# Patient Record
Sex: FEMALE | Race: WHITE | Employment: UNEMPLOYED | URBAN - NONMETROPOLITAN AREA
[De-identification: names, ages, dates, MRNs, and addresses within clinical notes are randomized per-mention and may not be internally consistent; named-entity substitution may affect disease eponyms.]

---

## 2019-09-18 ENCOUNTER — HOSPITAL ENCOUNTER (OUTPATIENT)
Dept: MAMMOGRAPHY | Age: 54
Discharge: HOME | End: 2019-09-18
Payer: COMMERCIAL

## 2019-09-18 ENCOUNTER — HOSPITAL ENCOUNTER (OUTPATIENT)
Dept: GENERAL RADIOLOGY | Age: 54
Discharge: HOME | End: 2019-09-18
Payer: COMMERCIAL

## 2019-09-18 DIAGNOSIS — R52 PAIN: ICD-10-CM

## 2019-09-18 DIAGNOSIS — Z12.39 SCREENING BREAST EXAMINATION: ICD-10-CM

## 2019-09-18 PROCEDURE — 77063 BREAST TOMOSYNTHESIS BI: CPT

## 2019-09-18 PROCEDURE — 74018 RADEX ABDOMEN 1 VIEW: CPT

## 2019-09-19 ENCOUNTER — HOSPITAL ENCOUNTER (OUTPATIENT)
Age: 54
Setting detail: SPECIMEN
Discharge: HOME | End: 2019-09-19
Payer: COMMERCIAL

## 2019-09-19 LAB
ABSOLUTE EOS #: 0.1 K/UL (ref 0–0.4)
ABSOLUTE IMMATURE GRANULOCYTE: ABNORMAL K/UL (ref 0–0.3)
ABSOLUTE LYMPH #: 3.5 K/UL (ref 1–4.8)
ABSOLUTE MONO #: 0.7 K/UL (ref 0.1–1.2)
ALBUMIN SERPL-MCNC: 4.2 G/DL (ref 3.5–5.2)
ALBUMIN/GLOBULIN RATIO: 1.1 (ref 1–2.5)
ALP BLD-CCNC: 132 U/L (ref 35–104)
ALT SERPL-CCNC: 39 U/L (ref 5–33)
ANION GAP SERPL CALCULATED.3IONS-SCNC: 14 MMOL/L (ref 9–17)
AST SERPL-CCNC: 32 U/L
BASOPHILS # BLD: 2 % (ref 0–1)
BASOPHILS ABSOLUTE: 0.3 K/UL (ref 0–0.2)
BILIRUB SERPL-MCNC: 0.46 MG/DL (ref 0.3–1.2)
BILIRUBIN DIRECT: 0.12 MG/DL
BILIRUBIN, INDIRECT: 0.34 MG/DL (ref 0–1)
BUN BLDV-MCNC: 6 MG/DL (ref 6–20)
BUN/CREAT BLD: 11 (ref 9–20)
CALCIUM SERPL-MCNC: 9.4 MG/DL (ref 8.6–10.4)
CHLORIDE BLD-SCNC: 101 MMOL/L (ref 98–107)
CHOLESTEROL/HDL RATIO: 5.7
CHOLESTEROL: 199 MG/DL
CO2: 24 MMOL/L (ref 20–31)
CREAT SERPL-MCNC: 0.56 MG/DL (ref 0.5–0.9)
DIFFERENTIAL TYPE: ABNORMAL
EOSINOPHILS RELATIVE PERCENT: 1 % (ref 1–7)
ESTIMATED AVERAGE GLUCOSE: 114 MG/DL
GFR AFRICAN AMERICAN: >60 ML/MIN
GFR NON-AFRICAN AMERICAN: >60 ML/MIN
GFR SERPL CREATININE-BSD FRML MDRD: ABNORMAL ML/MIN/{1.73_M2}
GFR SERPL CREATININE-BSD FRML MDRD: ABNORMAL ML/MIN/{1.73_M2}
GLOBULIN: 3.7 G/DL (ref 1.5–3.8)
GLUCOSE BLD-MCNC: 110 MG/DL (ref 70–99)
HBA1C MFR BLD: 5.6 % (ref 4.8–5.9)
HCT VFR BLD CALC: 45.3 % (ref 36–46)
HDLC SERPL-MCNC: 35 MG/DL
HEMOGLOBIN: 15.2 G/DL (ref 12–16)
IMMATURE GRANULOCYTES: ABNORMAL %
LDL CHOLESTEROL: 118 MG/DL (ref 0–130)
LYMPHOCYTES # BLD: 30 % (ref 16–46)
MCH RBC QN AUTO: 31.8 PG (ref 26–34)
MCHC RBC AUTO-ENTMCNC: 33.6 G/DL (ref 31–37)
MCV RBC AUTO: 94.6 FL (ref 80–100)
MONOCYTES # BLD: 6 % (ref 4–11)
NRBC AUTOMATED: ABNORMAL PER 100 WBC
PDW BLD-RTO: 13.3 % (ref 11–14.5)
PLATELET # BLD: 374 K/UL (ref 140–450)
PLATELET ESTIMATE: ABNORMAL
PMV BLD AUTO: 8.5 FL (ref 6–12)
POTASSIUM SERPL-SCNC: 4.2 MMOL/L (ref 3.7–5.3)
RBC # BLD: 4.79 M/UL (ref 4–5.2)
RBC # BLD: ABNORMAL 10*6/UL
SEG NEUTROPHILS: 61 % (ref 43–77)
SEGMENTED NEUTROPHILS ABSOLUTE COUNT: 7.2 K/UL (ref 1.8–7.7)
SODIUM BLD-SCNC: 139 MMOL/L (ref 135–144)
TOTAL PROTEIN: 7.9 G/DL (ref 6.4–8.3)
TRIGL SERPL-MCNC: 232 MG/DL
TSH SERPL DL<=0.05 MIU/L-ACNC: 3.5 MIU/L (ref 0.3–5)
VLDLC SERPL CALC-MCNC: ABNORMAL MG/DL (ref 1–30)
WBC # BLD: 11.8 K/UL (ref 3.5–11)
WBC # BLD: ABNORMAL 10*3/UL

## 2019-09-19 PROCEDURE — 80076 HEPATIC FUNCTION PANEL: CPT

## 2019-09-19 PROCEDURE — 84443 ASSAY THYROID STIM HORMONE: CPT

## 2019-09-19 PROCEDURE — 80061 LIPID PANEL: CPT

## 2019-09-19 PROCEDURE — 80048 BASIC METABOLIC PNL TOTAL CA: CPT

## 2019-09-19 PROCEDURE — 85025 COMPLETE CBC W/AUTO DIFF WBC: CPT

## 2019-09-19 PROCEDURE — 83036 HEMOGLOBIN GLYCOSYLATED A1C: CPT

## 2019-09-25 ENCOUNTER — HOSPITAL ENCOUNTER (OUTPATIENT)
Dept: WOMENS IMAGING | Age: 54
Discharge: HOME | End: 2019-09-25
Payer: COMMERCIAL

## 2019-09-25 DIAGNOSIS — R92.2 BREAST DENSITY: ICD-10-CM

## 2019-09-25 PROCEDURE — 76642 ULTRASOUND BREAST LIMITED: CPT

## 2019-09-25 PROCEDURE — G0279 TOMOSYNTHESIS, MAMMO: HCPCS

## 2019-10-08 ENCOUNTER — HOSPITAL ENCOUNTER (OUTPATIENT)
Age: 54
Discharge: HOME | End: 2019-10-08
Payer: COMMERCIAL

## 2019-10-10 ENCOUNTER — NURSE ONLY (OUTPATIENT)
Dept: LAB | Age: 54
End: 2019-10-10

## 2019-10-11 LAB
FERRITIN: 119 NG/ML (ref 10–291)
HBV SURFACE AB TITR SER: NEGATIVE {TITER}
HEPATITIS B SURFACE ANTIGEN: NEGATIVE
HEPATITIS C ANTIBODY: NEGATIVE
IGA: 249 MG/DL (ref 70–400)
IRON: 62 UG/DL (ref 50–170)
TOTAL IRON BINDING CAPACITY: 300 UG/DL (ref 171–450)

## 2019-10-13 LAB
ALPHA-1 ANTITRYPSIN: 133 MG/DL (ref 90–200)
CERULOPLASMIN: 30 MG/DL (ref 17–54)
GLIADIN PEPTIDE IGG, IGA: NORMAL
HAV AB SERPL IA-ACNC: POSITIVE
HEPATITIS B CORE TOTAL ANTIBODY: NEGATIVE
TISSUE TRANSGLUTAMINASE ANTIBODY: 1 U/ML (ref 0–5)
TISSUE TRANSGLUTAMINASE IGA: 1 U/ML (ref 0–3)

## 2019-10-14 ENCOUNTER — HOSPITAL ENCOUNTER (OUTPATIENT)
Dept: CT IMAGING | Age: 54
Discharge: HOME | End: 2019-10-14
Payer: COMMERCIAL

## 2019-10-14 DIAGNOSIS — R16.0 ENLARGED LIVER: ICD-10-CM

## 2019-10-14 DIAGNOSIS — R22.1 LUMP ON NECK: ICD-10-CM

## 2019-10-14 PROCEDURE — 70491 CT SOFT TISSUE NECK W/DYE: CPT

## 2019-10-14 PROCEDURE — 6360000004 HC RX CONTRAST MEDICATION: Performed by: NURSE PRACTITIONER

## 2019-10-14 PROCEDURE — 74177 CT ABD & PELVIS W/CONTRAST: CPT

## 2019-10-14 RX ADMIN — IOPAMIDOL 100 ML: 755 INJECTION, SOLUTION INTRAVENOUS at 18:41

## 2019-10-14 RX ADMIN — IOHEXOL 50 ML: 240 INJECTION, SOLUTION INTRATHECAL; INTRAVASCULAR; INTRAVENOUS; ORAL at 18:41

## 2019-10-21 ENCOUNTER — NURSE ONLY (OUTPATIENT)
Dept: LAB | Age: 54
End: 2019-10-21

## 2019-10-24 LAB — FECAL FAT, QUALITATIVE: NORMAL

## 2019-10-25 LAB — PANCREATIC ELASTASE, FECAL: 201 UG/G

## 2019-11-06 LAB — MISC. #1 REFERENCE GROUP TEST: NORMAL

## 2019-11-13 ENCOUNTER — HOSPITAL ENCOUNTER (OUTPATIENT)
Age: 54
Discharge: HOME | End: 2019-11-13
Payer: COMMERCIAL

## 2019-11-13 ENCOUNTER — HOSPITAL ENCOUNTER (OUTPATIENT)
Dept: ULTRASOUND IMAGING | Age: 54
Discharge: HOME | End: 2019-11-13
Payer: COMMERCIAL

## 2019-11-13 DIAGNOSIS — R22.1 NECK MASS: ICD-10-CM

## 2019-11-13 PROCEDURE — 76536 US EXAM OF HEAD AND NECK: CPT

## 2019-11-14 ENCOUNTER — HOSPITAL ENCOUNTER (OUTPATIENT)
Age: 54
Setting detail: SPECIMEN
Discharge: HOME | End: 2019-11-14
Payer: COMMERCIAL

## 2019-11-14 LAB
ABSOLUTE EOS #: 0.2 K/UL (ref 0–0.4)
ABSOLUTE IMMATURE GRANULOCYTE: ABNORMAL K/UL (ref 0–0.3)
ABSOLUTE LYMPH #: 4.2 K/UL (ref 1–4.8)
ABSOLUTE MONO #: 0.7 K/UL (ref 0.1–1.2)
ALBUMIN SERPL-MCNC: 4.4 G/DL (ref 3.5–5.2)
ALBUMIN/GLOBULIN RATIO: 1.2 (ref 1–2.5)
ALP BLD-CCNC: 149 U/L (ref 35–104)
ALT SERPL-CCNC: 38 U/L (ref 5–33)
ANION GAP SERPL CALCULATED.3IONS-SCNC: 14 MMOL/L (ref 9–17)
AST SERPL-CCNC: 31 U/L
BASOPHILS # BLD: 3 % (ref 0–1)
BASOPHILS ABSOLUTE: 0.3 K/UL (ref 0–0.2)
BILIRUB SERPL-MCNC: 0.3 MG/DL (ref 0.3–1.2)
BILIRUBIN DIRECT: 0.11 MG/DL
BILIRUBIN, INDIRECT: 0.19 MG/DL (ref 0–1)
BUN BLDV-MCNC: 6 MG/DL (ref 6–20)
BUN/CREAT BLD: 12 (ref 9–20)
C-REACTIVE PROTEIN: 9.3 MG/L (ref 0–5)
CALCIUM SERPL-MCNC: 9.6 MG/DL (ref 8.6–10.4)
CHLORIDE BLD-SCNC: 96 MMOL/L (ref 98–107)
CO2: 26 MMOL/L (ref 20–31)
CREAT SERPL-MCNC: 0.52 MG/DL (ref 0.5–0.9)
DIFFERENTIAL TYPE: ABNORMAL
EOSINOPHILS RELATIVE PERCENT: 2 % (ref 1–7)
GFR AFRICAN AMERICAN: >60 ML/MIN
GFR NON-AFRICAN AMERICAN: >60 ML/MIN
GFR SERPL CREATININE-BSD FRML MDRD: ABNORMAL ML/MIN/{1.73_M2}
GFR SERPL CREATININE-BSD FRML MDRD: ABNORMAL ML/MIN/{1.73_M2}
GLOBULIN: 3.8 G/DL (ref 1.5–3.8)
GLUCOSE BLD-MCNC: 96 MG/DL (ref 70–99)
HCT VFR BLD CALC: 46.1 % (ref 36–46)
HEMOGLOBIN: 15.6 G/DL (ref 12–16)
IMMATURE GRANULOCYTES: ABNORMAL %
LYMPHOCYTES # BLD: 38 % (ref 16–46)
MCH RBC QN AUTO: 31.9 PG (ref 26–34)
MCHC RBC AUTO-ENTMCNC: 33.8 G/DL (ref 31–37)
MCV RBC AUTO: 94.4 FL (ref 80–100)
MONOCYTES # BLD: 7 % (ref 4–11)
NRBC AUTOMATED: ABNORMAL PER 100 WBC
PDW BLD-RTO: 13.3 % (ref 11–14.5)
PLATELET # BLD: 301 K/UL (ref 140–450)
PLATELET ESTIMATE: ABNORMAL
PMV BLD AUTO: 8.3 FL (ref 6–12)
POTASSIUM SERPL-SCNC: 4.3 MMOL/L (ref 3.7–5.3)
RBC # BLD: 4.88 M/UL (ref 4–5.2)
RBC # BLD: ABNORMAL 10*6/UL
RHEUMATOID FACTOR: <10 IU/ML
SEDIMENTATION RATE, ERYTHROCYTE: 13 MM (ref 0–30)
SEG NEUTROPHILS: 50 % (ref 43–77)
SEGMENTED NEUTROPHILS ABSOLUTE COUNT: 5.7 K/UL (ref 1.8–7.7)
SODIUM BLD-SCNC: 136 MMOL/L (ref 135–144)
TOTAL PROTEIN: 8.2 G/DL (ref 6.4–8.3)
TSH SERPL DL<=0.05 MIU/L-ACNC: 4.15 MIU/L (ref 0.3–5)
WBC # BLD: 11.2 K/UL (ref 3.5–11)
WBC # BLD: ABNORMAL 10*3/UL

## 2019-11-14 PROCEDURE — 80076 HEPATIC FUNCTION PANEL: CPT

## 2019-11-14 PROCEDURE — 85651 RBC SED RATE NONAUTOMATED: CPT

## 2019-11-14 PROCEDURE — 86140 C-REACTIVE PROTEIN: CPT

## 2019-11-14 PROCEDURE — 85025 COMPLETE CBC W/AUTO DIFF WBC: CPT

## 2019-11-14 PROCEDURE — 86431 RHEUMATOID FACTOR QUANT: CPT

## 2019-11-14 PROCEDURE — 86038 ANTINUCLEAR ANTIBODIES: CPT

## 2019-11-14 PROCEDURE — 84443 ASSAY THYROID STIM HORMONE: CPT

## 2019-11-14 PROCEDURE — 80048 BASIC METABOLIC PNL TOTAL CA: CPT

## 2019-11-15 LAB — ANTI-NUCLEAR ANTIBODY (ANA): NEGATIVE

## 2019-12-05 ENCOUNTER — HOSPITAL ENCOUNTER (OUTPATIENT)
Dept: ULTRASOUND IMAGING | Age: 54
Discharge: HOME | End: 2019-12-05
Payer: COMMERCIAL

## 2019-12-05 DIAGNOSIS — R10.9 ABDOMINAL PAIN, UNSPECIFIED ABDOMINAL LOCATION: ICD-10-CM

## 2019-12-05 DIAGNOSIS — R10.2 PELVIC PAIN: ICD-10-CM

## 2019-12-05 PROCEDURE — 76856 US EXAM PELVIC COMPLETE: CPT

## 2019-12-05 PROCEDURE — 76705 ECHO EXAM OF ABDOMEN: CPT

## 2020-01-29 ENCOUNTER — OFFICE VISIT (OUTPATIENT)
Dept: CARDIOLOGY CLINIC | Age: 55
End: 2020-01-29
Payer: COMMERCIAL

## 2020-01-29 VITALS
HEART RATE: 100 BPM | WEIGHT: 189 LBS | HEIGHT: 62 IN | DIASTOLIC BLOOD PRESSURE: 110 MMHG | SYSTOLIC BLOOD PRESSURE: 200 MMHG | BODY MASS INDEX: 34.78 KG/M2

## 2020-01-29 PROCEDURE — 93000 ELECTROCARDIOGRAM COMPLETE: CPT | Performed by: INTERNAL MEDICINE

## 2020-01-29 PROCEDURE — 99204 OFFICE O/P NEW MOD 45 MIN: CPT | Performed by: INTERNAL MEDICINE

## 2020-01-29 RX ORDER — METOPROLOL SUCCINATE 200 MG/1
TABLET, EXTENDED RELEASE ORAL
COMMUNITY
Start: 2020-01-08 | End: 2020-01-29 | Stop reason: ALTCHOICE

## 2020-01-29 RX ORDER — ALPRAZOLAM 0.25 MG/1
TABLET ORAL 3 TIMES DAILY PRN
Refills: 5 | COMMUNITY
Start: 2019-11-22 | End: 2021-06-14 | Stop reason: SDUPTHER

## 2020-01-29 RX ORDER — AMLODIPINE BESYLATE 10 MG/1
10 TABLET ORAL DAILY
Qty: 30 TABLET | Refills: 5 | Status: SHIPPED | OUTPATIENT
Start: 2020-01-29 | End: 2020-04-27

## 2020-01-29 RX ORDER — PANTOPRAZOLE SODIUM 40 MG/1
40 TABLET, DELAYED RELEASE ORAL 2 TIMES DAILY
Refills: 6 | COMMUNITY
Start: 2019-11-22 | End: 2021-06-04

## 2020-01-29 RX ORDER — METOPROLOL SUCCINATE 100 MG/1
TABLET, EXTENDED RELEASE ORAL
Refills: 5 | COMMUNITY
Start: 2019-11-22 | End: 2020-01-29

## 2020-01-29 RX ORDER — AMLODIPINE BESYLATE 5 MG/1
TABLET ORAL
Refills: 11 | COMMUNITY
Start: 2019-10-28 | End: 2020-01-29

## 2020-01-29 RX ORDER — CARVEDILOL 12.5 MG/1
12.5 TABLET ORAL DAILY
Qty: 30 TABLET | Refills: 5 | Status: SHIPPED | OUTPATIENT
Start: 2020-01-29 | End: 2020-02-06 | Stop reason: SDUPTHER

## 2020-01-29 RX ORDER — SUCRALFATE 1 G/1
1 TABLET ORAL PRN
COMMUNITY
Start: 2019-12-17 | End: 2021-03-24

## 2020-01-29 RX ORDER — CLONIDINE HYDROCHLORIDE 0.1 MG/1
0.1 TABLET ORAL 2 TIMES DAILY
Qty: 60 TABLET | Refills: 3 | Status: SHIPPED | OUTPATIENT
Start: 2020-01-29 | End: 2020-07-01

## 2020-01-29 RX ORDER — FUROSEMIDE 20 MG/1
20 TABLET ORAL DAILY
Qty: 90 TABLET | Refills: 1 | Status: SHIPPED
Start: 2020-01-29 | End: 2020-02-19 | Stop reason: ALTCHOICE

## 2020-01-29 RX ORDER — CLONIDINE HYDROCHLORIDE 0.2 MG/1
0.2 TABLET ORAL 2 TIMES DAILY
COMMUNITY
Start: 2019-12-27 | End: 2020-01-29 | Stop reason: DRUGHIGH

## 2020-01-29 RX ORDER — CLONIDINE HYDROCHLORIDE 0.1 MG/1
TABLET ORAL
Refills: 5 | COMMUNITY
Start: 2019-11-01 | End: 2020-01-29

## 2020-01-29 NOTE — PROGRESS NOTES
[address] [place] [address] [facility], [place], operated by [facility] [id number]  Dept: [phone]  Dept Fax: [phone]  Loc: [phone]    Visit Date: 1/29/2020    Ms. Rajani Jones is a 53 y.o. female  who presented for:  Chief Complaint   Patient presents with    Establish Cardiologist       HPI:   54 yo F c hx of HTN, Anxiety, Smoker, GERD, and alcohol abuse is here to establish cardiology. Patient reports worsening shortness of breath over the course of last 1 paul. Her BP has not been well controlled. She has been drinking alcohol every day, smokes 1 ppd. Her BP has been uncontrolled. No prior workup        Current Outpatient Medications:     pantoprazole (PROTONIX) 40 MG tablet, 40 mg 2 times daily , Disp: , Rfl: 6    ALPRAZolam (XANAX) 0.25 MG tablet, 3 times daily as needed. , Disp: , Rfl: 5    Lactobacillus (PROBIOTIC ACIDOPHILUS PO), Take by mouth daily, Disp: , Rfl:     carvedilol (COREG) 12.5 MG tablet, Take 1 tablet by mouth daily, Disp: 30 tablet, Rfl: 5    amLODIPine (NORVASC) 10 MG tablet, Take 1 tablet by mouth daily, Disp: 30 tablet, Rfl: 5    furosemide (LASIX) 20 MG tablet, Take 1 tablet by mouth daily, Disp: 90 tablet, Rfl: 1    sucralfate (CARAFATE) 1 GM tablet, , Disp: , Rfl:     Past Medical History  Riki Verma  has a past medical history of Anxiety, Arthritis, Enlargement, spleen, Fatty liver, GERD (gastroesophageal reflux disease), Hepatitis A, Hypertension, and Ovarian cyst.    Social History  Riki Verma  reports that she has been smoking. She has been smoking about 1.00 pack per day. She has never used smokeless tobacco. She reports current alcohol use. She reports that she does not use drugs. Family History  Riki Verma family history includes Heart Attack in her maternal grandfather and paternal grandfather; Heart Disease in her mother; Heart Surgery in her mother; Other in her brother and sister; Stroke in her maternal grandmother.     Past Surgical History 11/14/2019    MCV 94.4 11/14/2019    MCH 31.9 11/14/2019    MCHC 33.8 11/14/2019    RDW 13.3 11/14/2019     11/14/2019    MPV 8.3 11/14/2019       Lab Results   Component Value Date     11/14/2019    K 4.3 11/14/2019    CL 96 11/14/2019    CO2 26 11/14/2019    BUN 6 11/14/2019    LABALBU 4.4 11/14/2019    CREATININE 0.52 11/14/2019    CALCIUM 9.6 11/14/2019    GFRAA >60 11/14/2019    LABGLOM >60 11/14/2019    LABGLOM >90 01/04/2017    GLUCOSE 96 11/14/2019       Lab Results   Component Value Date    ALKPHOS 149 11/14/2019    ALT 38 11/14/2019    AST 31 11/14/2019    PROT 8.2 11/14/2019    BILITOT 0.30 11/14/2019    BILIDIR 0.11 11/14/2019    IBILI 0.19 11/14/2019    LABALBU 4.4 11/14/2019       No results found for: MG    No results found for: INR, PROTIME      Lab Results   Component Value Date    LABA1C 5.6 09/19/2019       Lab Results   Component Value Date    TRIG 232 09/19/2019    HDL 35 09/19/2019    LDLCALC 133 01/04/2017       Lab Results   Component Value Date    TSH 4.15 11/14/2019         Testing Reviewed:      I haveindividually reviewed the below cardiac tests    EKG:    ECHO: No results found for this or any previous visit. STRESS:    CATH:    Assessment/Plan       Diagnosis Orders   1. Other chest pain  EKG 12 Lead    Stress test, lexiscan    ECHO Complete 2D W Doppler W Color    Nkechi Guo MD, Pulmonology, Jamar Ochoa MD, Pulmonology, [facility]   2. Dyspnea on exertion  EKG 12 Lead    Stress test, lexiscan    ECHO Complete 2D W Doppler W Color    Nkechi Guo MD, Pulmonology, Jamar Ochoa MD, Pulmonology, [facility]   3. Other emphysema [facility])  Nkechi Guo MD, Pulmonology, Jamar Ochoa MD, Pulmonology, [facility]     Dyspnea  Smoker  Weight gain  COPD?, Pul HTN?  CHF?       Plan:  2D Echo, LVSF, Valves  Lexiscan  Pulm referral for PFTs, Sleep study  Will change toprol

## 2020-01-29 NOTE — PROGRESS NOTES
New patient     C/O increased weight gain, 2+ pitting edema (per daughter in law). C/O edema in her abdomen and around her neck. C/O mid sternal chest pain (7/10) radiating to her back and neck. C/O arms feeling heavy. C/O SOB. Worse with activity. C/O dizziness at times.      EKG DONE

## 2020-02-06 ENCOUNTER — TELEPHONE (OUTPATIENT)
Dept: CARDIOLOGY CLINIC | Age: 55
End: 2020-02-06

## 2020-02-07 ENCOUNTER — HOSPITAL ENCOUNTER (OUTPATIENT)
Dept: NON INVASIVE DIAGNOSTICS | Age: 55
Discharge: HOME | End: 2020-02-07
Payer: COMMERCIAL

## 2020-02-07 VITALS — BODY MASS INDEX: 32.28 KG/M2 | WEIGHT: 176.5 LBS

## 2020-02-07 LAB
LV EF: 58 %
LVEF MODALITY: NORMAL

## 2020-02-07 PROCEDURE — A9500 TC99M SESTAMIBI: HCPCS | Performed by: INTERNAL MEDICINE

## 2020-02-07 PROCEDURE — 2709999900 HC NON-CHARGEABLE SUPPLY

## 2020-02-07 PROCEDURE — 93017 CV STRESS TEST TRACING ONLY: CPT | Performed by: INTERNAL MEDICINE

## 2020-02-07 PROCEDURE — 6360000002 HC RX W HCPCS

## 2020-02-07 PROCEDURE — 78452 HT MUSCLE IMAGE SPECT MULT: CPT

## 2020-02-07 PROCEDURE — 93306 TTE W/DOPPLER COMPLETE: CPT

## 2020-02-07 PROCEDURE — 3430000000 HC RX DIAGNOSTIC RADIOPHARMACEUTICAL: Performed by: INTERNAL MEDICINE

## 2020-02-07 RX ORDER — CARVEDILOL 25 MG/1
25 TABLET ORAL DAILY
Qty: 60 TABLET | Refills: 3 | Status: SHIPPED
Start: 2020-02-07 | End: 2020-02-19 | Stop reason: DRUGHIGH

## 2020-02-07 RX ADMIN — Medication 31.1 MILLICURIE: at 15:53

## 2020-02-07 RX ADMIN — Medication 9.5 MILLICURIE: at 14:58
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
## 2020-02-19 ENCOUNTER — OFFICE VISIT (OUTPATIENT)
Dept: CARDIOLOGY CLINIC | Age: 55
End: 2020-02-19
Payer: COMMERCIAL

## 2020-02-19 VITALS
HEART RATE: 100 BPM | WEIGHT: 184 LBS | SYSTOLIC BLOOD PRESSURE: 152 MMHG | HEIGHT: 62 IN | BODY MASS INDEX: 33.86 KG/M2 | DIASTOLIC BLOOD PRESSURE: 82 MMHG

## 2020-02-19 PROCEDURE — 99214 OFFICE O/P EST MOD 30 MIN: CPT | Performed by: INTERNAL MEDICINE

## 2020-02-19 RX ORDER — BUMETANIDE 1 MG/1
0.5 TABLET ORAL DAILY
Qty: 30 TABLET | Refills: 3 | Status: SHIPPED | OUTPATIENT
Start: 2020-02-19 | End: 2020-03-05

## 2020-02-19 RX ORDER — CARVEDILOL 25 MG/1
25 TABLET ORAL 2 TIMES DAILY WITH MEALS
COMMUNITY
End: 2020-04-13 | Stop reason: SDUPTHER

## 2020-02-19 NOTE — PROGRESS NOTES
Psychiatric: Normal mood and affect.        No results found for: CKTOTAL, CKMB, CKMBINDEX    Lab Results   Component Value Date    WBC 11.2 11/14/2019    RBC 4.88 11/14/2019    HGB 15.6 11/14/2019    HCT 46.1 11/14/2019    MCV 94.4 11/14/2019    MCH 31.9 11/14/2019    MCHC 33.8 11/14/2019    RDW 13.3 11/14/2019     11/14/2019    MPV 8.3 11/14/2019       Lab Results   Component Value Date     11/14/2019    K 4.3 11/14/2019    CL 96 11/14/2019    CO2 26 11/14/2019    BUN 6 11/14/2019    LABALBU 4.4 11/14/2019    CREATININE 0.52 11/14/2019    CALCIUM 9.6 11/14/2019    GFRAA >60 11/14/2019    LABGLOM >60 11/14/2019    LABGLOM >90 01/04/2017    GLUCOSE 96 11/14/2019       Lab Results   Component Value Date    ALKPHOS 149 11/14/2019    ALT 38 11/14/2019    AST 31 11/14/2019    PROT 8.2 11/14/2019    BILITOT 0.30 11/14/2019    BILIDIR 0.11 11/14/2019    IBILI 0.19 11/14/2019    LABALBU 4.4 11/14/2019       No results found for: MG    No results found for: INR, PROTIME      Lab Results   Component Value Date    LABA1C 5.6 09/19/2019       Lab Results   Component Value Date    TRIG 232 09/19/2019    HDL 35 09/19/2019    LDLCALC 133 01/04/2017       Lab Results   Component Value Date    TSH 4.15 11/14/2019         Testing Reviewed:      I haveindividually reviewed the below cardiac tests    EKG:    ECHO:   Results for orders placed during the hospital encounter of 02/07/20   ECHO Complete 2D W Doppler W Color    Narrative Transthoracic Echocardiography Report (TTE)     Demographics      Patient Name    Lenda Apley Gender               Female      MR #            [id number]       Race                                                       Ethnicity      Account #       [de-identified]       Room Number      Accession       [id number]       Date of Study        02/07/2020   Number      Date of Birth   1965      Referring Physician  Angel Serna MD Moreno Corona MD      Age             53 year(s)      [facility]                                      Interpreting         Moreno Corona MD                                   Physician     Procedure    Type of Study      TTE procedure:ECHOCARDIOGRAM COMPLETE 2D W DOPPLER W COLOR. Procedure Date  Date: 02/07/2020 Start: 01:50 PM    Study Location: Echo Lab  Technical Quality: Adequate visualization    Indications:Shortness of breath. Additional Medical History:Smoker, Hypertension, GERD, Alcohol Abuse    Patient Status: Routine    Height: 62 inches Weight: 189 pounds BSA: 1.87 m^2 BMI: 34.57 kg/m^2    BP: 158/79 mmHg     Conclusions      Summary   Left ventricular size and systolic function is normal. Ejection fraction   was estimated at 55-60%. LV wall thickness is within normal limits and   there are no obvious wall motion abnormalities. The right ventricular size appears normal with normal systolic function   and wall thickness. Signature      ----------------------------------------------------------------   Electronically signed by Moreno Corona MD (Interpreting   physician) on 02/10/2020 at 10:37 AM   ----------------------------------------------------------------      Findings      Mitral Valve   The mitral valve structure is normal with normal leaflet separation. DOPPLER: The transmitral velocity was within the normal range with no   evidence for mitral stenosis. There was no evidence of mitral   regurgitation. Aortic Valve   The aortic valve leaflets were not well visualized. DOPPLER: Transaortic velocity was within the normal range with no evidence   of aortic stenosis. There was no evidence of aortic regurgitation. Tricuspid Valve   The tricuspid valve structure is normal with normal leaflet separation. DOPPLER: There is no evidence of tricuspid stenosis. There was no evidence   of tricuspid regurgitation.       Pulmonic Valve   The pulmonic mmHg      MV Deceleration Time: 299                         TV Peak E-Wave: 60.4   msec                                              cm/s                                                     TV Peak A-Wave: 57.8                              IVRT: 74 msec          cm/s   MV E' Septal Velocity: 6.2   cm/s                                              TV Peak Gradient: 1.46   MV A' Septal Velocity: 7.3 AV DVI (Vmax):0.82     mmHg   cm/s   MV E' Lateral Velocity:                           PV Peak Velocity: 67.7   7.3 cm/s                                          cm/s   MV A' Lateral Velocity:                           PV Peak Gradient: 1.83   9.4 cm/s                                          mmHg   E/E' septal: 12.23   E/E' lateral: 10.38     [email or web address] [id number]       STRESS:    CATH:    Assessment/Plan       Diagnosis Orders   1. Dyspnea, unspecified type       Dyspnea  Smoker  Weight gain  COPD?, Pul HTN?  CHF?        Plan:  Reviewed 2D Echo, LVSF, Valves  Lexiscan  Pulm referral for PFTs, Sleep study  changed toprol to Coreg  Added amlodipine  Continue clonidine for now, will try to taper off  Smoking cessation advised  Alcohol cessation advised  Advised to call in with BP logs in 5 days  The patient is asked to make an attempt to improve diet and exercise patterns to aid in medical management of this problem. Advised more plant based nutrition/meditarrean diet   Advised patient to call office or seek immediate medical attention if there is any new onset of  any chest pain, sob, palpitations, lightheadedness, dizziness, orthopnea, PND or pedal edema. All medication side effects were discussed in details.     Thank youfor allowing me to participate in the care of this patient. Please do not hesitate to contact me for any further questions.      Return in about 3 months (around 5/19/2020), or if

## 2020-03-05 ENCOUNTER — TELEPHONE (OUTPATIENT)
Dept: CARDIOLOGY CLINIC | Age: 55
End: 2020-03-05

## 2020-03-05 RX ORDER — BUMETANIDE 1 MG/1
1 TABLET ORAL DAILY
COMMUNITY
End: 2020-03-30 | Stop reason: SDUPTHER

## 2020-03-05 NOTE — TELEPHONE ENCOUNTER
Verbal order Dr Sam Rubio:  Take Bumex 1 mg qd and get BMP in one week. Pt notified. BMP ordered. Med ist updated. Please agree with verbal order.

## 2020-03-05 NOTE — TELEPHONE ENCOUNTER
Pt called with BP log (in Media) and states that she is still having swelling in her left foot and both hands. Asking if she can increase Bumex. She take half of 1 mg QD.

## 2020-03-10 ENCOUNTER — OFFICE VISIT (OUTPATIENT)
Dept: PULMONOLOGY | Age: 55
End: 2020-03-10
Payer: COMMERCIAL

## 2020-03-10 ENCOUNTER — TELEPHONE (OUTPATIENT)
Dept: PULMONOLOGY | Age: 55
End: 2020-03-10

## 2020-03-10 VITALS
DIASTOLIC BLOOD PRESSURE: 86 MMHG | HEART RATE: 90 BPM | OXYGEN SATURATION: 98 % | HEIGHT: 62 IN | SYSTOLIC BLOOD PRESSURE: 136 MMHG | WEIGHT: 183.2 LBS | BODY MASS INDEX: 33.71 KG/M2

## 2020-03-10 PROCEDURE — 99205 OFFICE O/P NEW HI 60 MIN: CPT | Performed by: INTERNAL MEDICINE

## 2020-03-10 RX ORDER — FLUTICASONE PROPIONATE 50 MCG
2 SPRAY, SUSPENSION (ML) NASAL DAILY
Qty: 1 BOTTLE | Refills: 11 | Status: SHIPPED | OUTPATIENT
Start: 2020-03-10 | End: 2021-04-02

## 2020-03-10 RX ORDER — ZOLPIDEM TARTRATE 5 MG/1
5 TABLET ORAL NIGHTLY PRN
Qty: 1 TABLET | Refills: 0 | Status: SHIPPED | OUTPATIENT
Start: 2020-03-10 | End: 2020-03-11

## 2020-03-10 RX ORDER — ACETAMINOPHEN 500 MG
500 TABLET ORAL EVERY 6 HOURS PRN
COMMUNITY
End: 2022-02-14

## 2020-03-10 NOTE — TELEPHONE ENCOUNTER
Pt was seen in Office today. She is requesting something for sleep for the night of her sleep study. Please advise.

## 2020-03-10 NOTE — PROGRESS NOTES
Neck Circumference -   16.5  Mallampati - 2    Lung Nodule Screening     [] Qualifies    [] Does not qualify   [] Declined    [] Completed

## 2020-03-18 ENCOUNTER — HOSPITAL ENCOUNTER (OUTPATIENT)
Age: 55
Setting detail: SPECIMEN
Discharge: HOME | End: 2020-03-18
Payer: COMMERCIAL

## 2020-03-18 LAB
ANION GAP SERPL CALCULATED.3IONS-SCNC: 14 MMOL/L (ref 9–17)
BUN BLDV-MCNC: 8 MG/DL (ref 6–20)
BUN/CREAT BLD: 12 (ref 9–20)
CALCIUM SERPL-MCNC: 9.7 MG/DL (ref 8.6–10.4)
CHLORIDE BLD-SCNC: 96 MMOL/L (ref 98–107)
CO2: 27 MMOL/L (ref 20–31)
CREAT SERPL-MCNC: 0.65 MG/DL (ref 0.5–0.9)
GFR AFRICAN AMERICAN: >60 ML/MIN
GFR NON-AFRICAN AMERICAN: >60 ML/MIN
GFR SERPL CREATININE-BSD FRML MDRD: ABNORMAL ML/MIN/{1.73_M2}
GFR SERPL CREATININE-BSD FRML MDRD: ABNORMAL ML/MIN/{1.73_M2}
GLUCOSE BLD-MCNC: 97 MG/DL (ref 70–99)
POTASSIUM SERPL-SCNC: 3.9 MMOL/L (ref 3.7–5.3)
SODIUM BLD-SCNC: 137 MMOL/L (ref 135–144)

## 2020-03-18 PROCEDURE — 80048 BASIC METABOLIC PNL TOTAL CA: CPT

## 2020-03-26 ENCOUNTER — TELEPHONE (OUTPATIENT)
Dept: PULMONOLOGY | Age: 55
End: 2020-03-26

## 2020-03-30 RX ORDER — BUMETANIDE 1 MG/1
1 TABLET ORAL DAILY
Qty: 90 TABLET | Refills: 3 | Status: SHIPPED | OUTPATIENT
Start: 2020-03-30 | End: 2020-05-20 | Stop reason: SDUPTHER

## 2020-04-13 RX ORDER — CARVEDILOL 25 MG/1
25 TABLET ORAL 2 TIMES DAILY WITH MEALS
Qty: 180 TABLET | Refills: 1 | Status: SHIPPED | OUTPATIENT
Start: 2020-04-13 | End: 2020-07-10

## 2020-04-28 RX ORDER — AMLODIPINE BESYLATE 10 MG/1
10 TABLET ORAL DAILY
Qty: 30 TABLET | Refills: 0 | Status: SHIPPED
Start: 2020-04-28 | End: 2020-08-11 | Stop reason: SINTOL

## 2020-05-19 ENCOUNTER — TELEPHONE (OUTPATIENT)
Dept: SLEEP CENTER | Age: 55
End: 2020-05-19

## 2020-05-21 RX ORDER — BUMETANIDE 1 MG/1
1 TABLET ORAL DAILY
Qty: 90 TABLET | Refills: 3 | OUTPATIENT
Start: 2020-05-21 | End: 2021-03-02

## 2020-05-26 ENCOUNTER — TELEPHONE (OUTPATIENT)
Dept: SLEEP CENTER | Age: 55
End: 2020-05-26

## 2020-05-26 NOTE — TELEPHONE ENCOUNTER
Numerous phone calls have been made, leaving messages to schedule HST. After calls on 5/20, 5/21, and 5/26 a letter has been sent to Jarrod Arevalo, inviting her to call the sleep center if she wishes to schedule a HST.

## 2020-05-27 ENCOUNTER — OFFICE VISIT (OUTPATIENT)
Dept: CARDIOLOGY CLINIC | Age: 55
End: 2020-05-27
Payer: COMMERCIAL

## 2020-05-27 VITALS
HEIGHT: 62 IN | SYSTOLIC BLOOD PRESSURE: 158 MMHG | BODY MASS INDEX: 34.23 KG/M2 | HEART RATE: 88 BPM | WEIGHT: 186 LBS | DIASTOLIC BLOOD PRESSURE: 80 MMHG

## 2020-05-27 PROCEDURE — 99213 OFFICE O/P EST LOW 20 MIN: CPT | Performed by: INTERNAL MEDICINE

## 2020-05-27 NOTE — PROGRESS NOTES
[id number]       Room Number      Accession       [id number]       Date of Study        02/07/2020   Number      Date of Birth   1965      Referring Physician  Paul Blankenship MD      Age             53 year(s)      Betty Soldrupa, [facility]                                      Interpreting         Pancho Blankenship MD                                   Physician     Procedure    Type of Study      TTE procedure:ECHOCARDIOGRAM COMPLETE 2D W DOPPLER W COLOR. Procedure Date  Date: 02/07/2020 Start: 01:50 PM    Study Location: Echo Lab  Technical Quality: Adequate visualization    Indications:Shortness of breath. Additional Medical History:Smoker, Hypertension, GERD, Alcohol Abuse    Patient Status: Routine    Height: 62 inches Weight: 189 pounds BSA: 1.87 m^2 BMI: 34.57 kg/m^2    BP: 158/79 mmHg     Conclusions      Summary   Left ventricular size and systolic function is normal. Ejection fraction   was estimated at 55-60%. LV wall thickness is within normal limits and   there are no obvious wall motion abnormalities. The right ventricular size appears normal with normal systolic function   and wall thickness. Signature      ----------------------------------------------------------------   Electronically signed by Pancho Blankenship MD (Interpreting   physician) on 02/10/2020 at 10:37 AM   ----------------------------------------------------------------      Findings      Mitral Valve   The mitral valve structure is normal with normal leaflet separation. DOPPLER: The transmitral velocity was within the normal range with no   evidence for mitral stenosis. There was no evidence of mitral   regurgitation. Aortic Valve   The aortic valve leaflets were not well visualized. DOPPLER: Transaortic velocity was within the normal range with no evidence   of aortic stenosis. There was no evidence of aortic regurgitation. Tricuspid Valve   The tricuspid valve structure is normal with normal leaflet separation. DOPPLER: There is no evidence of tricuspid stenosis. There was no evidence   of tricuspid regurgitation. Pulmonic Valve   The pulmonic valve leaflets appear normal thickness, and normal cuspal   separation. DOPPLER: The transpulmonic velocity was within the normal   range. No evidence for regurgitation. Left Atrium   Left atrial size is normal.      Left Ventricle   Left ventricular size and systolic function is normal. Ejection fraction   was estimated at 55-60%. LV wall thickness is within normal limits and   there are no obvious wall motion abnormalities. Right Atrium   Right atrial size was normal.      Right Ventricle   The right ventricular size appears normal with normal systolic function   and wall thickness. Pericardial Effusion   The pericardium appears normal with no evidence of a pericardial effusion. Pleural Effusion   No evidence of pleural effusion. Aorta / Great Vessels   -Aortic root dimension within normal limits. -IVC size is within normal limits with normal respiratory phasic changes.      M-Mode/2D Measurements & Calculations      LV Diastolic   LV Systolic Dimension: 3  AV Cusp Separation: 1.9 cmLA   Dimension: 4.6 cm                        Dimension: 4.1 cmAO Root   cm             LV Volume Diastolic: 51.0 Dimension: 2.5 cmLA Area: 16.5   LV FS:34.8 %   ml                        cm^2   LV PW          LV Volume Systolic: 35 ml   Diastolic: 1.1 LV EDV/LV EDV Index: 97.3   cm             ml/52 m^2LV ESV/LV ESV   Septum         Index: 35 ml/19 m^2       RV Diastolic Dimension: 3.3 cm   Diastolic: 1.1 EF Calculated: 64 %   cm                                       LA/Aorta: 1.64                                            Ascending Aorta: 3.1 cm                                            LA volume/Index: 49.9 ml /27m^2     Doppler Measurements & Calculations      MV Peak E-Wave: 75.8 cm/s  AV Peak Velocity: 111  LVOT Peak Velocity: 91.3   MV Peak A-Wave: 71.6 cm/s  cm/s                   cm/s   MV E/A Ratio: 1.06         AV Peak Gradient: 4.93 LVOT Peak Gradient: 3   MV Peak Gradient: 2.3 mmHg mmHg                   mmHg      MV Deceleration Time: 299                         TV Peak E-Wave: 60.4   msec                                              cm/s                                                     TV Peak A-Wave: 57.8                              IVRT: 74 msec          cm/s   MV E' Septal Velocity: 6.2   cm/s                                              TV Peak Gradient: 1.46   MV A' Septal Velocity: 7.3 AV DVI (Vmax):0.82     mmHg   cm/s   MV E' Lateral Velocity:                           PV Peak Velocity: 67.7   7.3 cm/s                                          cm/s   MV A' Lateral Velocity:                           PV Peak Gradient: 1.83   9.4 cm/s                                          mmHg   E/E' septal: 12.23   E/E' lateral: 10.38     [email or web address] [id number]%3d       STRESS:    CATH:    Assessment/Plan       Diagnosis Orders   1. Leg edema       Dyspnea  Smoker  Weight gain  COPD?, Pul HTN?  CHF?        Plan:  Reviewed 2D Echo, LVSF, Valves  Lexiscan  Pulm referral for PFTs, Sleep study  States she is continuing to smoke and drinks 5-6 drinks almost daily  changed toprol to Coreg  Added amlodipine  Continue clonidine for now, will try to taper off  Smoking cessation advised  Alcohol cessation advised  The patient is asked to make an attempt to improve diet and exercise patterns to aid in medical management of this problem.   Advised more plant based nutrition/meditarrean diet   Advised patient to call office or seek immediate medical attention if there is any new onset of  any chest pain, sob, palpitations, lightheadedness, dizziness, orthopnea, PND or pedal edema. All medication side effects were discussed in details.     Thank youfor allowing me to participate in the care of this patient. Please do not hesitate to contact me for any further questions. Return in about 8 months (around 1/27/2021) for Regular follow up, Review testing.        Electronically signed by Brandon Jesus MD [facility]  5/27/2020 at 2:17 PM

## 2020-06-18 ENCOUNTER — HOSPITAL ENCOUNTER (OUTPATIENT)
Age: 55
Discharge: HOME | End: 2020-06-18
Payer: COMMERCIAL

## 2020-06-18 ENCOUNTER — HOSPITAL ENCOUNTER (OUTPATIENT)
Dept: GENERAL RADIOLOGY | Age: 55
Discharge: HOME | End: 2020-06-18
Payer: COMMERCIAL

## 2020-06-18 PROCEDURE — 71046 X-RAY EXAM CHEST 2 VIEWS: CPT

## 2020-06-23 ENCOUNTER — HOSPITAL ENCOUNTER (OUTPATIENT)
Age: 55
Discharge: HOME | End: 2020-06-23
Payer: COMMERCIAL

## 2020-06-23 PROCEDURE — U0002 COVID-19 LAB TEST NON-CDC: HCPCS

## 2020-06-24 LAB
PERFORMING LAB: NORMAL
REPORT: NORMAL
SARS-COV-2: NOT DETECTED

## 2020-06-29 ENCOUNTER — HOSPITAL ENCOUNTER (OUTPATIENT)
Dept: PULMONOLOGY | Age: 55
Discharge: HOME | End: 2020-06-29
Payer: COMMERCIAL

## 2020-06-29 PROCEDURE — 94729 DIFFUSING CAPACITY: CPT

## 2020-06-29 PROCEDURE — 94726 PLETHYSMOGRAPHY LUNG VOLUMES: CPT

## 2020-06-29 PROCEDURE — 94060 EVALUATION OF WHEEZING: CPT

## 2020-07-01 RX ORDER — CLONIDINE HYDROCHLORIDE 0.1 MG/1
0.1 TABLET ORAL DAILY
Qty: 90 TABLET | Refills: 1 | Status: SHIPPED | OUTPATIENT
Start: 2020-07-01 | End: 2021-03-02

## 2020-07-07 ENCOUNTER — VIRTUAL VISIT (OUTPATIENT)
Dept: PULMONOLOGY | Age: 55
End: 2020-07-07
Payer: COMMERCIAL

## 2020-07-07 PROCEDURE — 99215 OFFICE O/P EST HI 40 MIN: CPT | Performed by: NURSE PRACTITIONER

## 2020-07-07 PROCEDURE — 99406 BEHAV CHNG SMOKING 3-10 MIN: CPT | Performed by: NURSE PRACTITIONER

## 2020-07-07 RX ORDER — UMECLIDINIUM BROMIDE AND VILANTEROL TRIFENATATE 62.5; 25 UG/1; UG/1
1 POWDER RESPIRATORY (INHALATION) DAILY
Qty: 30 PUFF | Refills: 5 | Status: SHIPPED | OUTPATIENT
Start: 2020-07-07 | End: 2020-12-02

## 2020-07-07 RX ORDER — ALBUTEROL SULFATE 90 UG/1
2 AEROSOL, METERED RESPIRATORY (INHALATION) EVERY 6 HOURS PRN
Qty: 1 INHALER | Refills: 5 | Status: SHIPPED | OUTPATIENT
Start: 2020-07-07 | End: 2021-05-04 | Stop reason: SDUPTHER

## 2020-07-07 NOTE — PATIENT INSTRUCTIONS
Patient Education        umeclidinium and vilanterol  Pronunciation:  jennifer ME kli CYDNEY ee um and vye MICAH ter ol  Brand:  Anoro Ellipta  What is the most important information I should know about umeclidinium and vilanterol? You should not use this medicine if you are allergic to umeclidinium or vilanterol, or if you have a severe allergy to milk proteins. Umeclidinium and vilanterol is not a rescue medicine. It will not work fast enough to treat an bronchospasm attack. Seek medical attention if your breathing problems do not improve, or if your symptoms get worse quickly. What is umeclidinium and vilanterol? Umeclidinium and vilanterol is a combination medicine used to prevent airflow obstruction and reduce flare-ups in adults with COPD (chronic obstructive pulmonary disease), including bronchitis and emphysema. This medicine is for use only in people with COPD and should not be used to treat asthma. Umeclidinium and vilanterol may also be used for purposes not listed in this medication guide. What should I discuss with my healthcare provider before using umeclidinium and vilanterol? You should not use this medicine if you are allergic to umeclidinium or vilanterol, or if you have a severe allergy to milk proteins. Tell your doctor if you have ever had:  · heart disease, high blood pressure;  · a seizure;  · liver disease;  · glaucoma;  · diabetes;  · a thyroid disorder; or  · an enlarged prostate or urination problems. Tell your doctor if you are pregnant or breastfeeding. Umeclidinium and vilanterol is not approved for use by anyone younger than 25years old. How should I use umeclidinium and vilanterol? Follow all directions on your prescription label and read all medication guides. Use the medicine exactly as directed. Umeclidinium and vilanterol is not a rescue medicine for bronchospasm attacks. Use only fast-acting inhalation medicine for an attack.  Seek medical attention if your breathing problems get worse quickly, or if you think your medications are not working as well. Read and carefully follow any Instructions for Use provided with your medicine. Ask your doctor or pharmacist if you do not understand these instructions. Use the medicine at the same time each day, and not more than once in a 24-hour period. Store at room temperature away from moisture, heat, and light. Keep the inhaler device in the sealed foil tray until ready to start using it. Throw the inhaler away 6 weeks after opening, or when the dose indicator shows a zero (whichever comes first). What happens if I miss a dose? Use the medicine as soon as you can, but skip the missed dose if it is almost time for your next dose. Do not use two doses at one time. Do not use more than once in a 24-hour period. What happens if I overdose? Seek emergency medical attention or call the Poison Help line at 1-693.242.2712. Overdose symptoms may include include headache, chest pain, fast heart rate, tremor, and feeling shaky or nervous. What should I avoid while using umeclidinium and vilanterol? Do not use a second inhaled bronchodilator that contains formoterol, arformoterol, indacaterol, olodaterol, salmeterol, or vilanterol. What are the possible side effects of umeclidinium and vilanterol? Get emergency medical help if you have signs of an allergic reaction: hives; difficult breathing; swelling of your face, lips, tongue, or throat.   Call your doctor at once if you have:  · wheezing, choking, or other breathing problems after using this medicine;  · painful or difficult urination, or urinating more often;  · blurred vision, tunnel vision, eye pain or redness, or seeing halos around lights;  · high blood sugar --increased thirst or urination, hunger, dry mouth, fruity breath odor; or  · low potassium level --leg cramps, constipation, irregular heartbeats, fluttering in your chest, numbness or tingling, muscle weakness or limp appropriate, unless specifically indicated otherwise. [facility]Domainindex.coms drug information does not endorse drugs, diagnose patients or recommend therapy. [facility] drug information is an informational resource designed to assist licensed healthcare practitioners in caring for their patients and/or to serve consumers viewing this service as a supplement to, and not a substitute for, the expertise, skill, knowledge and judgment of healthcare practitioners. The absence of a warning for a given drug or drug combination in no way should be construed to indicate that the drug or drug combination is safe, effective or appropriate for any given patient. [facility] does not assume any responsibility for any aspect of healthcare administered with the aid of information [facility] provides. The information contained herein is not intended to cover all possible uses, directions, precautions, warnings, drug interactions, allergic reactions, or adverse effects. If you have questions about the drugs you are taking, check with your doctor, nurse or pharmacist.  Copyright [id number] [address] [place] [address] Dr RIVERA. Version: 4.01. Revision date: 7/25/2019. Care instructions adapted under license by [facility] ([facility]). If you have questions about a medical condition or this instruction, always ask your healthcare professional. [place] [address] any warranty or liability for your use of this information. Patient Education        Chronic Obstructive Pulmonary Disease (COPD): Care Instructions  Your Care Instructions     Chronic obstructive pulmonary disease (COPD) is a general term for a group of lung diseases, including emphysema and chronic bronchitis. People with COPD have decreased airflow in and out of the lungs, which makes it hard to breathe. The airways also can get clogged with thick mucus. Cigarette smoking is a major cause of COPD. Although there is no cure for COPD, you can slow its progress.  Following your treatment plan and taking care of yourself can help you feel better and live longer. Follow-up care is a key part of your treatment and safety. Be sure to make and go to all appointments, and call your doctor if you are having problems. It's also a good idea to know your test results and keep a list of the medicines you take. How can you care for yourself at home? Staying healthy  · Do not smoke. This is the most important step you can take to prevent more damage to your lungs. If you need help quitting, talk to your doctor about stop-smoking programs and medicines. These can increase your chances of quitting for good. · Avoid colds and flu. Get a pneumococcal vaccine shot. If you have had one before, ask your doctor whether you need a second dose. Get the flu vaccine every fall. If you must be around people with colds or the flu, wash your hands often. · Avoid secondhand smoke, air pollution, and high altitudes. Also avoid cold, dry air and hot, humid air. Stay at home with your windows closed when air pollution is bad. Medicines and oxygen therapy  · Take your medicines exactly as prescribed. Call your doctor if you think you are having a problem with your medicine. You may be taking medicines such as:  ? Bronchodilators. These help open your airways and make breathing easier. They are either short-acting (work for 6 to 9 hours) or long-acting (work for 24 hours). You inhale most bronchodilators, so they start to act quickly. Always carry your quick-relief inhaler with you in case you need it while you are away from home. ? Corticosteroids (prednisone, budesonide). These reduce airway inflammation. They come in pill or inhaled form. You must take these medicines every day for them to work well. · Ask your doctor or pharmacist if a spacer is right for you. A spacer may help you get more inhaled medicine to your lungs. If you use one, ask how to use it properly.   · Do not take any vitamins, over-the-counter medicine, or herbal products have new or worse trouble breathing. · You cough up blood. · You have a fever. Watch closely for changes in your health, and be sure to contact your doctor if:  · You cough more deeply or more often, especially if you notice more mucus or a change in the color of your mucus. · You have new or worse swelling in your legs or belly. · You are not getting better as expected. Where can you learn more? Go to https://Danlanpealiyaheweb.Bidstalk. org and sign in to your Gear6 account. Enter W303 in the [employer] box to learn more about \"Chronic Obstructive Pulmonary Disease (COPD): Care Instructions. \"     If you do not have an account, please click on the \"Sign Up Now\" link. Current as of: February 24, 2020               Content Version: 12.5  © 2006-2020 Healthwise, Incorporated. Care instructions adapted under license by [facility] ([facility]). If you have questions about a medical condition or this instruction, always ask your healthcare professional. [place] [address] any warranty or liability for your use of this information. Patient Education        Deciding About Using Medicines To Quit Smoking  How can you decide about using medicines to quit smoking? What are the medicines you can use? Your doctor may prescribe varenicline (Chantix) or bupropion (Zyban). These medicines can help you cope with cravings for tobacco. They are pills that don't contain nicotine. You also can use nicotine replacement products. These do contain nicotine. There are many types. · Gum and lozenges slowly release nicotine into your mouth. · Patches stick to your skin. They slowly release nicotine into your bloodstream.  · An inhaler has a govea that contains nicotine. You breathe in a puff of nicotine vapor through your mouth and throat. · Nasal spray releases a mist that contains nicotine. What are key points about this decision? · Using medicines can double your chances of quitting smoking. They can ease cravings and withdrawal symptoms. · Getting counseling along with using medicine can raise your chances of quitting even more. · If you smoke fewer than 5 cigarettes a day, you may not need medicines to help you quit smoking. · These medicines have less nicotine than cigarettes. And by itself, nicotine is not nearly as harmful as smoking. The tars, carbon monoxide, and other toxic chemicals in tobacco cause the harmful effects. · The side effects of nicotine replacement products depend on the type of product. For example, a patch can make your skin red and itchy. Medicines in pill form can make you sick to your stomach. They can also cause dry mouth and trouble sleeping. For most people, the side effects are not bad enough to make them stop using the products. Why might you choose to use medicines to quit smoking? · You have tried on your own to stop smoking, but you were not able to stop. · You smoke more than 5 cigarettes a day. · You want to increase your chances of quitting smoking. · You want to reduce your cravings and withdrawal symptoms. · You feel the benefits of medicine outweigh the side effects. Why might you choose not to use medicine? · You want to try quitting on your own by stopping all at once (\"cold turkey\"). · You want to cut back slowly on the number of cigarettes you smoke. · You smoke fewer than 5 cigarettes a day. · You do not like using medicine. · You feel the side effects of medicines outweigh the benefits. · You are worried about the cost of medicines. Your decision  Thinking about the facts and your feelings can help you make a decision that is right for you. Be sure you understand the benefits and risks of your options, and think about what else you need to do before you make the decision. Where can you learn more? Go to https://daryl.healthBioLight [employer] org and sign in to your Reimage account.  Enter H503 in the Toldo box to learn more about \"Deciding About Using Medicines To Quit Smoking. \"     If you do not have an account, please click on the \"Sign Up Now\" link. Current as of: March 12, 2020               Content Version: 12.5  © 2006-2020 Healthwise, Incorporated. Care instructions adapted under license by [facility] ([facility]). If you have questions about a medical condition or this instruction, always ask your healthcare professional. [address] any warranty or liability for your use of this information.

## 2020-07-07 NOTE — PROGRESS NOTES
[facility]     Patient: Wilder Badillo, 63 y.o.   : 1965   Pt of Dr. Sugar Nuñez   Patient presents with    Results     CXR, PFT      TELEHEALTH EVALUATION -- Audio/Visual (During MNGLE-56 public health emergency)    HPI:  Nando Wharton (: 1965) has requested an audio/video evaluation for the following concern(s): SOB. Hx HTN, GERD, Hep A, Anxiety. HPI  Vandana Sheffield is here for follow up for SOB with CXR and PFT. Hx of PNA right pleural effusion 2017.  46 pack year smoking history still smoking 1 PPD. Saw Cardiology for evaluation of SOB, normal echo and stress test.  Reports increased fatigue, edema. Also found to have mild liver/spleen enlargement with possible degree of PAH. Status unchanged since last seen. CXR shows no acute findings, resolved effusion, mild emphysema. PFT correlates with moderate COPD, FEV1 55, FEV/FVC 68 with no BD response. Scheduled to have sleep study later this month. Started on Flonase for allergic rhinitis. MMRC Score: 1    Progress History:   Since last visit any new medical issues? No  New ER or hospitlal visits? No  Any new or changes in medicines? No  Using inhalers? NA  Are they helpful? NA    Past Medical hx   PMH:  Past Medical History:   Diagnosis Date    Anxiety     Arthritis     COPD (chronic obstructive pulmonary disease) (HCC)     Enlargement, spleen     Fatty liver     GERD (gastroesophageal reflux disease)     Hepatitis A     Hypertension     Ovarian cyst     Pneumonia      SURGICAL HISTORY:History reviewed. No pertinent surgical history.      SOCIAL HISTORY:  Social History     Tobacco Use    Smoking status: Current Every Day Smoker     Packs/day: 1.00     Years: 46.00     Pack years: 46.00     Types: Cigarettes    Smokeless tobacco: Never Used   Substance Use Topics    Alcohol use: Yes     Comment: social    Drug use: Never ALLERGIES:  Allergies   Allergen Reactions    Pcn [Penicillins] Hives     FAMILY HISTORY:  Family History   Problem Relation Age of Onset    Heart Surgery Mother         dies at 29    Heart Disease Mother     Other Sister         Cardiomyopathy    Other Brother         cardiomyopathy    Stroke Maternal Grandmother     Heart Attack Maternal Grandfather     Heart Attack Paternal Grandfather      CURRENT MEDICATIONS:  Current Outpatient Medications   Medication Sig Dispense Refill    umeclidinium-vilanterol (ANORO ELLIPTA) 62.5-25 MCG/INH AEPB inhaler Inhale 1 puff into the lungs daily 30 puff 5    albuterol sulfate HFA (VENTOLIN HFA) 108 (90 Base) MCG/ACT inhaler Inhale 2 puffs into the lungs every 6 hours as needed for Wheezing 1 Inhaler 5    cloNIDine (CATAPRES) 0.1 MG tablet Take 1 tablet by mouth daily 90 tablet 1    bumetanide (BUMEX) 1 MG tablet Take 1 tablet by mouth daily 90 tablet 3    amLODIPine (NORVASC) 10 MG tablet TAKE 1 TABLET BY MOUTH DAILY 30 tablet 0    carvedilol (COREG) 25 MG tablet Take 1 tablet by mouth 2 times daily (with meals) 180 tablet 1    acetaminophen (TYLENOL) 500 MG tablet Take 500 mg by mouth every 6 hours as needed for Pain      fluticasone (FLONASE) 50 MCG/ACT nasal spray 2 sprays by Nasal route daily 1 Bottle 11    sucralfate (CARAFATE) 1 GM tablet Take 1 g by mouth as needed       pantoprazole (PROTONIX) 40 MG tablet 40 mg 2 times daily   6    ALPRAZolam (XANAX) 0.25 MG tablet 3 times daily as needed. 5    Lactobacillus (PROBIOTIC ACIDOPHILUS PO) Take by mouth daily       No current facility-administered medications for this visit. Gallo WEEMS   General/Constitutional: No recent loss of weight or appetite changes. No fever or chills. HENT: Negative. Eyes: Negative. Upper respiratory tract: No nasal stuffiness or post nasal drip. Lower respiratory tract/ lungs: No cough or sputum production. No hemoptysis.   Cardiovascular: No palpitations or chest pain.  Gastrointestinal: No nausea or vomiting. Neurological: No focal neurologiacal weakness. Extremities: No increased edema. Musculoskeletal: No complaints. Genitourinary: No complaints. Hematological: Negative. Psychiatric/Behavioral: Negative. Skin: No itching. Physical exam   There were no vitals taken for this visit. Vital Signs: (As obtained by patient/caregiver or practitioner observation)     Constitutional: [x] Appears well-developed and well-nourished [x] No apparent distress        Mental status  [x] Alert and awake  [x] Oriented to person/place/time [x]Able to follow commands      Eyes:  EOM    [x]  Normal    Sclera  [x]  Normal           Discharge [x]  None visible      HENT:   [x] Normocephalic, atraumatic. [x] Mouth/Throat: Mucous membranes are moist.     External Ears [x] Normal    of ataxia         [x] Normal range of motion of neck    Neck: [x] No visualized mass     Pulmonary/Chest: [x] Respiratory effort normal.  [x] No visualized signs of difficulty breathing or respiratory distress       Musculoskeletal:   [x] Normal gait with no signs     Neurological:        [x] No Facial Asymmetry (Cranial nerve 7 motor function) (limited exam to video visit)          [x] No gaze palsy        Skin:        [x] No significant exanthematous lesions or discoloration noted on facial skin            Psychiatric:       [x] Normal Affect [x] No Hallucinations     Test results   Lung Nodule Screening     [x] Qualifies    []Does not qualify   [] Declined    [] Completed    6/18/20    COMPARISON: 1/4/2017.       TECHNIQUE: PA and lateral views the chest.       FINDINGS:   There is hyperaeration as evidence for COPD. The lungs appear clear. Effusion on prior exam has resolved.  The cardiac-based also what is normal. Visualized osseous structures appear intact.               Impression   No acute abnormality identified.                   Echo 2/7/20   Summary   Left ventricular size and systolic risks involved and cessation treatment options. We discussed the risks and benefits of various tobacco cessation strategies, including \"cold turkey,\" nicotine replacement (nicotine patch, gum, etc.), Wellbutrin and Chantix and combination therapy if needed. We discussed some of the risks of Chantix including nausea, insomnia, vivid dreaming and depression. She defers at this time. 40 minutes was spent on this visit with > 50% being face to face obtaining HPI, reviewing test results, educating about disease process, discussing smoking cessation  and coordinating a treatment plan. Will see Kurtis Pink back in: 6 months. Kurtis Pink is a 63 y.o. female being evaluated by a Virtual/Doxy. me Visit (video visit) encounter to address concerns as mentioned above. A caregiver was present when appropriate. Due to this being a TeleHealth encounter (During UBN-72 public health emergency), evaluation of the following organ systems was limited: Vitals/Constitutional/EENT/Resp/CV/GI//MS/Neuro/Skin/Heme-Lymph-Imm. Pursuant to the emergency declaration under the [facility], [address] [place] [address] authority and the [employer] Act, this Virtual Visit was conducted with patient's (and/or legal guardian's) consent, to reduce the patient's risk of exposure to COVID-19 and provide necessary medical care. The patient (and/or legal guardian) has also been advised to contact this office for worsening conditions or problems, and seek emergency medical treatment and/or call 911 if deemed necessary. Patient identification was verified at the start of the visit: Yes    Total time spent on this encounter: 40 minutes    Services were provided through a video synchronous discussion virtually to substitute for in-person clinic visit. Patient and provider were located at their individual homes.     --Cathleen Beatty, JASON - KASIA on 7/7/2020 at 3:38 PM    An electronic signature was used to authenticate this note.

## 2020-07-13 ENCOUNTER — HOSPITAL ENCOUNTER (OUTPATIENT)
Dept: SLEEP CENTER | Age: 55
Discharge: HOME | End: 2020-07-15
Payer: COMMERCIAL

## 2020-07-13 PROCEDURE — 95806 SLEEP STUDY UNATT&RESP EFFT: CPT

## 2020-07-13 RX ORDER — CARVEDILOL 25 MG/1
25 TABLET ORAL 2 TIMES DAILY WITH MEALS
Qty: 180 TABLET | Refills: 1 | Status: SHIPPED | OUTPATIENT
Start: 2020-07-13 | End: 2021-03-24 | Stop reason: SDUPTHER

## 2020-07-13 NOTE — PROGRESS NOTES
Bernard Roblero presents today for a HST instruction and demonstration on unit # 0946. Questions were asked and answers given. She was able to return demonstration and verbalized understanding. The sleep center control room phone number was provided incase questions arise during her study. Informed patient to call 911 in case of an emergency. She states she will return the unit tomorrow.

## 2020-07-15 LAB — STATUS: NORMAL

## 2020-07-16 NOTE — PROGRESS NOTES
[address] [place] [address]                               SLEEP STUDY REPORT    PATIENT NAME: Deidra Smith                    :        1965  MED REC NO:   [id number]                           ROOM:  ACCOUNT NO:   [de-identified]                           ADMIT DATE: 2020  PROVIDER:     Luz Germain. MD Jose    DATE OF STUDY:  2020    PORTABLE/HOME SLEEP STUDY REPORT    REFERRING PROVIDER:  Pushpa Elkins MD    The patient's height is 62 inches, weight is 183 pounds with a BMI of  33.5. HISTORY:  The patient is a 79-year-old female, who was recently  evaluated by me on 03/10/2020. The patient is currently suffering with  hypersomnia with [place] Sleepiness Score of 10. The patient's neck  circumference was 16.5 inches and class II Mallampati airway. Due to  her hypersomnia, the patient is scheduled for a sleep study to evaluate  for sleep apnea. The patient had associated comorbidities including  hypertension, gastroesophageal reflux disease, and hypersomnia. The  patient is scheduled for portable/home sleep study to evaluate for sleep  apnea. METHODS:  The patient underwent Type III Portable Monitoring Sleep Study  including the simultaneous recording of oral-nasal airflow, rib and  abdominal respiratory effort, pulse rate, oxygen saturation, left and  right leg movement, and body position. Scoring criteria is consistent  with the current published AASM standards for scoring of apneas and  hypopneas 4A. Sleep staging and scoring followed the standard put forth  by the American Academy of Sleep Medicine and utilized the 4A  obstructive hypopnea event desaturation of 4 percent or greater. INTERPRETATION:  This is a home/portable sleep study. The study was  performed on 2020.   The study was started at 10:00 p.m. and was  terminated at 07:30 a.m. with a total recording time was 569.6 minutes. RESPIRATORY EVENT ANALYSIS:  Revealed the patient had a total of 42  apneas. Out of 42, 41 were obstructive and 1 was central in nature. The patient also had a total of 171 hypopneas, all of them were  obstructive in nature. The total number of apneas and hypopneas  recorded during the study were 213 with an apnea-hypopnea index of 22.4. OXYGEN SATURATION MONITORING:  Revealed the patient had a maximum oxygen  desaturation to 82% with a mean oxygen saturation of 92.6%. The patient  spent a total of 13.6 minutes below oxygen saturation less than 88%. POSITION ANALYSIS:  Revealed the patient spent 4.7 minutes in supine  position, 565 minutes in non-supine position. EKG MONITORING:  Revealed normal sinus rhythm. IMPRESSION:  1. Moderately severe obstructive sleep apnea. 2.  Nocturnal hypoxia. The patient spent a total of 13.6 minutes below  oxygen saturation less than 88%. 3.  Anxiety disorder. 4.  Gastroesophageal reflux disease. 5.  Hypertension. 6.  Deviated nasal septum to the left side with a history of injury to  the nose during childhood. RECOMMENDATIONS:  1. For the patient's sleep disordered breathing, the patient needs  treatment. 2.  The patient should be scheduled for followup with my clinic as soon  as possible to discuss about the sleep study findings for further  management. Thanks to Jesika Lehman MD for giving me this opportunity to  participate in the care of this pleasant lady.         Scott Mccord MD    D: 07/15/2020 17:09:42       T: 07/16/2020 0:00:10     [email or web address]  Job#: [id number]     Doc#: [id number]    CC:

## 2020-07-21 ENCOUNTER — VIRTUAL VISIT (OUTPATIENT)
Dept: PULMONOLOGY | Age: 55
End: 2020-07-21
Payer: COMMERCIAL

## 2020-07-21 PROCEDURE — 99214 OFFICE O/P EST MOD 30 MIN: CPT | Performed by: NURSE PRACTITIONER

## 2020-07-21 RX ORDER — FEXOFENADINE HCL 180 MG/1
180 TABLET ORAL DAILY
Qty: 30 TABLET | Refills: 0 | COMMUNITY
Start: 2020-07-21 | End: 2020-08-20

## 2020-07-21 NOTE — PROGRESS NOTES
[place] [facility], [facility] [address], 63 y.o.  [id number]     Chief Complaint   Patient presents with    Results     HST results     TELEHEALTH EVALUATION -- Audio/Visual (During MXMQZ-76 public health emergency)    HPI:  Danyel Nelson (: 1965) has requested an audio/video evaluation for the following concern(s): HST results. Study Results  Initial Study Date -  20  AHI -  22.4   Total Events - 213  (Apneas  41  Hypopneas 171  Central  1)  LM w/Arousals - NA  Sleep Efficiency - NA  (Total Recorded Time - 569.6 min)  Time with Sats below 88% - 13.6 min  Weight 183 lbs  Neck 16.5 inches  Mallampati II  ESS 10    Interval History       Danyel Nelson is a 63 y.o. old female who presents to review the results of her recent home sleep study, to answer questions and to explore options for treatment. She was referred to Dr. Augie South for evaluation of SOB, reproted symptoms of hypersomnia and fatigue. Was started on Flonase for allergic rhinitis. Has continued allergy symptoms. SOB improved with adding Anoro. PMHx  Past Medical History:   Diagnosis Date    Anxiety     Arthritis     COPD (chronic obstructive pulmonary disease) (HCC)     Enlargement, spleen     Fatty liver     GERD (gastroesophageal reflux disease)     Hepatitis A     Hypertension     Ovarian cyst     Pneumonia      No past surgical history on file.   Social History     Tobacco Use    Smoking status: Current Every Day Smoker     Packs/day: 1.00     Years: 46.00     Pack years: 46.00     Types: Cigarettes    Smokeless tobacco: Never Used   Substance Use Topics    Alcohol use: Yes     Comment: social    Drug use: Never     Family History   Problem Relation Age of Onset    Heart Surgery Mother         dies at 29    Heart Disease Mother     Other Sister         Cardiomyopathy    Other Brother         cardiomyopathy    Stroke Maternal Grandmother     Heart Attack Maternal Grandfather Psychiatric/Behavioral: Negative. Skin: No itching. Exam  There were no vitals taken for this visit. There is no height or weight on file to calculate BMI. Oxygen level - Room Air     Vital Signs: (As obtained by patient/caregiver or practitioner observation)    Constitutional: [x] Appears well-developed and well-nourished [x] No apparent distress        Mental status  [x] Alert and awake  [x] Oriented to person/place/time [x]Able to follow commands      Eyes:  EOM    [x]  Normal    Sclera  [x]  Normal           Discharge [x]  None visible      HENT:   [x] Normocephalic, atraumatic. [x] Mouth/Throat: Mucous membranes are moist.     External Ears [x] Normal     Neck: [x] No visualized mass     Pulmonary/Chest: [x] Respiratory effort normal.  [x] No visualized signs of difficulty breathing or respiratory distress. Musculoskeletal:   [x] Normal gait with no signs of ataxia         [x] Normal range of motion of neck    Neurological:        [x] No Facial Asymmetry (Cranial nerve 7 motor function) (limited exam to video visit)          [x] No gaze palsy         Skin:        [x] No significant exanthematous lesions or discoloration noted on facial skin             Psychiatric:       [x] Normal Affect [x] No Hallucinations     Assessment   Diagnosis Orders   1. LYNN (obstructive sleep apnea)  DME Order for CPAP as OP   2. Stage 2 moderate COPD by GOLD classification ([address]     3. Environmental and seasonal allergies          Recommendations  I reviewed the home sleep study results in detail with Clydia Epley. We discussed various treatment options including positional therapy, OAT and PAP therapies along with the benefits and limitations of each. We also discussed the health risks with untreated LYNN. She agrees to proceed with APAP set up with mask fitting.    -Continue Flonase.  -Start Allegra 180 mg daily PRN. -Continue Anoro and LEVY PRN. -Keep Pulmonary follow up as previously scheduled.         Follow up 8 weeks after PAP set up with download. Vivian Perales is a 63 y.o. female being evaluated by a Virtual/Doxy. me Visit (video visit) encounter to address concerns as mentioned above. A caregiver was present when appropriate. Due to this being a TeleHealth encounter (During CPIMX-77 public health emergency), evaluation of the following organ systems was limited: Vitals/Constitutional/EENT/Resp/CV/GI//MS/Neuro/Skin/Heme-Lymph-Imm. Pursuant to the emergency declaration under the [facility], [address] [place] [address] authority and the [employer] Act, this Virtual Visit was conducted with patient's (and/or legal guardian's) consent, to reduce the patient's risk of exposure to COVID-19 and provide necessary medical care. The patient (and/or legal guardian) has also been advised to contact this office for worsening conditions or problems, and seek emergency medical treatment and/or call 911 if deemed necessary. Patient identification was verified at the start of the visit: Yes    Total time spent on this encounter: Not billed by time    Services were provided through a video synchronous discussion virtually to substitute for in-person clinic visit. Patient and provider were located at their individual homes. --JASON Gonzalez CNP on 7/21/2020 at 3:25 PM    An electronic signature was used to authenticate this note.

## 2020-07-21 NOTE — PATIENT INSTRUCTIONS
allergies. · Try a continuous positive airway pressure (CPAP) breathing machine if your doctor recommends it. The machine keeps your airway open when you sleep. · If CPAP does not work for you, ask your doctor if you can try other breathing machines. A bilevel positive airway pressure machine uses one type of air pressure for breathing in and another type for breathing out. Another device raises or lowers air pressure as needed while you breathe. · Talk to your doctor if:  ? Your nose feels dry or bleeds when you use one of these machines. You may need to increase moisture in the air. A humidifier may help. ? Your nose is runny or stuffy from using a breathing machine. Decongestants or a corticosteroid nasal spray may help. ? You are sleepy during the day and it gets in the way of the normal things you do. Do not drive when you are drowsy. When should you call for help? Watch closely for changes in your health, and be sure to contact your doctor if:  · You still have sleep apnea even though you have made lifestyle changes. · You are thinking of trying a device such as CPAP. · You are having problems using a CPAP or similar machine. Where can you learn more? Go to https://[employer]te[employer]ream. org and sign in to your [employer] account. Enter Q644 in the [employer] box to learn more about \"Sleep Apnea: Care Instructions. \"     If you do not have an account, please click on the \"Sign Up Now\" link. Current as of: February 24, 2020               Content Version: 12.5  © 2006-2020 Healthwise, Incorporated. Care instructions adapted under license by [facility] [employer] [facility] ([facility]). If you have questions about a medical condition or this instruction, always ask your healthcare professional. [place] [address] any warranty or liability for your use of this information.

## 2020-08-08 ENCOUNTER — HOSPITAL ENCOUNTER (EMERGENCY)
Age: 55
Discharge: HOME | End: 2020-08-08
Attending: EMERGENCY MEDICINE
Payer: COMMERCIAL

## 2020-08-08 ENCOUNTER — APPOINTMENT (OUTPATIENT)
Dept: GENERAL RADIOLOGY | Age: 55
End: 2020-08-08
Payer: COMMERCIAL

## 2020-08-08 VITALS
HEIGHT: 62 IN | DIASTOLIC BLOOD PRESSURE: 80 MMHG | HEART RATE: 88 BPM | WEIGHT: 186 LBS | SYSTOLIC BLOOD PRESSURE: 138 MMHG | OXYGEN SATURATION: 95 % | RESPIRATION RATE: 16 BRPM | BODY MASS INDEX: 34.23 KG/M2 | TEMPERATURE: 98.7 F

## 2020-08-08 LAB
ALBUMIN SERPL-MCNC: 4 G/DL (ref 3.5–5.1)
ALP BLD-CCNC: 153 U/L (ref 38–126)
ALT SERPL-CCNC: 47 U/L (ref 11–66)
ANION GAP SERPL CALCULATED.3IONS-SCNC: 13 MEQ/L (ref 8–16)
AST SERPL-CCNC: 37 U/L (ref 5–40)
ATYPICAL LYMPHOCYTES: ABNORMAL %
BACTERIA: ABNORMAL /HPF
BASE EXCESS MIXED: 1.2 MMOL/L (ref -2–3)
BASOPHILS # BLD: 0.6 %
BASOPHILS ABSOLUTE: 0.1 THOU/MM3 (ref 0–0.1)
BILIRUB SERPL-MCNC: 0.2 MG/DL (ref 0.3–1.2)
BILIRUBIN DIRECT: < 0.2 MG/DL (ref 0–0.3)
BILIRUBIN URINE: NEGATIVE
BLOOD, URINE: NEGATIVE
BUN BLDV-MCNC: 6 MG/DL (ref 7–22)
CALCIUM SERPL-MCNC: 9 MG/DL (ref 8.5–10.5)
CASTS 2: ABNORMAL /LPF
CASTS UA: ABNORMAL /LPF
CHARACTER, URINE: ABNORMAL
CHLORIDE BLD-SCNC: 96 MEQ/L (ref 98–111)
CO2: 24 MEQ/L (ref 23–33)
COLLECTED BY:: ABNORMAL
COLOR: YELLOW
CREAT SERPL-MCNC: 0.5 MG/DL (ref 0.4–1.2)
CRYSTALS, UA: ABNORMAL
DEVICE: ABNORMAL
DIFFERENTIAL TYPE: ABNORMAL
EKG ATRIAL RATE: 89 BPM
EKG P AXIS: 8 DEGREES
EKG P-R INTERVAL: 128 MS
EKG Q-T INTERVAL: 366 MS
EKG QRS DURATION: 80 MS
EKG QTC CALCULATION (BAZETT): 445 MS
EKG R AXIS: 74 DEGREES
EKG T AXIS: 59 DEGREES
EKG VENTRICULAR RATE: 89 BPM
EOSINOPHIL # BLD: 0.8 %
EOSINOPHILS ABSOLUTE: 0.1 THOU/MM3 (ref 0–0.4)
EPITHELIAL CELLS, UA: ABNORMAL /HPF
ERYTHROCYTE [DISTWIDTH] IN BLOOD BY AUTOMATED COUNT: 14.1 % (ref 11.5–14.5)
ERYTHROCYTE [DISTWIDTH] IN BLOOD BY AUTOMATED COUNT: 49.1 FL (ref 35–45)
GFR SERPL CREATININE-BSD FRML MDRD: > 90 ML/MIN/1.73M2
GLUCOSE BLD-MCNC: 120 MG/DL (ref 70–108)
GLUCOSE URINE: NEGATIVE MG/DL
HCO3, MIXED: 26 MMOL/L (ref 23–28)
HCT VFR BLD CALC: 43.5 % (ref 37–47)
HEMOGLOBIN: 15 GM/DL (ref 12–16)
IMMATURE GRANS (ABS): 0.05 THOU/MM3 (ref 0–0.07)
IMMATURE GRANULOCYTES: 0.4 %
KETONES, URINE: NEGATIVE
LEUKOCYTE ESTERASE, URINE: NEGATIVE
LYMPHOCYTES # BLD: 37.8 %
LYMPHOCYTES ABSOLUTE: 5 THOU/MM3 (ref 1–4.8)
MCH RBC QN AUTO: 33 PG (ref 26–33)
MCHC RBC AUTO-ENTMCNC: 34.5 GM/DL (ref 32.2–35.5)
MCV RBC AUTO: 95.6 FL (ref 81–99)
MISCELLANEOUS 2: ABNORMAL
MONOCYTES # BLD: 7 %
MONOCYTES ABSOLUTE: 0.9 THOU/MM3 (ref 0.4–1.3)
NITRITE, URINE: NEGATIVE
NUCLEATED RED BLOOD CELLS: 0 /100 WBC
O2 SAT, MIXED: 97 %
OSMOLALITY CALCULATION: 265.2 MOSMOL/KG (ref 275–300)
PATHOLOGIST REVIEW: ABNORMAL
PCO2, MIXED VENOUS: 39 MMHG (ref 41–51)
PH UA: 6.5 (ref 5–9)
PH, MIXED: 7.42 (ref 7.31–7.41)
PLATELET # BLD: 286 THOU/MM3 (ref 130–400)
PLATELET ESTIMATE: ADEQUATE
PMV BLD AUTO: 9.4 FL (ref 9.4–12.4)
PO2 MIXED: 90 MMHG (ref 25–40)
POTASSIUM SERPL-SCNC: 3.5 MEQ/L (ref 3.5–5.2)
PRO-BNP: 75.9 PG/ML (ref 0–900)
PROTEIN UA: NEGATIVE
RBC # BLD: 4.55 MILL/MM3 (ref 4.2–5.4)
RBC URINE: ABNORMAL /HPF
RENAL EPITHELIAL, UA: ABNORMAL
SCAN OF BLOOD SMEAR: NORMAL
SEG NEUTROPHILS: 53.4 %
SEGMENTED NEUTROPHILS ABSOLUTE COUNT: 7 THOU/MM3 (ref 1.8–7.7)
SITE: ABNORMAL
SODIUM BLD-SCNC: 133 MEQ/L (ref 135–145)
SPECIFIC GRAVITY, URINE: < 1.005 (ref 1–1.03)
TOTAL PROTEIN: 7.4 G/DL (ref 6.1–8)
TROPONIN T: < 0.01 NG/ML
UROBILINOGEN, URINE: 0.2 EU/DL (ref 0–1)
WBC # BLD: 13.2 THOU/MM3 (ref 4.8–10.8)
WBC UA: ABNORMAL /HPF
YEAST: ABNORMAL

## 2020-08-08 PROCEDURE — 81001 URINALYSIS AUTO W/SCOPE: CPT

## 2020-08-08 PROCEDURE — 99283 EMERGENCY DEPT VISIT LOW MDM: CPT

## 2020-08-08 PROCEDURE — 71045 X-RAY EXAM CHEST 1 VIEW: CPT

## 2020-08-08 PROCEDURE — 84484 ASSAY OF TROPONIN QUANT: CPT

## 2020-08-08 PROCEDURE — 82803 BLOOD GASES ANY COMBINATION: CPT

## 2020-08-08 PROCEDURE — 80053 COMPREHEN METABOLIC PANEL: CPT

## 2020-08-08 PROCEDURE — 94761 N-INVAS EAR/PLS OXIMETRY MLT: CPT

## 2020-08-08 PROCEDURE — 83880 ASSAY OF NATRIURETIC PEPTIDE: CPT

## 2020-08-08 PROCEDURE — 85025 COMPLETE CBC W/AUTO DIFF WBC: CPT

## 2020-08-08 PROCEDURE — 93005 ELECTROCARDIOGRAM TRACING: CPT | Performed by: EMERGENCY MEDICINE

## 2020-08-08 PROCEDURE — 82248 BILIRUBIN DIRECT: CPT

## 2020-08-08 PROCEDURE — 36415 COLL VENOUS BLD VENIPUNCTURE: CPT

## 2020-08-08 PROCEDURE — 36600 WITHDRAWAL OF ARTERIAL BLOOD: CPT

## 2020-08-08 ASSESSMENT — ENCOUNTER SYMPTOMS
PHOTOPHOBIA: 0
RHINORRHEA: 0
NAUSEA: 0
EYE REDNESS: 0
VOMITING: 0
STRIDOR: 0
ABDOMINAL DISTENTION: 0
COUGH: 0
EYE ITCHING: 0
SORE THROAT: 0
ABDOMINAL PAIN: 0
SHORTNESS OF BREATH: 1
EYE DISCHARGE: 0
BACK PAIN: 0
DIARRHEA: 0
CONSTIPATION: 0
WHEEZING: 0
CHEST TIGHTNESS: 0
EYE PAIN: 0

## 2020-08-08 NOTE — ED NOTES
Respiratory at bedside- pt sitting up on cart drinking water. Appears in no acute distress.       Gema Corona RN  08/08/20 [id number]

## 2020-08-08 NOTE — ED TRIAGE NOTES
Pt presents to the ED via ED lobby with c/o shortness of breath and leg swelling. Pt states she has been increasingly short of breath the past few days. Pt also reports that she has had difficulty walking due to dyspnea with exertion and leg swelling. Pt also reports that her abdomen has become increasingly distended. Pt denies pain. PT sees Dr Francisco Javier Hansen for cardiology. VSS, respirations even and unlabored. EKG complete.

## 2020-08-08 NOTE — ED PROVIDER NOTES
[address] ENCOUNTER      PATIENT NAME: Loren You  MRN: [id number]  : 1965  ROOT: 2020  PROVIDER: Jeronimo Sharpe MD      [address] [place] [address]       Chief Complaint   Patient presents with    Shortness of Breath    Leg Swelling       Nurses Notes reviewed and I agreeexcept as noted in the HPI. HISTORY OF PRESENT ILLNESS    Loren You is a 63 y.o. female who presents to Emergency Department with Shortness of Breath and Leg Swelling      67-year-old female with PMH of COPD and central obesity presents to ED for SOB and bilateral LE edema in last 4.5 months. Onset: Gradual  Location: At home  Severity: Moderate  Timing: Last of 4.5 months  Modifying factors: Dyspnea worse on exertion  Quality: Shortness of breath and peripheral edema from bilateral lower extremities  Radiation: No pain  Duration: Persistent  Context: No history of CHF. No liver cirrhosis. No renal failure  Prior episodes: None  Therapy today: None  Associated Symptoms: None. Additional history: Hx of ETOH abuse (usually drinks 6 beers daily). This HPI was provided by the patient. REVIEW OF SYSTEMS     Review of Systems   Constitutional: Negative for activity change, appetite change, chills, fatigue, fever and unexpected weight change. HENT: Negative for congestion, ear discharge, ear pain, hearing loss, nosebleeds, rhinorrhea and sore throat. Eyes: Negative for photophobia, pain, discharge, redness and itching. Respiratory: Positive for shortness of breath. Negative for cough, chest tightness, wheezing and stridor. Cardiovascular: Negative for chest pain, palpitations and leg swelling. Gastrointestinal: Negative for abdominal distention, abdominal pain, constipation, diarrhea, nausea and vomiting. Endocrine: Negative for cold intolerance, heat intolerance, polydipsia and polyphagia. Genitourinary: Negative for dysuria, flank pain, frequency and hematuria. Musculoskeletal: Negative for arthralgias, back pain, gait problem, myalgias, neck pain and neck stiffness. Skin: Negative for pallor, rash and wound. Allergic/Immunologic: Negative for environmental allergies and food allergies. Neurological: Negative for dizziness, tremors, syncope, weakness and headaches. Psychiatric/Behavioral: Negative for agitation, behavioral problems, confusion, self-injury, sleep disturbance and suicidal ideas. PAST MEDICAL HISTORY    has a past medical history of Anxiety, Arthritis, COPD (chronic obstructive pulmonary disease) ([facility] [address] Enlargement, spleen, Fatty liver, GERD (gastroesophageal reflux disease), Hepatitis A, Hypertension, Ovarian cyst, and Pneumonia. SURGICAL HISTORY      has no past surgical history on file. CURRENT MEDICATIONS       Previous Medications    ACETAMINOPHEN (TYLENOL) 500 MG TABLET    Take 500 mg by mouth every 6 hours as needed for Pain    ALBUTEROL SULFATE HFA (VENTOLIN HFA) 108 (90 BASE) MCG/ACT INHALER    Inhale 2 puffs into the lungs every 6 hours as needed for Wheezing    ALPRAZOLAM (XANAX) 0.25 MG TABLET    3 times daily as needed.      AMLODIPINE (NORVASC) 10 MG TABLET    TAKE 1 TABLET BY MOUTH DAILY    BUMETANIDE (BUMEX) 1 MG TABLET    Take 1 tablet by mouth daily    CARVEDILOL (COREG) 25 MG TABLET    TAKE 1 TABLET BY MOUTH 2 TIMES DAILY (WITH MEALS)    CLONIDINE (CATAPRES) 0.1 MG TABLET    Take 1 tablet by mouth daily    FEXOFENADINE (ALLEGRA) 180 MG TABLET    Take 1 tablet by mouth daily    FLUTICASONE (FLONASE) 50 MCG/ACT NASAL SPRAY    2 sprays by Nasal route daily    LACTOBACILLUS (PROBIOTIC ACIDOPHILUS PO)    Take by mouth daily    PANTOPRAZOLE (PROTONIX) 40 MG TABLET    40 mg 2 times daily     SUCRALFATE (CARAFATE) 1 GM TABLET    Take 1 g by mouth as needed     UMECLIDINIUM-VILANTEROL (ANORO ELLIPTA) 62.5-25 MCG/INH AEPB INHALER    Inhale 1 puff into the lungs daily       ALLERGIES     is allergic to pcn [penicillins]. FAMILY HISTORY     She indicated that her mother is . She indicated that her father is . She indicated that her sister is alive. She indicated that her brother is alive. She indicated that her maternal grandmother is . She indicated that her maternal grandfather is . She indicated that her paternal grandfather is . family history includes Heart Attack in her maternal grandfather and paternal grandfather; Heart Disease in her mother; Heart Surgery in her mother; Other in her brother and sister; Stroke in her maternal grandmother. SOCIAL HISTORY      reports that she has been smoking cigarettes. She has a 46.00 pack-year smoking history. She has never used smokeless tobacco. She reports current alcohol use. She reports that she does not use drugs. PHYSICAL EXAM     INITIAL VITALS:  height is 5' 2\" (1.575 m) and weight is 186 lb (84.4 kg). Her oral temperature is 98.7 °F (37.1 °C). Her blood pressure is 131/78 and her pulse is 85. Her respiration is 14 and oxygen saturation is 96%. Physical Exam  Vitals signs and nursing note reviewed. Constitutional:       Appearance: She is well-developed. She is not diaphoretic. HENT:      Head: Normocephalic and atraumatic. Nose: Nose normal.   Eyes:      General: No scleral icterus. Right eye: No discharge. Left eye: No discharge. Conjunctiva/sclera: Conjunctivae normal.      Pupils: Pupils are equal, round, and reactive to light. Neck:      Musculoskeletal: Normal range of motion and neck supple. Vascular: No JVD. Trachea: No tracheal deviation. Cardiovascular:      Rate and Rhythm: Normal rate and regular rhythm. Heart sounds: Normal heart sounds. No murmur. No friction rub. No gallop. Pulmonary:      Effort: Pulmonary effort is normal. No respiratory distress. Breath sounds: Normal breath sounds. No stridor. No wheezing, rhonchi or rales.    Chest: Chest wall: No tenderness. Abdominal:      General: Bowel sounds are normal. There is no distension. Palpations: Abdomen is soft. There is no mass. Tenderness: There is no abdominal tenderness. There is no guarding or rebound. Hernia: No hernia is present. Musculoskeletal:         General: No tenderness or deformity. Right lower leg: Edema present. Left lower leg: Edema present. Comments: Bilateral lower extremity 2+ moderate pitting edema   Lymphadenopathy:      Cervical: No cervical adenopathy. Skin:     General: Skin is warm and dry. Capillary Refill: Capillary refill takes less than 2 seconds. Coloration: Skin is not pale. Findings: No erythema or rash. Neurological:      Mental Status: She is alert and oriented to person, place, and time. Cranial Nerves: No cranial nerve deficit. Sensory: No sensory deficit. Motor: No abnormal muscle tone. Coordination: Coordination normal.      Deep Tendon Reflexes: Reflexes normal.   Psychiatric:         Behavior: Behavior normal.         Thought Content: Thought content normal.         Judgment: Judgment normal.           DIFFERENTIAL DIAGNOSIS:   Medication side effect, CHF, renal insufficiency, hypoalbuminemia, fatty liver, dependent edema, idiopathic edema    DIAGNOSTIC RESULTS     EKG: All EKG's are interpreted by the Emergency Department Physician who either signs or Co-signsthis chart in the absence of a cardiologist.  Interpreted by me  Normal sinus rhythm  Ventricular rate 89 bpm  CO interval 128 ms  QRS duration 80 ms   ms  Q waves from septal leads, age undetermined  No ST elevation or acute T wave    RADIOLOGY: non-plain film images(s) such as CT, Ultrasound and MRI are read by the radiologist.    XR CHEST PORTABLE   Final Result      No acute cardiopulmonary disease. **This report has been created using voice recognition software.  It may contain minor errors which are inherent in voice recognition technology. **      Final report electronically signed by Dr. Jackie Weiss on 8/8/2020 3:49 AM          []Visualized and interpreted by me   [] Radiologist's Wet Read Report Reviewed   [] Discussed with RadiologistPiyush Fam:   Results for orders placed or performed during the hospital encounter of 08/08/20   CBC Auto Differential   Result Value Ref Range    WBC 13.2 (H) 4.8 - 10.8 thou/mm3    RBC 4.55 4.20 - 5.40 mill/mm3    Hemoglobin 15.0 12.0 - 16.0 gm/dl    Hematocrit 43.5 37.0 - 47.0 %    MCV 95.6 81.0 - 99.0 fL    MCH 33.0 26.0 - 33.0 pg    MCHC 34.5 32.2 - 35.5 gm/dl    RDW-CV 14.1 11.5 - 14.5 %    RDW-SD 49.1 (H) 35.0 - 45.0 fL    Platelets 821 897 - 795 thou/mm3    MPV 9.4 9.4 - 12.4 fL    Differential Type see below     Platelet Estimate ADEQUATE Adequate   Hepatic function panel   Result Value Ref Range    Alb 4.0 3.5 - 5.1 g/dL    Total Bilirubin 0.2 (L) 0.3 - 1.2 mg/dL    Bilirubin, Direct <0.2 0.0 - 0.3 mg/dL    Alkaline Phosphatase 153 (H) 38 - 126 U/L    AST 37 5 - 40 U/L    ALT 47 11 - 66 U/L    Total Protein 7.4 6.1 - 8.0 g/dL   Basic Metabolic Panel   Result Value Ref Range    Sodium 133 (L) 135 - 145 meq/L    Potassium 3.5 3.5 - 5.2 meq/L    Chloride 96 (L) 98 - 111 meq/L    CO2 24 23 - 33 meq/L    Glucose 120 (H) 70 - 108 mg/dL    BUN 6 (L) 7 - 22 mg/dL    CREATININE 0.5 0.4 - 1.2 mg/dL    Calcium 9.0 8.5 - 10.5 mg/dL   Brain Natriuretic Peptide   Result Value Ref Range    Pro-BNP 75.9 0.0 - 900.0 pg/mL   Troponin   Result Value Ref Range    Troponin T < 0.010 ng/ml   Urine with Reflexed Micro   Result Value Ref Range    Glucose, Ur NEGATIVE NEGATIVE mg/dl    Bilirubin Urine NEGATIVE NEGATIVE    Ketones, Urine NEGATIVE NEGATIVE    Specific Gravity, Urine < 1.005 1.002 - 1.03    Blood, Urine NEGATIVE NEGATIVE    pH, UA 6.5 5.0 - 9.0    Protein, UA NEGATIVE NEGATIVE    Urobilinogen, Urine 0.2 0.0 - 1.0 eu/dl    Nitrite, Urine NEGATIVE NEGATIVE    Leukocyte Esterase, Urine patient's leg swelling is secondary to Norvasc. I suggest she should discuss with his cardiologist to discontinue Norvasc. Regarding her shortness of breath, it seems this is multifactorial including obesity hypoventilation and COPD. She certainly needs to quit smoking and loose weight and she understands. Patient was discharged with cardiology follow-up in 3 days. CRITICAL CARE:   None    CONSULTS:  None    PROCEDURES:  None    FINAL IMPRESSION      1. Peripheral edema    2. Medication side effect    3.  Dyspnea and respiratory abnormalities          DISPOSITION/PLAN   Home    PATIENT REFERRED TO:  Arnoldo Frey MD  [facility] [place] [place] [address]  [phone]    In 3 days  To discuss stopping Norvasc      DISCHARGE MEDICATIONS:  New Prescriptions    No medications on file       (Please note that portions of this note were completed with a voice recognition program.  Efforts were made to edit the dictations but occasionally words aremis-transcribed.)    MD Kiya Suárez MD  08/08/20 [id number]

## 2020-08-08 NOTE — ED NOTES
Assumed care at this time. Bedside report received from [facility].  Will continue to monitor        Pavel Conway RN  08/08/20 [id number]

## 2020-08-09 PROCEDURE — 93010 ELECTROCARDIOGRAM REPORT: CPT | Performed by: INTERNAL MEDICINE

## 2020-08-10 ENCOUNTER — CARE COORDINATION (OUTPATIENT)
Dept: CARE COORDINATION | Age: 55
End: 2020-08-10

## 2020-08-10 NOTE — CARE COORDINATION
Patient contacted regarding recent visit for viral symptoms. This Bong Cadence contacted the patient by telephone to perform post discharge call. Verified name and  with patient as identifiers. Provided introduction to self, and reason for call due to viral symptoms of infection and/or exposure to COVID-19. Patient presented to emergency department/flu clinic with complaints of viral symptoms/exposure to COVID. Patient reports symptoms are improving. Due to no new or worsening symptoms the RN CTN/ACM was not notified for escalation. Discussed exposure protocols and quarantine with CDC Guidelines What To Do If You Are Sick    Patient was given an opportunity for questions and concerns. Stay home except to get medical care    Separate yourself from other people and animals in your home    Call ahead before visiting your doctor    Wear a facemask    Cover your coughs and sneezes    Clean your hands often    Avoid sharing personal household items    Clean all high-touch surfaces everyday    Monitor your symptoms  Seek prompt medical attention if your illness is worsening (e.g., difficulty breathing). Before seeking care, call your healthcare provider and tell them that you have, or are being evaluated for, COVID-19. Put on a facemask before you enter the facility. These steps will help the healthcare provider's office to keep other people in the office or waiting room from getting infected or exposed. Ask your healthcare provider to call the local or [facility] health department. Persons who are placed under If you have a medical emergency and need to call 911, notify the dispatch personnel that you have, or are being evaluated for COVID-19. If possible, put on a facemask before emergency medical services arrive. The patient agrees to contact the [facility] exposure line [phone], local health department [place] [facility]: ([phone]) and PCP office for questions related to their healthcare. Author provided contact information for future reference. Patient/family/caregiver given information for [employer] and agrees to enroll no    Patient was seen in the ER on 8/8/20 for SOB and leg swelling. Patient is following up with her Cardiologist. Patient educated on covid precautions and given the covid hotline number. Patient declined [employer] Well [employer].

## 2020-08-10 NOTE — CARE COORDINATION
Attempted to reach patient for a follow up in regards to COVID-19 education/ monitoring. Patient was unavailable at the time of my call, and a generic voicemail message was left asking patient to return my call at [phone].     Julissa Ball [address]   Medication Assistance  [facility] [employer]    (W)[phone] (I)[phone]

## 2020-08-11 RX ORDER — LISINOPRIL 10 MG/1
10 TABLET ORAL DAILY
Qty: 30 TABLET | Refills: 2 | Status: SHIPPED | OUTPATIENT
Start: 2020-08-11 | End: 2020-10-02

## 2020-08-11 RX ORDER — LISINOPRIL 10 MG/1
10 TABLET ORAL DAILY
COMMUNITY
End: 2020-08-11 | Stop reason: SDUPTHER

## 2020-08-27 ENCOUNTER — CARE COORDINATION (OUTPATIENT)
Dept: CARE COORDINATION | Age: 55
End: 2020-08-27

## 2020-08-27 NOTE — CARE COORDINATION
Attempted to reach patient for a 2 week ED follow up in regards to COVID-19 education/ monitoring. Patient was unavailable at the time of my call, and a generic voicemail message was left asking patient to return my call at [phone].     Yecenia Puckett [facility]  [address] [facility]   Medication Assistance  [facility] [employer]    X)[phone] (O)[phone]

## 2020-10-05 RX ORDER — LISINOPRIL 10 MG/1
10 TABLET ORAL DAILY
Qty: 30 TABLET | Refills: 4 | Status: SHIPPED | OUTPATIENT
Start: 2020-10-05 | End: 2021-03-02

## 2020-10-06 ENCOUNTER — VIRTUAL VISIT (OUTPATIENT)
Dept: PULMONOLOGY | Age: 55
End: 2020-10-06
Payer: COMMERCIAL

## 2020-10-06 PROCEDURE — 99214 OFFICE O/P EST MOD 30 MIN: CPT | Performed by: NURSE PRACTITIONER

## 2020-10-06 NOTE — PROGRESS NOTES
[facility]         [id number]  10/6/2020   Chief Complaint   Patient presents with    Sleep Apnea     8 week follow up with download      TELEHEALTH EVALUATION -- Audio/Visual (During DVNFJ-68 public health emergency)    HPI:  Jovanny Jansen (: 1965) has requested an audio/video evaluation for the following concern(s): LYNN. Pt of Dr. Ashley Weston    PAP Download:   Original or initial AHI: 22.4   Date of initial study: 20  Weight of initial study: 183 lbs  [] Compliant  27%   [x] Noncompliant 64%     PAP Type Airsense 10 APAP Level  10-15   Avg Hrs/Day 3 hours and 3 minutes  AHI: 0.8   Recorded compliance dates, 20 to 20  Machine/Mfg: hoohbe  Interface: FFM    Provider:  [x]LOWELL  []Rashad []Mikie  [][facility]         [][facility] []Other:     Neck Size: 16.5 inches  Mallampati Mallampati 2  ESS:  10    Here is a scan of the most recent download:                                                                              Presentation:   Jada Flaherty presents for sleep medicine follow up for obstructive sleep apnea. Since the last visit, Jada Flaherty has started APAP and is not tolerating treatment well. She struggles with pressure, mask fit/moisture, sinus drainage and nausea to the point she vomits. We have made multiple adjustments for APAP 5-20 to CPAP 16 to APAP 10-15 as well as mask fittings. She is still not tolerating, sleeping and feeling worse so stopped using it this past Tuesday. She is inquiring about [employer]. Equipment issues: The pressure is not acceptable, the mask is unacceptable and she is using the humidity. Sleep issues:  Do you feel better? No  More rested? No   Better concentration? NA    Progress History:   Since last visit any new medical issues? No  New ER or hospitlal visits? No  Any new or changes in medicines? No  Any new sleep medicines?  No      Past Medical History:   Diagnosis Date    Anxiety     Arthritis  COPD (chronic obstructive pulmonary disease) (HCC)     Enlargement, spleen     Fatty liver     GERD (gastroesophageal reflux disease)     Hepatitis A     Hypertension     LYNN on CPAP     Ovarian cyst     Pneumonia        History reviewed. No pertinent surgical history. Social History     Tobacco Use    Smoking status: Current Every Day Smoker     Packs/day: 1.00     Years: 46.00     Pack years: 46.00     Types: Cigarettes    Smokeless tobacco: Never Used   Substance Use Topics    Alcohol use: Yes     Comment: 6 beers per day    Drug use: Never       Allergies   Allergen Reactions    Pcn [Penicillins] Hives       Current Outpatient Medications   Medication Sig Dispense Refill    lisinopril (PRINIVIL;ZESTRIL) 10 MG tablet TAKE 1 TABLET BY MOUTH DAILY 30 tablet 4    CPAP Machine MISC by Does not apply route Please change CPAP to APAP pressure of 10-15 cm H20. 1 each 0    carvedilol (COREG) 25 MG tablet TAKE 1 TABLET BY MOUTH 2 TIMES DAILY (WITH MEALS) 180 tablet 1    umeclidinium-vilanterol (ANORO ELLIPTA) 62.5-25 MCG/INH AEPB inhaler Inhale 1 puff into the lungs daily 30 puff 5    albuterol sulfate HFA (VENTOLIN HFA) 108 (90 Base) MCG/ACT inhaler Inhale 2 puffs into the lungs every 6 hours as needed for Wheezing 1 Inhaler 5    cloNIDine (CATAPRES) 0.1 MG tablet Take 1 tablet by mouth daily 90 tablet 1    bumetanide (BUMEX) 1 MG tablet Take 1 tablet by mouth daily 90 tablet 3    acetaminophen (TYLENOL) 500 MG tablet Take 500 mg by mouth every 6 hours as needed for Pain      fluticasone (FLONASE) 50 MCG/ACT nasal spray 2 sprays by Nasal route daily 1 Bottle 11    sucralfate (CARAFATE) 1 GM tablet Take 1 g by mouth as needed       pantoprazole (PROTONIX) 40 MG tablet 40 mg 2 times daily   6    ALPRAZolam (XANAX) 0.25 MG tablet 3 times daily as needed. 5    Lactobacillus (PROBIOTIC ACIDOPHILUS PO) Take by mouth daily       No current facility-administered medications for this visit. [x] Normal range of motion of neck          Neurological:        [x] No Facial Asymmetry (Cranial nerve 7 motor function) (limited exam to video visit)          [x] No gaze palsy          Skin:        [x] No significant exanthematous lesions or discoloration noted on facial skin           Psychiatric:       [x] Normal Affect [x] No Hallucinations     ASSESSMENT/DIAGNOSIS     Diagnosis Orders   1. LYNN on CPAP  Sleep Study with PAP Titration    Intolerant to treatment   2. Chronic sinus complaints     3. Environmental and seasonal allergies     4. Stage 2 moderate COPD by GOLD classification ([address]            Plan   Do you need any equipment today? No.    -Despite pressure changes, mask refit, she is not tolerant to treatment.  -She is not a candidate for positional treatment nor oral appliance. -She was HST with APAP set up: will schedule in lab CPAP titration. May need BiPAP. -Contact DME about adjusting comfort settings and temperature/humidification.   -Try using CPAP when awake to aid with adapting.  -Take Flonase at HS.  -Continue Zyrtec.  -May need to evaluate sinuses.  -We did discuss surgical options and need for true failure to CPAP then referral to ENT that specializes in sleep medicine. - She was advised to continue current positive airway pressure therapy with above described pressure. - She was advised to keep good compliance with current recommended pressure to get optimal results and clinical improvement.  - Recommend 7-9 hours of sleep with PAP treatment. - She was advised to call [employer] company regarding supplies if needed.   -She is to call my office for earlier appointment if needed for worsening of sleep symptoms.   - She was instructed on weight loss. - Lanell Goldmann was educated about my impression and plan and verbalizes understanding. We will see Fabián Villalba back in: 8 weeks with download. Fabián Villalba is a 63 y.o. female being evaluated by a Virtual/Doxy. me Visit (video visit) encounter to address concerns as mentioned above. A caregiver was present when appropriate. Due to this being a TeleHealth encounter (During BDGTJ-33 public health emergency), evaluation of the following organ systems was limited: Vitals/Constitutional/EENT/Resp/CV/GI//MS/Neuro/Skin/Heme-Lymph-Imm. Pursuant to the emergency declaration under the [address] [place] [address] and the [employer] Act, this Virtual Visit was conducted with patient's (and/or legal guardian's) consent, to reduce the patient's risk of exposure to COVID-19 and provide necessary medical care. The patient (and/or legal guardian) has also been advised to contact this office for worsening conditions or problems, and seek emergency medical treatment and/or call 911 if deemed necessary. Patient identification was verified at the start of the visit: Yes    Total time spent on this encounter: 25 minutes. Services were provided through a video synchronous discussion virtually to substitute for in-person clinic visit. Patient and provider were located at their individual homes. --JASON Gibbs CNP on 10/6/2020 at 3:08 PM    An electronic signature was used to authenticate this note.

## 2020-10-08 ENCOUNTER — TELEPHONE (OUTPATIENT)
Dept: PULMONOLOGY | Age: 55
End: 2020-10-08

## 2020-10-08 NOTE — TELEPHONE ENCOUNTER
Patient wants pressure turned down on machine, states she can't wear with it being that high. Please advise.

## 2020-10-17 ENCOUNTER — APPOINTMENT (OUTPATIENT)
Dept: GENERAL RADIOLOGY | Age: 55
End: 2020-10-17
Payer: COMMERCIAL

## 2020-10-17 ENCOUNTER — HOSPITAL ENCOUNTER (EMERGENCY)
Age: 55
Discharge: HOME | End: 2020-10-17
Attending: FAMILY MEDICINE
Payer: COMMERCIAL

## 2020-10-17 VITALS
DIASTOLIC BLOOD PRESSURE: 98 MMHG | RESPIRATION RATE: 20 BRPM | BODY MASS INDEX: 34.78 KG/M2 | SYSTOLIC BLOOD PRESSURE: 192 MMHG | OXYGEN SATURATION: 94 % | HEART RATE: 86 BPM | WEIGHT: 189 LBS | TEMPERATURE: 98.7 F | HEIGHT: 62 IN

## 2020-10-17 LAB
ALBUMIN SERPL-MCNC: 3.6 GM/DL (ref 3.4–5)
ALP BLD-CCNC: 154 U/L (ref 46–116)
ALT SERPL-CCNC: 65 U/L (ref 14–63)
ANION GAP: 8 MEQ/L (ref 8–16)
AST SERPL-CCNC: 57 U/L (ref 15–37)
BASOPHILS # BLD: 0.8 % (ref 0–3)
BILIRUB SERPL-MCNC: 0.6 MG/DL (ref 0.2–1)
BILIRUBIN URINE: ABNORMAL
BLOOD, URINE: NEGATIVE
BUN BLDV-MCNC: 12 MG/DL (ref 7–18)
CHARACTER, URINE: CLEAR
CHLORIDE BLD-SCNC: 95 MEQ/L (ref 98–107)
CO2: 30 MEQ/L (ref 21–32)
COLOR: YELLOW
CREAT SERPL-MCNC: 0.9 MG/DL (ref 0.6–1.3)
EKG ATRIAL RATE: 88 BPM
EKG P-R INTERVAL: 118 MS
EKG Q-T INTERVAL: 350 MS
EKG QRS DURATION: 74 MS
EKG QTC CALCULATION (BAZETT): 423 MS
EKG R AXIS: 74 DEGREES
EKG T AXIS: 57 DEGREES
EKG VENTRICULAR RATE: 88 BPM
EOSINOPHILS RELATIVE PERCENT: 1.4 % (ref 0–4)
GFR, ESTIMATED: 69 ML/MIN/1.73M2
GLUCOSE BLD-MCNC: 158 MG/DL (ref 74–106)
GLUCOSE, URINE: NEGATIVE MG/DL
HCT VFR BLD CALC: 46.5 % (ref 37–47)
HEMOGLOBIN: 15.6 GM/DL (ref 12–16)
ICTOTEST: NEGATIVE
KETONES, URINE: NEGATIVE
LEUKOCYTE ESTERASE, URINE: NEGATIVE
LYMPHOCYTES # BLD: 24.5 % (ref 15–47)
MCH RBC QN AUTO: 33.6 PG (ref 27–31)
MCHC RBC AUTO-ENTMCNC: 33.6 GM/DL (ref 33–37)
MCV RBC AUTO: 100 FL (ref 81–99)
MONOCYTES: 7.4 % (ref 0–12)
NITRITE, URINE: NEGATIVE
NT PRO BNP: 73 PG/ML (ref 0–900)
PDW BLD-RTO: 12.1 % (ref 11.5–14.5)
PH UA: 6 (ref 5–9)
PLATELET # BLD: 264 THOU/MM3 (ref 130–400)
PMV BLD AUTO: 7.4 FL (ref 7.4–10.4)
POC CALCIUM: 9 MG/DL (ref 8.5–10.1)
POTASSIUM SERPL-SCNC: 3.7 MEQ/L (ref 3.5–5.1)
PROTEIN UA: NEGATIVE MG/DL
RBC # BLD: 4.65 MILL/MM3 (ref 4.2–5.4)
REFLEX TO URINE C & S: ABNORMAL
SEGS: 65.9 % (ref 43–75)
SODIUM BLD-SCNC: 133 MEQ/L (ref 136–145)
SPECIFIC GRAVITY UA: 1.01 (ref 1–1.03)
TOTAL PROTEIN: 8 GM/DL (ref 6.4–8.2)
TROPONIN I: < 0.017 NG/ML (ref 0.02–0.06)
TROPONIN I: < 0.017 NG/ML (ref 0.02–0.06)
UROBILINOGEN, URINE: 0.2 EU/DL (ref 0–1)
WBC # BLD: 11.5 THOU/MM3 (ref 4.8–10.8)

## 2020-10-17 PROCEDURE — 36415 COLL VENOUS BLD VENIPUNCTURE: CPT

## 2020-10-17 PROCEDURE — 93005 ELECTROCARDIOGRAM TRACING: CPT | Performed by: FAMILY MEDICINE

## 2020-10-17 PROCEDURE — 71046 X-RAY EXAM CHEST 2 VIEWS: CPT

## 2020-10-17 PROCEDURE — 85025 COMPLETE CBC W/AUTO DIFF WBC: CPT

## 2020-10-17 PROCEDURE — 99283 EMERGENCY DEPT VISIT LOW MDM: CPT

## 2020-10-17 PROCEDURE — 81001 URINALYSIS AUTO W/SCOPE: CPT

## 2020-10-17 PROCEDURE — 2709999900 HC NON-CHARGEABLE SUPPLY

## 2020-10-17 PROCEDURE — 80053 COMPREHEN METABOLIC PANEL: CPT

## 2020-10-17 PROCEDURE — 84484 ASSAY OF TROPONIN QUANT: CPT

## 2020-10-17 PROCEDURE — 99282 EMERGENCY DEPT VISIT SF MDM: CPT

## 2020-10-17 PROCEDURE — 83880 ASSAY OF NATRIURETIC PEPTIDE: CPT

## 2020-10-17 RX ORDER — AZITHROMYCIN 250 MG/1
TABLET, FILM COATED ORAL
Qty: 1 PACKET | Refills: 0 | Status: SHIPPED | OUTPATIENT
Start: 2020-10-17 | End: 2020-10-21

## 2020-10-17 RX ORDER — PREDNISONE 10 MG/1
TABLET ORAL
Qty: 20 TABLET | Refills: 0 | Status: SHIPPED | OUTPATIENT
Start: 2020-10-17 | End: 2020-10-27

## 2020-10-17 ASSESSMENT — ENCOUNTER SYMPTOMS
NAUSEA: 0
COUGH: 0
SHORTNESS OF BREATH: 1
SORE THROAT: 0
VOMITING: 0

## 2020-10-17 ASSESSMENT — HEART SCORE: ECG: 0

## 2020-10-18 NOTE — ED PROVIDER NOTES
[facility]  eMERGENCY dEPARTMENT eNCOUnter          CHIEF COMPLAINT       Chief Complaint   Patient presents with    Shortness of Breath       Nurses Notes reviewed and I agree except as noted in the HPI. HISTORY OF PRESENT ILLNESS    Kenya Rivera is a 63 y.o. female who presents shortness of breath and short period of chest pain that started around 2 pm today. No current chest pain. Notes shortness of breath is not unusual secondary to COPD. The patient notes SOB worse in recent days. Denies fever,chills. Denies other symptoms. REVIEW OF SYSTEMS     Review of Systems   Constitutional: Negative for chills and fever. HENT: Negative for congestion and sore throat. Respiratory: Positive for shortness of breath. Negative for cough. Cardiovascular: Negative for chest pain and palpitations. Gastrointestinal: Negative for nausea and vomiting. Genitourinary: Negative for difficulty urinating, dysuria and frequency. Musculoskeletal: Negative for arthralgias and myalgias. Psychiatric/Behavioral: Negative for agitation and behavioral problems. All other systems reviewed and are negative. PAST MEDICAL HISTORY    has a past medical history of Anxiety, Arthritis, COPD (chronic obstructive pulmonary disease) ([address] Enlargement, spleen, Fatty liver, GERD (gastroesophageal reflux disease), Hepatitis A, Hypertension, LYNN on CPAP, Ovarian cyst, and Pneumonia. SURGICAL HISTORY      has no past surgical history on file. CURRENT MEDICATIONS       Previous Medications    ACETAMINOPHEN (TYLENOL) 500 MG TABLET    Take 500 mg by mouth every 6 hours as needed for Pain    ALBUTEROL SULFATE HFA (VENTOLIN HFA) 108 (90 BASE) MCG/ACT INHALER    Inhale 2 puffs into the lungs every 6 hours as needed for Wheezing    ALPRAZOLAM (XANAX) 0.25 MG TABLET    3 times daily as needed.      BUMETANIDE (BUMEX) 1 MG TABLET    Take 1 tablet by mouth daily    CARVEDILOL (COREG) 25 MG TABLET    TAKE 1 TABLET BY MOUTH 2 TIMES DAILY (WITH MEALS)    CLONIDINE (CATAPRES) 0.1 MG TABLET    Take 1 tablet by mouth daily    CPAP MACHINE MISC    by Does not apply route Please change APAP pressure to 6-14 cm H20.    FLUTICASONE (FLONASE) 50 MCG/ACT NASAL SPRAY    2 sprays by Nasal route daily    LACTOBACILLUS (PROBIOTIC ACIDOPHILUS PO)    Take by mouth daily    LISINOPRIL (PRINIVIL;ZESTRIL) 10 MG TABLET    TAKE 1 TABLET BY MOUTH DAILY    PANTOPRAZOLE (PROTONIX) 40 MG TABLET    40 mg 2 times daily     SUCRALFATE (CARAFATE) 1 GM TABLET    Take 1 g by mouth as needed     UMECLIDINIUM-VILANTEROL (ANORO ELLIPTA) 62.5-25 MCG/INH AEPB INHALER    Inhale 1 puff into the lungs daily       ALLERGIES     is allergic to pcn [penicillins]. FAMILY HISTORY     She indicated that her mother is . She indicated that her father is . She indicated that her sister is alive. She indicated that her brother is alive. She indicated that her maternal grandmother is . She indicated that her maternal grandfather is . She indicated that her paternal grandfather is . family history includes Heart Attack in her maternal grandfather and paternal grandfather; Heart Disease in her mother; Heart Surgery in her mother; Other in her brother and sister; Stroke in her maternal grandmother. SOCIAL HISTORY      reports that she has been smoking cigarettes. She has a 46.00 pack-year smoking history. She has never used smokeless tobacco. She reports current alcohol use. She reports that she does not use drugs. PHYSICAL EXAM     INITIAL VITALS:  height is 5' 2\" (1.575 m) and weight is 189 lb (85.7 kg). Her oral temperature is 98.7 °F (37.1 °C). Her blood pressure is 192/98 (abnormal) and her pulse is 90. Her respiration is 29 and oxygen saturation is 93%. Physical Exam  Vitals signs and nursing note reviewed. Constitutional:       General: She is not in acute distress. Appearance: She is not ill-appearing. Pulmonary:      Effort: Pulmonary effort is normal.      Breath sounds: Normal breath sounds. No decreased breath sounds, wheezing or rhonchi. Chest:      Chest wall: No tenderness. Musculoskeletal:      Right lower leg: No edema. Left lower leg: No edema. Skin:     General: Skin is dry. Capillary Refill: Capillary refill takes less than 2 seconds. Findings: No erythema. Neurological:      General: No focal deficit present. Mental Status: She is alert. Psychiatric:         Mood and Affect: Mood normal.         Behavior: Behavior normal.         DIFFERENTIAL DIAGNOSIS:   Copd exacerbation,acs,chf     DIAGNOSTIC RESULTS     EKG: All EKG's are interpreted by the Emergency Department Physician who either signs or Co-signs this chart in the absence of a cardiologist.  Normal sinus rhythm   Septal infarct (cited on or before 08-AUG-2020)   Abnormal ECG   When compared with ECG of 08-AUG-2020 02:07,   No significant change was found     RADIOLOGY: non-plain film images(s) such as CT, Ultrasound and MRI are read by the radiologist.       XR CHEST (2 VW) (Final result)   Result time 10/17/20 21:11:05   Final result by Joceline Santiago MD (10/17/20 21:11:05)                 Impression:     Pulmonary hyperinflation. This document has been electronically signed by: Joceline Santiago MD on   10/17/2020 09:11 PM             Narrative:     2 view chest x-ray     Comparison:  SR GABINO  - XR CHEST PORTABLE  - 08/08/2020 03:30 AM EDT     Findings:   No consolidation or effusion. Normal size heart.    No acute fracture.                        LABS:   Labs Reviewed   CBC WITH AUTO DIFFERENTIAL - Abnormal; Notable for the following components:       Result Value    WBC 11.5 (*)     .0 (*)     MCH 33.6 (*)     All other components within normal limits   COMPREHENSIVE METABOLIC PANEL - Abnormal; Notable for the following components:    Glucose 158 (*)     Sodium 133 (*)     Chloride 95 (*)     AST 57 (*)     Alkaline Phosphatase 154 (*)     ALT 65 (*)     All other components within normal limits   URINE RT REFLEX TO CULTURE - Abnormal; Notable for the following components:    Bilirubin Urine SMALL (*)     All other components within normal limits   GLOMERULAR FILTRATION RATE, ESTIMATED - Abnormal; Notable for the following components:    GFR, Estimated 69 (*)     All other components within normal limits   TROPONIN   BRAIN NATRIURETIC PEPTIDE   ANION GAP   ICTOTEST, URINE   TROPONIN       EMERGENCY DEPARTMENT COURSE:   Vitals:    Vitals:    10/17/20 2022   BP: (!) 192/98   Pulse: 90   Resp: 29   Temp: 98.7 °F (37.1 °C)   TempSrc: Oral   SpO2: 93%   Weight: 189 lb (85.7 kg)   Height: 5' 2\" (1.575 m)   Heart Score for chest pain patients  History: Slightly Suspicious  ECG: Normal  Patient Age: > 39 and < 65 years  *Risk factors for Atherosclerotic disease: Cigarette smoking, Hypertension  Risk Factors: 1 or 2 risk factors  Troponin: < 1X normal limit  Heart Score Total: 2  Heart Score for chest pain patients  History: Slightly Suspicious  ECG: Normal  Patient Age: > 39 and < 65 years  *Risk factors for Atherosclerotic disease: Cigarette smoking, Hypertension  Risk Factors: 1 or 2 risk factors  Troponin: < 1X normal limit  Heart Score Total: 2        Arrival patient is accompanied by her daughter-in-law and . Patient states that she is short of breath but she is normally short of breath. She states what made her come to the ED with some right-sided chest pain lasting minutes around 2 PM today she currently denies any chest pain. EKG showed a sinus rhythm with when compared to a prior EKG of 8 August 2020 no significant change was found. She does have a longstanding history of smoking. She states that the shortness of breath is usual for her secondary COPD. Lungs are relatively unremarkable on auscultation. Labs troponin was negative BNP was normal at 72. Chest x-ray showed some hyperinflation. There is no significant metabolic derangement that I noted except for some elevated liver enzymes and elevated alk phos which has been noted in the past.  Repeat troponin was also negative. At this time I did offer her treatment for her COPD. I recommended further follow-up with her primary care provider. CRITICAL CARE:       CONSULTS:      PROCEDURES:  None    FINAL IMPRESSION      1. COPD exacerbation ([address]          DISPOSITION/PLAN   Home. Care instructions provided. Follow up with PCP or ED if symptoms should worsen. PATIENT REFERRED TO:  Jacqui Small MD  [facility] [address] [phone]    Call in 3 days  As needed      DISCHARGE MEDICATIONS:  New Prescriptions    AZITHROMYCIN (ZITHROMAX Z-JERE) 250 MG TABLET    Take 2 tablets (500 mg) on Day 1, and then take 1 tablet (250 mg) on days 2 through 5.     PREDNISONE (DELTASONE) 10 MG TABLET    Take 4 tablets by mouth once daily for 5 days       (Please note that portions of this note were completed with a voice recognition program.  Efforts were made to edit the dictations but occasionally words are mis-transcribed.)    MD Andra Jacobs MD  10/17/20 [id number]

## 2020-10-18 NOTE — ED NOTES
Patient presents today complaining of shortness of breath for the past 24 hours. States her blood pressure has been up, she had a short episode of chest pain this morning. Also states she has dizziness that comes and goes today. Abdomen is distended, states \"I have all kinds of stomach issues they can't figure out. \"  Patient feels bloated.        Madhu Diaz RN  10/17/20 2036

## 2020-12-02 RX ORDER — UMECLIDINIUM BROMIDE AND VILANTEROL TRIFENATATE 62.5; 25 UG/1; UG/1
1 POWDER RESPIRATORY (INHALATION) DAILY
Qty: 1 EACH | Refills: 5 | Status: SHIPPED | OUTPATIENT
Start: 2020-12-02 | End: 2020-12-29 | Stop reason: SDUPTHER

## 2020-12-29 ENCOUNTER — TELEPHONE (OUTPATIENT)
Dept: PULMONOLOGY | Age: 55
End: 2020-12-29

## 2020-12-29 RX ORDER — UMECLIDINIUM BROMIDE AND VILANTEROL TRIFENATATE 62.5; 25 UG/1; UG/1
1 POWDER RESPIRATORY (INHALATION) DAILY
Qty: 1 EACH | Refills: 5 | Status: SHIPPED | OUTPATIENT
Start: 2020-12-29 | End: 2021-06-14 | Stop reason: SDUPTHER

## 2021-03-02 RX ORDER — LISINOPRIL 10 MG/1
10 TABLET ORAL DAILY
Qty: 90 TABLET | Refills: 0 | Status: SHIPPED | OUTPATIENT
Start: 2021-03-02 | End: 2021-03-24 | Stop reason: SDUPTHER

## 2021-03-02 RX ORDER — BUMETANIDE 1 MG/1
1 TABLET ORAL DAILY
Qty: 90 TABLET | Refills: 0 | Status: SHIPPED | OUTPATIENT
Start: 2021-03-02 | End: 2021-03-24 | Stop reason: SDUPTHER

## 2021-03-02 RX ORDER — CLONIDINE HYDROCHLORIDE 0.1 MG/1
0.1 TABLET ORAL DAILY
Qty: 90 TABLET | Refills: 0 | Status: SHIPPED | OUTPATIENT
Start: 2021-03-02 | End: 2021-03-24 | Stop reason: SDUPTHER

## 2021-03-24 ENCOUNTER — OFFICE VISIT (OUTPATIENT)
Dept: CARDIOLOGY CLINIC | Age: 56
End: 2021-03-24
Payer: COMMERCIAL

## 2021-03-24 VITALS
HEART RATE: 97 BPM | DIASTOLIC BLOOD PRESSURE: 80 MMHG | BODY MASS INDEX: 37.38 KG/M2 | WEIGHT: 203.13 LBS | HEIGHT: 62 IN | SYSTOLIC BLOOD PRESSURE: 150 MMHG

## 2021-03-24 DIAGNOSIS — R06.00 DYSPNEA, UNSPECIFIED TYPE: Primary | ICD-10-CM

## 2021-03-24 PROCEDURE — 99213 OFFICE O/P EST LOW 20 MIN: CPT | Performed by: INTERNAL MEDICINE

## 2021-03-24 RX ORDER — CARVEDILOL 25 MG/1
25 TABLET ORAL 2 TIMES DAILY WITH MEALS
Qty: 180 TABLET | Refills: 3 | Status: SHIPPED | OUTPATIENT
Start: 2021-03-24 | End: 2021-06-14 | Stop reason: SDUPTHER

## 2021-03-24 RX ORDER — BUMETANIDE 1 MG/1
1 TABLET ORAL DAILY
Qty: 90 TABLET | Refills: 3 | Status: SHIPPED | OUTPATIENT
Start: 2021-03-24 | End: 2021-06-14 | Stop reason: SDUPTHER

## 2021-03-24 RX ORDER — CLONIDINE HYDROCHLORIDE 0.1 MG/1
0.1 TABLET ORAL DAILY
Qty: 90 TABLET | Refills: 3 | Status: SHIPPED | OUTPATIENT
Start: 2021-03-24 | End: 2021-06-14 | Stop reason: SDUPTHER

## 2021-03-24 RX ORDER — LISINOPRIL 10 MG/1
10 TABLET ORAL DAILY
Qty: 90 TABLET | Refills: 3 | Status: SHIPPED | OUTPATIENT
Start: 2021-03-24 | End: 2021-11-23

## 2021-03-24 NOTE — PROGRESS NOTES
[address] [place] [address] [facility] [address]  Dept: [phone]  Dept Fax: [phone]  Loc: [phone]    Visit Date: 3/24/2021    Ms. Anshu Dao is a 63 y.o. female  who presented for:  Chief Complaint   Patient presents with    Check-Up    Edema       HPI:   62 yo F c hx of HTN, COPD, LYNN not on CPAP, Anxiety, Smoker, GERD, and alcohol abuse is here follow up. Reports She has been drinking alcohol every day, smokes 1 ppd. Her BP has been uncontrolled. Current Outpatient Medications:     lisinopril (PRINIVIL;ZESTRIL) 10 MG tablet, TAKE 1 TABLET BY MOUTH DAILY, Disp: 90 tablet, Rfl: 0    cloNIDine (CATAPRES) 0.1 MG tablet, TAKE 1 TABLET BY MOUTH DAILY, Disp: 90 tablet, Rfl: 0    bumetanide (BUMEX) 1 MG tablet, TAKE 1 TABLET BY MOUTH DAILY, Disp: 90 tablet, Rfl: 0    umeclidinium-vilanterol (ANORO ELLIPTA) 62.5-25 MCG/INH AEPB inhaler, Inhale 1 puff into the lungs daily, Disp: 1 each, Rfl: 5    CPAP Machine MISC, by Does not apply route Please change APAP pressure to 6-14 cm H20., Disp: 1 each, Rfl: 0    carvedilol (COREG) 25 MG tablet, TAKE 1 TABLET BY MOUTH 2 TIMES DAILY (WITH MEALS), Disp: 180 tablet, Rfl: 1    acetaminophen (TYLENOL) 500 MG tablet, Take 500 mg by mouth every 6 hours as needed for Pain, Disp: , Rfl:     fluticasone (FLONASE) 50 MCG/ACT nasal spray, 2 sprays by Nasal route daily, Disp: 1 Bottle, Rfl: 11    pantoprazole (PROTONIX) 40 MG tablet, 40 mg 2 times daily , Disp: , Rfl: 6    ALPRAZolam (XANAX) 0.25 MG tablet, 3 times daily as needed.  , Disp: , Rfl: 5    Lactobacillus (PROBIOTIC ACIDOPHILUS PO), Take by mouth daily, Disp: , Rfl:     albuterol sulfate HFA (VENTOLIN HFA) 108 (90 Base) MCG/ACT inhaler, Inhale 2 puffs into the lungs every 6 hours as needed for Wheezing, Disp: 1 Inhaler, Rfl: 5    Past Medical History  Satinder Flor  has a past medical history of Anxiety, Arthritis, COPD (chronic obstructive pulmonary disease) ([facility] [address] Enlargement, spleen, Fatty liver, GERD (gastroesophageal reflux disease), Hepatitis A, Hypertension, LYNN on CPAP, Ovarian cyst, Pneumonia, and Sleep apnea. Social History  Maribel Núñez  reports that she has been smoking cigarettes. She has a 46.00 pack-year smoking history. She has never used smokeless tobacco. She reports current alcohol use. She reports that she does not use drugs. Family History  Maribel Núñez family history includes Heart Attack in her maternal grandfather and paternal grandfather; Heart Disease in her mother; Heart Surgery in her mother; Other in her brother and sister; Stroke in her maternal grandmother. Past Surgical History   History reviewed. No pertinent surgical history. Subjective:     REVIEW OF SYSTEMS  Constitutional: denies sweats, chills and fever  HENT: denies  congestion, sinus pressure, sneezing and sore throat. Eyes: denies  pain, discharge, redness and itching. Respiratory: denies apnea, cough  Gastrointestinal: denies blood in stool, constipation, diarrhea   Endocrine: denies cold intolerance, heat intolerance, polydipsia. Genitourinary: denies dysuria, enuresis, flank pain and hematuria. Musculoskeletal: denies arthralgias, joint swelling and neck pain. Neurological: denies numbness and headaches. Psychiatric/Behavioral: denies agitation, confusion, decreased concentration and dysphoric mood    All others reviewed and are negative. Objective:     BP (!) 149/71   Pulse 97   Ht 5' 2\" (1.575 m)   Wt 203 lb 2 oz (92.1 kg)   BMI 37.15 kg/m²     Wt Readings from Last 3 Encounters:   03/24/21 203 lb 2 oz (92.1 kg)   10/17/20 189 lb (85.7 kg)   08/08/20 186 lb (84.4 kg)     BP Readings from Last 3 Encounters:   03/24/21 (!) 149/71   10/17/20 (!) 192/98   08/08/20 138/80       PHYSICAL EXAM  Constitutional: Oriented to person, place, and time. Appears well-developed and well-nourished. HENT:   Head: Normocephalic and atraumatic.    Eyes: EOM are normal. Pupils are equal, round, and reactive to light. Neck: Normal range of motion. Neck supple. No JVD present. Cardiovascular: Normal rate , normal heart sounds and intact distal pulses. Pulmonary/Chest: Effort normal and breath sounds normal. No respiratory distress. No wheezes. No rales. Abdominal: Soft. Bowel sounds are normal. No distension. There is no tenderness. Musculoskeletal: Normal range of motion. No edema. Neurological: Alert and oriented to person, place, and time. No cranial nerve deficit. Coordination normal.   Skin: Skin is warm and dry. Psychiatric: Normal mood and affect.        No results found for: CKTOTAL, CKMB, CKMBINDEX    Lab Results   Component Value Date    WBC 11.5 10/17/2020    RBC 4.65 10/17/2020    HGB 15.6 10/17/2020    HCT 46.5 10/17/2020    .0 10/17/2020    MCH 33.6 10/17/2020    MCHC 33.6 10/17/2020    RDW 12.1 10/17/2020     10/17/2020    MPV 7.4 10/17/2020       Lab Results   Component Value Date     10/17/2020    K 3.7 10/17/2020    CL 95 10/17/2020    CO2 30 10/17/2020    BUN 12 10/17/2020    LABALBU 3.6 10/17/2020    CREATININE 0.9 10/17/2020    CALCIUM 9.0 08/08/2020    GFRAA >60 03/18/2020    LABGLOM >90 08/08/2020    GLUCOSE 158 10/17/2020       Lab Results   Component Value Date    ALKPHOS 154 10/17/2020    ALT 65 10/17/2020    AST 57 10/17/2020    PROT 8.0 10/17/2020    BILITOT 0.6 10/17/2020    BILIDIR <0.2 08/08/2020    IBILI 0.19 11/14/2019    LABALBU 3.6 10/17/2020       No results found for: MG    No results found for: INR, PROTIME      Lab Results   Component Value Date    LABA1C 5.6 09/19/2019       Lab Results   Component Value Date    TRIG 232 09/19/2019    HDL 35 09/19/2019    LDLCALC 133 01/04/2017       Lab Results   Component Value Date    TSH 4.15 11/14/2019         Testing Reviewed:      I haveindividually reviewed the below cardiac tests    EKG:    ECHO:   Results for orders placed during the hospital encounter of 02/07/20   ECHO Complete 2D W Doppler W Color    Narrative Transthoracic Echocardiography Report (TTE)     Demographics      Patient Name    Carlotta Eastman Gender               Female      MR #            [id number]       Race                                                       Ethnicity      Account #       [de-identified]       Room Number      Accession       [id number]       Date of Study        02/07/2020   Number      Date of Birth   1965      Referring Physician  Desiderio Apley MD Marrian Showers MD      Age             53 year(s)      Keny Estes [facility]                                      Interpreting         Nathaniel Alvarez MD                                   Physician     Procedure    Type of Study      TTE procedure:ECHOCARDIOGRAM COMPLETE 2D W DOPPLER W COLOR. Procedure Date  Date: 02/07/2020 Start: 01:50 PM    Study Location: Echo Lab  Technical Quality: Adequate visualization    Indications:Shortness of breath. Additional Medical History:Smoker, Hypertension, GERD, Alcohol Abuse    Patient Status: Routine    Height: 62 inches Weight: 189 pounds BSA: 1.87 m^2 BMI: 34.57 kg/m^2    BP: 158/79 mmHg     Conclusions      Summary   Left ventricular size and systolic function is normal. Ejection fraction   was estimated at 55-60%. LV wall thickness is within normal limits and   there are no obvious wall motion abnormalities. The right ventricular size appears normal with normal systolic function   and wall thickness. Signature      ----------------------------------------------------------------   Electronically signed by Nathaniel Alvarez MD (Interpreting   physician) on 02/10/2020 at 10:37 AM   ----------------------------------------------------------------      Findings      Mitral Valve   The mitral valve structure is normal with normal leaflet separation.    DOPPLER: The transmitral velocity was within the Diastolic Dimension: 3.3 cm   Diastolic: 1.1 EF Calculated: 64 %   cm                                       LA/Aorta: 1.64                                            Ascending Aorta: 3.1 cm                                            LA volume/Index: 49.9 ml /27m^2     Doppler Measurements & Calculations      MV Peak E-Wave: 75.8 cm/s  AV Peak Velocity: 111  LVOT Peak Velocity: 91.3   MV Peak A-Wave: 71.6 cm/s  cm/s                   cm/s   MV E/A Ratio: 1.06         AV Peak Gradient: 4.93 LVOT Peak Gradient: 3   MV Peak Gradient: 2.3 mmHg mmHg                   mmHg      MV Deceleration Time: 299                         TV Peak E-Wave: 60.4   msec                                              cm/s                                                     TV Peak A-Wave: 57.8                              IVRT: 74 msec          cm/s   MV E' Septal Velocity: 6.2   cm/s                                              TV Peak Gradient: 1.46   MV A' Septal Velocity: 7.3 AV DVI (Vmax):0.82     mmHg   cm/s   MV E' Lateral Velocity:                           PV Peak Velocity: 67.7   7.3 cm/s                                          cm/s   MV A' Lateral Velocity:                           PV Peak Gradient: 1.83   9.4 cm/s                                          mmHg   E/E' septal: 12.23   E/E' lateral: 10.38     [email or web address] [id number]       STRESS:    CATH:    Assessment/Plan       Diagnosis Orders   1.  Dyspnea, unspecified type       Dyspnea  COPD  Smoker  Weight gain          Plan:  Reviewed 2D Echo and Joan Escalona up with pulmonary  States she is continuing to smoke and drinks 5-6 drinks almost daily  Coreg 25mg BID  Continue clonidine for now, needs to be tappered off  Smoking cessation advised   Alcohol cessation advised  The patient is asked to make an attempt to improve diet and exercise patterns to aid in medical management of this problem. Advised more plant based nutrition/meditarrean diet   Advised patient to call office or seek immediate medical attention if there is any new onset of  any chest pain, sob, palpitations, lightheadedness, dizziness, orthopnea, PND or pedal edema. All medication side effects were discussed in details.     Thank youfor allowing me to participate in the care of this patient. Please do not hesitate to contact me for any further questions. Return in about 1 year (around 3/24/2022), or if symptoms worsen or fail to improve, for Regular follow up, Review testing.        Electronically signed by Maco Diop MD [facility]  3/24/2021 at 2:17 PM

## 2021-03-24 NOTE — PROGRESS NOTES
Pt here for 10 mo check up     Pt denies swelling in legs and feet ,     Pt continues with leg aches and not able to stand for long periods , dizziness at times.

## 2021-05-04 RX ORDER — ALBUTEROL SULFATE 90 UG/1
2 AEROSOL, METERED RESPIRATORY (INHALATION) EVERY 6 HOURS PRN
Qty: 1 INHALER | Refills: 1 | Status: SHIPPED | OUTPATIENT
Start: 2021-05-04 | End: 2021-06-14 | Stop reason: SDUPTHER

## 2021-06-14 PROBLEM — Z87.891 HISTORY OF TOBACCO ABUSE: Status: ACTIVE | Noted: 2021-06-14

## 2021-06-14 PROBLEM — K21.9 GASTROESOPHAGEAL REFLUX DISEASE WITHOUT ESOPHAGITIS: Status: ACTIVE | Noted: 2021-06-14

## 2021-06-14 PROBLEM — J98.01 BRONCHOSPASM: Status: ACTIVE | Noted: 2021-06-14

## 2021-06-14 PROBLEM — J30.2 SEASONAL ALLERGIES: Status: ACTIVE | Noted: 2021-06-14

## 2021-08-11 ENCOUNTER — HOSPITAL ENCOUNTER (OUTPATIENT)
Dept: CT IMAGING | Age: 56
Discharge: HOME | End: 2021-08-11
Payer: COMMERCIAL

## 2021-08-11 DIAGNOSIS — Z87.891 HISTORY OF TOBACCO ABUSE: ICD-10-CM

## 2021-08-11 PROCEDURE — 71271 CT THORAX LUNG CANCER SCR C-: CPT

## 2021-11-16 ENCOUNTER — TELEPHONE (OUTPATIENT)
Dept: PULMONOLOGY | Age: 56
End: 2021-11-16

## 2021-11-16 NOTE — PROGRESS NOTES
[facility]    Patient: Marly Roberson, 61 y.o.   : 2021    Patient of Dr. Kellee Maharaj   Patient presents with    Follow-up     16 month f/u, no testing         COPD  She complains of cough, shortness of breath, sputum production and wheezing. There is no chest tightness or hemoptysis. This is a chronic problem. The current episode started more than 1 year ago. The problem occurs daily. The problem has been gradually worsening. The cough is productive of sputum (clear, frothy ). Associated symptoms include dyspnea on exertion, headaches (reports intermittent), nasal congestion and postnasal drip. Pertinent negatives include no appetite change, chest pain, fever, rhinorrhea, sneezing, sore throat or weight loss. Her symptoms are aggravated by strenuous activity, exercise, any activity, climbing stairs, change in weather, minimal activity and exposure to fumes. Her symptoms are alleviated by beta-agonist. She reports significant improvement on treatment. Risk factors for lung disease include smoking/tobacco exposure. Her past medical history is significant for COPD. Flaco Puri is here for follow up for Moderate COPD. Patient was last seen as a virtual visit by JASON Flores CNP on 2020. At that time, she was started on Anoro, was smoking 1 PPD, and deferred CT lung screening. She was to follow up in 6 months, however patient did not follow up. Patient had a CT lung screen on 2021 with a 7 mm DAWIT nodule with recommendation for 6 month follow up. Patient has not followed with the sleep clinic. Patient reports that she cannot wear the CPAP as she reports that she has panic attacks. Overall patient reports respiratory symptoms have been worse since last appointment. Patient reports good compliance with inhaled medications (Anoro). Patient using albuterol 4+ times per day on average.  Patient reports physical limitation due to respiratory symptoms. Her past medical history is significant for anxiety, arthritis, COPD, enlarged spleen, fatty liver, GERD, Hep A, hypertension, LYNN, and ovarian cyst.     Patient reports to me that she is in the middle of transitioning to a new primary care provider. She reports that she has an appointment with JASON Regalado CNP tomorrow (11/23). MMRC Dyspnea Scale:   0: Dyspneic on strenuous exercise  1: Dyspneic on walking a slight hill  2: Dyspneic on walking level ground; must stop occasionally due to breathlessness  3: Must stop for breathlessness after walking 100 yards or after a few minutes  4: Cannot leave house; breathless on dressing/undressing    MMRC dyspnea score: 3    Progress History:   Since last visit any new medical issues? No  New ER or hospital visits? No  Any new or changes in medicines? Yes, probiotics  Using inhalers? Yes Anoro and as needed albuterol   Are they helpful? Yes   Any recent exacerbations? No  Last PFT: 6/29/2020 - moderate obstruction  Last 6 MWT: none in epic  Last A1A: Serum level of 133 on 10/10/2019    Smoking History:  Current smoker of 0.5 PPD with 52 PY history    Flu vaccine: has not received and does not plan to   Pneumonia vaccine: [id number] 6/14/2021 - up to date   COVID-19 vaccine: has not received and reports that she is scared   Past Medical hx   PMH:  Past Medical History:   Diagnosis Date    Anxiety     Arthritis     COPD (chronic obstructive pulmonary disease) ([address]     Enlargement, spleen     Fatty liver     GERD (gastroesophageal reflux disease)     Hepatitis A     Hypertension     LYNN on CPAP     Ovarian cyst     Pneumonia     Sleep apnea      SURGICAL HISTORY:History reviewed. No pertinent surgical history.   SOCIAL HISTORY:  Social History     Tobacco Use    Smoking status: Current Every Day Smoker     Packs/day: 1.00     Years: 46.00     Pack years: 46.00     Types: Cigarettes    Smokeless tobacco: Never Used    Tobacco comment: smokes . 5 pack a day 11/22/21   Vaping Use    Vaping Use: Never used   Substance Use Topics    Alcohol use: Yes     Comment: 6 beers per day    Drug use: Never     ALLERGIES:  Allergies   Allergen Reactions    Pcn [Penicillins] Hives     FAMILY HISTORY:  Family History   Problem Relation Age of Onset    Heart Surgery Mother         dies at 29    Heart Disease Mother     Other Sister         Cardiomyopathy    Other Brother         cardiomyopathy    Stroke Maternal Grandmother     Heart Attack Maternal Grandfather     Heart Attack Paternal Grandfather      CURRENT MEDICATIONS:  Current Outpatient Medications   Medication Sig Dispense Refill    fluticasone-umeclidin-vilant (TRELEGY ELLIPTA) 100-62.5-25 MCG/INH AEPB Inhale 1 puff into the lungs daily 30 each 11    albuterol sulfate HFA (VENTOLIN HFA) 108 (90 Base) MCG/ACT inhaler Inhale 2 puffs into the lungs every 6 hours as needed for Wheezing or Shortness of Breath 18 g 11    albuterol (PROVENTIL) (2.5 MG/3ML) 0.083% nebulizer solution Take 3 mLs by nebulization every 6 hours as needed for Wheezing or Shortness of Breath 120 each 11    bumetanide (BUMEX) 1 MG tablet Take 1 tablet by mouth daily 90 tablet 3    carvedilol (COREG) 25 MG tablet Take 1 tablet by mouth 2 times daily (with meals) 180 tablet 3    cloNIDine (CATAPRES) 0.1 MG tablet Take 1 tablet by mouth daily 90 tablet 3    sucralfate (CARAFATE) 1 GM tablet Take 1 tablet by mouth 4 times daily 120 tablet 5    ondansetron (ZOFRAN) 8 MG tablet Take 1 tablet by mouth every 8 hours as needed for Nausea or Vomiting 30 tablet 5    fluticasone (FLONASE) 50 MCG/ACT nasal spray 2 SPRAYS BY NASAL ROUTE DAILY 1 Bottle 11    lisinopril (PRINIVIL;ZESTRIL) 10 MG tablet Take 1 tablet by mouth daily 90 tablet 3    CPAP Machine MISC by Does not apply route Please change APAP pressure to 6-14 cm H20. 1 each 0    acetaminophen (TYLENOL) 500 MG tablet Take 500 mg by mouth every 6 hours as needed for Pain      Lactobacillus (PROBIOTIC ACIDOPHILUS PO) Take by mouth daily      pantoprazole (PROTONIX) 40 MG tablet TAKE 1 TABLET BY MOUTH 2 TIMES DAILY NO REFILLS DUE FOR CHECK UP 60 tablet 10     No current facility-administered medications for this visit. Pinky Angles ROS   Review of Systems   Constitutional: Negative for appetite change, chills, fever and weight loss. HENT: Positive for postnasal drip and sinus pressure. Negative for congestion, rhinorrhea, sinus pain, sneezing and sore throat. Eyes: Negative for itching. Respiratory: Positive for cough, sputum production, shortness of breath and wheezing. Negative for hemoptysis and chest tightness. Cardiovascular: Positive for dyspnea on exertion and palpitations. Negative for chest pain and leg swelling. Gastrointestinal: Positive for abdominal pain (reports is chronic). History of GERD, reports GERD symptoms   Genitourinary: Negative for difficulty urinating. Allergic/Immunologic: Positive for environmental allergies. On Flonase   Neurological: Positive for headaches (reports intermittent). Negative for light-headedness. Psychiatric/Behavioral: The patient is nervous/anxious. Physical exam   BP (!) 162/108 Comment: recheck 182/110  Pulse 97   Ht 5' (1.524 m)   Wt 204 lb (92.5 kg)   SpO2 96% Comment: r/a  BMI 39.84 kg/m²    Wt Readings from Last 3 Encounters:   11/22/21 204 lb (92.5 kg)   06/14/21 205 lb (93 kg)   03/24/21 203 lb 2 oz (92.1 kg)       Physical Exam  Constitutional:       Appearance: She is well-developed. Comments: BMI 40   HENT:      Head: Normocephalic. Right Ear: External ear normal.      Left Ear: External ear normal.      Mouth/Throat:      Mouth: Mucous membranes are moist.      Pharynx: Oropharynx is clear. No oropharyngeal exudate. Eyes:      General:         Right eye: No discharge. Left eye: No discharge.    Cardiovascular:      Rate and Rhythm: Normal rate and regular rhythm. Pulmonary:      Effort: Pulmonary effort is normal.      Breath sounds: Wheezing (clears with cough) present. No rhonchi or rales. Chest:      Chest wall: No tenderness. Abdominal:      General: There is no distension. Tenderness: There is abdominal tenderness. Musculoskeletal:      Cervical back: Neck supple. Right lower leg: No edema. Left lower leg: No edema. Skin:     General: Skin is warm and dry. Neurological:      General: No focal deficit present. Mental Status: She is alert. Psychiatric:         Attention and Perception: Attention normal.         Mood and Affect: Mood is anxious. Speech: Speech is rapid and pressured. Behavior: Behavior is cooperative. Results   Lung Nodule Screening     [x] Qualifies    [] Does not qualify   [] Declined    [] Completed  52 PY history   The USPSTF recommends annual screening for lung cancer with low-dose computed tomography (LDCT) in adults aged 48 to 80 years who have a 20 pack-year smoking history and currently smoke or have quit within the past 15 years. Screening should be discontinued once a person has not smoked for 15 years or develops a health problem that substantially limits life expectancy or the ability or willingness to have curative lung surgery. CT Lung Screening 8/11/2021 (Reviewed) 7mm DAWIT nodule   Narrative   NONCONTRAST SCREENING CT CHEST:       HISTORY: History of smoking. 40 pack year history of smoking.       TECHNIQUE:    1 mm axial imaging of the chest and upper abdomen without IV contrast.        All CT scans at this facility use dose modulation, iterative reconstruction, and/or weight based dosing when appropriate to reduce the radiation dose to as low as reasonably achievable.       COMPARISON: No prior examinations.           FINDINGS:   LUNGS NODULES:   1. 7 mm pleural-based nodule left upper lobe laterally image 45.       LYMPHADENOPATHY:   1.  There are no pathologically enlarged lymph nodes.       OTHER (LUNGS/MEDIASTINUM/MUSCULOSKELETAL/ABDOMEN):   1. Unremarkable.             Impression   1. 7 mm left upper lobe nodule   2. There are no pathologically enlarged lymph nodes. 3. LUNGRADS ASSESSMENT VALUE: 3,            The LUNG RADS RECOMMENDATIONS for monitoring lung nodules listed below (ACR- Lung-RADS Version 1.0 Assessment Categories Release date\" April 28, 2014)       LUNG RADS RECOMMENDATIONS;   1.  Normal, continue annual screening   2.  Benign appearance or behavior, continue annual screening   3.  6 month CT recommended   4A.  3 month CT recommended; may consider PET/CT   4B.  Additional diagnostics and/or tissue sampling recommended   4X.  Additional diagnostics and/or tissue sampling recommended         **This report has been created using voice recognition software.  It may contain minor errors which are inherent in voice recognition technology. **       Final report electronically signed by Dr. Filipe Lopez on 8/11/2021 4:49 PM           Echo 2/7/2020 (Reviewed) EF 55-60% with normal right ventricle   Conclusions      Summary   Left ventricular size and systolic function is normal. Ejection fraction   was estimated at 55-60%. LV wall thickness is within normal limits and   there are no obvious wall motion abnormalities. The right ventricular size appears normal with normal systolic function   and wall thickness. Signature      ----------------------------------------------------------------   Electronically signed by Fabiano Mark MD (Interpreting   physician) on 02/10/2020 at 10:37 AM   ----------------------------------------------------------------  Assessment      Diagnosis Orders   1.  Moderate COPD (chronic obstructive pulmonary disease) (HCC)  fluticasone-umeclidin-vilant (TRELEGY ELLIPTA) 100-62.5-25 MCG/INH AEPB    albuterol sulfate HFA (VENTOLIN HFA) 108 (90 Base) MCG/ACT inhaler    DME Order for Nebulizer as OP    albuterol (PROVENTIL) (2.5 MG/3ML) 0.083% nebulizer solution   2. Pulmonary emphysema, unspecified emphysema type (HCC)  fluticasone-umeclidin-vilant (TRELEGY ELLIPTA) 100-62.5-25 MCG/INH AEPB    albuterol sulfate HFA (VENTOLIN HFA) 108 (90 Base) MCG/ACT inhaler    DME Order for Nebulizer as OP    albuterol (PROVENTIL) (2.5 MG/3ML) 0.083% nebulizer solution   3. Allergic rhinitis, unspecified seasonality, unspecified trigger     4. Lung nodule, solitary  CT CHEST WO CONTRAST   5. Anxiety     6. Hypertension, uncontrolled           Plan   1. Moderate COPD (chronic obstructive pulmonary disease) (HCC)  - Symptoms poorly controlled on Anoro, will stop Anoro  - Start Trelegy 100. Instructed patient on use. Advised to rinse and spit after use  - 2 samples of trelegy given to patient today  - Sample spacer given to patient  - Order for nebulizer and albuterol nebulizer solution  - Discussed albuterol inhaler and nebulizer use with the patient. Reviewed signs and symptoms indicating need for use including shortness of breath and wheezing. Discussed with the patient the importance of using the inhaler or nebulizer within the prescribed time frames. Patient verbalized understanding to use one or the other not both within prescribed time frame. Patient also verbalized understanding that if there is no relief of symptoms after using albuterol and resting for 15 minutes they need to go to nearest ER or call 911.  - Need to complete A1A at next visit  - Unable to obtain 6 MWT due to very elevated BP. Will plan to obtain test at next visit. - fluticasone-umeclidin-vilant (Elton Rail) 100-62.5-25 MCG/INH AEPB; Inhale 1 puff into the lungs daily  Dispense: 30 each; Refill: 11  - albuterol sulfate HFA (VENTOLIN HFA) 108 (90 Base) MCG/ACT inhaler;  Inhale 2 puffs into the lungs every 6 hours as needed for Wheezing or Shortness of Breath  Dispense: 18 g; Refill: 11  - DME Order for Nebulizer as OP  - albuterol (PROVENTIL) (2.5 MG/3ML) 0.083% nebulizer other poor outcome. Patient verbalized that she understood the risk and she would still like to proceed with being evaluated at [facility] ambulatory care    7. LYNN not on treatment  - Patient has history of LYNN and is not on treatment due to claustrophobia   - Declines sleep follow up      Advised patient to call office with any changes, questions, or concerns regarding respiratory status or issues with prescribed medications. Will forward my note to patient's new primary care provider, JASON Diggs CNP as she is scheduled with him tomorrow. Return in about 3 months (around 2/22/2022) for COPD with CT prior.        Electronically signed by JASON Colon CNP on 11/22/2021 at 3:02 PM

## 2021-11-16 NOTE — TELEPHONE ENCOUNTER
Patient called back and wanted to be rescheduled back in our office.   Appt was made with Reyna per pt's request.

## 2021-11-16 NOTE — TELEPHONE ENCOUNTER
Received a faxed referral from Dr. Josafat Owen to reschedule patient for pulmonary. Went to schedule patient and see where she is scheduled with another pulmonologist in [place]. Just need to know if patient wants to see us [place] or another doctor in [place].

## 2021-11-22 ENCOUNTER — OFFICE VISIT (OUTPATIENT)
Dept: PULMONOLOGY | Age: 56
End: 2021-11-22
Payer: COMMERCIAL

## 2021-11-22 ENCOUNTER — HOSPITAL ENCOUNTER (EMERGENCY)
Age: 56
Discharge: HOME | End: 2021-11-22
Attending: EMERGENCY MEDICINE
Payer: COMMERCIAL

## 2021-11-22 VITALS
HEART RATE: 87 BPM | DIASTOLIC BLOOD PRESSURE: 89 MMHG | OXYGEN SATURATION: 97 % | RESPIRATION RATE: 15 BRPM | SYSTOLIC BLOOD PRESSURE: 199 MMHG | TEMPERATURE: 97.4 F

## 2021-11-22 VITALS
WEIGHT: 204 LBS | HEIGHT: 60 IN | BODY MASS INDEX: 40.05 KG/M2 | OXYGEN SATURATION: 96 % | DIASTOLIC BLOOD PRESSURE: 108 MMHG | SYSTOLIC BLOOD PRESSURE: 162 MMHG | HEART RATE: 97 BPM

## 2021-11-22 DIAGNOSIS — J30.9 ALLERGIC RHINITIS, UNSPECIFIED SEASONALITY, UNSPECIFIED TRIGGER: ICD-10-CM

## 2021-11-22 DIAGNOSIS — G47.33 OSA (OBSTRUCTIVE SLEEP APNEA): ICD-10-CM

## 2021-11-22 DIAGNOSIS — J44.9 MODERATE COPD (CHRONIC OBSTRUCTIVE PULMONARY DISEASE) (HCC): Primary | ICD-10-CM

## 2021-11-22 DIAGNOSIS — I10 HYPERTENSION, UNCONTROLLED: ICD-10-CM

## 2021-11-22 DIAGNOSIS — F41.9 ANXIETY: ICD-10-CM

## 2021-11-22 DIAGNOSIS — R91.1 LUNG NODULE, SOLITARY: ICD-10-CM

## 2021-11-22 DIAGNOSIS — I10 UNCONTROLLED HYPERTENSION: Primary | ICD-10-CM

## 2021-11-22 DIAGNOSIS — J43.9 PULMONARY EMPHYSEMA, UNSPECIFIED EMPHYSEMA TYPE (HCC): ICD-10-CM

## 2021-11-22 PROCEDURE — 99214 OFFICE O/P EST MOD 30 MIN: CPT

## 2021-11-22 PROCEDURE — 6370000000 HC RX 637 (ALT 250 FOR IP): Performed by: EMERGENCY MEDICINE

## 2021-11-22 PROCEDURE — 99283 EMERGENCY DEPT VISIT LOW MDM: CPT

## 2021-11-22 RX ORDER — FLUTICASONE FUROATE, UMECLIDINIUM BROMIDE AND VILANTEROL TRIFENATATE 100; 62.5; 25 UG/1; UG/1; UG/1
1 POWDER RESPIRATORY (INHALATION) DAILY
Qty: 30 EACH | Refills: 11 | Status: SHIPPED | OUTPATIENT
Start: 2021-11-22 | End: 2022-02-17

## 2021-11-22 RX ORDER — ALBUTEROL SULFATE 90 UG/1
2 AEROSOL, METERED RESPIRATORY (INHALATION) EVERY 6 HOURS PRN
Qty: 18 G | Refills: 11 | Status: SHIPPED | OUTPATIENT
Start: 2021-11-22 | End: 2022-11-22

## 2021-11-22 RX ORDER — CLONIDINE HYDROCHLORIDE 0.1 MG/1
0.1 TABLET ORAL ONCE
Status: COMPLETED | OUTPATIENT
Start: 2021-11-22 | End: 2021-11-22

## 2021-11-22 RX ORDER — ALBUTEROL SULFATE 2.5 MG/3ML
2.5 SOLUTION RESPIRATORY (INHALATION) EVERY 6 HOURS PRN
Qty: 120 EACH | Refills: 11 | Status: SHIPPED | OUTPATIENT
Start: 2021-11-22 | End: 2022-11-22

## 2021-11-22 RX ADMIN — CLONIDINE HYDROCHLORIDE 0.1 MG: 0.1 TABLET ORAL at 16:36

## 2021-11-22 ASSESSMENT — ENCOUNTER SYMPTOMS
COUGH: 1
SORE THROAT: 0
SPUTUM PRODUCTION: 1
SINUS PAIN: 0
WHEEZING: 1
RHINORRHEA: 0
SHORTNESS OF BREATH: 1
SINUS PRESSURE: 1
HEMOPTYSIS: 0
CHEST TIGHTNESS: 0
ABDOMINAL PAIN: 1
EYE ITCHING: 0

## 2021-11-22 ASSESSMENT — COPD QUESTIONNAIRES: COPD: 1

## 2021-11-22 NOTE — PATIENT INSTRUCTIONS
Stop your Anoro inhaler. Start Trelegy. Inhale 1 puff daily. Rinse and spit after use. Continue your albuterol inhaler and nebulizer. You may use one or the other every 6 hours as needed for shortness of breath or wheezing. Do NOT use both at the same time as they continue the same medication. I will see you back in 3 months. Chronic Obstructive Pulmonary Disease (COPD): Care Instructions  Your Care Instructions     Chronic obstructive pulmonary disease (COPD) is a general term for a group of lung diseases, including emphysema and chronic bronchitis. People with COPD have decreased airflow in and out of the lungs, which makes it hard to breathe. The airways also can get clogged with thick mucus. Cigarette smoking is a major cause of COPD. Although there is no cure for COPD, you can slow its progress. Following your treatment plan and taking care of yourself can help you feel better and live longer. Follow-up care is a key part of your treatment and safety. Be sure to make and go to all appointments, and call your doctor if you are having problems. It's also a good idea to know your test results and keep a list of the medicines you take. How can you care for yourself at home? Staying healthy    · Do not smoke. This is the most important step you can take to prevent more damage to your lungs. If you need help quitting, talk to your doctor about stop-smoking programs and medicines. These can increase your chances of quitting for good. · Avoid colds and flu. Get a pneumococcal vaccine shot. If you have had one before, ask your doctor whether you need a second dose. Get the flu vaccine every fall. If you must be around people with colds or the flu, wash your hands often. · Avoid secondhand smoke, air pollution, and high altitudes. Also avoid cold, dry air and hot, humid air. Stay at home with your windows closed when air pollution is bad. Medicines and oxygen therapy    1.  Take your medicines exactly as prescribed. Call your doctor if you think you are having a problem with your medicine. You may be taking medicines such as:  ? Bronchodilators. These help open your airways and make breathing easier. They are either short-acting (work for 6 to 9 hours) or long-acting (work for 24 hours). You inhale most bronchodilators, so they start to act quickly. Always carry your quick-relief inhaler with you in case you need it while you are away from home. ? Corticosteroids (prednisone, budesonide). These reduce airway inflammation. They come in pill or inhaled form. You must take these medicines every day for them to work well. · Ask your doctor or pharmacist if a spacer is right for you. A spacer may help you get more inhaled medicine to your lungs. If you use one, ask how to use it properly. · Do not take any vitamins, over-the-counter medicine, or herbal products without talking to your doctor first.     · If your doctor prescribed antibiotics, take them as directed. Do not stop taking them just because you feel better. You need to take the full course of antibiotics. · If you use oxygen therapy, use the flow rate your doctor has recommended. Don't change it without talking to your doctor first. Oxygen therapy boosts the amount of oxygen in your blood and helps you breathe easier. Activity    · Get regular exercise. Walking is an easy way to get exercise. Start out slowly, and walk a little more each day. · Pay attention to your breathing. You are exercising too hard if you can't talk while you exercise. · Take short rest breaks when doing household chores and other activities. · Learn breathing methods--such as breathing through pursed lips--to help you become less short of breath. · If your doctor has not set you up with a pulmonary rehabilitation program, ask if rehab is right for you.  Rehab includes exercise programs, education about your disease and how to manage it, help with diet and other changes, and emotional support. Diet    · Eat regular, healthy meals. Use bronchodilators about 1 hour before you eat to make it easier to eat. Eat several small meals instead of three large ones. Drink beverages at the end of the meal. Avoid foods that are hard to chew. · Eat foods that contain protein so you don't lose muscle mass. · Talk with your doctor if you gain too much weight or if you lose weight without trying. Mental health    · Talk to your family, friends, or a therapist about your feelings. Some people feel frightened, angry, hopeless, helpless, and even guilty. Talking openly about bad feelings can help you cope. If these feelings last, talk to your doctor. When should you call for help? Call 911 anytime you think you may need emergency care. For example, call if:    · You have severe trouble breathing. Call your doctor now or seek immediate medical care if:    · You have new or worse trouble breathing. · You cough up blood. · You have a fever. Watch closely for changes in your health, and be sure to contact your doctor if:    · You cough more deeply or more often, especially if you notice more mucus or a change in the color of your mucus. · You have new or worse swelling in your legs or belly. · You are not getting better as expected. Where can you learn more? Go to https://Pigeonlynehemiah.Nibu. org and sign in to your [employer] account. Enter I455 in the [facility] box to learn more about \"Chronic Obstructive Pulmonary Disease (COPD): Care Instructions. \"     If you do not have an account, please click on the \"Sign Up Now\" link. Current as of: October 26, 2020               Content Version: 12.9  © 0926-2521 [employer]. Care instructions adapted under license by [facility] ([facility]).  If you have questions about a medical condition or this instruction, always ask your healthcare professional. Luciana Phipps disclaims any warranty or liability for your use of this information.

## 2021-11-22 NOTE — LETTER
Adrian Pickett,   I saw a patient today that reported to me that she is establishing care with you as a primary care provider tomorrow. I wanted to let you know that her blood pressure was very elevated at my appointment (162/108 with recheck 182/110). I had advised her to go straight to the ER, she refused and elected to have her friend drive her to [facility]. I educated her that in not immediately seeking treatment, she is at risk for stroke, heart attack, or other poor outcomes. She was okay with these risks. She was very anxious at her appointment. Her voice was trembling and her hands were shaking. She reported that she takes as needed xanax for her anxiety and I had advised her that she likely will need something more for her anxiety, such as a daily medication. I advised her to discuss this with you at her appointment. I did escalate her COPD treatment to Trelegy, but I do feel that her anxiety may also play a factor in her symptoms. She uses albuterol frequently. I just wanted to let you know since you will be seeing her as a new patient tomorrow.     Have a great day,  Evelin Giron, JASON - CNP

## 2021-11-22 NOTE — ED NOTES
Pt given an additional dose of clonidine per order. Pt given d/c instructions and instructed to increase her clonidine to twice daily and to also call her PCP in the am and discuss the change.  Pt stated she understood and had no further questions     Elodia Leyden, RN  11/22/21 [id number]

## 2021-11-22 NOTE — ED NOTES
Pt states that she was at her pulmonologist apt today and her blood pressure was elevated so they recommended that she f/u at an ED. Denies any CP, pain or any complaints. She stated that she took all her meds today and does so as ordered.      Afsaneh Friedman RN  11/22/21 [id number]

## 2021-11-23 ENCOUNTER — OFFICE VISIT (OUTPATIENT)
Dept: FAMILY MEDICINE CLINIC | Age: 56
End: 2021-11-23
Payer: COMMERCIAL

## 2021-11-23 VITALS
HEIGHT: 60 IN | WEIGHT: 204 LBS | RESPIRATION RATE: 18 BRPM | OXYGEN SATURATION: 95 % | DIASTOLIC BLOOD PRESSURE: 92 MMHG | SYSTOLIC BLOOD PRESSURE: 170 MMHG | TEMPERATURE: 97.1 F | HEART RATE: 86 BPM | BODY MASS INDEX: 40.05 KG/M2

## 2021-11-23 DIAGNOSIS — F41.9 ANXIETY: ICD-10-CM

## 2021-11-23 DIAGNOSIS — K21.9 GASTROESOPHAGEAL REFLUX DISEASE WITHOUT ESOPHAGITIS: ICD-10-CM

## 2021-11-23 DIAGNOSIS — I10 ESSENTIAL HYPERTENSION: Primary | ICD-10-CM

## 2021-11-23 DIAGNOSIS — J44.9 CHRONIC OBSTRUCTIVE PULMONARY DISEASE, UNSPECIFIED COPD TYPE (HCC): ICD-10-CM

## 2021-11-23 DIAGNOSIS — Z12.11 SCREEN FOR COLON CANCER: ICD-10-CM

## 2021-11-23 PROCEDURE — 99214 OFFICE O/P EST MOD 30 MIN: CPT | Performed by: NURSE PRACTITIONER

## 2021-11-23 RX ORDER — ALPRAZOLAM 0.25 MG/1
0.25 TABLET ORAL NIGHTLY PRN
COMMUNITY
End: 2022-01-04 | Stop reason: SDUPTHER

## 2021-11-23 RX ORDER — LISINOPRIL 30 MG/1
30 TABLET ORAL DAILY
Qty: 90 TABLET | Refills: 3 | Status: SHIPPED | OUTPATIENT
Start: 2021-11-23 | End: 2022-02-24 | Stop reason: SDUPTHER

## 2021-11-23 ASSESSMENT — ENCOUNTER SYMPTOMS
GASTROINTESTINAL NEGATIVE: 1
COUGH: 1
EYES NEGATIVE: 1
RESPIRATORY NEGATIVE: 1
SHORTNESS OF BREATH: 1
ABDOMINAL PAIN: 0
WHEEZING: 0
NAUSEA: 0

## 2021-11-23 NOTE — ED PROVIDER NOTES
[facility]  eMERGENCY dEPARTMENT eNCOUnter             SharonPiyush Noam 81    CHIEF COMPLAINT    Chief Complaint   Patient presents with    Hypertension       Nurses Notes reviewed and I agree except as noted in the HPI. HPI    Margoth Jamison is a 61 y.o. female who presents when she went to a new pulmonologist today, they told her that her blood pressure was too high. They told her to go to an emergency department. She has taken all of her blood pressure medicine today, except her evening dose of carvedilol. She feels that she is just stressed out by the visit to the doctor today. She was put on some new medications for her COPD, and hopes that they will help her. She does have a frequent cough and some wheezing. She uses a as needed Ventolin inhaler right now. She has not been on steroids for several months. REVIEW OF SYSTEMS      Review of Systems   Constitutional: Negative for fever and malaise/fatigue. HENT: Negative for congestion. Respiratory: Positive for cough and shortness of breath. Negative for wheezing. Cardiovascular: Negative for chest pain and palpitations. Gastrointestinal: Negative for abdominal pain and nausea. Genitourinary: Negative for dysuria. Neurological: Negative for dizziness, focal weakness and headaches. PAST MEDICAL HISTORY     has a past medical history of Anxiety, Arthritis, COPD (chronic obstructive pulmonary disease) (HCC), Enlargement, spleen, Fatty liver, GERD (gastroesophageal reflux disease), Hepatitis A, Hypertension, LYNN on CPAP, Ovarian cyst, Pneumonia, and Sleep apnea. SURGICAL HISTORY     has no past surgical history on file.     CURRENT MEDICATIONS    Discharge Medication List as of 11/22/2021  5:10 PM      CONTINUE these medications which have NOT CHANGED    Details   fluticasone-umeclidin-vilant (TRELEGY ELLIPTA) 100-62.5-25 MCG/INH AEPB Inhale 1 puff into the lungs daily, Disp-30 each, R-11Normal albuterol sulfate HFA (VENTOLIN HFA) 108 (90 Base) MCG/ACT inhaler Inhale 2 puffs into the lungs every 6 hours as needed for Wheezing or Shortness of Breath, Disp-18 g, R-11Normal      albuterol (PROVENTIL) (2.5 MG/3ML) 0.083% nebulizer solution Take 3 mLs by nebulization every 6 hours as needed for Wheezing or Shortness of Breath, Disp-120 each, R-11Normal      pantoprazole (PROTONIX) 40 MG tablet TAKE 1 TABLET BY MOUTH 2 TIMES DAILY NO REFILLS DUE FOR CHECK UP, Disp-60 tablet, R-10Normal      bumetanide (BUMEX) 1 MG tablet Take 1 tablet by mouth daily, Disp-90 tablet, R-3Normal      carvedilol (COREG) 25 MG tablet Take 1 tablet by mouth 2 times daily (with meals), Disp-180 tablet, R-3Normal      cloNIDine (CATAPRES) 0.1 MG tablet Take 1 tablet by mouth daily, Disp-90 tablet, R-3Normal      sucralfate (CARAFATE) 1 GM tablet Take 1 tablet by mouth 4 times daily, Disp-120 tablet, R-5Normal      ondansetron (ZOFRAN) 8 MG tablet Take 1 tablet by mouth every 8 hours as needed for Nausea or Vomiting, Disp-30 tablet, R-5Normal      fluticasone (FLONASE) 50 MCG/ACT nasal spray 2 SPRAYS BY NASAL ROUTE DAILY, Disp-1 Bottle, R-11Normal      lisinopril (PRINIVIL;ZESTRIL) 10 MG tablet Take 1 tablet by mouth daily, Disp-90 tablet, R-3Normal      CPAP Machine MISC Starting Thu 10/8/2020, Disp-1 each,R-0, PrintPlease change APAP pressure to 6-14 cm H20.      acetaminophen (TYLENOL) 500 MG tablet Take 500 mg by mouth every 6 hours as needed for PainHistorical Med      Lactobacillus (PROBIOTIC ACIDOPHILUS PO) Take by mouth dailyHistorical Med             ALLERGIES    is allergic to pcn [penicillins]. FAMILY HISTORY    She indicated that her mother is . She indicated that her father is . She indicated that her sister is alive. She indicated that her brother is alive. She indicated that her maternal grandmother is . She indicated that her maternal grandfather is .  She indicated that her paternal grandfather is . family history includes Heart Attack in her maternal grandfather and paternal grandfather; Heart Disease in her mother; Heart Surgery in her mother; Other in her brother and sister; Stroke in her maternal grandmother. SOCIAL HISTORY     reports that she has been smoking cigarettes. She has a 23.00 pack-year smoking history. She has never used smokeless tobacco. She reports current alcohol use. She reports previous drug use. PHYSICAL EXAM       INITIAL VITALS: BP (!) 199/89   Pulse 87   Temp 97.4 °F (36.3 °C) (Oral)   Resp 15   SpO2 97%      Physical Exam  Vitals and nursing note reviewed. Exam conducted with a chaperone present. Constitutional:       General: She is not in acute distress. Appearance: She is not toxic-appearing. HENT:      Right Ear: Tympanic membrane and ear canal normal.      Left Ear: Tympanic membrane and ear canal normal.      Nose: No congestion. Mouth/Throat:      Mouth: Mucous membranes are moist.      Pharynx: No oropharyngeal exudate or posterior oropharyngeal erythema. Eyes:      Pupils: Pupils are equal, round, and reactive to light. Cardiovascular:      Rate and Rhythm: Normal rate and regular rhythm. Heart sounds: No murmur heard. Pulmonary:      Comments: Decreased breath sounds, prolonged expiratory phase, clear. Frequent harsh cough. Abdominal:      General: Bowel sounds are normal.      Palpations: Abdomen is soft. There is no mass. Tenderness: There is no abdominal tenderness. Musculoskeletal:         General: No swelling or tenderness. Cervical back: Neck supple. Lymphadenopathy:      Cervical: No cervical adenopathy. Skin:     General: Skin is warm and dry. Findings: No rash. Neurological:      General: No focal deficit present. Mental Status: She is alert and oriented to person, place, and time.    Psychiatric:         Behavior: Behavior normal.           Labs Reviewed - No data to

## 2021-11-23 NOTE — PROGRESS NOTES
Duke Pascual is a 61 y.o. female whopresents today for :  Chief Complaint   Patient presents with    New Patient     In ER yesterday and pulmonologist., bump on head,     Hypertension       HPI:     HPI  Pt here as a new patient. Was seeing pulmonologist yesterday and bp was very high. Went to ER  They increased clonidine times 1 day. Reports history of severe htn. Has seen cardiology   notes are in chart. Has history of severe copd. Anxiety   Did review colon cancer screening. Patient Active Problem List   Diagnosis    Hypertension, uncontrolled    Anxiety    Gastroesophageal reflux disease without esophagitis    Seasonal allergies    Bronchospasm    History of tobacco abuse    Moderate COPD (chronic obstructive pulmonary disease) (HCC)    Allergic rhinitis    Lung nodule, solitary    Pulmonary emphysema (HCC)    LYNN (obstructive sleep apnea)        Past Medical History:   Diagnosis Date    Anxiety     Arthritis     COPD (chronic obstructive pulmonary disease) (HCC)     Enlargement, spleen     Fatty liver     GERD (gastroesophageal reflux disease)     Hepatitis A     Hypertension     LYNN on CPAP     Ovarian cyst     Pneumonia     Sleep apnea       No past surgical history on file. Family History   Problem Relation Age of Onset    Heart Surgery Mother         dies at 29    Heart Disease Mother     Other Sister         Cardiomyopathy    Other Brother         cardiomyopathy    Stroke Maternal Grandmother     Heart Attack Maternal Grandfather     Heart Attack Paternal Grandfather      Social History     Tobacco Use    Smoking status: Current Every Day Smoker     Packs/day: 0.50     Years: 46.00     Pack years: 23.00     Types: Cigarettes    Smokeless tobacco: Never Used    Tobacco comment: smokes . 5 pack a day 11/22/21   Substance Use Topics    Alcohol use: Yes     Comment: several times/week      Current Outpatient Medications   Medication Sig Dispense Refill    ALPRAZolam (XANAX) 0.25 MG tablet Take 0.25 mg by mouth nightly as needed for Sleep.  lisinopril (PRINIVIL;ZESTRIL) 30 MG tablet Take 1 tablet by mouth daily 90 tablet 3    fluticasone-umeclidin-vilant (TRELEGY ELLIPTA) 100-62.5-25 MCG/INH AEPB Inhale 1 puff into the lungs daily 30 each 11    albuterol sulfate HFA (VENTOLIN HFA) 108 (90 Base) MCG/ACT inhaler Inhale 2 puffs into the lungs every 6 hours as needed for Wheezing or Shortness of Breath 18 g 11    albuterol (PROVENTIL) (2.5 MG/3ML) 0.083% nebulizer solution Take 3 mLs by nebulization every 6 hours as needed for Wheezing or Shortness of Breath 120 each 11    pantoprazole (PROTONIX) 40 MG tablet TAKE 1 TABLET BY MOUTH 2 TIMES DAILY NO REFILLS DUE FOR CHECK UP 60 tablet 10    bumetanide (BUMEX) 1 MG tablet Take 1 tablet by mouth daily 90 tablet 3    carvedilol (COREG) 25 MG tablet Take 1 tablet by mouth 2 times daily (with meals) 180 tablet 3    cloNIDine (CATAPRES) 0.1 MG tablet Take 1 tablet by mouth daily 90 tablet 3    sucralfate (CARAFATE) 1 GM tablet Take 1 tablet by mouth 4 times daily 120 tablet 5    ondansetron (ZOFRAN) 8 MG tablet Take 1 tablet by mouth every 8 hours as needed for Nausea or Vomiting 30 tablet 5    fluticasone (FLONASE) 50 MCG/ACT nasal spray 2 SPRAYS BY NASAL ROUTE DAILY 1 Bottle 11    acetaminophen (TYLENOL) 500 MG tablet Take 500 mg by mouth every 6 hours as needed for Pain      Lactobacillus (PROBIOTIC ACIDOPHILUS PO) Take by mouth daily      CPAP Machine MISC by Does not apply route Please change APAP pressure to 6-14 cm H20. (Patient not taking: Reported on 11/23/2021) 1 each 0     No current facility-administered medications for this visit.      Allergies   Allergen Reactions    Pcn [Penicillins] Hives     Health Maintenance   Topic Date Due    Cervical cancer screen  Never done    Colon cancer screen colonoscopy  Never done    Shingles Vaccine (1 of 2) Never done    Flu vaccine (1) Never done    Breast cancer screen  09/25/2021    Potassium monitoring  10/17/2021    Creatinine monitoring  10/17/2021    COVID-19 Vaccine (1) 06/14/2022 (Originally 5/24/1977)    Low dose CT lung screening  08/11/2022    Diabetes screen  09/19/2022    Lipid screen  09/19/2024    Pneumococcal 0-64 years Vaccine (2 of 2 - PPSV23) 05/24/2029    DTaP/Tdap/Td vaccine (2 - Td or Tdap) 06/14/2031    Hepatitis C screen  Completed    Hepatitis A vaccine  Aged Out    Hepatitis B vaccine  Aged Out    Hib vaccine  Aged Out    Meningococcal (ACWY) vaccine  Aged Out    HIV screen  Discontinued       Subjective:     Review of Systems   Constitutional: Negative. HENT: Negative. Eyes: Negative. Respiratory: Negative. Cardiovascular: Negative. Gastrointestinal: Negative. Musculoskeletal: Negative. Skin: Negative. Neurological: Negative. Objective:     Vitals:    11/23/21 1425 11/23/21 1458   BP: (!) 204/98 (!) 170/92   Site: Left Upper Arm    Position: Sitting    Cuff Size: Large Adult    Pulse: 86    Resp: 18    Temp: 97.1 °F (36.2 °C)    TempSrc: Temporal    SpO2: 95%    Weight: 204 lb (92.5 kg)    Height: 5' (1.524 m)        Physical Exam  Constitutional:       Appearance: She is well-developed. HENT:      Head: Normocephalic. Right Ear: Tympanic membrane and external ear normal.      Left Ear: Tympanic membrane and external ear normal.      Nose: Nose normal.   Cardiovascular:      Rate and Rhythm: Normal rate and regular rhythm. Heart sounds: Normal heart sounds. No murmur heard. No friction rub. No gallop. Pulmonary:      Effort: Pulmonary effort is normal.      Breath sounds: Decreased air movement present. Decreased breath sounds and wheezing present. No rales. Abdominal:      General: Bowel sounds are normal.      Palpations: Abdomen is soft. Tenderness: There is no abdominal tenderness. There is no guarding. Musculoskeletal:         General: Normal range of motion. Cervical back: Normal range of motion and neck supple. Lymphadenopathy:      Cervical: No cervical adenopathy. Skin:     General: Skin is warm. Neurological:      Mental Status: She is alert and oriented to person, place, and time. Deep Tendon Reflexes: Reflexes are normal and symmetric. Assessment:      Diagnosis Orders   1. Essential hypertension  lisinopril (PRINIVIL;ZESTRIL) 30 MG tablet   2. Screen for colon cancer  Cologuard (For External Results Only)   3. Chronic obstructive pulmonary disease, unspecified COPD type ([facility] [address]     4. Gastroesophageal reflux disease without esophagitis     5. Anxiety         Plan:      Return in about 4 weeks (around 12/21/2021). Orders Placed This Encounter   Procedures    Cologuard (For External Results Only)     This test is performed by an external laboratory and is used for result attachment only. It is required that this order requisition be faxed to: [facility] [facility] [employer] [facility]) @ [phone]. See [email or web address] for further information. Standing Status:   Future     Standing Expiration Date:   11/23/2022     Orders Placed This Encounter   Medications    lisinopril (PRINIVIL;ZESTRIL) 30 MG tablet     Sig: Take 1 tablet by mouth daily     Dispense:  90 tablet     Refill:  3      Will increase prinivil to 30mg  Cont other meds  Fu in 3months     Patient given educational materials - seepatient instructions. Discussed use, benefit, and side effects of prescribed medications. All patient questions answered. Pt voiced understanding. Patient agreed withtreatment plan. Follow up as directed.      Electronically signed by JASON Smith CNP on 11/23/2021 at 5:45 PM

## 2021-12-25 ENCOUNTER — HOSPITAL ENCOUNTER (INPATIENT)
Age: 56
LOS: 2 days | Discharge: HOME | DRG: 139 | End: 2021-12-27
Attending: EMERGENCY MEDICINE | Admitting: PEDIATRICS
Payer: COMMERCIAL

## 2021-12-25 ENCOUNTER — APPOINTMENT (OUTPATIENT)
Dept: GENERAL RADIOLOGY | Age: 56
DRG: 139 | End: 2021-12-25
Payer: COMMERCIAL

## 2021-12-25 DIAGNOSIS — I10 UNCONTROLLED HYPERTENSION: ICD-10-CM

## 2021-12-25 DIAGNOSIS — J18.9 PNEUMONIA DUE TO INFECTIOUS ORGANISM, UNSPECIFIED LATERALITY, UNSPECIFIED PART OF LUNG: ICD-10-CM

## 2021-12-25 DIAGNOSIS — J44.1 COPD EXACERBATION (HCC): Primary | ICD-10-CM

## 2021-12-25 DIAGNOSIS — R06.02 SHORTNESS OF BREATH: ICD-10-CM

## 2021-12-25 DIAGNOSIS — J30.9 ALLERGIC RHINITIS, UNSPECIFIED SEASONALITY, UNSPECIFIED TRIGGER: ICD-10-CM

## 2021-12-25 DIAGNOSIS — J81.0 ACUTE PULMONARY EDEMA (HCC): ICD-10-CM

## 2021-12-25 LAB
ALBUMIN SERPL-MCNC: 3.9 GM/DL (ref 3.4–5)
ALP BLD-CCNC: 137 U/L (ref 46–116)
ALT SERPL-CCNC: 27 U/L (ref 14–63)
ANION GAP: 10 MEQ/L (ref 8–16)
AST SERPL-CCNC: 23 U/L (ref 15–37)
BASOPHILS # BLD: 1 % (ref 0–3)
BILIRUB SERPL-MCNC: 0.7 MG/DL (ref 0.2–1)
BUN BLDV-MCNC: 6 MG/DL (ref 7–18)
CHLORIDE BLD-SCNC: 93 MEQ/L (ref 98–107)
CO2: 31 MEQ/L (ref 21–32)
CREAT SERPL-MCNC: 0.8 MG/DL (ref 0.6–1.3)
DIFFERENTIAL, MANUAL: NORMAL
EKG ATRIAL RATE: 96 BPM
EKG P AXIS: 63 DEGREES
EKG P-R INTERVAL: 140 MS
EKG Q-T INTERVAL: 344 MS
EKG QRS DURATION: 68 MS
EKG QTC CALCULATION (BAZETT): 434 MS
EKG R AXIS: 69 DEGREES
EKG T AXIS: 20 DEGREES
EKG VENTRICULAR RATE: 96 BPM
FLU A ANTIGEN: NEGATIVE
FLU B ANTIGEN: NEGATIVE
GFR, ESTIMATED: 78 ML/MIN/1.73M2
GLUCOSE BLD-MCNC: 116 MG/DL (ref 74–106)
HCT VFR BLD CALC: 45.4 % (ref 37–47)
HEMOGLOBIN: 14.7 GM/DL (ref 12–16)
LYMPHOCYTES # BLD: 20 % (ref 15–47)
MACROCYTES: ABNORMAL
MCH RBC QN AUTO: 33 PG (ref 27–31)
MCHC RBC AUTO-ENTMCNC: 32.5 GM/DL (ref 33–37)
MCV RBC AUTO: 101.6 FL (ref 81–99)
MONOCYTES: 7 % (ref 0–12)
NT PRO BNP: 195 PG/ML (ref 0–900)
PDW BLD-RTO: 12.1 % (ref 11.5–14.5)
PLATELET # BLD: 297 THOU/MM3 (ref 130–400)
PLATELET ESTIMATE: ADEQUATE
PMV BLD AUTO: 7.4 FL (ref 7.4–10.4)
POC CALCIUM: 8.8 MG/DL (ref 8.5–10.1)
POTASSIUM SERPL-SCNC: 4.2 MEQ/L (ref 3.5–5.1)
RBC # BLD: 4.46 MILL/MM3 (ref 4.2–5.4)
SARS-COV-2, NAAT: NOT  DETECTED
SEGS: 72 % (ref 43–75)
SODIUM BLD-SCNC: 134 MEQ/L (ref 136–145)
TOTAL PROTEIN: 8.4 GM/DL (ref 6.4–8.2)
TROPONIN I: < 0.017 NG/ML (ref 0.02–0.06)
WBC # BLD: 12.6 THOU/MM3 (ref 4.8–10.8)

## 2021-12-25 PROCEDURE — 6360000002 HC RX W HCPCS: Performed by: EMERGENCY MEDICINE

## 2021-12-25 PROCEDURE — 96375 TX/PRO/DX INJ NEW DRUG ADDON: CPT

## 2021-12-25 PROCEDURE — 93010 ELECTROCARDIOGRAM REPORT: CPT | Performed by: INTERNAL MEDICINE

## 2021-12-25 PROCEDURE — 6370000000 HC RX 637 (ALT 250 FOR IP): Performed by: EMERGENCY MEDICINE

## 2021-12-25 PROCEDURE — 71045 X-RAY EXAM CHEST 1 VIEW: CPT

## 2021-12-25 PROCEDURE — 2140000000 HC CCU INTERMEDIATE R&B

## 2021-12-25 PROCEDURE — 87804 INFLUENZA ASSAY W/OPTIC: CPT

## 2021-12-25 PROCEDURE — 87541 LEGION PNEUMO DNA AMP PROB: CPT

## 2021-12-25 PROCEDURE — 87486 CHLMYD PNEUM DNA AMP PROBE: CPT

## 2021-12-25 PROCEDURE — 87581 M.PNEUMON DNA AMP PROBE: CPT

## 2021-12-25 PROCEDURE — 87449 NOS EACH ORGANISM AG IA: CPT

## 2021-12-25 PROCEDURE — 84145 PROCALCITONIN (PCT): CPT

## 2021-12-25 PROCEDURE — 87635 SARS-COV-2 COVID-19 AMP PRB: CPT

## 2021-12-25 PROCEDURE — 87040 BLOOD CULTURE FOR BACTERIA: CPT

## 2021-12-25 PROCEDURE — 85025 COMPLETE CBC W/AUTO DIFF WBC: CPT

## 2021-12-25 PROCEDURE — 84484 ASSAY OF TROPONIN QUANT: CPT

## 2021-12-25 PROCEDURE — 99284 EMERGENCY DEPT VISIT MOD MDM: CPT

## 2021-12-25 PROCEDURE — 80053 COMPREHEN METABOLIC PANEL: CPT

## 2021-12-25 PROCEDURE — 81003 URINALYSIS AUTO W/O SCOPE: CPT

## 2021-12-25 PROCEDURE — 83880 ASSAY OF NATRIURETIC PEPTIDE: CPT

## 2021-12-25 PROCEDURE — 93005 ELECTROCARDIOGRAM TRACING: CPT | Performed by: EMERGENCY MEDICINE

## 2021-12-25 PROCEDURE — 87899 AGENT NOS ASSAY W/OPTIC: CPT

## 2021-12-25 PROCEDURE — 87798 DETECT AGENT NOS DNA AMP: CPT

## 2021-12-25 PROCEDURE — 36415 COLL VENOUS BLD VENIPUNCTURE: CPT

## 2021-12-25 PROCEDURE — 96374 THER/PROPH/DIAG INJ IV PUSH: CPT

## 2021-12-25 PROCEDURE — 87631 RESP VIRUS 3-5 TARGETS: CPT

## 2021-12-25 PROCEDURE — 2500000003 HC RX 250 WO HCPCS: Performed by: EMERGENCY MEDICINE

## 2021-12-25 RX ORDER — FUROSEMIDE 10 MG/ML
60 INJECTION INTRAMUSCULAR; INTRAVENOUS ONCE
Status: COMPLETED | OUTPATIENT
Start: 2021-12-25 | End: 2021-12-25

## 2021-12-25 RX ORDER — ONDANSETRON 4 MG/1
4 TABLET, ORALLY DISINTEGRATING ORAL EVERY 8 HOURS PRN
Status: DISCONTINUED | OUTPATIENT
Start: 2021-12-25 | End: 2021-12-27 | Stop reason: HOSPADM

## 2021-12-25 RX ORDER — ONDANSETRON 2 MG/ML
4 INJECTION INTRAMUSCULAR; INTRAVENOUS EVERY 6 HOURS PRN
Status: DISCONTINUED | OUTPATIENT
Start: 2021-12-25 | End: 2021-12-27 | Stop reason: HOSPADM

## 2021-12-25 RX ORDER — SODIUM CHLORIDE 0.9 % (FLUSH) 0.9 %
5-40 SYRINGE (ML) INJECTION PRN
Status: DISCONTINUED | OUTPATIENT
Start: 2021-12-25 | End: 2021-12-27 | Stop reason: HOSPADM

## 2021-12-25 RX ORDER — METHYLPREDNISOLONE SODIUM SUCCINATE 125 MG/2ML
125 INJECTION, POWDER, LYOPHILIZED, FOR SOLUTION INTRAMUSCULAR; INTRAVENOUS ONCE
Status: COMPLETED | OUTPATIENT
Start: 2021-12-25 | End: 2021-12-25

## 2021-12-25 RX ORDER — LEVOFLOXACIN 5 MG/ML
750 INJECTION, SOLUTION INTRAVENOUS EVERY 24 HOURS
Status: DISCONTINUED | OUTPATIENT
Start: 2021-12-26 | End: 2021-12-27 | Stop reason: HOSPADM

## 2021-12-25 RX ORDER — SODIUM CHLORIDE 0.9 % (FLUSH) 0.9 %
5-40 SYRINGE (ML) INJECTION EVERY 12 HOURS SCHEDULED
Status: DISCONTINUED | OUTPATIENT
Start: 2021-12-25 | End: 2021-12-27 | Stop reason: HOSPADM

## 2021-12-25 RX ORDER — ONDANSETRON 2 MG/ML
4 INJECTION INTRAMUSCULAR; INTRAVENOUS ONCE
Status: COMPLETED | OUTPATIENT
Start: 2021-12-25 | End: 2021-12-25

## 2021-12-25 RX ORDER — MAGNESIUM SULFATE HEPTAHYDRATE 40 MG/ML
2000 INJECTION, SOLUTION INTRAVENOUS ONCE
Status: COMPLETED | OUTPATIENT
Start: 2021-12-25 | End: 2021-12-25

## 2021-12-25 RX ORDER — ACETAMINOPHEN 325 MG/1
650 TABLET ORAL EVERY 6 HOURS PRN
Status: DISCONTINUED | OUTPATIENT
Start: 2021-12-25 | End: 2021-12-27 | Stop reason: HOSPADM

## 2021-12-25 RX ORDER — IPRATROPIUM BROMIDE AND ALBUTEROL SULFATE 2.5; .5 MG/3ML; MG/3ML
1 SOLUTION RESPIRATORY (INHALATION) ONCE
Status: COMPLETED | OUTPATIENT
Start: 2021-12-25 | End: 2021-12-25

## 2021-12-25 RX ORDER — ALBUTEROL SULFATE 2.5 MG/3ML
2.5 SOLUTION RESPIRATORY (INHALATION) ONCE
Status: COMPLETED | OUTPATIENT
Start: 2021-12-25 | End: 2021-12-25

## 2021-12-25 RX ORDER — ACETAMINOPHEN 650 MG/1
650 SUPPOSITORY RECTAL EVERY 6 HOURS PRN
Status: DISCONTINUED | OUTPATIENT
Start: 2021-12-25 | End: 2021-12-27 | Stop reason: HOSPADM

## 2021-12-25 RX ORDER — CLONIDINE HYDROCHLORIDE 0.1 MG/1
0.1 TABLET ORAL DAILY
Status: DISCONTINUED | OUTPATIENT
Start: 2021-12-26 | End: 2021-12-27 | Stop reason: HOSPADM

## 2021-12-25 RX ORDER — POLYETHYLENE GLYCOL 3350 17 G/17G
17 POWDER, FOR SOLUTION ORAL DAILY PRN
Status: DISCONTINUED | OUTPATIENT
Start: 2021-12-25 | End: 2021-12-27 | Stop reason: HOSPADM

## 2021-12-25 RX ORDER — LEVOFLOXACIN 5 MG/ML
500 INJECTION, SOLUTION INTRAVENOUS ONCE
Status: COMPLETED | OUTPATIENT
Start: 2021-12-25 | End: 2021-12-25

## 2021-12-25 RX ORDER — IPRATROPIUM BROMIDE AND ALBUTEROL SULFATE 2.5; .5 MG/3ML; MG/3ML
1 SOLUTION RESPIRATORY (INHALATION) EVERY 4 HOURS
Status: DISCONTINUED | OUTPATIENT
Start: 2021-12-25 | End: 2021-12-27 | Stop reason: HOSPADM

## 2021-12-25 RX ORDER — ONDANSETRON 2 MG/ML
INJECTION INTRAMUSCULAR; INTRAVENOUS
Status: DISPENSED
Start: 2021-12-25 | End: 2021-12-26

## 2021-12-25 RX ORDER — NITROGLYCERIN 0.4 MG/1
0.4 TABLET SUBLINGUAL EVERY 5 MIN PRN
Status: DISCONTINUED | OUTPATIENT
Start: 2021-12-25 | End: 2021-12-27 | Stop reason: HOSPADM

## 2021-12-25 RX ORDER — ALBUTEROL SULFATE 2.5 MG/3ML
2.5 SOLUTION RESPIRATORY (INHALATION) ONCE
Status: DISCONTINUED | OUTPATIENT
Start: 2021-12-25 | End: 2021-12-27 | Stop reason: HOSPADM

## 2021-12-25 RX ORDER — PANTOPRAZOLE SODIUM 40 MG/1
40 TABLET, DELAYED RELEASE ORAL 2 TIMES DAILY
Status: DISCONTINUED | OUTPATIENT
Start: 2021-12-26 | End: 2021-12-27 | Stop reason: HOSPADM

## 2021-12-25 RX ORDER — SODIUM CHLORIDE 9 MG/ML
25 INJECTION, SOLUTION INTRAVENOUS PRN
Status: DISCONTINUED | OUTPATIENT
Start: 2021-12-25 | End: 2021-12-27 | Stop reason: HOSPADM

## 2021-12-25 RX ORDER — CARVEDILOL 25 MG/1
25 TABLET ORAL 2 TIMES DAILY WITH MEALS
Status: DISCONTINUED | OUTPATIENT
Start: 2021-12-26 | End: 2021-12-27 | Stop reason: HOSPADM

## 2021-12-25 RX ADMIN — NITROGLYCERIN 0.4 MG: 0.4 TABLET, ORALLY DISINTEGRATING SUBLINGUAL at 19:05

## 2021-12-25 RX ADMIN — ALBUTEROL SULFATE 2.5 MG: 2.5 SOLUTION RESPIRATORY (INHALATION) at 19:04

## 2021-12-25 RX ADMIN — METHYLPREDNISOLONE SODIUM SUCCINATE 125 MG: 125 INJECTION, POWDER, FOR SOLUTION INTRAMUSCULAR; INTRAVENOUS at 18:27

## 2021-12-25 RX ADMIN — LEVOFLOXACIN 500 MG: 5 INJECTION, SOLUTION INTRAVENOUS at 20:18

## 2021-12-25 RX ADMIN — MAGNESIUM SULFATE HEPTAHYDRATE 2000 MG: 40 INJECTION, SOLUTION INTRAVENOUS at 19:49

## 2021-12-25 RX ADMIN — FUROSEMIDE 60 MG: 10 INJECTION, SOLUTION INTRAMUSCULAR; INTRAVENOUS at 19:03

## 2021-12-25 RX ADMIN — NITROGLYCERIN 0.4 MG: 0.4 TABLET, ORALLY DISINTEGRATING SUBLINGUAL at 19:22

## 2021-12-25 RX ADMIN — ONDANSETRON HYDROCHLORIDE 4 MG: 4 INJECTION, SOLUTION INTRAMUSCULAR; INTRAVENOUS at 20:44

## 2021-12-25 RX ADMIN — IPRATROPIUM BROMIDE AND ALBUTEROL SULFATE 1 AMPULE: .5; 3 SOLUTION RESPIRATORY (INHALATION) at 18:26

## 2021-12-25 ASSESSMENT — ENCOUNTER SYMPTOMS
WHEEZING: 0
DIARRHEA: 0
BLOOD IN STOOL: 0
EYE DISCHARGE: 0
COUGH: 1
ABDOMINAL PAIN: 0
EYE PAIN: 0
SORE THROAT: 0
SHORTNESS OF BREATH: 1

## 2021-12-25 ASSESSMENT — PAIN SCALES - GENERAL: PAINLEVEL_OUTOF10: 4

## 2021-12-25 ASSESSMENT — PAIN DESCRIPTION - LOCATION
LOCATION: RIB CAGE
LOCATION: RIB CAGE

## 2021-12-25 ASSESSMENT — PAIN DESCRIPTION - ORIENTATION
ORIENTATION: RIGHT
ORIENTATION: RIGHT

## 2021-12-25 NOTE — ED PROVIDER NOTES
[address]  [address] [place] [address]  Phone: [phone]    eMERGENCY dEPARTMENT eNCOUnter           [address] [facility]       Chief Complaint   Patient presents with    Shortness of Breath    Cough       Nurses Notes reviewed and I agree except as noted in the HPI. HISTORY OF PRESENT ILLNESS    Monik Markham is a 61 y.o. female who presented private vehicle with the above-mentioned complaints. She has chronic COPD. She said that her breathing has been worse over the last 2 days and especially today. She is complaining of severe shortness of breath. She has mild nonproductive cough. She denies fever or chills. She also is complaining of right-sided pleuritic chest pain which was only with coughing. Patient is not on home O2. Her O2 sat was 77% on room air upon arrival.     REVIEW OF SYSTEMS     Review of Systems   Constitutional: Positive for fatigue. Negative for chills and fever. HENT: Negative for sore throat. Eyes: Negative for pain and discharge. Respiratory: Positive for cough and shortness of breath. Negative for wheezing. Cardiovascular: Negative for chest pain and palpitations. Gastrointestinal: Negative for abdominal pain, blood in stool and diarrhea. Genitourinary: Negative for dysuria and hematuria. Musculoskeletal: Negative for neck pain and neck stiffness. Neurological: Negative for seizures, syncope and headaches. Psychiatric/Behavioral: Negative for confusion. PAST MEDICAL HISTORY    has a past medical history of Anxiety, Arthritis, COPD (chronic obstructive pulmonary disease) (HCC), Enlargement, spleen, Fatty liver, GERD (gastroesophageal reflux disease), Hepatitis A, Hypertension, LYNN on CPAP, Ovarian cyst, Pneumonia, and Sleep apnea. SURGICAL HISTORY      has no past surgical history on file.     CURRENT MEDICATIONS       Previous Medications    ACETAMINOPHEN (TYLENOL) 500 MG TABLET    Take 500 mg by mouth every 6 hours as needed for Pain    ALBUTEROL (PROVENTIL) (2.5 MG/3ML) 0.083% NEBULIZER SOLUTION    Take 3 mLs by nebulization every 6 hours as needed for Wheezing or Shortness of Breath    ALBUTEROL SULFATE HFA (VENTOLIN HFA) 108 (90 BASE) MCG/ACT INHALER    Inhale 2 puffs into the lungs every 6 hours as needed for Wheezing or Shortness of Breath    ALPRAZOLAM (XANAX) 0.25 MG TABLET    Take 0.25 mg by mouth nightly as needed for Sleep. BUMETANIDE (BUMEX) 1 MG TABLET    Take 1 tablet by mouth daily    CARVEDILOL (COREG) 25 MG TABLET    Take 1 tablet by mouth 2 times daily (with meals)    CLONIDINE (CATAPRES) 0.1 MG TABLET    Take 1 tablet by mouth daily    CPAP MACHINE MISC    by Does not apply route Please change APAP pressure to 6-14 cm H20.    FLUTICASONE (FLONASE) 50 MCG/ACT NASAL SPRAY    2 SPRAYS BY NASAL ROUTE DAILY    FLUTICASONE-UMECLIDIN-VILANT (TRELEGY ELLIPTA) 100-62.5-25 MCG/INH AEPB    Inhale 1 puff into the lungs daily    LACTOBACILLUS (PROBIOTIC ACIDOPHILUS PO)    Take by mouth daily    LISINOPRIL (PRINIVIL;ZESTRIL) 30 MG TABLET    Take 1 tablet by mouth daily    ONDANSETRON (ZOFRAN) 8 MG TABLET    Take 1 tablet by mouth every 8 hours as needed for Nausea or Vomiting    PANTOPRAZOLE (PROTONIX) 40 MG TABLET    TAKE 1 TABLET BY MOUTH 2 TIMES DAILY NO REFILLS DUE FOR CHECK UP    SUCRALFATE (CARAFATE) 1 GM TABLET    Take 1 tablet by mouth 4 times daily       ALLERGIES     is allergic to pcn [penicillins]. FAMILY HISTORY     She indicated that her mother is . She indicated that her father is . She indicated that her sister is alive. She indicated that her brother is alive. She indicated that her maternal grandmother is . She indicated that her maternal grandfather is . She indicated that her paternal grandfather is .    family history includes Heart Attack in her maternal grandfather and paternal grandfather; Heart Disease in her mother; Heart Surgery in her mother; Other in her brother and sister; Stroke in her maternal grandmother. SOCIAL HISTORY      reports that she has been smoking cigarettes. She has a 23.00 pack-year smoking history. She has never used smokeless tobacco. She reports current alcohol use. She reports previous drug use. PHYSICAL EXAM     INITIAL VITALS:  temperature is 97 °F (36.1 °C). Her blood pressure is 151/89 (abnormal) and her pulse is 100. Her respiration is 22 and oxygen saturation is 93%. Physical Exam  Vitals and nursing note reviewed. Constitutional:       General: She is in acute distress. Appearance: She is well-developed. Comments: She is awake and alert, in severe respiratory distress. Cannot finish a sentence. HENT:      Head: Atraumatic. Eyes:      Conjunctiva/sclera: Conjunctivae normal.      Pupils: Pupils are equal, round, and reactive to light. Neck:      Thyroid: No thyromegaly. Vascular: No JVD. Trachea: No tracheal deviation. Cardiovascular:      Rate and Rhythm: Normal rate and regular rhythm. Heart sounds: No murmur heard. No friction rub. No gallop. Pulmonary:      Effort: Respiratory distress present. Comments: Her work of breathing is very labored, she is using accessory muscles. Breath sounds are severely diminished bilaterally. There is faint expiratory wheezing  Abdominal:      General: Bowel sounds are normal.      Palpations: Abdomen is soft. Tenderness: There is no abdominal tenderness. Musculoskeletal:         General: No tenderness. Cervical back: Neck supple. Neurological:      General: No focal deficit present. Mental Status: She is alert. Cranial Nerves: No cranial nerve deficit.              DIAGNOSTIC RESULTS     EKG:   EKG Interpretation    Interpreted by me    Rhythm: normal sinus   Rate: normal  Axis: normal  Ectopy: none  Conduction: normal  ST Segments: no acute change  T Waves: no acute change  Q Waves: none    Clinical Impression: no acute changes and normal EKG    RADIOLOGY:    PROCEDURE: XR CHEST PORTABLE     CLINICAL INFORMATION: Shortness of breath     TECHNIQUE: Mobile AP chest radiograph.     COMPARISON: PA and lateral chest radiographs 10/17/2020     FINDINGS: There is mild enlargement of the cardiac silhouette. Pulmonary vessels are congested. Pulmonary interstitium is prominent. The right costophrenic angle is blunted. Reticular opacities are seen at the bilateral lung bases. Degenerative changes   in the thoracic spine are poorly visualized.     IMPRESSION:  1. Mild cardiomegaly with pulmonary vascular congestion suspicious for congestive heart failure. 2. Probable small right-sided pleural effusion and bibasilar atelectasis/infiltrate. 3. Prominent pulmonary interstitium suspicious for pulmonary edema.     LABS:   Labs Reviewed   CBC WITH AUTO DIFFERENTIAL - Abnormal; Notable for the following components:       Result Value    WBC 12.6 (*)     .6 (*)     MCH 33.0 (*)     MCHC 32.5 (*)     All other components within normal limits   COMPREHENSIVE METABOLIC PANEL - Abnormal; Notable for the following components:    Glucose 116 (*)     BUN 6 (*)     Sodium 134 (*)     Chloride 93 (*)     Alkaline Phosphatase 137 (*)     Total Protein 8.4 (*)     All other components within normal limits   GLOMERULAR FILTRATION RATE, ESTIMATED - Abnormal; Notable for the following components:    GFR, Estimated 78 (*)     All other components within normal limits   COVID-19, RAPID   RAPID INFLUENZA A/B ANTIGENS   CULTURE, BLOOD 1   CULTURE, BLOOD 2   TROPONIN   BRAIN NATRIURETIC PEPTIDE   ANION GAP       EMERGENCY DEPARTMENT COURSE:   Vitals:    Vitals:    12/25/21 2001 12/25/21 2015 12/25/21 2030 12/25/21 2031   BP: (!) 155/103 (!) 153/85  (!) 151/89   Pulse: 105 98 100 100   Resp: 26 27 22    Temp:       SpO2: 93% 94% 90% 93%   Patient was placed on cardiac monitor. She was given 4 L of O2 by nasal cannula.   She received 1 DuoNeb and 2 albuterol treatments, Solu-Medrol IV, Levaquin IV, magnesium IV and Lasix IV. She also received nitroglycerin 0.4 mg sublingual.    Reevaluation at 8:20 PM:  She is awake alert, seems to be breathing more comfortably, only minimal obvious distress. Lungs examination showed diminishing of wheezing, breath sounds are still diminished bilaterally. Her blood pressure improved to 153/85. CONSULTS:  I spoke with Alexandr Stacy RN home accept the patient for the hospitalist service      FINAL IMPRESSION      1. COPD exacerbation ([address]    2. Shortness of breath    3. Uncontrolled hypertension    4. Pneumonia due to infectious organism, unspecified laterality, unspecified part of lung    5.  Acute pulmonary edema (HCC)          DISPOSITION/PLAN       (Please note that portions of this note were completed with a voice recognition program.  Efforts were made to edit the dictations but occasionally words are mis-transcribed.)    MD Franchesca Kerr MD  12/25/21 4840

## 2021-12-25 NOTE — ED TRIAGE NOTES
Presents via wheelchair with complaints of increased shortness of breath that started today. Labored breathing noted on assessment.

## 2021-12-26 LAB
ACINETOBACTER CALCOACETICUS-BAUMANNII BY PCR: NOT DETECTED
ADENOVIRUS BY PCR: NOT DETECTED
ALBUMIN SERPL-MCNC: 4.3 G/DL (ref 3.5–5.1)
ALLEN TEST: POSITIVE
ALP BLD-CCNC: 125 U/L (ref 38–126)
ALT SERPL-CCNC: 17 U/L (ref 11–66)
ANION GAP SERPL CALCULATED.3IONS-SCNC: 15 MEQ/L (ref 8–16)
AST SERPL-CCNC: 19 U/L (ref 5–40)
BASE EXCESS (CALCULATED): 4.2 MMOL/L (ref -2.5–2.5)
BILIRUB SERPL-MCNC: 0.5 MG/DL (ref 0.3–1.2)
BILIRUBIN URINE: NEGATIVE
BLOOD, URINE: NEGATIVE
BUN BLDV-MCNC: 8 MG/DL (ref 7–22)
CALCIUM SERPL-MCNC: 9.9 MG/DL (ref 8.5–10.5)
CHARACTER, URINE: CLEAR
CHLAMYDIA PNEUMONIAE BY PCR: NOT DETECTED
CHLORIDE BLD-SCNC: 89 MEQ/L (ref 98–111)
CO2: 27 MEQ/L (ref 23–33)
COLLECTED BY:: ABNORMAL
COLOR: YELLOW
CREAT SERPL-MCNC: 0.6 MG/DL (ref 0.4–1.2)
DEVICE: ABNORMAL
ENTEROBACTER CLOACAE COMPLEX BY PCR: NOT DETECTED
ERYTHROCYTE [DISTWIDTH] IN BLOOD BY AUTOMATED COUNT: 14 % (ref 11.5–14.5)
ERYTHROCYTE [DISTWIDTH] IN BLOOD BY AUTOMATED COUNT: 49 FL (ref 35–45)
ESCHERICHIA COLI BY PCR: NOT DETECTED
ETHYL ALCOHOL, SERUM: < 0.01 %
GFR SERPL CREATININE-BSD FRML MDRD: > 90 ML/MIN/1.73M2
GLUCOSE BLD-MCNC: 168 MG/DL (ref 70–108)
GLUCOSE URINE: NEGATIVE MG/DL
HAEMOPHILUS INFLUENZAE BY PCR: NOT DETECTED
HCO3: 30 MMOL/L (ref 23–28)
HCT VFR BLD CALC: 45.4 % (ref 37–47)
HEMOGLOBIN: 15 GM/DL (ref 12–16)
IFIO2: 36
INFLUENZA A BY PCR: NOT DETECTED
INFLUENZA B BY PCR: NOT DETECTED
KETONES, URINE: NEGATIVE
KLEBSIELLA AEROGENES BY PCR: NOT DETECTED
KLEBSIELLA OXYTOCA BY PCR: NOT DETECTED
KLEBSIELLA PNEUMONIAE GROUP BY PCR: NOT DETECTED
LACTIC ACID, SEPSIS: 1.3 MMOL/L (ref 0.5–1.9)
LACTIC ACID, SEPSIS: 1.9 MMOL/L (ref 0.5–1.9)
LEGIONELLA PNEUMOPHILIA AG, URINE: NEGATIVE
LEGIONELLA PNEUMOPHILIA BY PCR: NOT DETECTED
LEUKOCYTE ESTERASE, URINE: NEGATIVE
LV EF: 60 %
LVEF MODALITY: NORMAL
MCH RBC QN AUTO: 31.4 PG (ref 26–33)
MCHC RBC AUTO-ENTMCNC: 33 GM/DL (ref 32.2–35.5)
MCV RBC AUTO: 95.2 FL (ref 81–99)
METAPNEUMOVIRUS BY PCR: NOT DETECTED
MORAXELLA CATARRHALIS BY PCR: NOT DETECTED
MRSA SCREEN RT-PCR: NEGATIVE
MYCOPLASMA PNEUMONIAE BY PCR: NOT DETECTED
NITRITE, URINE: NEGATIVE
NON-SARS CORONAVIRUS: NOT DETECTED
O2 SATURATION: 92 %
PARAINFLUENZA VIRUS BY PCR: NOT DETECTED
PCO2: 45 MMHG (ref 35–45)
PH BLOOD GAS: 7.42 (ref 7.35–7.45)
PH UA: 5 (ref 5–9)
PLATELET # BLD: 280 THOU/MM3 (ref 130–400)
PMV BLD AUTO: 9.8 FL (ref 9.4–12.4)
PO2: 62 MMHG (ref 71–104)
POTASSIUM REFLEX MAGNESIUM: 4.4 MEQ/L (ref 3.5–5.2)
PROCALCITONIN: 0.03 NG/ML (ref 0.01–0.09)
PROTEIN UA: NEGATIVE
PROTEUS SPECIES BY PCR: NOT DETECTED
PSEUDOMONAS AERUGINOSA BY PCR: NOT DETECTED
RBC # BLD: 4.77 MILL/MM3 (ref 4.2–5.4)
RESISTANT GENE CTX-M BY PCR: NORMAL
RESISTANT GENE IMP BY PCR: NORMAL
RESISTANT GENE KPC BY PCR: NORMAL
RESISTANT GENE MECA/C & MREJ BY PCR: NORMAL
RESISTANT GENE NDM BY PCR: NORMAL
RESISTANT GENE OXA-48-LIKE BY PCR: NORMAL
RESISTANT GENE VIM BY PCR: NORMAL
RESPIRATORY SYNCYTIAL VIRUS BY PCR: NOT DETECTED
RHINOVIRUS ENTEROVIRUS PCR: NOT DETECTED
SERRATIA MARCESCENS BY PCR: NOT DETECTED
SODIUM BLD-SCNC: 131 MEQ/L (ref 135–145)
SOURCE, BLOOD GAS: ABNORMAL
SOURCE: NORMAL
SPECIFIC GRAVITY, URINE: 1.01 (ref 1–1.03)
SPECIMEN ACCEPTABILITY: NORMAL
STAPH AUREUS BY PCR: NOT DETECTED
STREP AGALACTIAE BY PCR: NOT DETECTED
STREP PNEUMO AG, UR: NEGATIVE
STREP PNEUMONIAE BY PCR: NOT DETECTED
STREP PYOGENES BY PCR: NOT DETECTED
TOTAL PROTEIN: 7.8 G/DL (ref 6.1–8)
UROBILINOGEN, URINE: 0.2 EU/DL (ref 0–1)
VANCOMYCIN RESISTANT ENTEROCOCCUS: NEGATIVE
WBC # BLD: 9.7 THOU/MM3 (ref 4.8–10.8)

## 2021-12-26 PROCEDURE — 36600 WITHDRAWAL OF ARTERIAL BLOOD: CPT

## 2021-12-26 PROCEDURE — 82077 ASSAY SPEC XCP UR&BREATH IA: CPT

## 2021-12-26 PROCEDURE — 93005 ELECTROCARDIOGRAM TRACING: CPT | Performed by: STUDENT IN AN ORGANIZED HEALTH CARE EDUCATION/TRAINING PROGRAM

## 2021-12-26 PROCEDURE — 2580000003 HC RX 258: Performed by: STUDENT IN AN ORGANIZED HEALTH CARE EDUCATION/TRAINING PROGRAM

## 2021-12-26 PROCEDURE — 2500000003 HC RX 250 WO HCPCS: Performed by: STUDENT IN AN ORGANIZED HEALTH CARE EDUCATION/TRAINING PROGRAM

## 2021-12-26 PROCEDURE — 85027 COMPLETE CBC AUTOMATED: CPT

## 2021-12-26 PROCEDURE — 87641 MR-STAPH DNA AMP PROBE: CPT

## 2021-12-26 PROCEDURE — 6360000002 HC RX W HCPCS: Performed by: STUDENT IN AN ORGANIZED HEALTH CARE EDUCATION/TRAINING PROGRAM

## 2021-12-26 PROCEDURE — 87205 SMEAR GRAM STAIN: CPT

## 2021-12-26 PROCEDURE — 87081 CULTURE SCREEN ONLY: CPT

## 2021-12-26 PROCEDURE — 99233 SBSQ HOSP IP/OBS HIGH 50: CPT | Performed by: HOSPITALIST

## 2021-12-26 PROCEDURE — 94761 N-INVAS EAR/PLS OXIMETRY MLT: CPT

## 2021-12-26 PROCEDURE — 82803 BLOOD GASES ANY COMBINATION: CPT

## 2021-12-26 PROCEDURE — 80053 COMPREHEN METABOLIC PANEL: CPT

## 2021-12-26 PROCEDURE — 2700000000 HC OXYGEN THERAPY PER DAY

## 2021-12-26 PROCEDURE — 6370000000 HC RX 637 (ALT 250 FOR IP): Performed by: STUDENT IN AN ORGANIZED HEALTH CARE EDUCATION/TRAINING PROGRAM

## 2021-12-26 PROCEDURE — 2140000000 HC CCU INTERMEDIATE R&B

## 2021-12-26 PROCEDURE — 36415 COLL VENOUS BLD VENIPUNCTURE: CPT

## 2021-12-26 PROCEDURE — 87500 VANOMYCIN DNA AMP PROBE: CPT

## 2021-12-26 PROCEDURE — 87070 CULTURE OTHR SPECIMN AEROBIC: CPT

## 2021-12-26 PROCEDURE — 93306 TTE W/DOPPLER COMPLETE: CPT

## 2021-12-26 PROCEDURE — 83605 ASSAY OF LACTIC ACID: CPT

## 2021-12-26 RX ORDER — HYDRALAZINE HYDROCHLORIDE 20 MG/ML
5 INJECTION INTRAMUSCULAR; INTRAVENOUS EVERY 6 HOURS PRN
Status: DISCONTINUED | OUTPATIENT
Start: 2021-12-26 | End: 2021-12-27 | Stop reason: HOSPADM

## 2021-12-26 RX ORDER — BUMETANIDE 1 MG/1
1 TABLET ORAL DAILY
Status: DISCONTINUED | OUTPATIENT
Start: 2021-12-26 | End: 2021-12-27 | Stop reason: HOSPADM

## 2021-12-26 RX ORDER — LORAZEPAM 2 MG/ML
4 INJECTION INTRAMUSCULAR
Status: DISCONTINUED | OUTPATIENT
Start: 2021-12-26 | End: 2021-12-26

## 2021-12-26 RX ORDER — SODIUM CHLORIDE 0.9 % (FLUSH) 0.9 %
5-40 SYRINGE (ML) INJECTION PRN
Status: DISCONTINUED | OUTPATIENT
Start: 2021-12-26 | End: 2021-12-27 | Stop reason: HOSPADM

## 2021-12-26 RX ORDER — NICOTINE 21 MG/24HR
1 PATCH, TRANSDERMAL 24 HOURS TRANSDERMAL DAILY
Status: DISCONTINUED | OUTPATIENT
Start: 2021-12-26 | End: 2021-12-26

## 2021-12-26 RX ORDER — MULTIVITAMIN WITH IRON
1 TABLET ORAL DAILY
Status: DISCONTINUED | OUTPATIENT
Start: 2021-12-26 | End: 2021-12-27 | Stop reason: HOSPADM

## 2021-12-26 RX ORDER — LORAZEPAM 2 MG/1
2 TABLET ORAL
Status: DISCONTINUED | OUTPATIENT
Start: 2021-12-26 | End: 2021-12-26

## 2021-12-26 RX ORDER — LORAZEPAM 1 MG/1
1 TABLET ORAL
Status: DISCONTINUED | OUTPATIENT
Start: 2021-12-26 | End: 2021-12-26

## 2021-12-26 RX ORDER — FOLIC ACID 1 MG/1
1 TABLET ORAL DAILY
Status: DISCONTINUED | OUTPATIENT
Start: 2021-12-26 | End: 2021-12-27 | Stop reason: HOSPADM

## 2021-12-26 RX ORDER — LIDOCAINE 4 G/G
1 PATCH TOPICAL DAILY
Status: DISCONTINUED | OUTPATIENT
Start: 2021-12-26 | End: 2021-12-27 | Stop reason: HOSPADM

## 2021-12-26 RX ORDER — LORAZEPAM 2 MG/ML
3 INJECTION INTRAMUSCULAR
Status: DISCONTINUED | OUTPATIENT
Start: 2021-12-26 | End: 2021-12-26

## 2021-12-26 RX ORDER — LORAZEPAM 2 MG/1
4 TABLET ORAL
Status: DISCONTINUED | OUTPATIENT
Start: 2021-12-26 | End: 2021-12-26

## 2021-12-26 RX ORDER — LANOLIN ALCOHOL/MO/W.PET/CERES
100 CREAM (GRAM) TOPICAL DAILY
Status: DISCONTINUED | OUTPATIENT
Start: 2021-12-26 | End: 2021-12-27 | Stop reason: HOSPADM

## 2021-12-26 RX ORDER — SODIUM CHLORIDE 9 MG/ML
25 INJECTION, SOLUTION INTRAVENOUS PRN
Status: DISCONTINUED | OUTPATIENT
Start: 2021-12-26 | End: 2021-12-27 | Stop reason: HOSPADM

## 2021-12-26 RX ORDER — LORAZEPAM 2 MG/ML
2 INJECTION INTRAMUSCULAR
Status: DISCONTINUED | OUTPATIENT
Start: 2021-12-26 | End: 2021-12-26

## 2021-12-26 RX ORDER — LORAZEPAM 2 MG/ML
1 INJECTION INTRAMUSCULAR
Status: DISCONTINUED | OUTPATIENT
Start: 2021-12-26 | End: 2021-12-26

## 2021-12-26 RX ORDER — NICOTINE 21 MG/24HR
1 PATCH, TRANSDERMAL 24 HOURS TRANSDERMAL DAILY PRN
Status: DISCONTINUED | OUTPATIENT
Start: 2021-12-26 | End: 2021-12-27 | Stop reason: HOSPADM

## 2021-12-26 RX ORDER — PHENOBARBITAL SODIUM 65 MG/ML
260 INJECTION INTRAMUSCULAR
Status: DISCONTINUED | OUTPATIENT
Start: 2021-12-26 | End: 2021-12-27 | Stop reason: HOSPADM

## 2021-12-26 RX ORDER — PREDNISONE 20 MG/1
40 TABLET ORAL DAILY
Status: DISCONTINUED | OUTPATIENT
Start: 2021-12-26 | End: 2021-12-27 | Stop reason: HOSPADM

## 2021-12-26 RX ORDER — PHENOBARBITAL SODIUM 65 MG/ML
130 INJECTION INTRAMUSCULAR
Status: DISCONTINUED | OUTPATIENT
Start: 2021-12-26 | End: 2021-12-27 | Stop reason: HOSPADM

## 2021-12-26 RX ORDER — BUMETANIDE 0.25 MG/ML
1 INJECTION, SOLUTION INTRAMUSCULAR; INTRAVENOUS 2 TIMES DAILY
Status: DISCONTINUED | OUTPATIENT
Start: 2021-12-26 | End: 2021-12-27 | Stop reason: HOSPADM

## 2021-12-26 RX ORDER — SODIUM CHLORIDE 0.9 % (FLUSH) 0.9 %
5-40 SYRINGE (ML) INJECTION EVERY 12 HOURS SCHEDULED
Status: DISCONTINUED | OUTPATIENT
Start: 2021-12-26 | End: 2021-12-27 | Stop reason: HOSPADM

## 2021-12-26 RX ADMIN — CARVEDILOL 25 MG: 25 TABLET, FILM COATED ORAL at 17:39

## 2021-12-26 RX ADMIN — PHENOBARBITAL SODIUM 130 MG: 65 INJECTION INTRAMUSCULAR; INTRAVENOUS at 13:14

## 2021-12-26 RX ADMIN — FOLIC ACID 1 MG: 1 TABLET ORAL at 07:53

## 2021-12-26 RX ADMIN — BUMETANIDE 1 MG: 0.25 INJECTION INTRAMUSCULAR; INTRAVENOUS at 20:21

## 2021-12-26 RX ADMIN — HYDRALAZINE HYDROCHLORIDE 5 MG: 20 INJECTION, SOLUTION INTRAMUSCULAR; INTRAVENOUS at 01:39

## 2021-12-26 RX ADMIN — Medication 100 MG: at 07:53

## 2021-12-26 RX ADMIN — LEVOFLOXACIN 750 MG: 5 INJECTION, SOLUTION INTRAVENOUS at 20:53

## 2021-12-26 RX ADMIN — CARVEDILOL 25 MG: 25 TABLET, FILM COATED ORAL at 07:53

## 2021-12-26 RX ADMIN — PREDNISONE 40 MG: 20 TABLET ORAL at 07:54

## 2021-12-26 RX ADMIN — ACETAMINOPHEN 650 MG: 325 TABLET ORAL at 09:35

## 2021-12-26 RX ADMIN — SODIUM CHLORIDE, PRESERVATIVE FREE 10 ML: 5 INJECTION INTRAVENOUS at 01:41

## 2021-12-26 RX ADMIN — PANTOPRAZOLE SODIUM 40 MG: 40 TABLET, DELAYED RELEASE ORAL at 15:55

## 2021-12-26 RX ADMIN — BUMETANIDE 1 MG: 0.25 INJECTION INTRAMUSCULAR; INTRAVENOUS at 07:55

## 2021-12-26 RX ADMIN — PHENOBARBITAL SODIUM 130 MG: 65 INJECTION INTRAMUSCULAR; INTRAVENOUS at 15:55

## 2021-12-26 RX ADMIN — PHENOBARBITAL SODIUM 130 MG: 65 INJECTION INTRAMUSCULAR; INTRAVENOUS at 20:21

## 2021-12-26 RX ADMIN — SODIUM CHLORIDE, PRESERVATIVE FREE 10 ML: 5 INJECTION INTRAVENOUS at 07:56

## 2021-12-26 RX ADMIN — PHENOBARBITAL SODIUM 130 MG: 65 INJECTION INTRAMUSCULAR; INTRAVENOUS at 09:49

## 2021-12-26 RX ADMIN — SODIUM CHLORIDE, PRESERVATIVE FREE 10 ML: 5 INJECTION INTRAVENOUS at 20:43

## 2021-12-26 RX ADMIN — Medication 1 TABLET: at 07:53

## 2021-12-26 RX ADMIN — IPRATROPIUM BROMIDE AND ALBUTEROL SULFATE 1 AMPULE: .5; 3 SOLUTION RESPIRATORY (INHALATION) at 13:40

## 2021-12-26 RX ADMIN — SODIUM CHLORIDE, PRESERVATIVE FREE 10 ML: 5 INJECTION INTRAVENOUS at 07:57

## 2021-12-26 RX ADMIN — LISINOPRIL 30 MG: 20 TABLET ORAL at 07:55

## 2021-12-26 RX ADMIN — CLONIDINE HYDROCHLORIDE 0.1 MG: 0.1 TABLET ORAL at 07:53

## 2021-12-26 RX ADMIN — ENOXAPARIN SODIUM 40 MG: 100 INJECTION SUBCUTANEOUS at 07:52

## 2021-12-26 RX ADMIN — ACETAMINOPHEN 650 MG: 325 TABLET ORAL at 15:55

## 2021-12-26 RX ADMIN — ACETAMINOPHEN 650 MG: 325 TABLET ORAL at 01:38

## 2021-12-26 RX ADMIN — PANTOPRAZOLE SODIUM 40 MG: 40 TABLET, DELAYED RELEASE ORAL at 07:53

## 2021-12-26 ASSESSMENT — PAIN DESCRIPTION - ONSET: ONSET: ON-GOING

## 2021-12-26 ASSESSMENT — LIFESTYLE VARIABLES
HOW OFTEN DO YOU HAVE A DRINK CONTAINING ALCOHOL: 4 OR MORE TIMES A WEEK
HOW MANY STANDARD DRINKS CONTAINING ALCOHOL DO YOU HAVE ON A TYPICAL DAY: 5 OR 6

## 2021-12-26 ASSESSMENT — PAIN SCALES - GENERAL
PAINLEVEL_OUTOF10: 5
PAINLEVEL_OUTOF10: 5
PAINLEVEL_OUTOF10: 4
PAINLEVEL_OUTOF10: 6

## 2021-12-26 ASSESSMENT — PAIN DESCRIPTION - LOCATION
LOCATION: HEAD
LOCATION: HEAD

## 2021-12-26 ASSESSMENT — PAIN DESCRIPTION - PAIN TYPE
TYPE: ACUTE PAIN
TYPE: ACUTE PAIN

## 2021-12-26 ASSESSMENT — PATIENT HEALTH QUESTIONNAIRE - PHQ9: SUM OF ALL RESPONSES TO PHQ QUESTIONS 1-9: 13

## 2021-12-26 ASSESSMENT — PAIN DESCRIPTION - ORIENTATION: ORIENTATION: ANTERIOR;POSTERIOR

## 2021-12-26 ASSESSMENT — PAIN DESCRIPTION - DESCRIPTORS: DESCRIPTORS: POUNDING

## 2021-12-26 ASSESSMENT — PAIN DESCRIPTION - FREQUENCY: FREQUENCY: INTERMITTENT

## 2021-12-26 NOTE — PROGRESS NOTES
Utilize [facility] alcohol withdrawal scale (Based on [place] Modified Alcohol Withdrawal Scale). Tabulate score based on classifications including Tremor, Sweating, Hallucination, Orientation, and Agitation. Tremor: 1  Sweatin  Hallucinations: 0  Orientation: 0  Agitation: 0  Total Score: 2  Action perform as described below     Tremor:  No tremor is 0 points. Tremor on movement is 1 point. Tremor at rest is 2 points. Sweating: No Sweat 0 points. Moist is 1 point. Drenching sweats is 2 points. Hallucinations: No present 0 points. Dissuadable is 1 point. Not dissuadable is 2 points. Orientation: Oriented 0 points. Vague/detached 1 point. Disoriented/no contact 2 points. Agitation: Calm 0 points. Anxious 1 point. Panicky 2 points. Check scale every 2 hours. Discontinue scoring with 4 consecutive scorings of 0. Scale 0: No phenobarbital given. Re-assess every 60 minutes as needed. Scale 1-3: Phenobarbital 130 mg IV over 3 minutes. Re-assess every 60 minutes as needed. May administer every 60 minutes to a maximum dose of phenobarbital 1040 mg in 24 hours! Scale 4-8: Phenobarbital 260 mg IV over 5 minutes. Re-assess every 60 minutes as needed. May administer every 60 minutes to a maximum dose of phenobarbital 1040mg in 24 hours! Scale 9-10: Transfer to ICU (if not already in ICU). Administer 10mg/kg phenobarbital IV over 60 minutes. Maximum dose phenobarbital is 1040mg in 24 hours!

## 2021-12-26 NOTE — PROGRESS NOTES
Utilize [facility] alcohol withdrawal scale (Based on [place] Modified Alcohol Withdrawal Scale). Tabulate score based on classifications including Tremor, Sweating, Hallucination, Orientation, and Agitation. Tremor: 1  Sweatin  Hallucinations: 0  Orientation: 0  Agitation: 0  Total Score: 2  Action perform as described below     Tremor:  No tremor is 0 points. Tremor on movement is 1 point. Tremor at rest is 2 points. Sweating: No Sweat 0 points. Moist is 1 point. Drenching sweats is 2 points. Hallucinations: No present 0 points. Dissuadable is 1 point. Not dissuadable is 2 points. Orientation: Oriented 0 points. Vague/detached 1 point. Disoriented/no contact 2 points. Agitation: Calm 0 points. Anxious 1 point. Panicky 2 points. Check scale every 2 hours. Discontinue scoring with 4 consecutive scorings of 0. Scale 0: No phenobarbital given. Re-assess every 60 minutes as needed. Scale 1-3: Phenobarbital 130 mg IV over 3 minutes. Re-assess every 60 minutes as needed. May administer every 60 minutes to a maximum dose of phenobarbital 1040 mg in 24 hours! Scale 4-8: Phenobarbital 260 mg IV over 5 minutes. Re-assess every 60 minutes as needed. May administer every 60 minutes to a maximum dose of phenobarbital 1040mg in 24 hours! Scale 9-10: Transfer to ICU (if not already in ICU). Administer 10mg/kg phenobarbital IV over 60 minutes. Maximum dose phenobarbital is 1040mg in 24 hours!

## 2021-12-26 NOTE — PROGRESS NOTES
[facility] alcohol withdrawal scale (Based on [place] Modified Alcohol Withdrawal Scale). Tabulate score based on classifications including Tremor, Sweating, Hallucination, Orientation, and Agitation.       Tremor: 0  Sweatin  Hallucinations:  0  Orientation: 0  Agitation: 0    Total Score: 0    Action perform as described below      Tremor:  No tremor is 0 points. Tremor on movement is 1 point. Tremor at rest is 2 points. Sweating: No Sweat 0 points. Moist is 1 point. Drenching sweats is 2 points. Hallucinations: No present 0 points. Dissuadable is 1 point. Not dissuadable is 2 points. Orientation: Oriented 0 points. Vague/detached 1 point. Disoriented/no contact 2 points. Agitation: Calm 0 points. Anxious 1 point.  Panicky 2 points.

## 2021-12-26 NOTE — PROGRESS NOTES
[facility] alcohol withdrawal scale (Based on [place] Modified Alcohol Withdrawal Scale). Tabulate score based on classifications including Tremor, Sweating, Hallucination, Orientation, and Agitation.            Tremor: 0  Sweatin  Hallucinations:  0  Orientation: 0  Agitation: 0     Total Score: 0     Action perform as described below      Tremor:  No tremor is 0 points.  Tremor on movement is 1 point.  Tremor at rest is 2 points. Sweating: No Sweat 0 points. Moist is 1 point.  Drenching sweats is 2 points. Hallucinations: No present 0 points. Dissuadable is 1 point. Not dissuadable is 2 points. Orientation: Oriented 0 points. Vague/detached 1 point. Disoriented/no contact 2 points. Agitation: Calm 0 points.  Anxious 1 point.  Panicky 2 points.

## 2021-12-26 NOTE — ED NOTES
Reported off care to [facility] crew.  Pt stable and loaded into EMS and transported to [facility] 3B-36     Yonatan Baker RN  12/25/21 2052

## 2021-12-26 NOTE — PROGRESS NOTES
Hospitalist Progress Note    Patient:  Desire Dennis      Unit/Bed:3B-36/036-A    YOB: 1965    MRN: [id number]       Acct: [de-identified]     PCP: JASON Maldonado CNP    Date of Admission: 12/25/2021    Assessment/Plan:    1. Hypertensive urgency: Resolved, patient's blood pressures are elevated but much improved. Continue with carvedilol, lisinopril. As needed hydralazine to maintain systolic blood pressures less than 160.  2. Acute hypoxemic respiratory failure: Continue to wean O2, wean down to 4 L nasal cannula. 3. Acute pulmonary edema: Likely secondary to acute diastolic dysfunction. 2D echo is pending. Continue to diurese with Bumex 1 mg IV every 12 hours. Fluid restriction at 2 L discussed with patient as she had found drinks from the cafeteria it is vital that she monitor her fluid intake. Daily weights. Repeat chest x-ray in a.m.  4. Acute exacerbation of COPD: Continue with prednisone, bronchodilators. 5. Obstructive sleep apnea: Patient is noncompliant with CPAP. 6. Alcohol use: CIWA protocol with phenobarbital.  7. VTE prophylaxis: Lovenox      Subjective (past 24 hours):   Patient seen and examined at bedside, reports she has been voiding well. No reports of chest pain or shortness of breath. Patient does complain of a headache.       Medications:  Reviewed    Infusion Medications    sodium chloride      sodium chloride       Scheduled Medications    lidocaine  1 patch TransDERmal Daily    multivitamin  1 tablet Oral Daily    thiamine  100 mg Oral Daily    sodium chloride flush  5-40 mL IntraVENous 2 times per day    folic acid  1 mg Oral Daily    predniSONE  40 mg Oral Daily    [Held by provider] bumetanide  1 mg Oral Daily    bumetanide  1 mg IntraVENous BID    albuterol  2.5 mg Nebulization Once    carvedilol  25 mg Oral BID     cloNIDine  0.1 mg Oral Daily    lisinopril  30 mg Oral Daily    pantoprazole  40 mg Oral BID    sodium chloride flush 5-40 mL IntraVENous 2 times per day    enoxaparin  40 mg SubCUTAneous Daily    ipratropium-albuterol  1 ampule Inhalation Q4H    levofloxacin  750 mg IntraVENous Q24H     PRN Meds: hydrALAZINE, sodium chloride flush, sodium chloride, nicotine, PHENobarbital, PHENobarbital, PHENobarbital IVPB, nitroGLYCERIN, sodium chloride flush, sodium chloride, ondansetron **OR** ondansetron, polyethylene glycol, acetaminophen **OR** acetaminophen    No intake or output data in the 24 hours ending 12/26/21 0950    Diet:  ADULT DIET; Regular; Low Sodium (2 gm); 2000 ml    Exam:  BP (!) 158/62   Pulse 97   Temp 97.8 °F (36.6 °C) (Oral)   Resp 20   Ht 5' 2\" (1.575 m)   Wt 203 lb 6.4 oz (92.3 kg)   SpO2 98%   BMI 37.20 kg/m²     General appearance: No apparent distress, appears stated age and cooperative. Respiratory:  Normal respiratory effort. Clear to auscultation, bilaterally without Rales/Wheezes/Rhonchi. Cardiovascular: Regular rate and rhythm with normal S1/S2 without murmurs, rubs or gallops. Abdomen: Soft, non-tender, non-distended with normal bowel sounds. Extremities: no pedal edema  Skin:  no rashes    Labs:   Recent Labs     12/25/21 1830 12/26/21 0451   WBC 12.6* 9.7   HGB 14.7 15.0   HCT 45.4 45.4    280     Recent Labs     12/25/21 1830 12/26/21 0451   * 131*   K 4.2 4.4   CL 93* 89*   CO2 31 27   BUN 6* 8   CREATININE 0.8 0.6   CALCIUM  --  9.9     Recent Labs     12/25/21 1830 12/26/21  0451   AST 23 19   ALT 27 17   BILITOT 0.7 0.5   ALKPHOS 137* 125     No results for input(s): INR in the last 72 hours.   Recent Labs     12/25/21 1830   TROPONINI < 0.017     Recent Labs     12/25/21  2328   PROCAL 0.03       Microbiology:      Urinalysis:      Lab Results   Component Value Date    NITRU NEGATIVE 12/25/2021    WBCUA 0-2 08/08/2020    BACTERIA FEW 08/08/2020    RBCUA NONE 08/08/2020    BLOODU NEGATIVE 12/25/2021    SPECGRAV 1.010 06/14/2021    SPECGRAV 1.015 10/17/2020    GLUCOSEU NEGATIVE 12/25/2021       Radiology:  XR CHEST PORTABLE    Result Date: 12/25/2021  PROCEDURE: XR CHEST PORTABLE CLINICAL INFORMATION: Shortness of breath TECHNIQUE: Mobile AP chest radiograph. COMPARISON: PA and lateral chest radiographs 10/17/2020 FINDINGS: There is mild enlargement of the cardiac silhouette. Pulmonary vessels are congested. Pulmonary interstitium is prominent. The right costophrenic angle is blunted. Reticular opacities are seen at the bilateral lung bases. Degenerative changes in the thoracic spine are poorly visualized. 1. Mild cardiomegaly with pulmonary vascular congestion suspicious for congestive heart failure. 2. Probable small right-sided pleural effusion and bibasilar atelectasis/infiltrate. 3. Prominent pulmonary interstitium suspicious for pulmonary edema. **This report has been created using voice recognition software. It may contain minor errors which are inherent in voice recognition technology. ** Final report electronically signed by Dr. Ольга Dobbs on 12/25/2021 6:49 PM      DVT prophylaxis: [x] Lovenox                                 [] SCDs                                 [] SQ Heparin                                 [] Encourage ambulation           [] Already on Anticoagulation     Code Status: Full Code    Tele:   [x] yes             [] no    Active Hospital Problems    Diagnosis Date Noted    COPD exacerbation ([facility] [address] [J44.1] 12/25/2021       Electronically signed by Dina Kasper MD on 12/26/2021 at 9:50 AM

## 2021-12-26 NOTE — PROGRESS NOTES
Pt arrived to 3B room 36. Oriented to room and call light. Dr. Clarice Nichole paged regarding patient's arrival. Assessment complete.

## 2021-12-26 NOTE — H&P
Hospitalist - History & Physical      Patient: Danielle Ayala    Unit/Bed:3B-36/036-A  YOB: 1965  MRN: [id number]   Acct: [de-identified]   PCP: JASON Gonzalez - CNP    Date of Service: Pt seen/examined on 12/26/21  and Admitted to Inpatient with expected LOS greater than two midnights due to medical therapy. Chief Complaint:  Shortness of breath with fatigue    Assessment and Plan:  Hypertensive Emergency with associated pulmonary edema: SBP >180/DBP>110 with evidence of pulmonary edema on CXR with acute hypoxia. Last /86 with noted ROOT per family members. Repeat BP on evaluation SBP 170s/DBP 80s. Has known history of uncontrolled HTN per family and chart review.  -Will hold off on starting on gtt. Resume home BP meds. Hydralazine 5 mg IV PRN for SBP >180/DBP>110. Suspect chronic uncontrolled BP. Will be cautious with aggressive acute correction. Will need modification in medications for better BP control at baseline. Monitor vitals. -Bumex 1 mg IV BID for diuresis. Strict I/Os. -Check TTE (last TTE 02/10/2020 with EF 55-60% with abnormalities)    Acute hypoxic respiratory failure: pulmonary edema due to HTN emergency vs progression of underlying COPD in the setting of LYNN (noncompliance to CPAP and reported pulmonary HTN) vs acute COPD decompensation vs pulmonary HTN from noncompliance to CPAP. Started on Trelegy (changed from NeurOp) recently. On Trelegy, ventolin HFA inhaler, proventil nebulizar at home. Not on oxygen at baseline. Last PFT 06/29/2020 Moderate COPD with FEV 1 55, FEV/FVC ratio 55. Given her extensive history and recently progression of COPD, cough with some sputum production, will treat for acute COPD exacerbation with possible pneumonia. -Duonebs q4h, Prednisone 40 mg daily (may need a taper), Levaquin 750 mg IV daily (pt allergic to PCN) empirically. Acapella, IS. Add metaneb as patient reported difficulty with nebulizer at home recently.   -Check lactate level  -Check ABG  -Lidocaine patch for back pain related to deep breathing/cough. -Currently on 4L NC. Wean off of oxygen as tolerated with SpO2 goal>92%. BiPAP if patient can tolerate to help with pulmonary edema.  -Will need home oxygen evaluation on discharge. SIRS: 3/4 with tachypnea, tachycardia, and leukocytosis. Pulmonary source likely given presentation as above. Afebrile and hemodynamically stable. -UA with reflex culture, BCx x2, MRSA/VRE screening, Legionella/Strep Ag pending. On Abx. Fluids not given due to evidence of volume overload and acute hypoxia. Hx Primary HTN: uncontrolled. Endorses compliance to medications. On Lisinopril 30 mg daily, Bumex 1 mg daily, Clonidine 00.1 mg daily, Coreg 25 mg BID. Management as above. EtOH use disorder: Reports 4-6 beers daily for 2 years. Last drink last night. Check Ethanol level. Phenobarbital protocol. B1/Folic/multivit daily. Fall/Seizure precautions. Addiction/SW consulted. Monitor for withdrawal. Encourage cessation. LYNN on CPAP: noncompliance to CPAP due to claustrophobia per chart review. Declined sleep follow up. Pulmonary nodule: CT lung screen on 8/11/2021 with a 7 mm DAWIT nodule with recommendation for 6 month follow up outpatient. Medical deconditioning: in the setting of worsening COPD. PTOT. Fall precautions. Hx Fatty liver with splenomegaly: noted. Anxiety: On Xanax at home. Hold for now. Monitor. Tobacco use disorder: Smokes 1 PPD. 40+ PYH. Nicotine patch prn. Encourage cessation. Patient currently unwilling to quit. Code Status:FULL    Tele:   [x] yes             [] no    Diet: ADULT DIET; Regular;  Low Sodium (2 gm); 2000 ml    DVT prophylaxis: [x] Lovenox                                  [] SCDs                                 [] SQ Heparin                                 [] Encourage ambulation                                 [] Already on Anticoagulation      Disposition:      [x] TBD [] Home                             [] TCU                             [] Rehab                             [] Psych                             [] SNF                             [] [facility]                             [] Other-      History Of Present Illness:    62 yo F with history of COPD, current smoker, LYNN on CPAP, HTN, Anxiety trasnferred from [facility] for evaluation of shortness of breath that acutely worsened x 2 days and fatigue/cough. [facility] ED: Noted to be hypoxic 77% on RA. Afebrile, tachycardic 105, tachypneic 24, Hypertensive ranging 151-207/. Labs showed WBC 12.6. , total protein 8.4. . COVID rapid/Flu A/B negative. CXR with mild cardiomegaly, pulmonary vascular congestion with prominent pulmonary interstitium, probable small right sided pleural effusion and bibasilar atelectasis/infiltrate. Given Duoneb x1 dose, Lasix 60 mg IVx1 dose, SoluMedrol 125 mg IV x1 dose, and Levaquin 500 mg IV x1 dose. Mag sulfate 2g IV also given. Transferred to [facility] as direct admit for acute hypoxic respiratory failure. On evaluation, patient states she has been having progessively worsening dyspnea predominantly on exertion with fatigue for the past 1 month that has been worsened acutely over the last 2 days. Has been physically limited due to dyspnea. Has baseline cough but reports some increased sputum production. Reports no subjective fever, chills, abdominal pain. Some nausea present. Has chronic diarrhea. No loss of taste/smell. No known sick contact. Reports associated chest pain lower axillary area that radiates to back but only present with cough or deep breathing. Attributes to \"dilocation of rib from coughing too hard\". Has extensive smoking history of ~1 ppd since age 14. Reports drinking 4-6 beers nightly over the last 2 years, drank less prior to that. No other drug use.  Follows with BELKIS Denise CNP at Dr. Orlando Mike office (last office visit in 11/2021). Reports she was recently changed from Anoro to Trelegy. Per chart review: Patient had a CT lung screen on 8/11/2021 with a 7 mm DAWIT nodule with recommendation for 6 month follow up. Patient has not followed with the sleep clinic. Patient reported noncompliance to CPAP due to panic attacks. Past Medical History:        Diagnosis Date    Anxiety     Arthritis     COPD (chronic obstructive pulmonary disease) (HCC)     Enlargement, spleen     Fatty liver     GERD (gastroesophageal reflux disease)     Hepatitis A     Hypertension     LYNN on CPAP     Ovarian cyst     Pneumonia     Sleep apnea        Past Surgical History:  History reviewed. No pertinent surgical history. Home Medications:   No current facility-administered medications on file prior to encounter. Current Outpatient Medications on File Prior to Encounter   Medication Sig Dispense Refill    ALPRAZolam (XANAX) 0.25 MG tablet Take 0.25 mg by mouth nightly as needed for Sleep or Anxiety.        lisinopril (PRINIVIL;ZESTRIL) 30 MG tablet Take 1 tablet by mouth daily 90 tablet 3    fluticasone-umeclidin-vilant (TRELEGY ELLIPTA) 100-62.5-25 MCG/INH AEPB Inhale 1 puff into the lungs daily 30 each 11    albuterol sulfate HFA (VENTOLIN HFA) 108 (90 Base) MCG/ACT inhaler Inhale 2 puffs into the lungs every 6 hours as needed for Wheezing or Shortness of Breath 18 g 11    albuterol (PROVENTIL) (2.5 MG/3ML) 0.083% nebulizer solution Take 3 mLs by nebulization every 6 hours as needed for Wheezing or Shortness of Breath (Patient taking differently: Take 2.5 mg by nebulization every 4 hours as needed for Wheezing or Shortness of Breath ) 120 each 11    pantoprazole (PROTONIX) 40 MG tablet TAKE 1 TABLET BY MOUTH 2 TIMES DAILY NO REFILLS DUE FOR CHECK UP 60 tablet 10    bumetanide (BUMEX) 1 MG tablet Take 1 tablet by mouth daily 90 tablet 3    carvedilol (COREG) 25 MG tablet Take 1 tablet by mouth 2 times daily (with meals) 180 tablet 3    cloNIDine (CATAPRES) 0.1 MG tablet Take 1 tablet by mouth daily 90 tablet 3    sucralfate (CARAFATE) 1 GM tablet Take 1 tablet by mouth 4 times daily 120 tablet 5    ondansetron (ZOFRAN) 8 MG tablet Take 1 tablet by mouth every 8 hours as needed for Nausea or Vomiting 30 tablet 5    fluticasone (FLONASE) 50 MCG/ACT nasal spray 2 SPRAYS BY NASAL ROUTE DAILY 1 Bottle 11    acetaminophen (TYLENOL) 500 MG tablet Take 500 mg by mouth every 6 hours as needed for Pain      Lactobacillus (PROBIOTIC ACIDOPHILUS PO) Take by mouth daily      CPAP Machine MISC by Does not apply route Please change APAP pressure to 6-14 cm H20. (Patient not taking: Reported on 11/23/2021) 1 each 0       Allergies:    Pcn [penicillins]    Social History:    reports that she has been smoking cigarettes. She has a 23.00 pack-year smoking history. She has never used smokeless tobacco. She reports current alcohol use. She reports previous drug use. Family History:       Problem Relation Age of Onset    Heart Surgery Mother         dies at 29    Heart Disease Mother     Other Sister         Cardiomyopathy    Other Brother         cardiomyopathy    Stroke Maternal Grandmother     Heart Attack Maternal Grandfather     Heart Attack Paternal Grandfather        Review of systems:  Pertinent positives as noted in the HPI. All other systems reviewed and negative. EKG: NSR    PHYSICAL EXAM:  BP (!) 173/89   Pulse 98   Temp 97.6 °F (36.4 °C) (Oral)   Resp 26   Ht 5' 2\" (1.575 m)   Wt 203 lb 6.4 oz (92.3 kg)   SpO2 94%   BMI 37.20 kg/m²   General appearance: No apparent distress, appears stated age and cooperative. Chronically ill appearing. HEENT: Normal cephalic, atraumatic without obvious deformity. Extra ocular muscles intact. Conjunctivae/corneas clear. Neck: Supple, with full range of motion. No jugular venous distention. Respiratory:  Normal respiratory effort.  Diminished lung sounds with bibasilar crackles, bilaterally without Wheezes/Rhonchi. Cardiovascular: Regular rate and rhythm with normal S1/S2 without murmurs, rubs or gallops. Abdomen: Soft, non-tender, non-distended with normal bowel sounds. Musculoskeletal:  No clubbing, cyanosis or edema bilaterally. Skin: Skin color, texture, turgor normal.  No rashes or lesions. Neurologic:  Neurovascularly intact without any focal sensory/motor deficits. Cranial nerves: II-XII intact, grossly non-focal.  Psychiatric: Alert and oriented, thought content appropriate, normal insight  Capillary Refill: Brisk,< 3 seconds   Peripheral Pulses: +2 palpable, equal bilaterally     Labs:   Recent Labs     12/25/21 1830   WBC 12.6*   HGB 14.7   HCT 45.4        Recent Labs     12/25/21 1830   *   K 4.2   CL 93*   CO2 31   BUN 6*   CREATININE 0.8     Recent Labs     12/25/21 1830   AST 23   ALT 27   BILITOT 0.7   ALKPHOS 137*     No results for input(s): INR in the last 72 hours. Recent Labs     12/25/21 1830   TROPONINI < 0.017       Urinalysis:    Lab Results   Component Value Date    NITRU NEGATIVE 12/25/2021    WBCUA 0-2 08/08/2020    BACTERIA FEW 08/08/2020    RBCUA NONE 08/08/2020    BLOODU NEGATIVE 12/25/2021    SPECGRAV 1.010 06/14/2021    SPECGRAV 1.015 10/17/2020    GLUCOSEU NEGATIVE 12/25/2021       Radiology:   XR CHEST PORTABLE   Final Result   1. Mild cardiomegaly with pulmonary vascular congestion suspicious for congestive heart failure. 2. Probable small right-sided pleural effusion and bibasilar atelectasis/infiltrate. 3. Prominent pulmonary interstitium suspicious for pulmonary edema. **This report has been created using voice recognition software. It may contain minor errors which are inherent in voice recognition technology. **      Final report electronically signed by Dr. Kike Kevin on 12/25/2021 6:49 PM        XR CHEST PORTABLE    Result Date: 12/25/2021  PROCEDURE: XR CHEST PORTABLE CLINICAL INFORMATION: Shortness of breath TECHNIQUE: Mobile AP chest radiograph. COMPARISON: PA and lateral chest radiographs 10/17/2020 FINDINGS: There is mild enlargement of the cardiac silhouette. Pulmonary vessels are congested. Pulmonary interstitium is prominent. The right costophrenic angle is blunted. Reticular opacities are seen at the bilateral lung bases. Degenerative changes in the thoracic spine are poorly visualized. 1. Mild cardiomegaly with pulmonary vascular congestion suspicious for congestive heart failure. 2. Probable small right-sided pleural effusion and bibasilar atelectasis/infiltrate. 3. Prominent pulmonary interstitium suspicious for pulmonary edema. **This report has been created using voice recognition software. It may contain minor errors which are inherent in voice recognition technology. ** Final report electronically signed by Dr. Adrien Nieto on 12/25/2021 6:49 PM          Electronically signed by   Rachana Herrera MD   PGY2, Internal Medicine  12/26/2021

## 2021-12-26 NOTE — PROGRESS NOTES
This RCP spoke with Garcia Horner RN about BiPAP order for pt. Patient does not wear CPAP at home to due anxiety and RN believes pt will not tolerate BiPAP.  Patient currently wearing 6LNC with unlabored respirations, RR 22/min

## 2021-12-26 NOTE — CONSULTS
Brief Intervention and Referral to Treatment Summary    Patient was provided PHQ-9, AUDIT and DAST Screening:      PHQ-9 Score:  13  AUDIT Score:  10  DAST Score:  1    Patients substance use is considered     Low Risk/Healthy  Moderate Risk  X  Harmful  Dependent    Patients depression is considered:     Minimal  Mild   Moderate  X  Moderately Severe  Severe    Brief Education Was Provided    Patient was receptive      Brief Intervention Is Provided (Only for AUDIT or DAST)       Patient denies readiness to decrease and/or stop use and a plan was not discussed      Recommendations/Referrals for Brief and/or Specialized Treatment Provided to Patient     Pt drinks alcohol almost daily, 4-5 beers, occasional rum. Pt tried medical gummies and took a puff of a joint within the last month to assist with pain however did not like either. Pt denies issues with alcohol use, reportedly drink for fun. Pt report moderate depression which began in childhood, increased with the onset of medical issues five years ago, SI/HI denied, hallucinations denied, no delusions noted. Pt receptive to receiving resources.

## 2021-12-26 NOTE — PROGRESS NOTES
Utilize [facility] alcohol withdrawal scale (Based on [place] Modified Alcohol Withdrawal Scale). Tabulate score based on classifications including Tremor, Sweating, Hallucination, Orientation, and Agitation. Tremor:  Sweating:  Hallucinations:   Orientation:   Agitation:   Total Score: Action perform as described below     Tremor:  No tremor is 0 points. Tremor on movement is 1 point. Tremor at rest is 2 points. Sweating: No Sweat 0 points. Moist is 1 point. Drenching sweats is 2 points. Hallucinations: No present 0 points. Dissuadable is 1 point. Not dissuadable is 2 points. Orientation: Oriented 0 points. Vague/detached 1 point. Disoriented/no contact 2 points. Agitation: Calm 0 points. Anxious 1 point. Panicky 2 points. Check scale every 2 hours. Discontinue scoring with 4 consecutive scorings of 0. Scale 0: No phenobarbital given. Re-assess every 60 minutes as needed. Scale 1-3: Phenobarbital 130 mg IV over 3 minutes. Re-assess every 60 minutes as needed. May administer every 60 minutes to a maximum dose of phenobarbital 1040 mg in 24 hours! Scale 4-8: Phenobarbital 260 mg IV over 5 minutes. Re-assess every 60 minutes as needed. May administer every 60 minutes to a maximum dose of phenobarbital 1040mg in 24 hours! Scale 9-10: Transfer to ICU (if not already in ICU). Administer 10mg/kg phenobarbital IV over 60 minutes. Maximum dose phenobarbital is 1040mg in 24 hours!

## 2021-12-26 NOTE — PROGRESS NOTES
Utilize [facility] alcohol withdrawal scale (Based on [place] Modified Alcohol Withdrawal Scale). Tabulate score based on classifications including Tremor, Sweating, Hallucination, Orientation, and Agitation. Tremor: 0  Sweatin  Hallucinations: 0  Orientation: 0  Agitation: 0  Total Score: 1  Action perform as described below     Tremor:  No tremor is 0 points. Tremor on movement is 1 point. Tremor at rest is 2 points. Sweating: No Sweat 0 points. Moist is 1 point. Drenching sweats is 2 points. Hallucinations: No present 0 points. Dissuadable is 1 point. Not dissuadable is 2 points. Orientation: Oriented 0 points. Vague/detached 1 point. Disoriented/no contact 2 points. Agitation: Calm 0 points. Anxious 1 point. Panicky 2 points. Check scale every 2 hours. Discontinue scoring with 4 consecutive scorings of 0. Scale 0: No phenobarbital given. Re-assess every 60 minutes as needed. Scale 1-3: Phenobarbital 130 mg IV over 3 minutes. Re-assess every 60 minutes as needed. May administer every 60 minutes to a maximum dose of phenobarbital 1040 mg in 24 hours! Scale 4-8: Phenobarbital 260 mg IV over 5 minutes. Re-assess every 60 minutes as needed. May administer every 60 minutes to a maximum dose of phenobarbital 1040mg in 24 hours! Scale 9-10: Transfer to ICU (if not already in ICU). Administer 10mg/kg phenobarbital IV over 60 minutes. Maximum dose phenobarbital is 1040mg in 24 hours!

## 2021-12-27 ENCOUNTER — TELEPHONE (OUTPATIENT)
Dept: FAMILY MEDICINE CLINIC | Age: 56
End: 2021-12-27

## 2021-12-27 ENCOUNTER — APPOINTMENT (OUTPATIENT)
Dept: GENERAL RADIOLOGY | Age: 56
DRG: 139 | End: 2021-12-27
Payer: COMMERCIAL

## 2021-12-27 VITALS
HEIGHT: 62 IN | WEIGHT: 203 LBS | BODY MASS INDEX: 37.36 KG/M2 | RESPIRATION RATE: 20 BRPM | DIASTOLIC BLOOD PRESSURE: 73 MMHG | TEMPERATURE: 97.7 F | HEART RATE: 96 BPM | OXYGEN SATURATION: 94 % | SYSTOLIC BLOOD PRESSURE: 142 MMHG

## 2021-12-27 LAB
ALBUMIN SERPL-MCNC: 4.3 G/DL (ref 3.5–5.1)
ALP BLD-CCNC: 110 U/L (ref 38–126)
ALT SERPL-CCNC: 15 U/L (ref 11–66)
ANION GAP SERPL CALCULATED.3IONS-SCNC: 10 MEQ/L (ref 8–16)
AST SERPL-CCNC: 21 U/L (ref 5–40)
BASOPHILS # BLD: 0.2 %
BASOPHILS ABSOLUTE: 0 THOU/MM3 (ref 0–0.1)
BILIRUB SERPL-MCNC: 0.5 MG/DL (ref 0.3–1.2)
BILIRUBIN DIRECT: < 0.2 MG/DL (ref 0–0.3)
BUN BLDV-MCNC: 17 MG/DL (ref 7–22)
CALCIUM SERPL-MCNC: 9.9 MG/DL (ref 8.5–10.5)
CHLORIDE BLD-SCNC: 90 MEQ/L (ref 98–111)
CO2: 33 MEQ/L (ref 23–33)
CREAT SERPL-MCNC: 0.8 MG/DL (ref 0.4–1.2)
EKG ATRIAL RATE: 90 BPM
EKG P AXIS: 32 DEGREES
EKG P-R INTERVAL: 146 MS
EKG Q-T INTERVAL: 358 MS
EKG QRS DURATION: 74 MS
EKG QTC CALCULATION (BAZETT): 437 MS
EKG R AXIS: 75 DEGREES
EKG T AXIS: 40 DEGREES
EKG VENTRICULAR RATE: 90 BPM
EOSINOPHIL # BLD: 0.2 %
EOSINOPHILS ABSOLUTE: 0 THOU/MM3 (ref 0–0.4)
ERYTHROCYTE [DISTWIDTH] IN BLOOD BY AUTOMATED COUNT: 14.5 % (ref 11.5–14.5)
ERYTHROCYTE [DISTWIDTH] IN BLOOD BY AUTOMATED COUNT: 51.1 FL (ref 35–45)
GFR SERPL CREATININE-BSD FRML MDRD: 74 ML/MIN/1.73M2
GLUCOSE BLD-MCNC: 94 MG/DL (ref 70–108)
HCT VFR BLD CALC: 44.9 % (ref 37–47)
HEMOGLOBIN: 14.7 GM/DL (ref 12–16)
IMMATURE GRANS (ABS): 0.05 THOU/MM3 (ref 0–0.07)
IMMATURE GRANULOCYTES: 0.4 %
LYMPHOCYTES # BLD: 26.3 %
LYMPHOCYTES ABSOLUTE: 3.4 THOU/MM3 (ref 1–4.8)
MCH RBC QN AUTO: 31.7 PG (ref 26–33)
MCHC RBC AUTO-ENTMCNC: 32.7 GM/DL (ref 32.2–35.5)
MCV RBC AUTO: 96.8 FL (ref 81–99)
MONOCYTES # BLD: 8.2 %
MONOCYTES ABSOLUTE: 1 THOU/MM3 (ref 0.4–1.3)
NUCLEATED RED BLOOD CELLS: 0 /100 WBC
PLATELET # BLD: 287 THOU/MM3 (ref 130–400)
PMV BLD AUTO: 9.8 FL (ref 9.4–12.4)
POTASSIUM SERPL-SCNC: 4.6 MEQ/L (ref 3.5–5.2)
RBC # BLD: 4.64 MILL/MM3 (ref 4.2–5.4)
SEG NEUTROPHILS: 64.7 %
SEGMENTED NEUTROPHILS ABSOLUTE COUNT: 8.3 THOU/MM3 (ref 1.8–7.7)
SODIUM BLD-SCNC: 133 MEQ/L (ref 135–145)
TOTAL PROTEIN: 7.8 G/DL (ref 6.1–8)
WBC # BLD: 12.8 THOU/MM3 (ref 4.8–10.8)

## 2021-12-27 PROCEDURE — 93010 ELECTROCARDIOGRAM REPORT: CPT | Performed by: INTERNAL MEDICINE

## 2021-12-27 PROCEDURE — 36415 COLL VENOUS BLD VENIPUNCTURE: CPT

## 2021-12-27 PROCEDURE — 99239 HOSP IP/OBS DSCHRG MGMT >30: CPT | Performed by: HOSPITALIST

## 2021-12-27 PROCEDURE — 85025 COMPLETE CBC W/AUTO DIFF WBC: CPT

## 2021-12-27 PROCEDURE — 71045 X-RAY EXAM CHEST 1 VIEW: CPT

## 2021-12-27 PROCEDURE — 80053 COMPREHEN METABOLIC PANEL: CPT

## 2021-12-27 PROCEDURE — 82248 BILIRUBIN DIRECT: CPT

## 2021-12-27 PROCEDURE — 6360000002 HC RX W HCPCS: Performed by: STUDENT IN AN ORGANIZED HEALTH CARE EDUCATION/TRAINING PROGRAM

## 2021-12-27 PROCEDURE — 6370000000 HC RX 637 (ALT 250 FOR IP): Performed by: STUDENT IN AN ORGANIZED HEALTH CARE EDUCATION/TRAINING PROGRAM

## 2021-12-27 PROCEDURE — 2700000000 HC OXYGEN THERAPY PER DAY

## 2021-12-27 PROCEDURE — 2500000003 HC RX 250 WO HCPCS: Performed by: STUDENT IN AN ORGANIZED HEALTH CARE EDUCATION/TRAINING PROGRAM

## 2021-12-27 PROCEDURE — 94640 AIRWAY INHALATION TREATMENT: CPT

## 2021-12-27 PROCEDURE — 2580000003 HC RX 258: Performed by: STUDENT IN AN ORGANIZED HEALTH CARE EDUCATION/TRAINING PROGRAM

## 2021-12-27 RX ORDER — LEVOFLOXACIN 750 MG/1
750 TABLET ORAL DAILY
Qty: 3 TABLET | Refills: 0 | Status: SHIPPED | OUTPATIENT
Start: 2021-12-27 | End: 2021-12-30

## 2021-12-27 RX ORDER — PREDNISONE 20 MG/1
40 TABLET ORAL DAILY
Qty: 6 TABLET | Refills: 0 | Status: SHIPPED | OUTPATIENT
Start: 2021-12-28 | End: 2022-02-09 | Stop reason: SDUPTHER

## 2021-12-27 RX ADMIN — LISINOPRIL 30 MG: 20 TABLET ORAL at 08:56

## 2021-12-27 RX ADMIN — BUMETANIDE 1 MG: 0.25 INJECTION INTRAMUSCULAR; INTRAVENOUS at 08:56

## 2021-12-27 RX ADMIN — SODIUM CHLORIDE, PRESERVATIVE FREE 10 ML: 5 INJECTION INTRAVENOUS at 09:00

## 2021-12-27 RX ADMIN — Medication 100 MG: at 08:58

## 2021-12-27 RX ADMIN — ENOXAPARIN SODIUM 40 MG: 100 INJECTION SUBCUTANEOUS at 08:59

## 2021-12-27 RX ADMIN — PANTOPRAZOLE SODIUM 40 MG: 40 TABLET, DELAYED RELEASE ORAL at 08:59

## 2021-12-27 RX ADMIN — IPRATROPIUM BROMIDE AND ALBUTEROL SULFATE 1 AMPULE: .5; 3 SOLUTION RESPIRATORY (INHALATION) at 07:51

## 2021-12-27 RX ADMIN — IPRATROPIUM BROMIDE AND ALBUTEROL SULFATE 1 AMPULE: .5; 3 SOLUTION RESPIRATORY (INHALATION) at 02:31

## 2021-12-27 RX ADMIN — ACETAMINOPHEN 650 MG: 325 TABLET ORAL at 03:10

## 2021-12-27 RX ADMIN — CLONIDINE HYDROCHLORIDE 0.1 MG: 0.1 TABLET ORAL at 08:58

## 2021-12-27 RX ADMIN — Medication 1 TABLET: at 08:56

## 2021-12-27 RX ADMIN — FOLIC ACID 1 MG: 1 TABLET ORAL at 08:57

## 2021-12-27 RX ADMIN — PREDNISONE 40 MG: 20 TABLET ORAL at 08:56

## 2021-12-27 RX ADMIN — CARVEDILOL 25 MG: 25 TABLET, FILM COATED ORAL at 08:56

## 2021-12-27 RX ADMIN — IPRATROPIUM BROMIDE AND ALBUTEROL SULFATE 1 AMPULE: .5; 3 SOLUTION RESPIRATORY (INHALATION) at 11:51

## 2021-12-27 RX ADMIN — ACETAMINOPHEN 650 MG: 325 TABLET ORAL at 13:44

## 2021-12-27 ASSESSMENT — PAIN SCALES - GENERAL
PAINLEVEL_OUTOF10: 4
PAINLEVEL_OUTOF10: 4
PAINLEVEL_OUTOF10: 2

## 2021-12-27 ASSESSMENT — PAIN DESCRIPTION - PAIN TYPE: TYPE: ACUTE PAIN

## 2021-12-27 ASSESSMENT — PAIN DESCRIPTION - LOCATION
LOCATION: RIB CAGE;BACK
LOCATION: RIB CAGE;BACK

## 2021-12-27 ASSESSMENT — PAIN DESCRIPTION - DESCRIPTORS: DESCRIPTORS: ACHING

## 2021-12-27 ASSESSMENT — PAIN DESCRIPTION - ORIENTATION: ORIENTATION: RIGHT

## 2021-12-27 NOTE — PROGRESS NOTES
Utilize [facility] alcohol withdrawal scale (Based on [place] Modified Alcohol Withdrawal Scale). Tabulate score based on classifications including Tremor, Sweating, Hallucination, Orientation, and Agitation.     :     Tremor: 0  Sweatin  Hallucinations: 0  Orientation: 0  Agitation: 0  Total Score: 0     No interventions required per protocol.          (Daily total Phenobarbital given: 520 mg as of )        Tremor:  No tremor is 0 points.  Tremor on movement is 1 point.  Tremor at rest is 2 points. Sweating: No Sweat 0 points. Moist is 1 point.  Drenching sweats is 2 points. Hallucinations: No present 0 points. Dissuadable is 1 point. Not dissuadable is 2 points. Orientation: Oriented 0 points. Vague/detached 1 point. Disoriented/no contact 2 points. Agitation: Calm 0 points.  Anxious 1 point. Panicky 2 points.     Check scale every 2 hours.  Discontinue scoring with 4 consecutive scorings of 0. Scale 0: No phenobarbital given.  Re-assess every 60 minutes as needed. Scale 1-3: Phenobarbital 130 mg IV over 3 minutes. Re-assess every 60 minutes as needed.  May administer every 60 minutes to a maximum dose of phenobarbital 1040 mg in 24 hours! Scale 4-8: Phenobarbital 260 mg IV over 5 minutes.  Re-assess every 60 minutes as needed. May administer every 60 minutes to a maximum dose of phenobarbital 1040mg in 24 hours! Scale 9-10: Transfer to ICU (if not already in ICU).  Administer 10mg/kg phenobarbital IV over 60 minutes.  Maximum dose phenobarbital is 1040mg in 24 hours!

## 2021-12-27 NOTE — DISCHARGE SUMMARY
Discharge Summary    Patient:  Reny Reyes  YOB: 1965    MRN: [id number]   Acct: [de-identified]    Primary Care Physician: JASON Winkler CNP    Admit date:  12/25/2021    Discharge date:   12/27/2021       Discharge Diagnoses:   <principal problem not specified>  Active Problems:    COPD exacerbation ([address]  Resolved Problems:    * No resolved hospital problems. *      Admitted for: (HPI)  60 yo F with history of COPD, current smoker, LYNN on CPAP, HTN, Anxiety trasnferred from [facility] for evaluation of shortness of breath that acutely worsened x 2 days and fatigue/cough.   [facility] ED: Noted to be hypoxic 77% on RA. Afebrile, tachycardic 105, tachypneic 24, Hypertensive ranging 151-207/. Labs showed WBC 12.6. , total protein 8.4. . COVID rapid/Flu A/B negative. CXR with mild cardiomegaly, pulmonary vascular congestion with prominent pulmonary interstitium, probable small right sided pleural effusion and bibasilar atelectasis/infiltrate. Given Duoneb x1 dose, Lasix 60 mg IVx1 dose, SoluMedrol 125 mg IV x1 dose, and Levaquin 500 mg IV x1 dose. Mag sulfate 2g IV also given. Transferred to [facility] as direct admit for acute hypoxic respiratory failure.     On evaluation, patient states she has been having progessively worsening dyspnea predominantly on exertion with fatigue for the past 1 month that has been worsened acutely over the last 2 days. Has been physically limited due to dyspnea. Has baseline cough but reports some increased sputum production. Reports no subjective fever, chills, abdominal pain. Some nausea present. Has chronic diarrhea. No loss of taste/smell. No known sick contact. Reports associated chest pain lower axillary area that radiates to back but only present with cough or deep breathing. Attributes to \"dilocation of rib from coughing too hard\".      Has extensive smoking history of ~1 ppd since age 14.  Reports drinking 4-6 beers nightly over the last 2 years, drank less prior to that. No other drug use. Follows with BELKIS Myers CNP at [facility] office (last office visit in 11/2021). Reports she was recently changed from Anoro to Trelegy. Per chart review: Patient had a CT lung screen on 8/11/2021 with a 7 mm DAWIT nodule with recommendation for 6 month follow up. Patient has not followed with the sleep clinic. Patient reported noncompliance to CPAP due to panic attacks. Hospital Course:  65-year-old female who had been admitted with uncontrolled hypertension and acute hypoxemic respiratory failure secondary to acute pulmonary edema. Patient was restarted on blood pressure medications and her pressures normalized. She was given IV Bumex to help with the pulmonary edema and was weaned off of oxygen completely. Patient was  advised on the importance of compliance with her blood pressure medications. She also has a history of obstructive sleep apnea and is noncompliant with her CPAP machine. She was treated for COPD exacerbation and is being discharged home with prednisone and Rx for Levaquin. Follow-up with PCP and pulmonology as outpatient. Patient was up and ambulating the hallways with no assistance on room air prior to discharge.     Consultants:  Patient Care Team:  JASON Gongora CNP as PCP - General (Nurse Practitioner)  JASON Gongora CNP as PCP - REHABILITATION [facility] Provider    Discharge Medications:       Medication List      START taking these medications    levoFLOXacin 750 MG tablet  Commonly known as: Levaquin  Take 1 tablet by mouth daily for 3 days     predniSONE 20 MG tablet  Commonly known as: DELTASONE  Take 2 tablets by mouth daily for 3 days  Start taking on: December 28, 2021        CHANGE how you take these medications    * albuterol sulfate  (90 Base) MCG/ACT inhaler  Commonly known as: Ventolin HFA  Inhale 2 puffs into the lungs every 6 hours as needed for Wheezing or Shortness of Breath  What changed: Another medication with the same name was changed. Make sure you understand how and when to take each. * albuterol (2.5 MG/3ML) 0.083% nebulizer solution  Commonly known as: PROVENTIL  Take 3 mLs by nebulization every 6 hours as needed for Wheezing or Shortness of Breath  What changed: when to take this         * This list has 2 medication(s) that are the same as other medications prescribed for you. Read the directions carefully, and ask your doctor or other care provider to review them with you.             CONTINUE taking these medications    acetaminophen 500 MG tablet  Commonly known as: TYLENOL     ALPRAZolam 0.25 MG tablet  Commonly known as: XANAX     bumetanide 1 MG tablet  Commonly known as: BUMEX  Take 1 tablet by mouth daily     carvedilol 25 MG tablet  Commonly known as: COREG  Take 1 tablet by mouth 2 times daily (with meals)     cloNIDine 0.1 MG tablet  Commonly known as: CATAPRES  Take 1 tablet by mouth daily     CPAP Machine Misc  by Does not apply route Please change APAP pressure to 6-14 cm H20.     fluticasone 50 MCG/ACT nasal spray  Commonly known as: FLONASE  2 SPRAYS BY NASAL ROUTE DAILY     lisinopril 30 MG tablet  Commonly known as: PRINIVIL;ZESTRIL  Take 1 tablet by mouth daily     ondansetron 8 MG tablet  Commonly known as: Zofran  Take 1 tablet by mouth every 8 hours as needed for Nausea or Vomiting     pantoprazole 40 MG tablet  Commonly known as: PROTONIX  TAKE 1 TABLET BY MOUTH 2 TIMES DAILY NO REFILLS DUE FOR CHECK UP     PROBIOTIC ACIDOPHILUS PO     sucralfate 1 GM tablet  Commonly known as: Carafate  Take 1 tablet by mouth 4 times daily     Trelegy Ellipta 100-62.5-25 MCG/INH Aepb  Generic drug: fluticasone-umeclidin-vilant  Inhale 1 puff into the lungs daily           Where to Get Your Medications      These medications were sent to [address] [place] [address] [phone]  [address] [address]    Phone: [phone]   · levoFLOXacin 750 MG tablet  · predniSONE 20 MG tablet           Physical Exam:    Vitals:  Vitals:    12/27/21 0328 12/27/21 0853 12/27/21 0913 12/27/21 1130   BP: (!) 150/70 134/64     Pulse: 78 96     Resp: 18 18     Temp: 97.7 °F (36.5 °C) 97.7 °F (36.5 °C)     TempSrc: Oral Oral     SpO2: 98% 96% 91% 92%   Weight: 203 lb (92.1 kg)      Height:         Weight: Weight: 203 lb (92.1 kg)     24 hour intake/output:    Intake/Output Summary (Last 24 hours) at 12/27/2021 1143  Last data filed at 12/26/2021 2310  Gross per 24 hour   Intake 522.39 ml   Output 0 ml   Net 522.39 ml       General appearance - alert awake appears to be in no acute distress  Chest - Bilateral air entry, no wheeze  Heart - S1S2 RRR, no murmurs or gallops  Abdomen - soft, non tender, normoactive bowel sounds  Neurological - non focal  Extremities - no edema  Skin - no rashes or lesions    Procedures:  na    Diagnostic Test:  na    Radiology reports as per the Radiologist  Radiology:  ECHO Complete 2D W Doppler W Color    Result Date: 12/26/2021  Transthoracic Echocardiography Report (TTE)  Demographics   Patient Name    Giancarlo Salazar Gender               Female   MR #            [id number]       Race                                                    Ethnicity   Account #       [de-identified]       Room Number          0036   Accession       [id number]      Date of Study        12/26/2021  Number   Date of Birth   1965      Referring Physician  KASIA CLEMENT MD   Age             61 year(s)      Sonographer          Elsi Flores RDCS                                   Interpreting         Tonda Spurling MD                                  Physician  Procedure Type of Study   TTE procedure:ECHOCARDIOGRAM COMPLETE 2D W DOPPLER W COLOR.   Procedure Date Date: 12/26/2021 Start: 08:02 AM Study Location: Echo Lab Technical Quality: Limited visualization due to poor acoustical window. Indications:Hypoxia. Additional Medical History:COPD, hypertension, anxiety, GERD, sleep apnea, smoker Patient Status: Routine Height: 62.2 inches Weight: 202.83 pounds BSA: 1.93 m^2 BMI: 36.85 kg/m^2 BP: 157/76 mmHg  Conclusions   Summary  Normal left ventricle size and systolic function. Ejection fraction was  estimated at 60 %. There were no regional left ventricular wall motion  abnormalities and wall thickness was within normal limits. Signature   ----------------------------------------------------------------  Electronically signed by Tamera Moreno MD (Interpreting  physician) on 12/26/2021 at 08:25 PM  ----------------------------------------------------------------   Findings   Mitral Valve  The mitral valve structure was normal with normal leaflet separation. DOPPLER: The transmitral velocity was within the normal range with no  evidence for mitral stenosis. Trace mitral regurgitation is present. Aortic Valve  The aortic valve was trileaflet with normal thickness and cuspal  separation. DOPPLER: Transaortic velocity was within the normal range with  no evidence of aortic stenosis. There was no evidence of aortic  regurgitation. Tricuspid Valve  The tricuspid valve structure was normal with normal leaflet separation. DOPPLER: There was no evidence of tricuspid stenosis. Trivial tricuspid  regurgitation visualized. Pulmonic Valve  The pulmonic valve leaflets exhibited normal thickness, no calcification,  and normal cuspal separation. DOPPLER: The transpulmonic velocity was  within the normal range with no evidence for regurgitation. Left Atrium  Left atrial size was normal.   Left Ventricle  Normal left ventricle size and systolic function. Ejection fraction was  estimated at 60 %. There were no regional left ventricular wall motion  abnormalities and wall thickness was within normal limits.    Right Atrium  Right atrial size was normal. Right Ventricle  The right ventricular size was normal with normal systolic function and  wall thickness. Pericardial Effusion  The pericardium was normal in appearance with no evidence of a pericardial  effusion. Pleural Effusion  No evidence of pleural effusion. Aorta / Great Vessels  -Aortic root dimension within normal limits.  -The Pulmonary artery is within normal limits. -IVC size is within normal limits with normal respiratory phasic changes.   M-Mode/2D Measurements & Calculations   LV Diastolic   LV Systolic Dimension:    AV Cusp Separation: 2.1 cmLA  Dimension: 4.3 3.1 cm                    Dimension: 3.7 cmAO Root  cm             LV Volume Diastolic: 83.0 Dimension: 3.8 cmLA Area: 11.5  LV FS:27.9 %   ml                        cm^2  LV PW          LV Volume Systolic: 60.2  Diastolic: 0.8 ml  cm             LV EDV/LV EDV Index: 83.1  Septum         ml/43 m^2LV ESV/LV ESV    RV Diastolic Dimension: 2.9 cm  Diastolic: 0.9 Index: 45.9 ml/20 m^2  cm             EF Calculated: 54.4 %     LA/Aorta: 0.97                                            LA volume/Index: 26.7 ml /14m^2  Doppler Measurements & Calculations   MV Peak E-Wave: 67.7 cm/s AV Peak Velocity: 115 LVOT Peak Velocity: 98.1  MV Peak A-Wave: 75.8 cm/s cm/s                  cm/s  MV E/A Ratio: 0.89        AV Peak Gradient:     LVOT Peak Gradient: 4 mmHg  MV Peak Gradient: 1.83    5.29 mmHg  mmHg                                            TV Peak E-Wave: 65.1 cm/s                                                  TV Peak A-Wave: 74.1 cm/s  MV Deceleration Time: 225  msec                                            TV Peak Gradient: 1.7 mmHg  MV P1/2t: 66 msec                               TR Velocity:261 cm/s  MVA by PHT:3.33 cm^2                            TR Gradient:27.25 mmHg                                                  PV Peak Velocity: 53.1  MV E' Septal Velocity:    AV DVI (Vmax):0.85    cm/s  3.8 cm/s PV Peak Gradient: 1.13  MV A' Septal Velocity:                          mmHg  7.5 cm/s  MV E' Lateral Velocity: 5  cm/s  MV A' Lateral Velocity:  7.8 cm/s  E/E' septal: 17.82  E/E' lateral: 13.54  [email or web address] [id number]    XR CHEST PORTABLE    Result Date: 12/27/2021  1 view chest x-ray Comparison: October 17, 2020 Findings: Interstitial densities greater involving the right than left lung predominately within the right upper and right lower lobe. Finding may indicate changes of edema or atypical infection. Heart size is normal. Without retrocardiac densities. No acute fracture. 1. Interstitial densities greater involving the right than left lung predominately within the right upper and right lower lobe. Finding may indicate changes of edema or atypical infection. This document has been electronically signed by: Iglesia Ortega DO on 12/27/2021 03:10 AM    XR CHEST PORTABLE    Result Date: 12/25/2021  PROCEDURE: XR CHEST PORTABLE CLINICAL INFORMATION: Shortness of breath TECHNIQUE: Mobile AP chest radiograph. COMPARISON: PA and lateral chest radiographs 10/17/2020 FINDINGS: There is mild enlargement of the cardiac silhouette. Pulmonary vessels are congested. Pulmonary interstitium is prominent. The right costophrenic angle is blunted. Reticular opacities are seen at the bilateral lung bases. Degenerative changes in the thoracic spine are poorly visualized. 1. Mild cardiomegaly with pulmonary vascular congestion suspicious for congestive heart failure. 2. Probable small right-sided pleural effusion and bibasilar atelectasis/infiltrate. 3. Prominent pulmonary interstitium suspicious for pulmonary edema. **This report has been created using voice recognition software. It may contain minor errors which are inherent in voice recognition technology. ** Final report electronically signed by  Ival Lips on 12/25/2021 6:49 PM      Results for orders placed or performed during the hospital encounter of 12/25/21   COVID-19, Rapid    Specimen: Nasopharyngeal Swab   Result Value Ref Range    SARS-CoV-2, NAAT NOT  DETECTED NOT DETECTED   Rapid influenza A/B antigens    Specimen: Nasopharyngeal   Result Value Ref Range    Flu A Antigen Negative NEGATIVE    Flu B Antigen Negative NEGATIVE   Culture, Blood 1    Specimen: Blood   Result Value Ref Range    Blood Culture, Routine No growth-preliminary     Culture, Blood 2    Specimen: Blood   Result Value Ref Range    Blood Culture, Routine No growth-preliminary     Pneumonia Panel, Molecular    Specimen: Sputum   Result Value Ref Range    Source see below     Specimen Acceptability see below     Acinetobacter calcoaceticus-baumannii by PCR Not Detected Not Detected    Enterobacter cloacae complex by PCR Not Detected Not Detected    Escherichia coli by PCR Not Detected Not Detected    Haemophilus Influenzae by PCR Not Detected Not Detected    Klebsiella aerogenes by PCR Not Detected Not Detected    Klebsiella oxytoca by PCR Not Detected Not Detected    Klebsiella pneumoniae group by PCR Not Detected Not Detected    Moraxella catarrhalis by PCR Not Detected Not Detected    Proteus species by PCR Not Detected Not Detected    Pseudomonas aeruginosa by PCR Not Detected Not Detected    Serratia marcescens by PCR Not Detected Not Detected    Staph aureus by PCR Not Detected Not Detected    Strep agalactiae by PCR Not Detected Not Detected    Strep pneumoniae by PCR Not Detected Not Detected    Strep pyogenes by PCR Not Detected Not Detected    Resistant gene ctx-m by PCR N/A Not Detected    Resistant gene imp by PCR N/A Not Detected    Resistant gene kpc by PCR N/A Not Detected    Resistant gene meca/c & mrej by PCR N/A Not Detected    Resistant gene ndm by PCR N/A Not Detected    Resistant gene oxa-48-like by pcr N/A Not Detected    Resistant gene vim by PCR N/A Not Detected    Chlamydia pneumoniae By PCR Not Detected Not Detected    Legionella Pneumophilia By PCR Not Detected Not Detected    Mycoplasma pneumoniae by PCR Not Detected Not Detected    Adenovirus by PCR Not Detected Not Detected    Non-SARS Coronavirus Not Detected Not Detected    Metapneumovirus by PCR Not Detected Not Detected    Rhinovirus Enterovirus PCR Not Detected Not Detected    Influenza A by PCR Not Detected Not Detected    Influenza B by PCR Not Detected Not Detected    Parainfluenza virus by PCR Not Detected Not Detected    Respiratory Syncytial Virus by PCR Not Detected Not Detected   VRE Screen by PCR    Specimen: Rectal Swab   Result Value Ref Range    Vancomycin Resistant Enterococcus NEGATIVE    Legionella Antigen, Urine    Specimen: Urine, clean catch   Result Value Ref Range    Legionella Pneumophilia Ag, Urine NEGATIVE NEG   Strep Pneumoniae Antigen    Specimen: Urine, clean catch   Result Value Ref Range    Strep pneumo Ag, Ur NEGATIVE    Culture, Respiratory    Specimen: Sputum Expectorated   Result Value Ref Range    Respiratory Culture Normal jaime- preliminary      Gram Stain Result       Quality of sputum specimen: Specimen acceptable. Few segmented neutrophils observed. Rare epithelial cells observed. Few gram positive cocci in pairs and chains. Few gram positive bacilli. Rare gram negative bacilli.     Troponin   Result Value Ref Range    Troponin I < 0.017 0.017 - 0.056 ng/ml   Brain Natriuretic Peptide   Result Value Ref Range    NT Pro-.0 0.0 - 900.0 pg/mL   CBC auto differential   Result Value Ref Range    WBC 12.6 (H) 4.8 - 10.8 thou/mm3    RBC 4.46 4.20 - 5.40 mill/mm3    Hemoglobin 14.7 12.0 - 16.0 gm/dl    Hematocrit 45.4 37.0 - 47.0 %    .6 (H) 81.0 - 99.0 fL    MCH 33.0 (H) 27.0 - 31.0 pg    MCHC 32.5 (L) 33.0 - 37.0 gm/dl    RDW 12.1 11.5 - 14.5 %    Platelets 524 269 - 955 thou/mm3    MPV 7.4 7.4 - 10.4 fL    SEGS 72.0 43.0 - 75.0 %    Lymphocytes 20.0 15.0 - 47.0 %    Monocytes 7.0 0.0 - 12.0 %    Basophils 1.0 0.0 - 3.0 %    Platelet Estimate ADEQUATE Adequate    Macrocytes 1+ Absent   Comprehensive Metabolic Panel   Result Value Ref Range    Glucose 116 (H) 74 - 106 mg/dl    CREATININE 0.8 0.6 - 1.3 mg/dl    BUN 6 (L) 7 - 18 mg/dl    Sodium 134 (L) 136 - 145 meq/l    Potassium 4.2 3.5 - 5.1 meq/l    Chloride 93 (L) 98 - 107 meq/l    CO2 31 21 - 32 meq/l    POC CALCIUM 8.8 8.5 - 10.1 mg/dl    AST 23 15 - 37 U/L    Alkaline Phosphatase 137 (H) 46 - 116 U/L    Total Protein 8.4 (H) 6.4 - 8.2 gm/dl    Albumin 3.9 3.4 - 5.0 gm/dl    Total Bilirubin 0.7 0.2 - 1.0 mg/dl    ALT 27 14 - 63 U/L   Glomerular Filtration Rate, Estimated   Result Value Ref Range    GFR, Estimated 78 (A) ml/min/1.73m2   ANION GAP   Result Value Ref Range    Anion Gap 10.0 8.0 - 16.0 meq/l   Manual Differential   Result Value Ref Range    Differential, manual see below    Comprehensive Metabolic Panel w/ Reflex to MG   Result Value Ref Range    Glucose 168 (H) 70 - 108 mg/dL    CREATININE 0.6 0.4 - 1.2 mg/dL    BUN 8 7 - 22 mg/dL    Sodium 131 (L) 135 - 145 meq/L    Potassium reflex Magnesium 4.4 3.5 - 5.2 meq/L    Chloride 89 (L) 98 - 111 meq/L    CO2 27 23 - 33 meq/L    Calcium 9.9 8.5 - 10.5 mg/dL    AST 19 5 - 40 U/L    Alkaline Phosphatase 125 38 - 126 U/L    Total Protein 7.8 6.1 - 8.0 g/dL    Albumin 4.3 3.5 - 5.1 g/dL    Total Bilirubin 0.5 0.3 - 1.2 mg/dL    ALT 17 11 - 66 U/L   CBC   Result Value Ref Range    WBC 9.7 4.8 - 10.8 thou/mm3    RBC 4.77 4.20 - 5.40 mill/mm3    Hemoglobin 15.0 12.0 - 16.0 gm/dl    Hematocrit 45.4 37.0 - 47.0 %    MCV 95.2 81.0 - 99.0 fL    MCH 31.4 26.0 - 33.0 pg    MCHC 33.0 32.2 - 35.5 gm/dl    RDW-CV 14.0 11.5 - 14.5 %    RDW-SD 49.0 (H) 35.0 - 45.0 fL    Platelets 463 379 - 859 thou/mm3    MPV 9.8 9.4 - 12.4 fL   Blood gas, arterial   Result Value Ref Range    pH, Blood Gas 7.42 7.35 - 7.45    PCO2 45 35 - 45 mmhg    PO2 62 (L) 71 - 104 mmhg    HCO3 30 (H) 23 - 28 mmol/l    Base Excess (Calculated) 4.2 (H) -2.5 - 2.5 mmol/l    O2 Sat 92 %    IFIO2 36     DEVICE Cannula     [place] Test Positive     Source: L Radial     COLLECTED BY: [id number]    MRSA by PCR   Result Value Ref Range    MRSA SCREEN RT-PCR NEGATIVE    Procalcitonin   Result Value Ref Range    Procalcitonin 0.03 0.01 - 0.09 ng/mL   Urine rt reflex to culture    Specimen: Urine, clean catch   Result Value Ref Range    Glucose, Ur NEGATIVE NEGATIVE mg/dl    Bilirubin Urine NEGATIVE NEGATIVE    Ketones, Urine NEGATIVE NEGATIVE    Specific Gravity, Urine 1.007 1.002 - 1.030    Blood, Urine NEGATIVE NEGATIVE    pH, UA 5.0 5.0 - 9.0    Protein, UA NEGATIVE NEGATIVE    Urobilinogen, Urine 0.2 0.0 - 1.0 eu/dl    Nitrite, Urine NEGATIVE NEGATIVE    Leukocyte Esterase, Urine NEGATIVE NEGATIVE    Color, UA YELLOW STRAW-YELLOW    Character, Urine CLEAR CLEAR-SL CLOUD   Ethanol   Result Value Ref Range    ETHYL ALCOHOL, SERUM < 0.01 0.00 %   Lactate, Sepsis   Result Value Ref Range    Lactic Acid, Sepsis 1.3 0.5 - 1.9 mmol/L   Lactate, Sepsis   Result Value Ref Range    Lactic Acid, Sepsis 1.9 0.5 - 1.9 mmol/L   Anion Gap   Result Value Ref Range    Anion Gap 15.0 8.0 - 16.0 meq/L   Glomerular Filtration Rate, Estimated   Result Value Ref Range    Est, Glom Filt Rate >90 JG/QPF/4.08V3   Basic Metabolic Panel   Result Value Ref Range    Sodium 133 (L) 135 - 145 meq/L    Potassium 4.6 3.5 - 5.2 meq/L    Chloride 90 (L) 98 - 111 meq/L    CO2 33 23 - 33 meq/L    Glucose 94 70 - 108 mg/dL    BUN 17 7 - 22 mg/dL    CREATININE 0.8 0.4 - 1.2 mg/dL    Calcium 9.9 8.5 - 10.5 mg/dL   CBC auto differential   Result Value Ref Range    WBC 12.8 (H) 4.8 - 10.8 thou/mm3    RBC 4.64 4.20 - 5.40 mill/mm3    Hemoglobin 14.7 12.0 - 16.0 gm/dl    Hematocrit 44.9 37.0 - 47.0 %    MCV 96.8 81.0 - 99.0 fL    MCH 31.7 26.0 - 33.0 pg    MCHC 32.7 32.2 - 35.5 gm/dl    RDW-CV 14.5 11.5 - 14.5 %    RDW-SD 51.1 (H) 35.0 - 45.0 fL    Platelets 722 466 - 400 thou/mm3    MPV 9.8 9.4 - 12.4 fL    Seg Neutrophils 64.7 %    Lymphocytes 26.3 %    Monocytes 8.2 %    Eosinophils 0.2 %    Basophils 0.2 %    Immature Granulocytes 0.4 %    Segs Absolute 8.3 (H) 1.8 - 7.7 thou/mm3    Lymphocytes Absolute 3.4 1.0 - 4.8 thou/mm3    Monocytes Absolute 1.0 0.4 - 1.3 thou/mm3    Eosinophils Absolute 0.0 0.0 - 0.4 thou/mm3    Basophils Absolute 0.0 0.0 - 0.1 thou/mm3    Immature Grans (Abs) 0.05 0.00 - 0.07 thou/mm3    nRBC 0 /100 wbc   Hepatic Function Panel   Result Value Ref Range    Albumin 4.3 3.5 - 5.1 g/dL    Total Bilirubin 0.5 0.3 - 1.2 mg/dL    Bilirubin, Direct <0.2 0.0 - 0.3 mg/dL    Alkaline Phosphatase 110 38 - 126 U/L    AST 21 5 - 40 U/L    ALT 15 11 - 66 U/L    Total Protein 7.8 6.1 - 8.0 g/dL   Anion Gap   Result Value Ref Range    Anion Gap 10.0 8.0 - 16.0 meq/L   Glomerular Filtration Rate, Estimated   Result Value Ref Range    Est, Glom Filt Rate 74 (A) ml/min/1.73m2   EKG Dizziness   Result Value Ref Range    Ventricular Rate 96 BPM    Atrial Rate 96 BPM    P-R Interval 140 ms    QRS Duration 68 ms    Q-T Interval 344 ms    QTc Calculation (Bazett) 434 ms    P Axis 63 degrees    R Axis 69 degrees    T Axis 20 degrees   EKG 12 Lead   Result Value Ref Range    Ventricular Rate 90 BPM    Atrial Rate 90 BPM    P-R Interval 146 ms    QRS Duration 74 ms    Q-T Interval 358 ms    QTc Calculation (Bazett) 437 ms    P Axis 32 degrees    R Axis 75 degrees    T Axis 40 degrees       Diet:  ADULT DIET; Regular;  Low Sodium (2 gm); 2000 ml    Activity:  Activity as tolerated (Patient may move about with assist as indicated or with supervision.)    Follow-up:  in the next few days with Waldemar Rodriguez APRN - [facility]    Disposition: home    Condition: Stable      Time Spent: 35 minutes    Electronically signed by Dalia Padilla MD on 12/27/2021 at 11:43 AM    Discharging Hospitalist

## 2021-12-27 NOTE — TELEPHONE ENCOUNTER
[facility] Transitions Initial Follow Up Call    Outreach made within 2 business days of discharge: Yes    Patient: Elle Ingram Patient : 1965   MRN: [id number]  Reason for Admission: There are no discharge diagnoses documented for the most recent discharge. Discharge Date: 21       Spoke with: Patient, Yasmany Mojica    Discharge department/facility: [facility]    TCM Interactive Patient Contact:  Was patient able to fill all prescriptions: Yes  Was patient instructed to bring all medications to the follow-up visit: Yes  Is patient taking all medications as directed in the discharge summary?  Yes  Does patient understand their discharge instructions: Yes  Does patient have questions or concerns that need addressed prior to 7-14 day follow up office visit: no    Scheduled appointment with PCP within 7-14 days    Follow Up  Future Appointments   Date Time Provider Milton Novak   2022  9:30 AM MD KERRIE LeungULM [facility]   2022  2:00 PM STR [facility]AGING RM1 STR[facility] PUT OP STR [place] [facility]   2022  2:00 PM JASON Bullock - KASIA Schwartz [facility] [place], [place]

## 2021-12-27 NOTE — PROGRESS NOTES
Utilize [facility] alcohol withdrawal scale (Based on [place] Modified Alcohol Withdrawal Scale). Tabulate score based on classifications including Tremor, Sweating, Hallucination, Orientation, and Agitation.     :    Tremor: 1  Sweatin  Hallucinations: 0  Orientation: 0  Agitation: 0  Total Score: 1    2021 - 130 mg Phenobarbital administered IVP per protocol listed. (Daily total Phenobarbital given: 520 mg as of )        Tremor:  No tremor is 0 points. Tremor on movement is 1 point. Tremor at rest is 2 points. Sweating: No Sweat 0 points. Moist is 1 point. Drenching sweats is 2 points. Hallucinations: No present 0 points. Dissuadable is 1 point. Not dissuadable is 2 points. Orientation: Oriented 0 points. Vague/detached 1 point. Disoriented/no contact 2 points. Agitation: Calm 0 points. Anxious 1 point. Panicky 2 points.     Check scale every 2 hours. Discontinue scoring with 4 consecutive scorings of 0. Scale 0: No phenobarbital given. Re-assess every 60 minutes as needed. Scale 1-3: Phenobarbital 130 mg IV over 3 minutes. Re-assess every 60 minutes as needed. May administer every 60 minutes to a maximum dose of phenobarbital 1040 mg in 24 hours! Scale 4-8: Phenobarbital 260 mg IV over 5 minutes. Re-assess every 60 minutes as needed. May administer every 60 minutes to a maximum dose of phenobarbital 1040mg in 24 hours! Scale 9-10: Transfer to ICU (if not already in ICU). Administer 10mg/kg phenobarbital IV over 60 minutes.   Maximum dose phenobarbital is 1040mg in 24 hours!

## 2021-12-27 NOTE — PROGRESS NOTES
Utilize [facility] alcohol withdrawal scale (Based on [place] Modified Alcohol Withdrawal Scale). Tabulate score based on classifications including Tremor, Sweating, Hallucination, Orientation, and Agitation.     :     Tremor: 0  Sweatin  Hallucinations: 0  Orientation: 0  Agitation: 0  Total Score: 0     No interventions required per protocol.          (Daily total Phenobarbital given: 520 mg as of )        Tremor:  No tremor is 0 points.  Tremor on movement is 1 point.  Tremor at rest is 2 points. Sweating: No Sweat 0 points. Moist is 1 point.  Drenching sweats is 2 points. Hallucinations: No present 0 points. Dissuadable is 1 point. Not dissuadable is 2 points. Orientation: Oriented 0 points. Vague/detached 1 point. Disoriented/no contact 2 points. Agitation: Calm 0 points.  Anxious 1 point. Panicky 2 points.     Check scale every 2 hours.  Discontinue scoring with 4 consecutive scorings of 0. Scale 0: No phenobarbital given.  Re-assess every 60 minutes as needed. Scale 1-3: Phenobarbital 130 mg IV over 3 minutes. Re-assess every 60 minutes as needed.  May administer every 60 minutes to a maximum dose of phenobarbital 1040 mg in 24 hours! Scale 4-8: Phenobarbital 260 mg IV over 5 minutes.  Re-assess every 60 minutes as needed. May administer every 60 minutes to a maximum dose of phenobarbital 1040mg in 24 hours! Scale 9-10: Transfer to ICU (if not already in ICU).  Administer 10mg/kg phenobarbital IV over 60 minutes.  Maximum dose phenobarbital is 1040mg in 24 hours!

## 2021-12-27 NOTE — PROGRESS NOTES
CLINICAL PHARMACY: DISCHARGE MED RECONCILIATION/REVIEW    [facility]) Select Patient?: No  Total # of Interventions Recommended: 0   -   Total # Interventions Accepted: 0  Intervention Severity:   - Level 1 Intervention Present?: No   - Level 2 #: 0   - Level 3 #: 0   Time Spent (min): 15    Additional Documentation:

## 2021-12-27 NOTE — CARE COORDINATION
12/27/21, 2:10 PM EST    DISCHARGE PLANNING EVALUATION    SW consult deferred. She is being discharged today and denied needs to CM.

## 2021-12-27 NOTE — PROGRESS NOTES
Utilize [facility] alcohol withdrawal scale (Based on [place] Modified Alcohol Withdrawal Scale). Tabulate score based on classifications including Tremor, Sweating, Hallucination, Orientation, and Agitation.     :     Tremor: 0  Sweatin  Hallucinations: 0  Orientation: 0  Agitation: 0  Total Score: 0    No interventions required per protocol.          (Daily total Phenobarbital given: 520 mg as of )        Tremor:  No tremor is 0 points.  Tremor on movement is 1 point.  Tremor at rest is 2 points. Sweating: No Sweat 0 points. Moist is 1 point.  Drenching sweats is 2 points. Hallucinations: No present 0 points. Dissuadable is 1 point. Not dissuadable is 2 points. Orientation: Oriented 0 points. Vague/detached 1 point. Disoriented/no contact 2 points. Agitation: Calm 0 points.  Anxious 1 point. Panicky 2 points.     Check scale every 2 hours.  Discontinue scoring with 4 consecutive scorings of 0. Scale 0: No phenobarbital given.  Re-assess every 60 minutes as needed. Scale 1-3: Phenobarbital 130 mg IV over 3 minutes. Re-assess every 60 minutes as needed.  May administer every 60 minutes to a maximum dose of phenobarbital 1040 mg in 24 hours! Scale 4-8: Phenobarbital 260 mg IV over 5 minutes.  Re-assess every 60 minutes as needed. May administer every 60 minutes to a maximum dose of phenobarbital 1040mg in 24 hours! Scale 9-10: Transfer to ICU (if not already in ICU).  Administer 10mg/kg phenobarbital IV over 60 minutes.  Maximum dose phenobarbital is 1040mg in 24 hours!

## 2021-12-27 NOTE — PROGRESS NOTES
Utilize [facility] alcohol withdrawal scale (Based on [place] Modified Alcohol Withdrawal Scale). Tabulate score based on classifications including Tremor, Sweating, Hallucination, Orientation, and Agitation.     :     Tremor: 0  Sweatin  Hallucinations: 0  Orientation: 0  Agitation: 0  Total Score: 0     No interventions required per protocol.          (Daily total Phenobarbital given: 520 mg as of )        Tremor:  No tremor is 0 points.  Tremor on movement is 1 point.  Tremor at rest is 2 points. Sweating: No Sweat 0 points. Moist is 1 point.  Drenching sweats is 2 points. Hallucinations: No present 0 points. Dissuadable is 1 point. Not dissuadable is 2 points. Orientation: Oriented 0 points. Vague/detached 1 point. Disoriented/no contact 2 points. Agitation: Calm 0 points.  Anxious 1 point. Panicky 2 points.     Check scale every 2 hours.  Discontinue scoring with 4 consecutive scorings of 0. Scale 0: No phenobarbital given.  Re-assess every 60 minutes as needed. Scale 1-3: Phenobarbital 130 mg IV over 3 minutes. Re-assess every 60 minutes as needed.  May administer every 60 minutes to a maximum dose of phenobarbital 1040 mg in 24 hours! Scale 4-8: Phenobarbital 260 mg IV over 5 minutes.  Re-assess every 60 minutes as needed. May administer every 60 minutes to a maximum dose of phenobarbital 1040mg in 24 hours! Scale 9-10: Transfer to ICU (if not already in ICU).  Administer 10mg/kg phenobarbital IV over 60 minutes.  Maximum dose phenobarbital is 1040mg in 24 hours!

## 2021-12-27 NOTE — DISCHARGE SUMMARY
AVS printed, explained and given to pt. All questions answered. Pt verbalizes understanding when to seek medical attention. IV removed with tip intact. Telemonitor discontinued. Pt stable at discharge.

## 2021-12-28 ENCOUNTER — TELEPHONE (OUTPATIENT)
Dept: FAMILY MEDICINE CLINIC | Age: 56
End: 2021-12-28

## 2021-12-28 LAB
GRAM STAIN RESULT: NORMAL
MRSA SCREEN: NORMAL
RESPIRATORY CULTURE: NORMAL

## 2021-12-28 RX ORDER — LIDOCAINE 4 G/G
PATCH TOPICAL
Qty: 30 PATCH | Refills: 0 | Status: SHIPPED | OUTPATIENT
Start: 2021-12-28 | End: 2022-02-17

## 2021-12-28 NOTE — TELEPHONE ENCOUNTER
patient states she is having pain from the fluid around her lungs. The tylenol is not helping, asking if the pain patches she used in the hospital could be called in. Also, patient asking for something to help her stop smoking. She states she was told about something to help her stop smoking that is a pop or a snap. Please advise.

## 2021-12-28 NOTE — TELEPHONE ENCOUNTER
Lidocaine patches called in. I am not sure what she is referring to as a stop smoking aid. Perhaps she is thinking of nicorette gum/lozenge.   That is available otc

## 2022-01-01 LAB
BLOOD CULTURE, ROUTINE: NORMAL
BLOOD CULTURE, ROUTINE: NORMAL

## 2022-01-04 ENCOUNTER — OFFICE VISIT (OUTPATIENT)
Dept: FAMILY MEDICINE CLINIC | Age: 57
End: 2022-01-04
Payer: COMMERCIAL

## 2022-01-04 ENCOUNTER — TELEPHONE (OUTPATIENT)
Dept: FAMILY MEDICINE CLINIC | Age: 57
End: 2022-01-04

## 2022-01-04 DIAGNOSIS — J43.9 PULMONARY EMPHYSEMA, UNSPECIFIED EMPHYSEMA TYPE (HCC): ICD-10-CM

## 2022-01-04 DIAGNOSIS — Z23 NEED FOR INFLUENZA VACCINATION: ICD-10-CM

## 2022-01-04 DIAGNOSIS — R32 URINARY INCONTINENCE, UNSPECIFIED TYPE: ICD-10-CM

## 2022-01-04 DIAGNOSIS — J44.9 MODERATE COPD (CHRONIC OBSTRUCTIVE PULMONARY DISEASE) (HCC): Primary | ICD-10-CM

## 2022-01-04 DIAGNOSIS — I50.9 ACUTE CONGESTIVE HEART FAILURE, UNSPECIFIED HEART FAILURE TYPE (HCC): ICD-10-CM

## 2022-01-04 PROCEDURE — 1111F DSCHRG MED/CURRENT MED MERGE: CPT | Performed by: NURSE PRACTITIONER

## 2022-01-04 PROCEDURE — 90674 CCIIV4 VAC NO PRSV 0.5 ML IM: CPT | Performed by: NURSE PRACTITIONER

## 2022-01-04 PROCEDURE — 99215 OFFICE O/P EST HI 40 MIN: CPT | Performed by: NURSE PRACTITIONER

## 2022-01-04 RX ORDER — M-VIT,TX,IRON,MINS/CALC/FOLIC 27MG-0.4MG
1 TABLET ORAL DAILY
Qty: 30 TABLET | Refills: 11 | Status: SHIPPED | OUTPATIENT
Start: 2022-01-04 | End: 2023-01-04

## 2022-01-04 RX ORDER — ACETAMINOPHEN 500 MG
500-1000 TABLET ORAL 4 TIMES DAILY PRN
Qty: 120 TABLET | Refills: 5 | Status: SHIPPED | OUTPATIENT
Start: 2022-01-04

## 2022-01-04 RX ORDER — ALPRAZOLAM 0.25 MG/1
0.25 TABLET ORAL NIGHTLY PRN
Qty: 30 TABLET | Refills: 2 | Status: SHIPPED | OUTPATIENT
Start: 2022-01-04 | End: 2022-02-03

## 2022-01-04 RX ORDER — ETOH/EUC OIL/MENTH/PEP/WINTERG
SPRAY, NON-AEROSOL (ML) MUCOUS MEMBRANE
Qty: 120 EACH | Refills: 3 | Status: SHIPPED | OUTPATIENT
Start: 2022-01-04 | End: 2022-01-09 | Stop reason: SDUPTHER

## 2022-01-04 RX ORDER — ACETAMINOPHEN 500 MG
TABLET ORAL
Qty: 90 CAPSULE | Refills: 3 | Status: SHIPPED | OUTPATIENT
Start: 2022-01-04 | End: 2022-02-14

## 2022-01-04 RX ORDER — DIAPER,BRIEF,ADULT, DISPOSABLE
EACH MISCELLANEOUS
Qty: 60 EACH | Refills: 5 | Status: SHIPPED | OUTPATIENT
Start: 2022-01-04

## 2022-01-04 ASSESSMENT — ENCOUNTER SYMPTOMS
EYES NEGATIVE: 1
GASTROINTESTINAL NEGATIVE: 1
SHORTNESS OF BREATH: 1
CHEST TIGHTNESS: 1

## 2022-01-04 NOTE — PROGRESS NOTES
Immunizations Administered     Name Date Dose Route    Influenza, MDCK Quadv, IM, PF (Flucelvax 2 yrs and older) 1/4/2022 0.5 mL Intramuscular    Site: Deltoid- Left    Lot: [id number]    NDC: 57386-688-49          VIS GIVEN. CONSENT SIGNED  PATIENT TOLERATED WELL. Vaccine Information Sheet, \"Influenza - Inactivated\"  given to Cori Blanchard, or parent/legal guardian of  Cori Blanchard and verbalized understanding. Patient responses:    Have you ever had a reaction to a flu vaccine? No  Do you have an allergy to eggs, neomycin or polymixin? No  Do you have an allergy to Thimerosal, contact lens solution, or Merthiolate? No  Have you ever had Guillian Hatillo Syndrome? No  Do you have any current illness? No  Do you have a temperature above 100 degrees? No  Are you pregnant? No  If pregnant, permission obtained from physician? No  Do you have an active neurological disorder? No      Flu vaccine given per order. Please see immunization tab.

## 2022-01-04 NOTE — TELEPHONE ENCOUNTER
Per [facility].   Patient will need to have a walking test.  Can not take information from home pulse ox for O2

## 2022-01-04 NOTE — TELEPHONE ENCOUNTER
I will get note done and send to them. If still needs test then have her come in. Also I hand wrote a script for the acapella device.   It is not in epic

## 2022-01-04 NOTE — PROGRESS NOTES
Post-Discharge Transitional Care Management Services or Hospital Follow Up      Karishma Bergeron   YOB: 1965    Date of Office Visit:  1/4/2022  Date of Hospital Admission: 12/25/21  Date of Hospital Discharge: 12/27/21  Readmission Risk Score(high >=14%. Medium >=10%):Readmission Risk Score: 10.6 ( )      Care management risk score Rising risk (score 2-5) and Complex Care (Scores >=6): 1     Non face to face  following discharge, date last encounter closed (first attempt may have been earlier): 12/27/2021  4:46 PM 12/27/2021  4:46 PM    Call initiated 2 business days of discharge: Yes     Patient Active Problem List   Diagnosis    Hypertension, uncontrolled    Anxiety    Gastroesophageal reflux disease without esophagitis    Seasonal allergies    Bronchospasm    History of tobacco abuse    Moderate COPD (chronic obstructive pulmonary disease) ([facility] [address]    Allergic rhinitis    Lung nodule, solitary    Pulmonary emphysema (HCC)    LYNN (obstructive sleep apnea)    COPD exacerbation (HCC)       Allergies   Allergen Reactions    Pcn [Penicillins] Hives       Medications listed as ordered at the time of discharge from hospital     Medication List          Accurate as of January 4, 2022 11:59 PM. If you have any questions, ask your nurse or doctor. START taking these medications    * Bladder Control Pads Ex Absorb Misc  Use up to 4 a day  Started by: JASON Gomez CNP     * PROCare Adult Briefs Large Misc  Use 2 a day  Started by: JASON Gomez CNP     therapeutic multivitamin-minerals tablet  Take 1 tablet by mouth daily  Started by: JASON Gomez CNP     Wall Grab Bar Misc  1 for shower  Started by: JASON Gomez CNP         * This list has 2 medication(s) that are the same as other medications prescribed for you. Read the directions carefully, and ask your doctor or other care provider to review them with you.             CHANGE how you take these medications    * acetaminophen 500 MG tablet  Commonly known as: TYLENOL  Take 1-2 tablets by mouth 4 times daily as needed for Pain  What changed: You were already taking a medication with the same name, and this prescription was added. Make sure you understand how and when to take each. Changed by: JASON Thornton CNP     * acetaminophen 500 MG tablet  Commonly known as: TYLENOL  What changed: Another medication with the same name was added. Make sure you understand how and when to take each. Changed by: JASON Thornton CNP     * albuterol sulfate  (90 Base) MCG/ACT inhaler  Commonly known as: Ventolin HFA  Inhale 2 puffs into the lungs every 6 hours as needed for Wheezing or Shortness of Breath  What changed: Another medication with the same name was changed. Make sure you understand how and when to take each. * albuterol (2.5 MG/3ML) 0.083% nebulizer solution  Commonly known as: PROVENTIL  Take 3 mLs by nebulization every 6 hours as needed for Wheezing or Shortness of Breath  What changed: when to take this     * Probiotic (Lactobacillus) Caps  1 daily  What changed: You were already taking a medication with the same name, and this prescription was added. Make sure you understand how and when to take each. Changed by: JASON Thornton CNP     * PROBIOTIC ACIDOPHILUS PO  What changed: Another medication with the same name was added. Make sure you understand how and when to take each. Changed by: JASON Thornton CNP         * This list has 6 medication(s) that are the same as other medications prescribed for you. Read the directions carefully, and ask your doctor or other care provider to review them with you. CONTINUE taking these medications    ALPRAZolam 0.25 MG tablet  Commonly known as: XANAX  Take 1 tablet by mouth nightly as needed for Sleep or Anxiety for up to 30 days.      bumetanide 1 MG tablet  Commonly known as: BUMEX  Take 1 tablet by mouth daily carvedilol 25 MG tablet  Commonly known as: COREG  Take 1 tablet by mouth 2 times daily (with meals)     cloNIDine 0.1 MG tablet  Commonly known as: CATAPRES  Take 1 tablet by mouth daily     CPAP Machine Misc  by Does not apply route Please change APAP pressure to 6-14 cm H20.     fluticasone 50 MCG/ACT nasal spray  Commonly known as: FLONASE  2 SPRAYS BY NASAL ROUTE DAILY     lidocaine 4 % external patch  Apply up to 3 patches.   On 12 hours  Off 12 hours     lisinopril 30 MG tablet  Commonly known as: PRINIVIL;ZESTRIL  Take 1 tablet by mouth daily     ondansetron 8 MG tablet  Commonly known as: Zofran  Take 1 tablet by mouth every 8 hours as needed for Nausea or Vomiting     pantoprazole 40 MG tablet  Commonly known as: PROTONIX  TAKE 1 TABLET BY MOUTH 2 TIMES DAILY NO REFILLS DUE FOR CHECK UP     PROBIOTIC ADVANCED PO     sucralfate 1 GM tablet  Commonly known as: Carafate  Take 1 tablet by mouth 4 times daily     Trelegy Ellipta 100-62.5-25 MCG/INH Aepb  Generic drug: fluticasone-umeclidin-vilant  Inhale 1 puff into the lungs daily           Where to Get Your Medications      These medications were sent to [address] [place] [address] [phone]  [place] [address]    Phone: [phone]   · acetaminophen 500 MG tablet  · ALPRAZolam 0.25 MG tablet  · Probiotic (Lactobacillus) Caps  · therapeutic multivitamin-minerals tablet     You can get these medications from any pharmacy    Bring a paper prescription for each of these medications  · Bladder Control Pads Ex Absorb Misc  · PROCare Adult Briefs Large Misc  · Wall Grab Bar Misc           Medications marked \"taking\" at this time  Outpatient Medications Marked as Taking for the 1/4/22 encounter (Office Visit) with JASON Gomez - CNP   Medication Sig Dispense Refill    Probiotic Product (PROBIOTIC ADVANCED PO) Take by mouth      ALPRAZolam (XANAX) 0.25 MG tablet Take 1 tablet by mouth nightly as needed for Sleep or Anxiety for up to 30 days. 30 tablet 2    Misc. Devices (WALL GRAB BAR) MISC 1 for shower 1 each 0    Incontinence Supply Disposable (BLADDER CONTROL PADS EX ABSORB) MISC Use up to 4 a day 120 each 3    Incontinence Supply Disposable (PROCARE ADULT BRIEFS LARGE) MISC Use 2 a day 60 each 5    acetaminophen (TYLENOL) 500 MG tablet Take 1-2 tablets by mouth 4 times daily as needed for Pain 120 tablet 5    Probiotic, Lactobacillus, CAPS 1 daily 90 capsule 3    Multiple Vitamins-Minerals (THERAPEUTIC MULTIVITAMIN-MINERALS) tablet Take 1 tablet by mouth daily 30 tablet 11    lidocaine 4 % external patch Apply up to 3 patches.   On 12 hours  Off 12 hours 30 patch 0    lisinopril (PRINIVIL;ZESTRIL) 30 MG tablet Take 1 tablet by mouth daily 90 tablet 3    fluticasone-umeclidin-vilant (TRELEGY ELLIPTA) 100-62.5-25 MCG/INH AEPB Inhale 1 puff into the lungs daily 30 each 11    albuterol sulfate HFA (VENTOLIN HFA) 108 (90 Base) MCG/ACT inhaler Inhale 2 puffs into the lungs every 6 hours as needed for Wheezing or Shortness of Breath 18 g 11    albuterol (PROVENTIL) (2.5 MG/3ML) 0.083% nebulizer solution Take 3 mLs by nebulization every 6 hours as needed for Wheezing or Shortness of Breath (Patient taking differently: Take 2.5 mg by nebulization every 4 hours as needed for Wheezing or Shortness of Breath ) 120 each 11    bumetanide (BUMEX) 1 MG tablet Take 1 tablet by mouth daily 90 tablet 3    carvedilol (COREG) 25 MG tablet Take 1 tablet by mouth 2 times daily (with meals) 180 tablet 3    cloNIDine (CATAPRES) 0.1 MG tablet Take 1 tablet by mouth daily 90 tablet 3    sucralfate (CARAFATE) 1 GM tablet Take 1 tablet by mouth 4 times daily 120 tablet 5    ondansetron (ZOFRAN) 8 MG tablet Take 1 tablet by mouth every 8 hours as needed for Nausea or Vomiting 30 tablet 5    fluticasone (FLONASE) 50 MCG/ACT nasal spray 2 SPRAYS BY NASAL ROUTE DAILY 1 Bottle 11    CPAP Machine MISC by Does not apply route Please change APAP pressure to 6-14 cm H20. 1 each 0    acetaminophen (TYLENOL) 500 MG tablet Take 500 mg by mouth every 6 hours as needed for Pain      Lactobacillus (PROBIOTIC ACIDOPHILUS PO) Take 1 capsule by mouth daily           Medications patient taking as of now reconciled against medications ordered at time of hospital discharge: Yes    Chief Complaint   Patient presents with    Follow-Up from Hospital     copd       HPI  Pt was having increase difficulty in breathing. Became severe and went to ER. Found to have CHF/pulmonary edema, pleural effusion. Copd flare. Was quite ill. Treated with steroids antibiotics, diuretics. Is feeling better but weight is up some  Inpatient course: Discharge summary reviewed- see chart. Interval history/Current status: pt has been monitoruing vitals at home  Pulse ox is often in the 80's  A few times in the 90 but as low as 80. Can't walk around due to posey,  wieght is creeping back up. Discussed taking extra bumex. Pt is in need of multiple home help with mobility and safety    She is motivated to quit smoking    Review of Systems   Constitutional: Positive for fatigue. HENT: Negative. Eyes: Negative. Respiratory: Positive for chest tightness and shortness of breath. Cardiovascular: Negative. Gastrointestinal: Negative. Musculoskeletal: Negative. Skin: Negative. Neurological: Negative. Vitals:    01/04/22 1305   BP: 128/68   Site: Left Upper Arm   Position: Sitting   Cuff Size: Large Adult   Pulse: 107   Resp: 16   Temp: 96.9 °F (36.1 °C)   TempSrc: Temporal   SpO2: (!) 88% at rest on room air   Weight: 197 lb (89.4 kg)     Body mass index is 36.03 kg/m².    Wt Readings from Last 3 Encounters:   01/04/22 197 lb (89.4 kg)   12/27/21 203 lb (92.1 kg)   11/23/21 204 lb (92.5 kg)     BP Readings from Last 3 Encounters:   01/04/22 128/68   12/27/21 (!) 142/73   11/23/21 (!) 170/92       Physical Exam  Constitutional:       Appearance: She is well-developed. She is ill-appearing. HENT:      Head: Normocephalic. Right Ear: Tympanic membrane and external ear normal.      Left Ear: Tympanic membrane and external ear normal.      Nose: Nose normal.   Cardiovascular:      Rate and Rhythm: Normal rate and regular rhythm. Heart sounds: Normal heart sounds. No murmur heard. No friction rub. No gallop. Pulmonary:      Effort: Pulmonary effort is normal.      Breath sounds: Decreased air movement present. Decreased breath sounds present. No wheezing or rales. Abdominal:      General: Bowel sounds are normal.      Palpations: Abdomen is soft. Tenderness: There is no abdominal tenderness. There is no guarding. Musculoskeletal:         General: Normal range of motion. Cervical back: Normal range of motion and neck supple. Lymphadenopathy:      Cervical: No cervical adenopathy. Skin:     General: Skin is warm. Neurological:      Mental Status: She is alert and oriented to person, place, and time. Deep Tendon Reflexes: Reflexes are normal and symmetric. Assessment/Plan:  1. Moderate COPD (chronic obstructive pulmonary disease) (HCC)  Severe copd with ongoing hypoxia start home o2  - ALPRAZolam (XANAX) 0.25 MG tablet; Take 1 tablet by mouth nightly as needed for Sleep or Anxiety for up to 30 days. Dispense: 30 tablet; Refill: 2  - DME Order for Home Oxygen as OP  - Wheelchair  - Shower chair  - Misc. Devices (WALL GRAB BAR) MISC; 1 for shower  Dispense: 1 each; Refill: 0  - acetaminophen (TYLENOL) 500 MG tablet; Take 1-2 tablets by mouth 4 times daily as needed for Pain  Dispense: 120 tablet; Refill: 5  - Probiotic, Lactobacillus, CAPS; 1 daily  Dispense: 90 capsule; Refill: 3  - Multiple Vitamins-Minerals (THERAPEUTIC MULTIVITAMIN-MINERALS) tablet; Take 1 tablet by mouth daily  Dispense: 30 tablet;  Refill: 11  - NH DISCHARGE MEDS RECONCILED W/ CURRENT OUTPATIENT MED LIST    2. Pulmonary emphysema, unspecified emphysema type ([facility] [address]  Pt has low O2, requires to start home oxygen at 2lpm will arrange, pt will arrange to wear portable oxygen up to 4 hours a day     3. Acute congestive heart failure, unspecified heart failure type (HCC)  Monitor weight daily. Extra bumex if weight is up 2lbs    4. Urinary incontinence, unspecified type  Supplies written  - Incontinence Supply Disposable (BLADDER CONTROL PADS EX ABSORB) MISC; Use up to 4 a day  Dispense: 120 each; Refill: 3  - Incontinence Supply Disposable (PROCARE ADULT BRIEFS LARGE) MISC; Use 2 a day  Dispense: 60 each; Refill: 5    5. Need for influenza vaccination  See orders  - INFLUENZA, MDCK QUADV, 2 YRS AND OLDER, IM, PF, PREFILL SYR OR SDV, 0.5ML ([place], [place])      Desire Dennis was evaluated today and a DME order was entered for a standard wheelchair because she requires this to successfully complete daily living tasks of eating, toileting, personal cares and ambulating. A standard manual wheelchair is necessary due to patient's impaired ambulation and mobility restrictions and would be unable to resolve these daily living tasks using a cane or walker. The patient is capable of using a standard wheelchair safely in their home and can maneuver within their home with adequate access. There is a caregiver available to provide necessary assistance. The need for this equipment was discussed with the patient and she understands, is in agreement, and has not expressed an unwillingness to use the wheelchair.       Also pt is in need of acapella to help and raise secretions  Medical Decision Making: high complexity

## 2022-01-05 ENCOUNTER — TELEPHONE (OUTPATIENT)
Dept: FAMILY MEDICINE CLINIC | Age: 57
End: 2022-01-05

## 2022-01-05 VITALS
DIASTOLIC BLOOD PRESSURE: 68 MMHG | OXYGEN SATURATION: 88 % | BODY MASS INDEX: 36.03 KG/M2 | SYSTOLIC BLOOD PRESSURE: 128 MMHG | HEART RATE: 107 BPM | RESPIRATION RATE: 16 BRPM | TEMPERATURE: 96.9 F | WEIGHT: 197 LBS

## 2022-01-05 NOTE — TELEPHONE ENCOUNTER
Per [facility] home medical.  On page 7 of office note patient's SPO2 of 88% needs to state this was at rest on room air. Patient also qualifies to have oxygen 24/7 due to SPO2 of 88%.   Order also needs to state how many hours a day patient can wear the portable O2    Can you please addend note

## 2022-01-05 NOTE — TELEPHONE ENCOUNTER
Patient called [facility] asking if rx for shower chair could be changed.   Patient is asking for a tub/shower transfer bench instead

## 2022-01-05 NOTE — TELEPHONE ENCOUNTER
Angelina Barger from home medical called asking if you want to change order for patient to wear O2 24/7 as patient qualifies for this due to her resting o2 being 88%

## 2022-01-05 NOTE — TELEPHONE ENCOUNTER
Please see other questions as well as there were 3 different encounters. The device for breathing is a an acapella. For the covid monitor for symptoms. If develop call to get tested. For the shower chair new script written.   For the o2 lets keep as is for now as I had to talk patient into starting it in the first place

## 2022-01-05 NOTE — TELEPHONE ENCOUNTER
----- Message from Michael Huynh sent at 1/5/2022  3:36 PM EST -----  Subject: Message to Provider    QUESTIONS  Information for Provider? Pt says she needs the machine that you blow in   to exercise lungs. She did not know the name of it. Pt was also exposed to   covid and wants to know what she should do.  ---------------------------------------------------------------------------  --------------  CALL BACK INFO  What is the best way for the office to contact you? OK to leave message on   voicemail  Preferred Call Back Phone Number? [phone]  ---------------------------------------------------------------------------  --------------  SCRIPT ANSWERS  Relationship to Patient?  Self

## 2022-01-06 NOTE — TELEPHONE ENCOUNTER
Disregard last message home medical said its a transfer shower bench **    Home medical is sending us a fax about what is needed for the chair and the acapella.  Specific verbiage is needed

## 2022-01-06 NOTE — TELEPHONE ENCOUNTER
Pt also called back saying that she would like a Rx for transfer shower chair**  Script send to [facility].

## 2022-01-08 DIAGNOSIS — R32 URINARY INCONTINENCE, UNSPECIFIED TYPE: ICD-10-CM

## 2022-01-09 RX ORDER — ETOH/EUC OIL/MENTH/PEP/WINTERG
SPRAY, NON-AEROSOL (ML) MUCOUS MEMBRANE
Qty: 120 EACH | Refills: 3 | Status: SHIPPED | OUTPATIENT
Start: 2022-01-09

## 2022-01-09 NOTE — PROGRESS NOTES
Physician Progress Note      PATIENT:               Mando Hansen  CSN #:                  [id number]  :                       1965  ADMIT DATE:       2021 6:14 PM  [address] DATE:        2021 2:41 PM  RESPONDING  PROVIDER #:        Tanya Solis MD          QUERY TEXT:    Dr Mike Huffman,    Pt admitted with Hypertensive Emergency with associated pulmonary edema. Pt   noted to have Acute pulmonary edema: Likely secondary to acute diastolic   dysfunction and also has PNA. Pt treated with Nitro, Levaquin, & Diuretics. If   possible, please document in progress notes and discharge summary the   relationship, if any, between Hypertensive Emergency and acute diastolic   dysfunction . The medical record reflects the following:  Risk Factors: COPD, LYNN, HTN  Clinical Indicators: Per HP:Hypertensive Emergency with associated pulmonary   edema. Per progress note: Pt noted to have Acute pulmonary edema: Likely   secondary to acute diastolic dysfunction. SPO2 77% RA, Abnormal CXR:pulmonary   vascular congestion suspicious for congestive heart failure. 2. Probable small   right-sided pleural effusion and bibasilar atelectasis/infiltrate. Treatment: IV Levaquin, Nitro, IV Lasix & Bumex  Options provided:  -- Acute diastolic dysfunction due to Hypertensive Emergency  -- Acute diastolic dysfunction unrelated to Hypertensive Emergency  -- Other - I will add my own diagnosis  -- Disagree - Not applicable / Not valid  -- Disagree - Clinically unable to determine / Unknown  -- Refer to Clinical Documentation Reviewer    PROVIDER RESPONSE TEXT:    This patient has acute diastolic dysfunction due to Hypertensive Emergency. Query created by: Yusra Chacon on 2021 10:43 AM      QUERY TEXT:    Pt admitted with Hypertensive Emergency & Acute Res failure. Pt noted to have   PNA in H/p.  If possible, please document in progress notes and discharge   summary the present on admission status of  PNA:    The medical record reflects the following:  Risk Factors: COPD, LYNN, HTN  Clinical Indicators:  SPO2 77% RA, Abnormal CXR:pulmonary vascular congestion   suspicious for congestive heart failure. 2. Probable small right-sided pleural   effusion and bibasilar atelectasis/infiltrate.   Treatment: IV Levaquin  Options provided:  -- Yes, PNA was present at the time of the order to admit to the hospital  -- PNA ruled out  -- Other - I will add my own diagnosis  -- Disagree - Not applicable / Not valid  -- Disagree - Clinically unable to determine / Unknown  -- Refer to Clinical Documentation Reviewer    PROVIDER RESPONSE TEXT:    Yes, PNA was present at the time of the order to admit to the hospital.    Query created by: Lazaro Gorman on 12/27/2021 10:45 AM      Electronically signed by:  Glen Jackson MD 1/9/2022 1:01 PM

## 2022-01-10 ENCOUNTER — TELEPHONE (OUTPATIENT)
Dept: FAMILY MEDICINE CLINIC | Age: 57
End: 2022-01-10

## 2022-01-10 NOTE — TELEPHONE ENCOUNTER
Patient states she is going to quit smoking on Sunday and is asking if Nicotrol inhaler script can be sent in. Please advise.

## 2022-02-08 ENCOUNTER — HOSPITAL ENCOUNTER (OUTPATIENT)
Dept: CT IMAGING | Age: 57
Discharge: HOME | End: 2022-02-08
Payer: COMMERCIAL

## 2022-02-08 DIAGNOSIS — R91.1 LUNG NODULE, SOLITARY: ICD-10-CM

## 2022-02-08 PROCEDURE — 71250 CT THORAX DX C-: CPT

## 2022-02-09 ENCOUNTER — VIRTUAL VISIT (OUTPATIENT)
Dept: FAMILY MEDICINE CLINIC | Age: 57
End: 2022-02-09
Payer: COMMERCIAL

## 2022-02-09 DIAGNOSIS — J44.1 COPD EXACERBATION (HCC): Primary | ICD-10-CM

## 2022-02-09 PROCEDURE — G8427 DOCREV CUR MEDS BY ELIG CLIN: HCPCS | Performed by: FAMILY MEDICINE

## 2022-02-09 PROCEDURE — 99213 OFFICE O/P EST LOW 20 MIN: CPT | Performed by: FAMILY MEDICINE

## 2022-02-09 PROCEDURE — 87502 INFLUENZA DNA AMP PROBE: CPT | Performed by: FAMILY MEDICINE

## 2022-02-09 PROCEDURE — 3017F COLORECTAL CA SCREEN DOC REV: CPT | Performed by: FAMILY MEDICINE

## 2022-02-09 RX ORDER — DOXYCYCLINE HYCLATE 100 MG
100 TABLET ORAL 2 TIMES DAILY
Qty: 20 TABLET | Refills: 0 | Status: SHIPPED | OUTPATIENT
Start: 2022-02-09 | End: 2022-08-18 | Stop reason: SDUPTHER

## 2022-02-09 RX ORDER — PREDNISONE 20 MG/1
20 TABLET ORAL DAILY
Qty: 5 TABLET | Refills: 0 | Status: SHIPPED | OUTPATIENT
Start: 2022-02-09 | End: 2022-02-14

## 2022-02-09 NOTE — PROGRESS NOTES
2022    TELEHEALTH EVALUATION -- Audio/Visual (During [id number] public health emergency)    HPI:    Reny Reyes (:  1965) has requested an audio/video evaluation for the following concern(s):    More congestion in the nose and chest the past week. More trouble today. She has not had her covid vaccines. She will be tested this evening. Wears oxygen for the past 2 months. Associated with mild headaches with the sinuses, cough productive of thick mucus, more loose stools the past 2 days, but no fever and normal appetite. Tried:  Tylenol prn. Uses trelegy daily and prn albuterol      Patient eval with videovisit. Patient at home. Provider at office. Visit performed as a synchronous telecommunication system. Review of Systems  Constitutional: positive for fever, chills, diaphoresis, activity change, appetite change and fatigue. HENT: Negative for hearing loss, ear pain, congestion, sore throat, rhinorrhea, postnasal drip and ear discharge. Eyes: Negative for photophobia and visual disturbance. Respiratory: Positive for cough, chest tightness, shortness of breath and wheezing. Cardiovascular: Negative for chest pain and leg swelling. Gastrointestinal: Negative for nausea, vomiting, abdominal pain, diarrhea and constipation. Genitourinary: Negative for dysuria, urgency and frequency. Neurological: Negative for weakness, light-headedness and headaches. Psychiatric/Behavioral: Negative for sleep disturbance. Prior to Visit Medications    Medication Sig Taking?  Authorizing Provider   predniSONE (DELTASONE) 20 MG tablet Take 1 tablet by mouth daily for 5 days Yes Aren Vazquez MD   doxycycline hyclate (VIBRA-TABS) 100 MG tablet Take 1 tablet by mouth 2 times daily for 10 days Yes Aren Vazquez MD   nicotine (NICOTROL) 10 MG inhaler Inhale 1 puff into the lungs as needed for Smoking cessation  JASON Huggins - CNP   Incontinence Supply Disposable (BLADDER CONTROL PADS EX ABSORB) MISC Use up to 4 a day  JASON Hubbard CNP   Probiotic Product (PROBIOTIC ADVANCED PO) Take by mouth  Historical Provider, MD   Misc. Devices (WALL GRAB BAR) [facility] 1 for shower  JASON Huggins CNP   Incontinence Supply Disposable (PROCARE ADULT BRIEFS LARGE) MISC Use 2 a day  JASON Huggins CNP   acetaminophen (TYLENOL) 500 MG tablet Take 1-2 tablets by mouth 4 times daily as needed for Pain  JASON Huggins CNP   Probiotic, Lactobacillus, CAPS 1 daily  JASON Huggins CNP   Multiple Vitamins-Minerals (THERAPEUTIC MULTIVITAMIN-MINERALS) tablet Take 1 tablet by mouth daily  JASON Huggins CNP   lidocaine 4 % external patch Apply up to 3 patches.   On 12 hours  Off 12 hours  JASON Huggins CNP   lisinopril (PRINIVIL;ZESTRIL) 30 MG tablet Take 1 tablet by mouth daily  JASON Huggins CNP   fluticasone-umeclidin-vilant (TRELEGY ELLIPTA) 100-62.5-25 MCG/INH AEPB Inhale 1 puff into the lungs daily  JASON Ruiz CNP   albuterol sulfate HFA (VENTOLIN HFA) 108 (90 Base) MCG/ACT inhaler Inhale 2 puffs into the lungs every 6 hours as needed for Wheezing or Shortness of Breath  Amarilyse PoplJASON arvizu CNP   albuterol (PROVENTIL) (2.5 MG/3ML) 0.083% nebulizer solution Take 3 mLs by nebulization every 6 hours as needed for Wheezing or Shortness of Breath  Patient taking differently: Take 2.5 mg by nebulization every 4 hours as needed for Wheezing or Shortness of Breath   Remibie PoplJASON arvizu CNP   pantoprazole (PROTONIX) 40 MG tablet TAKE 1 TABLET BY MOUTH 2 TIMES DAILY NO REFILLS DUE FOR CHECK UP  Tristin Cortes MD   bumetanide (BUMEX) 1 MG tablet Take 1 tablet by mouth daily  Tristin Cortes MD   carvedilol (COREG) 25 MG tablet Take 1 tablet by mouth 2 times daily (with meals)  Tristin Cortes MD   cloNIDine (CATAPRES) 0.1 MG tablet Take 1 tablet by mouth daily  Tristin Cortes MD   sucralfate (CARAFATE) 1 GM tablet Take 1 tablet by mouth 4 times daily  Reilly Gresham MD   ondansetron (ZOFRAN) 8 MG tablet Take 1 tablet by mouth every 8 hours as needed for Nausea or Vomiting  Reilly Gresham MD   fluticasone (FLONASE) 50 MCG/ACT nasal spray 2 SPRAYS BY NASAL ROUTE DAILY  Ruth Mitchell MD   CPAP Machine MISC by Does not apply route Please change APAP pressure to 6-14 cm H20. Gabbi Junior APRN - CNP   acetaminophen (TYLENOL) 500 MG tablet Take 500 mg by mouth every 6 hours as needed for Pain  Historical Provider, MD   Lactobacillus (PROBIOTIC ACIDOPHILUS PO) Take 1 capsule by mouth daily   Historical Provider, MD       Social History     Tobacco Use    Smoking status: Current Every Day Smoker     Packs/day: 0.50     Years: 46.00     Pack years: 23.00     Types: Cigarettes    Smokeless tobacco: Never Used    Tobacco comment: smokes . 5 pack a day 11/22/21   Vaping Use    Vaping Use: Never used   Substance Use Topics    Alcohol use: Yes     Comment: several times/week    Drug use: Not Currently        Allergies   Allergen Reactions    Pcn [Penicillins] Hives       PHYSICAL EXAMINATION:  [ INSTRUCTIONS:  \"[x]\" Indicates a positive item  \"[]\" Indicates a negative item  -- DELETE ALL ITEMS NOT EXAMINED]  Vital Signs: (As obtained by patient/caregiver or practitioner observation)    Blood pressure-  Heart rate-    Respiratory rate-    Temperature-  Pulse oximetry-     Patient-Reported Vitals 2/9/2022   Patient-Reported Weight 194lb   Patient-Reported Systolic 062   Patient-Reported Diastolic 78   Patient-Reported Pulse 94   Patient-Reported SpO2 94        Constitutional: [x] Appears well-developed and well-nourished [x] No apparent distress      [x] Abnormal- wearing O2 at 2 liters.   pale and harsh cough    Mental status  [x] Alert and awake  [x] Oriented to person/place/time [x]Able to follow commands      Eyes:  EOM    [x]  Normal  [] Abnormal-  Sclera  [x]  Normal  [] Abnormal -         Discharge [x]  None visible  [] Abnormal -    HENT: authority and the [employer] Act. Patient identification was verified, and a caregiver was present when appropriate. The patient was located at home in a state where the provider was licensed to provide care. Total time spent on this encounter: Not billed by time    --Claudette Laughlin MD on 2/9/2022 at 2:33 PM    An electronic signature was used to authenticate this note.

## 2022-02-10 ENCOUNTER — HOSPITAL ENCOUNTER (OUTPATIENT)
Age: 57
Setting detail: SPECIMEN
Discharge: HOME | End: 2022-02-10
Payer: COMMERCIAL

## 2022-02-10 PROCEDURE — 87636 SARSCOV2 & INF A&B AMP PRB: CPT

## 2022-02-11 LAB
INFLUENZA A: NOT DETECTED
INFLUENZA B: NOT DETECTED
SARS-COV-2 RNA, RT PCR: NOT DETECTED

## 2022-02-14 ENCOUNTER — OFFICE VISIT (OUTPATIENT)
Dept: FAMILY MEDICINE CLINIC | Age: 57
End: 2022-02-14
Payer: COMMERCIAL

## 2022-02-14 VITALS
WEIGHT: 195 LBS | DIASTOLIC BLOOD PRESSURE: 70 MMHG | OXYGEN SATURATION: 96 % | SYSTOLIC BLOOD PRESSURE: 132 MMHG | HEART RATE: 94 BPM | HEIGHT: 62 IN | TEMPERATURE: 97.2 F | RESPIRATION RATE: 16 BRPM | BODY MASS INDEX: 35.88 KG/M2

## 2022-02-14 DIAGNOSIS — S22.41XA CLOSED FRACTURE OF MULTIPLE RIBS OF RIGHT SIDE, INITIAL ENCOUNTER: ICD-10-CM

## 2022-02-14 DIAGNOSIS — J43.9 PULMONARY EMPHYSEMA, UNSPECIFIED EMPHYSEMA TYPE (HCC): Primary | ICD-10-CM

## 2022-02-14 DIAGNOSIS — I10 ESSENTIAL HYPERTENSION: ICD-10-CM

## 2022-02-14 PROCEDURE — G8482 FLU IMMUNIZE ORDER/ADMIN: HCPCS | Performed by: NURSE PRACTITIONER

## 2022-02-14 PROCEDURE — 3023F SPIROM DOC REV: CPT | Performed by: NURSE PRACTITIONER

## 2022-02-14 PROCEDURE — G8417 CALC BMI ABV UP PARAM F/U: HCPCS | Performed by: NURSE PRACTITIONER

## 2022-02-14 PROCEDURE — 4004F PT TOBACCO SCREEN RCVD TLK: CPT | Performed by: NURSE PRACTITIONER

## 2022-02-14 PROCEDURE — G8427 DOCREV CUR MEDS BY ELIG CLIN: HCPCS | Performed by: NURSE PRACTITIONER

## 2022-02-14 PROCEDURE — 99214 OFFICE O/P EST MOD 30 MIN: CPT | Performed by: NURSE PRACTITIONER

## 2022-02-14 PROCEDURE — 3017F COLORECTAL CA SCREEN DOC REV: CPT | Performed by: NURSE PRACTITIONER

## 2022-02-14 RX ORDER — METHYLPREDNISOLONE 4 MG/1
TABLET ORAL
Qty: 1 KIT | Refills: 0 | Status: SHIPPED | OUTPATIENT
Start: 2022-02-14 | End: 2022-08-18 | Stop reason: SDUPTHER

## 2022-02-14 ASSESSMENT — ENCOUNTER SYMPTOMS
EYES NEGATIVE: 1
GASTROINTESTINAL NEGATIVE: 1
CHEST TIGHTNESS: 1

## 2022-02-14 NOTE — PROGRESS NOTES
[facility]    Patient: Hernán Christian, 61 y.o.   : 1965    Patient of Dr. Anna Peña   Patient presents with    Follow-up     3 month follow up, CT 22       Valencia Michael is here for follow up for Moderate COPD. Patient was hospitalized at [facility] from -. She presented to [facility] ED with hypoxia of 77% on room air, tachycardia 105, tachypneic 24, and hypertension (151-207/). Her COVID-19 and flu testing were negative. She had a chest x-ray with mild cardiomegaly, pulmonary vascular congestion with prominent pulmonary interstitium, probable small right sided pleural effusion and bibasilar atelectasis/infiltrate. She was treated with duoneb, lasix, solu-medrol, levaquin, and mag sulfate and transferred to [facility]. She was admitted with uncontrolled hypertension and acute hypoxemic respiratory failure secondary to acute pulmonary edema. She was restarted on BP medication and given IV bumex to assist with the pulmonary edema. She was weaned off of O2 prior to discharge. She was discharged home on prednisone and Levaquin. She reports that she has had rib pain since this admission. Post-discharge, she was seen by her primary care provider, JASON Braxton CNP and was placed on 2 lpm of supplemental O2 and was prescribed Bumex. She was also started on Nicotrol. She was also given an acapella flutter device. She reports she uses but not consistently. Patient was also seen as a virtual visit with her primary care provider for COPD exacerbation due to sinus infection on 22 and was prescribed prednisone and doxycycline. She had negative flu and COVID-19 testing. She was seen for follow up with her primary care provider on 2022 and was prescribed medrol dose pack, however she reports that she has not started this. Patient was started on Trelegy at her last office appointment.  She does not always feel that she can inhale the full dose of the medication. She had a CT lung screening at that appointment that revealed a 7 mm DAWIT nodule with recommendation for 6 month follow up. She is here to review CT Chest that revealed increase in size of DAWIT nodule now 11 mm (from 7 mm) with multiple lung nodules 5 mm or less in size. She has been wearing 2 lpm at all times lately, including nighttime. She reports that her pulse ox in the AM has been 87%. She has been monitoring her pulse ox at home. Refuses PAP treatment and has LYNN. Overall patient reports respiratory symptoms have been fluctuating a lot since last appointment. Patient reports good compliance with inhaled medications (Trelegy). Patient using albuterol 4-6 times per day on average. Patient reports physical limitation due to respiratory symptoms. Her past medical history is significant for anxiety, arthritis, COPD, enlarged spleen, fatty liver, GERD, Hep A, hypertension, LYNN (non compliant with PAP therapy), and ovarian cyst.     COPD  She complains of chest tightness, cough, shortness of breath, sputum production and wheezing. There is no hemoptysis. This is a chronic problem. The current episode started more than 1 year ago. The problem occurs every several days. The problem has been gradually worsening. The cough is productive of sputum (green, thick). Associated symptoms include nasal congestion, rhinorrhea and sneezing. Pertinent negatives include no appetite change, dyspnea on exertion, fever, postnasal drip or weight loss. Her symptoms are aggravated by strenuous activity, exercise and change in weather. Her symptoms are alleviated by beta-agonist. She reports significant improvement on treatment. Risk factors for lung disease include smoking/tobacco exposure. Her past medical history is significant for COPD.      MMRC Dyspnea Scale:   0: Dyspneic on strenuous exercise  1: Dyspneic on walking a slight hill  2: Dyspneic on walking level ground; must stop occasionally due to breathlessness  3: Must stop for breathlessness after walking 100 yards or after a few minutes  4: Cannot leave house; breathless on dressing/undressing    MMRC dyspnea score: 3    Progress History:   Since last visit any new medical issues? No  New ER or hospital visits? Yes 12/25 COPD exacerbation  Any new or changes in medicines? Yes prednisone and doxycyline  Using inhalers? Yes Trelegy and as needed albuterol inhaler and neb  Are they helpful? Yes   Previous inhalers? Anoro - escalated to trelegy  Any recent exacerbations? Yes with hospitalization in December and on 2/9/2022 d/t sinus infection - prescribed prednisone and doxycyline by primary care provider   Last PFT: 6/29/2020 - moderate obstruction  Last 6 MWT: none in epic  Last A1A: Serum level of 133 on 10/10/2019    Smoking History:  Current smoker of 0.5 PPD with 47 PY history  She has been using a nicotrol inhaler to try to quit smoking. She admits she started smoking more when she saw her CT scan results. She has an blanco on her phone for smoking cessation    Social History:  Patient job history: worked for paint shop and cleaned houses and [employer]  She has not had exposure to aerosolized particles or hazardous fumes. (Coal, dust, asbestos, molds ie Hay)  [place]  Denies exposure to pets/animals at home. Denies exposure to tuberculosis. Denies history of glaucoma or urinary retention.     Flu vaccine: has not received and does not plan to   Pneumonia vaccine: [id number] 6/14/2021 - up to date   COVID-19 vaccine: has not received and reports that she is scared to receive  Past Medical hx   PMH:  Past Medical History:   Diagnosis Date    Anxiety     Arthritis     COPD (chronic obstructive pulmonary disease) (HCC)     Enlargement, spleen     Fatty liver     GERD (gastroesophageal reflux disease)     Hepatitis A     Hypertension     LYNN on CPAP     Ovarian cyst     Pneumonia     Sleep apnea SURGICAL HISTORY:History reviewed. No pertinent surgical history. SOCIAL HISTORY:  Social History     Tobacco Use    Smoking status: Current Every Day Smoker     Packs/day: 1.50     Years: 65.00     Pack years: 97.50     Types: Cigarettes     Start date: 1/1/1957    Smokeless tobacco: Never Used    Tobacco comment: smokes . 5 pack a day 2/17/22   Vaping Use    Vaping Use: Never used   Substance Use Topics    Alcohol use: Yes     Comment: several times/week    Drug use: Not Currently     ALLERGIES:  Allergies   Allergen Reactions    Pcn [Penicillins] Hives     FAMILY HISTORY:  Family History   Problem Relation Age of Onset    Heart Surgery Mother         dies at 29    Heart Disease Mother     Other Sister         Cardiomyopathy    Other Brother         cardiomyopathy    Stroke Maternal Grandmother     Heart Attack Maternal Grandfather     Heart Attack Paternal Grandfather      CURRENT MEDICATIONS:  Current Outpatient Medications   Medication Sig Dispense Refill    Budeson-Glycopyrrol-Formoterol (BREZTRI AEROSPHERE) 160-9-4.8 MCG/ACT AERO Inhale 2 puffs into the lungs 2 times daily 10.7 g 11    guaiFENesin (MUCINEX) 600 MG extended release tablet Take 1 tablet by mouth 2 times daily 60 tablet 6    azithromycin (ZITHROMAX) 500 MG tablet Take 1 tablet by mouth daily for 3 days 3 tablet 0    lidocaine (XYLOCAINE) 5 % ointment Apply topically as needed. 50 g 0    methylPREDNISolone (MEDROL DOSEPACK) 4 MG tablet Take by mouth.  1 kit 0    doxycycline hyclate (VIBRA-TABS) 100 MG tablet Take 1 tablet by mouth 2 times daily for 10 days 20 tablet 0    nicotine (NICOTROL) 10 MG inhaler Inhale 1 puff into the lungs as needed for Smoking cessation 1 each 3    Incontinence Supply Disposable (BLADDER CONTROL PADS EX ABSORB) MISC Use up to 4 a day 120 each 3    Probiotic Product (PROBIOTIC ADVANCED PO) Take by mouth      Incontinence Supply Disposable (PROCARE ADULT BRIEFS LARGE) MISC Use 2 a day 60 each 5  acetaminophen (TYLENOL) 500 MG tablet Take 1-2 tablets by mouth 4 times daily as needed for Pain 120 tablet 5    Multiple Vitamins-Minerals (THERAPEUTIC MULTIVITAMIN-MINERALS) tablet Take 1 tablet by mouth daily 30 tablet 11    lisinopril (PRINIVIL;ZESTRIL) 30 MG tablet Take 1 tablet by mouth daily 90 tablet 3    albuterol sulfate HFA (VENTOLIN HFA) 108 (90 Base) MCG/ACT inhaler Inhale 2 puffs into the lungs every 6 hours as needed for Wheezing or Shortness of Breath 18 g 11    albuterol (PROVENTIL) (2.5 MG/3ML) 0.083% nebulizer solution Take 3 mLs by nebulization every 6 hours as needed for Wheezing or Shortness of Breath (Patient taking differently: Take 2.5 mg by nebulization every 4 hours as needed for Wheezing or Shortness of Breath ) 120 each 11    bumetanide (BUMEX) 1 MG tablet Take 1 tablet by mouth daily 90 tablet 3    carvedilol (COREG) 25 MG tablet Take 1 tablet by mouth 2 times daily (with meals) 180 tablet 3    cloNIDine (CATAPRES) 0.1 MG tablet Take 1 tablet by mouth daily 90 tablet 3    sucralfate (CARAFATE) 1 GM tablet Take 1 tablet by mouth 4 times daily 120 tablet 5    ondansetron (ZOFRAN) 8 MG tablet Take 1 tablet by mouth every 8 hours as needed for Nausea or Vomiting 30 tablet 5    fluticasone (FLONASE) 50 MCG/ACT nasal spray 2 SPRAYS BY NASAL ROUTE DAILY 1 Bottle 11    CPAP Machine MISC by Does not apply route Please change APAP pressure to 6-14 cm H20. 1 each 0    Lactobacillus (PROBIOTIC ACIDOPHILUS PO) Take 1 capsule by mouth daily       pantoprazole (PROTONIX) 40 MG tablet TAKE 1 TABLET BY MOUTH 2 TIMES DAILY NO REFILLS DUE FOR CHECK UP 60 tablet 10     No current facility-administered medications for this visit. Edwardo WEEMS   Review of Systems   Constitutional: Negative for appetite change, fever, unexpected weight change and weight loss. HENT: Positive for congestion, rhinorrhea and sneezing. Negative for postnasal drip, sinus pressure and sinus pain.     Respiratory: Positive for cough, sputum production, chest tightness, shortness of breath and wheezing. Negative for hemoptysis. Cardiovascular: Negative for dyspnea on exertion, palpitations and leg swelling. Genitourinary: Negative for difficulty urinating. Musculoskeletal:        Musculoskeletal rib pain from coughing   Allergic/Immunologic: Positive for environmental allergies. Physical exam   /86   Pulse 95   Temp 98.1 °F (36.7 °C)   Ht 5' 2\" (1.575 m)   Wt 195 lb (88.5 kg)   SpO2 96% Comment: r/a  BMI 35.67 kg/m²    Wt Readings from Last 3 Encounters:   02/17/22 195 lb (88.5 kg)   02/14/22 195 lb (88.5 kg)   01/04/22 197 lb (89.4 kg)       Physical Exam  Constitutional:       Appearance: She is well-developed. Comments: BMI 35.7   HENT:      Head: Normocephalic. Right Ear: External ear normal.      Left Ear: External ear normal.      Mouth/Throat:      Mouth: Mucous membranes are moist.      Pharynx: Oropharynx is clear. No oropharyngeal exudate. Eyes:      General:         Right eye: No discharge. Left eye: No discharge. Cardiovascular:      Rate and Rhythm: Normal rate and regular rhythm. Pulmonary:      Effort: Pulmonary effort is normal.      Breath sounds: Wheezing (expiratory) present. No rhonchi or rales. Comments: Diminished breath sounds  Chest:      Chest wall: No tenderness. Abdominal:      General: Bowel sounds are normal.   Musculoskeletal:      Cervical back: Neck supple. Right lower leg: No edema. Left lower leg: No edema. Skin:     General: Skin is warm and dry. Neurological:      General: No focal deficit present. Mental Status: She is alert. Psychiatric:         Mood and Affect: Mood normal.         Behavior: Behavior normal.         Thought Content:  Thought content normal.         Judgment: Judgment normal.          Results   Lung Nodule Screening     [x] Qualifies    [] Does not qualify   [] Declined    [] Completed  [address] history   The USPSTF recommends annual screening for lung cancer with low-dose computed tomography (LDCT) in adults aged 48 to [de-identified] years who have a 20 pack-year smoking history and currently smoke or have quit within the past 15 years. Screening should be discontinued once a person has not smoked for 15 years or develops a health problem that substantially limits life expectancy or the ability or willingness to have curative lung surgery. CT Chest 2/8/2022 (Reviewed) DAWIT nodule now 11 mm (from 7 mm) with multiple lung nodules 5 mm or less in size  Narrative   PROCEDURE: CT CHEST WO CONTRAST       CLINICAL INFORMATION: Lung nodule, solitary       COMPARISON: CT chest 8/11/2021       TECHNIQUE:    Helical CT acquisition of the chest was performed without intravenous contrast. Multiplanar reformats are provided.       All CT scans at this facility use dose modulation, iterative reconstruction, and/or weight based dosing when appropriate to reduce the radiation dose to as low as reasonably achievable.           FINDINGS:       The noncontrast CT limits the evaluation for solid organ pathology, vascular pathology, and lymphadenopathy.       11 mm left upper lobe pulmonary nodule (series 2, image 13) has increased in size from the prior exam where it measured 7 mm. There are multiple pulmonary nodules measuring 5 mm or less in both lungs. 4 mm right upper lobe pulmonary nodule (image 19). 3    mm right upper lobe pulmonary nodule (image 22). 3 mm right upper lobe pulmonary nodule (image 27). 3 mm intrafissural pulmonary nodule (image 31). A 3 mm right lower lobe pulmonary nodule (image 38). 4 mm intrafissural pulmonary nodule of the right    interlobar fissure (image 41). 3 mm left upper lobe pulmonary nodule (image 21). A 3 mm left upper lobe pulmonary nodule (image 22). 5 mm left upper lobe pulmonary nodule (image 26). 4 mm left lower lobe pulmonary nodule (image 30).  4 mm left lung base    pulmonary nodule (image 36). 4 mm left lung base pulmonary nodule (image 45). Mild emphysematous changes noted.       Mild aortocoronary calcifications.       No focal pulmonary consolidation. No large pulmonary mass. Central airway is patent.       No pleural effusions.  No significant pericardial effusion.       The heart is not enlarged. Ascending thoracic aorta is not dilated.  The main pulmonary artery is not dilated.       No significantly enlarged mediastinal or  axillary lymph node.       The thyroid is not enlarged. No chest wall mass.       Limited evaluation below the diaphragm show areas of fatty infiltration of the liver liver. Mild left adrenal hyperplasia. .       Bones: Right rib fractures in varying stages of the healing. The bones are demineralized. Degenerative changes of the thoracic spine. .               Impression   1. The 11 mm left upper lobe pulmonary nodule as increase in size from the prior exam. Consider PET/CT or fine needle aspiration for further evaluation. 2. Multiple pulmonary nodules measuring 5 mm or less are seen in both lungs. These may be inflammatory or infectious in nature of this time. A repeat CT of the chest without contrast in 3 months is recommended to evaluate for stability. 3. Rib fractures at various stages of healing noted. 4. Areas of fatty infiltration of the liver noted.               **This report has been created using voice recognition software.  It may contain minor errors which are inherent in voice recognition technology. **       Final report electronically signed by Dr Angelia Dixon on 2/8/2022 5:14 PM                                   Six Minute Walk Test  Estephania Barnes 1965    Six minute walk test done in my office today by my medical assistant. Mary Lou's oxygen saturation at rest on room air was 94%. Her oxygen saturation dropped to 86% on room air with exertion after walking 216 feet and within 1.5 minutes.     The six minute walk test was repeated with oxygen supplementation. Oxygen supplementation was started with 1 LPM via nasal cannula and titrated to 2 LPM via nasal cannula. At the end of the test Mary Lou Victor oxygen saturation remained at 93% on 2 LPM with exertion. She is mobile in the home and requires oxygen as outlined above. Patient ambulated a total of 864 feet with oxygen. Resting Dyspnea/Gregoria score was 1 / 4  and 0 / 1  upon completion of the walk. Resting heart rate was 91 bpm and 106 bpm upon completing the walk. Nasal Oxygen order:  2 lpm to be used with:  Rest: No.  Walking: Yes. Sleep: No.   POC flow: No.  Continuous flow: Yes. DME Medical Necessity Documentation    Lynsey Kothari was seen in the office on 2/17/2022 for the diagnosis COPD. I am prescribing oxygen because the diagnosis and testing requires the patient to have oxygen in the home. her condition will improve or be benefited by oxygen use. The patient is able to perform good mobility in a home setting and therefore does require the use of a portable oxygen system. Assessment      Diagnosis Orders   1. Moderate COPD (chronic obstructive pulmonary disease) ([facility])  Budeson-Glycopyrrol-Formoterol (BREZTRI AEROSPHERE) 160-9-4.8 MCG/ACT AERO    guaiFENesin (MUCINEX) 600 MG extended release tablet    6 Minute Walk Test    DME Order for Home Oxygen as OP   2. Pulmonary emphysema, unspecified emphysema type ([facility] [address]  Budeson-Glycopyrrol-Formoterol (BREZTRI AEROSPHERE) 160-9-4.8 MCG/ACT AERO    guaiFENesin (MUCINEX) 600 MG extended release tablet    6 Minute Walk Test    DME Order for Home Oxygen as OP   3. COPD exacerbation (HCC)  azithromycin (ZITHROMAX) 500 MG tablet   4. Nodule of upper lobe of left lung  PET CT SKULL BASE TO MID THIGH   5. Allergic rhinitis, unspecified seasonality, unspecified trigger     6. Rib pain  lidocaine (XYLOCAINE) 5 % ointment   7. LYNN (obstructive sleep apnea)           Plan   1.  Moderate COPD (chronic obstructive pulmonary disease) / Pulmonary emphysema with patient reports has used in the past and did not help, reinforced to patient the importance of quitting smoking to prevent further damage to lung function, patient verbalized understanding. (Approximately 5 mins)   - Patient declines smoking cessation program or nicotine patches  - She is using nicotrol inhaler and wishes to quit on her own    7. Chronic respiratory failure with hypoxia  - Order for 2 lpm with exertion    Advised patient to call office with any changes, questions, or concerns regarding respiratory status or issues with prescribed medications    Return in about 1 week (around 2/24/2022) for PET scan prior. I spent a total of 65 minutes on the day of the visit. Time spent included review of previous notes and test results and face to face time with the patient discussing diagnosis and importance of compliance with the treatment plan. Time spent also includes documentation on the day of the visit.     Electronically signed by JASON Segura CNP on 2/17/2022 at 3:57 PM

## 2022-02-14 NOTE — PROGRESS NOTES
Falguni Staley is a 61 y.o. female whopresents today for :  Chief Complaint   Patient presents with    1 Month Follow-Up       HPI:     HPI  Pt here for fu. Had virtual visit last week for sinus infection. With her copd and CHF. Weight is stable. Still having significant sinus congestion. Ct of chest reviewed. Larger nodule and rib fracture       Patient Active Problem List   Diagnosis    Hypertension, uncontrolled    Anxiety    Gastroesophageal reflux disease without esophagitis    Seasonal allergies    Bronchospasm    History of tobacco abuse    Moderate COPD (chronic obstructive pulmonary disease) (HCC)    Allergic rhinitis    Lung nodule, solitary    Pulmonary emphysema (HCC)    LYNN (obstructive sleep apnea)    COPD exacerbation (HCC)        Past Medical History:   Diagnosis Date    Anxiety     Arthritis     COPD (chronic obstructive pulmonary disease) (HCC)     Enlargement, spleen     Fatty liver     GERD (gastroesophageal reflux disease)     Hepatitis A     Hypertension     LYNN on CPAP     Ovarian cyst     Pneumonia     Sleep apnea       No past surgical history on file. Family History   Problem Relation Age of Onset    Heart Surgery Mother         dies at 29    Heart Disease Mother     Other Sister         Cardiomyopathy    Other Brother         cardiomyopathy    Stroke Maternal Grandmother     Heart Attack Maternal Grandfather     Heart Attack Paternal Grandfather      Social History     Tobacco Use    Smoking status: Current Every Day Smoker     Packs/day: 0.50     Years: 46.00     Pack years: 23.00     Types: Cigarettes    Smokeless tobacco: Never Used    Tobacco comment: smokes . 5 pack a day 11/22/21   Substance Use Topics    Alcohol use: Yes     Comment: several times/week      Current Outpatient Medications   Medication Sig Dispense Refill    methylPREDNISolone (MEDROL DOSEPACK) 4 MG tablet Take by mouth.  1 kit 0    predniSONE (DELTASONE) 20 MG tablet Take 1 tablet by mouth daily for 5 days 5 tablet 0    doxycycline hyclate (VIBRA-TABS) 100 MG tablet Take 1 tablet by mouth 2 times daily for 10 days 20 tablet 0    nicotine (NICOTROL) 10 MG inhaler Inhale 1 puff into the lungs as needed for Smoking cessation 1 each 3    Incontinence Supply Disposable (BLADDER CONTROL PADS EX ABSORB) MISC Use up to 4 a day 120 each 3    Probiotic Product (PROBIOTIC ADVANCED PO) Take by mouth      Incontinence Supply Disposable (PROCARE ADULT BRIEFS LARGE) MISC Use 2 a day 60 each 5    acetaminophen (TYLENOL) 500 MG tablet Take 1-2 tablets by mouth 4 times daily as needed for Pain 120 tablet 5    Multiple Vitamins-Minerals (THERAPEUTIC MULTIVITAMIN-MINERALS) tablet Take 1 tablet by mouth daily 30 tablet 11    lidocaine 4 % external patch Apply up to 3 patches.   On 12 hours  Off 12 hours 30 patch 0    lisinopril (PRINIVIL;ZESTRIL) 30 MG tablet Take 1 tablet by mouth daily 90 tablet 3    fluticasone-umeclidin-vilant (TRELEGY ELLIPTA) 100-62.5-25 MCG/INH AEPB Inhale 1 puff into the lungs daily 30 each 11    albuterol sulfate HFA (VENTOLIN HFA) 108 (90 Base) MCG/ACT inhaler Inhale 2 puffs into the lungs every 6 hours as needed for Wheezing or Shortness of Breath 18 g 11    albuterol (PROVENTIL) (2.5 MG/3ML) 0.083% nebulizer solution Take 3 mLs by nebulization every 6 hours as needed for Wheezing or Shortness of Breath (Patient taking differently: Take 2.5 mg by nebulization every 4 hours as needed for Wheezing or Shortness of Breath ) 120 each 11    pantoprazole (PROTONIX) 40 MG tablet TAKE 1 TABLET BY MOUTH 2 TIMES DAILY NO REFILLS DUE FOR CHECK UP 60 tablet 10    bumetanide (BUMEX) 1 MG tablet Take 1 tablet by mouth daily 90 tablet 3    carvedilol (COREG) 25 MG tablet Take 1 tablet by mouth 2 times daily (with meals) 180 tablet 3    cloNIDine (CATAPRES) 0.1 MG tablet Take 1 tablet by mouth daily 90 tablet 3    sucralfate (CARAFATE) 1 GM tablet Take 1 tablet by mouth 4 times daily 120 tablet 5    ondansetron (ZOFRAN) 8 MG tablet Take 1 tablet by mouth every 8 hours as needed for Nausea or Vomiting 30 tablet 5    fluticasone (FLONASE) 50 MCG/ACT nasal spray 2 SPRAYS BY NASAL ROUTE DAILY 1 Bottle 11    CPAP Machine MISC by Does not apply route Please change APAP pressure to 6-14 cm H20. 1 each 0    Lactobacillus (PROBIOTIC ACIDOPHILUS PO) Take 1 capsule by mouth daily        No current facility-administered medications for this visit. Allergies   Allergen Reactions    Pcn [Penicillins] Hives     Health Maintenance   Topic Date Due    Cervical cancer screen  Never done    Colon cancer screen colonoscopy  Never done    Breast cancer screen  09/25/2021    COVID-19 Vaccine (1) 06/14/2022 (Originally 5/24/1970)    Shingles Vaccine (1 of 2) 02/14/2023 (Originally 5/24/2014)    Depression Screen  06/14/2022    Potassium monitoring  12/27/2022    Creatinine monitoring  12/27/2022    Low dose CT lung screening  02/08/2023    Lipid screen  09/19/2024    Pneumococcal 0-64 years Vaccine (2 of 2 - PPSV23) 05/24/2029    DTaP/Tdap/Td vaccine (2 - Td or Tdap) 06/14/2031    Flu vaccine  Completed    Hepatitis C screen  Completed    Hepatitis A vaccine  Aged Out    Hepatitis B vaccine  Aged Out    Hib vaccine  Aged Out    Meningococcal (ACWY) vaccine  Aged Out    HIV screen  Discontinued       Subjective:     Review of Systems   Constitutional: Negative. HENT: Positive for congestion. Eyes: Negative. Respiratory: Positive for chest tightness. Cardiovascular: Negative. Gastrointestinal: Negative. Musculoskeletal: Negative. Skin: Negative. Neurological: Negative.         Objective:     Vitals:    02/14/22 1332 02/14/22 1353   BP: (!) 150/92 132/70   Site: Right Upper Arm    Position: Sitting    Cuff Size: Medium Adult    Pulse: 94    Resp: 16    Temp: 97.2 °F (36.2 °C)    TempSrc: Temporal    SpO2: 96%    Weight: 195 lb (88.5 kg)    Height: 5' 2.01\" (1.575

## 2022-02-17 ENCOUNTER — OFFICE VISIT (OUTPATIENT)
Dept: PULMONOLOGY | Age: 57
End: 2022-02-17
Payer: COMMERCIAL

## 2022-02-17 VITALS
DIASTOLIC BLOOD PRESSURE: 86 MMHG | SYSTOLIC BLOOD PRESSURE: 136 MMHG | HEART RATE: 95 BPM | WEIGHT: 195 LBS | OXYGEN SATURATION: 96 % | BODY MASS INDEX: 35.88 KG/M2 | TEMPERATURE: 98.1 F | HEIGHT: 62 IN

## 2022-02-17 DIAGNOSIS — R07.81 RIB PAIN: ICD-10-CM

## 2022-02-17 DIAGNOSIS — F17.218 CIGARETTE NICOTINE DEPENDENCE WITH OTHER NICOTINE-INDUCED DISORDER: ICD-10-CM

## 2022-02-17 DIAGNOSIS — J96.11 CHRONIC RESPIRATORY FAILURE WITH HYPOXIA (HCC): ICD-10-CM

## 2022-02-17 DIAGNOSIS — J30.9 ALLERGIC RHINITIS, UNSPECIFIED SEASONALITY, UNSPECIFIED TRIGGER: ICD-10-CM

## 2022-02-17 DIAGNOSIS — J44.9 MODERATE COPD (CHRONIC OBSTRUCTIVE PULMONARY DISEASE) (HCC): Primary | ICD-10-CM

## 2022-02-17 DIAGNOSIS — G47.33 OSA (OBSTRUCTIVE SLEEP APNEA): ICD-10-CM

## 2022-02-17 DIAGNOSIS — J43.9 PULMONARY EMPHYSEMA, UNSPECIFIED EMPHYSEMA TYPE (HCC): ICD-10-CM

## 2022-02-17 DIAGNOSIS — J44.1 COPD EXACERBATION (HCC): ICD-10-CM

## 2022-02-17 DIAGNOSIS — R91.1 NODULE OF UPPER LOBE OF LEFT LUNG: ICD-10-CM

## 2022-02-17 PROBLEM — F17.200 NICOTINE DEPENDENCE: Status: ACTIVE | Noted: 2022-02-17

## 2022-02-17 PROCEDURE — 3017F COLORECTAL CA SCREEN DOC REV: CPT

## 2022-02-17 PROCEDURE — 94618 PULMONARY STRESS TESTING: CPT

## 2022-02-17 PROCEDURE — G8427 DOCREV CUR MEDS BY ELIG CLIN: HCPCS

## 2022-02-17 PROCEDURE — 99215 OFFICE O/P EST HI 40 MIN: CPT

## 2022-02-17 PROCEDURE — 99406 BEHAV CHNG SMOKING 3-10 MIN: CPT

## 2022-02-17 PROCEDURE — 4004F PT TOBACCO SCREEN RCVD TLK: CPT

## 2022-02-17 PROCEDURE — 3023F SPIROM DOC REV: CPT

## 2022-02-17 PROCEDURE — G8482 FLU IMMUNIZE ORDER/ADMIN: HCPCS

## 2022-02-17 PROCEDURE — G8417 CALC BMI ABV UP PARAM F/U: HCPCS

## 2022-02-17 RX ORDER — LIDOCAINE 50 MG/G
OINTMENT TOPICAL
Qty: 50 G | Refills: 0 | Status: SHIPPED | OUTPATIENT
Start: 2022-02-17 | End: 2022-08-18

## 2022-02-17 RX ORDER — GUAIFENESIN 600 MG/1
600 TABLET, EXTENDED RELEASE ORAL 2 TIMES DAILY
Qty: 60 TABLET | Refills: 6 | Status: SHIPPED | OUTPATIENT
Start: 2022-02-17 | End: 2022-03-19

## 2022-02-17 RX ORDER — AZITHROMYCIN 500 MG/1
500 TABLET, FILM COATED ORAL DAILY
Qty: 3 TABLET | Refills: 0 | Status: SHIPPED | OUTPATIENT
Start: 2022-02-17 | End: 2022-02-20

## 2022-02-17 RX ORDER — BUDESONIDE, GLYCOPYRROLATE, AND FORMOTEROL FUMARATE 160; 9; 4.8 UG/1; UG/1; UG/1
2 AEROSOL, METERED RESPIRATORY (INHALATION) 2 TIMES DAILY
Qty: 10.7 G | Refills: 11 | Status: SHIPPED | OUTPATIENT
Start: 2022-02-17 | End: 2023-02-17

## 2022-02-17 ASSESSMENT — ENCOUNTER SYMPTOMS
CHEST TIGHTNESS: 1
SINUS PAIN: 0
SINUS PRESSURE: 0
HEMOPTYSIS: 0
RHINORRHEA: 1
WHEEZING: 1
SPUTUM PRODUCTION: 1
SHORTNESS OF BREATH: 1
COUGH: 1

## 2022-02-17 ASSESSMENT — COPD QUESTIONNAIRES: COPD: 1

## 2022-02-17 NOTE — PATIENT INSTRUCTIONS
Stop Trelegy, start Breztri with spacer. You will take 2 puffs twice daily. Rinse and spit after using inhaler     Continue your albuterol inhaler and nebulizer. You may use one or the other every 6 hours as needed for shortness of breath or wheezing. Do NOT use both at the same time as they continue the same medication. I have prescribed an antibiotic called azithromycin that you will take 1 tablet daily for 3 days. Start your steroid taper. We will start Mucinex 1 (600 mg tablet) twice daily. If you pick this up over the counter, make sure to get plain Mucinex, not Mucinex DM. I have ordered lidocaine ointment that you may use for your rib cage pain. If your pain changes or if it is not reproducible, make sure to seek treatment in the emergency room. Based on your test today, you need to wear 2 lpm when you are walking. Continue to work on quitting smoking. Continue to use your acapella device. I will see you back in about 1-2 weeks with PET scan prior to appointment. Chronic Obstructive Pulmonary Disease (COPD): Care Instructions  Your Care Instructions     Chronic obstructive pulmonary disease (COPD) is a general term for a group of lung diseases, including emphysema and chronic bronchitis. People with COPD have decreased airflow in and out of the lungs, which makes it hard to breathe. The airways also can get clogged with thick mucus. Cigarette smoking is a major cause of COPD. Although there is no cure for COPD, you can slow its progress. Following your treatment plan and taking care of yourself can help you feel better and live longer. Follow-up care is a key part of your treatment and safety. Be sure to make and go to all appointments, and call your doctor if you are having problems. It's also a good idea to know your test results and keep a list of the medicines you take. How can you care for yourself at home? Staying healthy    · Do not smoke.  This is the most important step you can take to prevent more damage to your lungs. If you need help quitting, talk to your doctor about stop-smoking programs and medicines. These can increase your chances of quitting for good. · Avoid colds and flu. Get a pneumococcal vaccine shot. If you have had one before, ask your doctor whether you need a second dose. Get the flu vaccine every fall. If you must be around people with colds or the flu, wash your hands often. · Avoid secondhand smoke, air pollution, and high altitudes. Also avoid cold, dry air and hot, humid air. Stay at home with your windows closed when air pollution is bad. Medicines and oxygen therapy    1. Take your medicines exactly as prescribed. Call your doctor if you think you are having a problem with your medicine. You may be taking medicines such as:  ? Bronchodilators. These help open your airways and make breathing easier. They are either short-acting (work for 6 to 9 hours) or long-acting (work for 24 hours). You inhale most bronchodilators, so they start to act quickly. Always carry your quick-relief inhaler with you in case you need it while you are away from home. ? Corticosteroids (prednisone, budesonide). These reduce airway inflammation. They come in pill or inhaled form. You must take these medicines every day for them to work well. · Ask your doctor or pharmacist if a spacer is right for you. A spacer may help you get more inhaled medicine to your lungs. If you use one, ask how to use it properly. · Do not take any vitamins, over-the-counter medicine, or herbal products without talking to your doctor first.     · If your doctor prescribed antibiotics, take them as directed. Do not stop taking them just because you feel better. You need to take the full course of antibiotics. · If you use oxygen therapy, use the flow rate your doctor has recommended.  Don't change it without talking to your doctor first. Oxygen therapy boosts the amount of oxygen in your blood and helps you breathe easier. Activity    · Get regular exercise. Walking is an easy way to get exercise. Start out slowly, and walk a little more each day. · Pay attention to your breathing. You are exercising too hard if you can't talk while you exercise. · Take short rest breaks when doing household chores and other activities. · Learn breathing methods--such as breathing through pursed lips--to help you become less short of breath. · If your doctor has not set you up with a pulmonary rehabilitation program, ask if rehab is right for you. Rehab includes exercise programs, education about your disease and how to manage it, help with diet and other changes, and emotional support. Diet    · Eat regular, healthy meals. Use bronchodilators about 1 hour before you eat to make it easier to eat. Eat several small meals instead of three large ones. Drink beverages at the end of the meal. Avoid foods that are hard to chew. · Eat foods that contain protein so you don't lose muscle mass. · Talk with your doctor if you gain too much weight or if you lose weight without trying. Mental health    · Talk to your family, friends, or a therapist about your feelings. Some people feel frightened, angry, hopeless, helpless, and even guilty. Talking openly about bad feelings can help you cope. If these feelings last, talk to your doctor. When should you call for help? Call 911 anytime you think you may need emergency care. For example, call if:    · You have severe trouble breathing. Call your doctor now or seek immediate medical care if:    · You have new or worse trouble breathing. · You cough up blood. · You have a fever. Watch closely for changes in your health, and be sure to contact your doctor if:    · You cough more deeply or more often, especially if you notice more mucus or a change in the color of your mucus. · You have new or worse swelling in your legs or belly. · You are not getting better as expected. Where can you learn more? Go to [email or web address]://chpepiceweb.[email or web address] org and sign in to your [employer] account. Enter Y568 in the [facility] box to learn more about \"Chronic Obstructive Pulmonary Disease (COPD): Care Instructions. \"     If you do not have an account, please click on the \"Sign Up Now\" link. Current as of: October 26, 2020               Content Version: 12.9  © 2006-2021 [employer]. Care instructions adapted under license by [facility] ([facility]). If you have questions about a medical condition or this instruction, always ask your healthcare professional. [address] any warranty or liability for your use of this information. Patient Education        PET Scan: About This Test  What is it? A PET scan is a test that uses a special type of camera and a radioactive substance called a \"tracer\" to look at organs in the body. PET stands for positron emission tomography. During the test, the tracer liquid is put into a vein in your arm. It moves through your body and collects in the specific organ or tissue, where it gives off tiny positively charged particles (positrons). The camera records the positrons and turns the recording into pictures on a computer. A computed tomography (CT) scan is often done at the same time as a PET scan. Why is this test done? A PET scan is often used to look for cancer and find heart and brain disorders. How do you prepare for the test?  · Tell your doctor ALL the medicines, vitamins, supplements, and herbal remedies you take. Some may increase the risk of problems during your test. Your doctor will tell you if you should stop taking any of them before the test and how soon to do it. · Don't drink caffeine for 24 hours before a PET scan of your heart.   · Don't do any exercise or other strenuous activity for at least 48 hours before this test.  · Don't eat or drink (except water) for at least 6 hours before this test.  · If you are breastfeeding, you may want to pump enough breast milk before the test to get through 1 to 2 days of feeding. The radioactive tracer used in this test can get into your breast milk and is not good for the baby. · Tell your doctor if you get nervous in tight spaces. You may get a medicine to help you relax. If you think you'll get this medicine, be sure you have someone to take you home. How is the test done? · A radioactive tracer will be given in a vein (IV). You may need to wait  for the tracer to move through your body. During this time, you will need to avoid moving and talking. · You will lie on a table that is attached to a PET scanner. · The table will pass slowly through the PET scanner, which is shaped like a doughnut. The scanner picks up signals from the tracer in your body. It is very important to lie still while each scan is being done. How long does the test take? The test will take 1 to 3 hours. How does having a PET scan feel? You will not feel pain during the test. The table you lie on may be hard and the room may be cool. It may be difficult to lie still during the test.  You may feel a quick sting or pinch when the IV is put in your arm. The tracer is unlikely to cause any side effects. If you don't feel well during or after the test, tell the person who is doing the test.  You may feel nervous inside the PET scanner. What are the risks of a PET scan? Allergic reactions to the tracer are very rare. In rare cases, some soreness or swelling may develop at the IV site where the radioactive tracer was put in. Apply a moist, warm compress to your arm. Anytime you're exposed to radiation, there's a small chance of damage to cells or tissue.  That's the case even with the low-level radioactive tracer used for this test. But the chance of damage is very low compared with the benefits of the test.  What happens after the test?  · You will probably be able to go home right away. · You can go back to your usual activities right away. · In rare cases, some soreness or swelling may develop at the IV site where the radioactive tracer was put in. Apply a moist, warm compress to your arm. · The radioactive tracer used in this test can get into your breast milk. Do not breastfeed your baby for 1 or 2 days after this test. During this time, you can give your baby breast milk you stored before the test, or you can give formula. Discard the breast milk you pump in the 1 or 2 days after the test.  · Most of the tracer will leave your body through your urine or stool within a day. So be sure to flush the toilet right after you use it, and wash your hands well with soap and water. The amount of radiation in the tracer is very small. This means it isn't a risk for people to be around you after the test.  · After the test, drink lots of fluids for the next 24 hours to help flush the tracer out of your body. Follow-up care is a key part of your treatment and safety. Be sure to make and go to all appointments, and call your doctor if you are having problems. It's also a good idea to keep a list of the medicines you take. Ask your doctor when you can expect to have your test results. Where can you learn more? Go to https://[employer]pealiyahDynadmic.[employer]. org and sign in to your [employer] account. Enter P432 in the [facility] box to learn more about \"PET Scan: About This Test.\"     If you do not have an account, please click on the \"Sign Up Now\" link. Current as of: June 17, 2021               Content Version: 13.1  © 9950-2778 [employer]. Care instructions adapted under license by [facility] ([facility]). If you have questions about a medical condition or this instruction, always ask your healthcare professional. [address] any warranty or liability for your use of this information.

## 2022-02-18 ENCOUNTER — TELEPHONE (OUTPATIENT)
Dept: PULMONOLOGY | Age: 57
End: 2022-02-18

## 2022-02-24 ENCOUNTER — TELEPHONE (OUTPATIENT)
Dept: PULMONOLOGY | Age: 57
End: 2022-02-24

## 2022-02-24 ENCOUNTER — TELEPHONE (OUTPATIENT)
Dept: FAMILY MEDICINE CLINIC | Age: 57
End: 2022-02-24

## 2022-02-24 DIAGNOSIS — I10 ESSENTIAL HYPERTENSION: ICD-10-CM

## 2022-02-24 RX ORDER — TIZANIDINE 4 MG/1
4 TABLET ORAL EVERY 8 HOURS PRN
Qty: 30 TABLET | Refills: 0 | Status: SHIPPED | OUTPATIENT
Start: 2022-02-24 | End: 2022-08-18

## 2022-02-24 RX ORDER — LISINOPRIL 30 MG/1
30 TABLET ORAL DAILY
Qty: 90 TABLET | Refills: 3 | Status: SHIPPED | OUTPATIENT
Start: 2022-02-24 | End: 2022-08-30

## 2022-02-24 NOTE — TELEPHONE ENCOUNTER
Patient got pet scan and follow up scheduled. She was wondering if it comes to the 7th and she is not feeling like she can lay on the bed due to her pain if you can give her something to help her muscles relax? I told her to give us a call next Friday on the 4th and let us know how she is feeling and we will go from there.

## 2022-02-24 NOTE — TELEPHONE ENCOUNTER
Patient called stating she still has her sinus infection and then got the stomach flu on top of it. She was vomiting and having diarrhea for 2 days. This flared her rib pain up again. She is taking Tylenol, bio freeze, Lidocaine patches, and Lidocaine cream with no relief. States she can feel her muscle in her back up into her side boob popping when she coughs and is very painful. She is asking for a muscle relaxer or something for pain to help. Please advise.

## 2022-02-24 NOTE — PROGRESS NOTES
[facility]    Patient: Griselda Adler, 61 y.o.   : 1965  3/10/2022    Patient of Dr. Adriana Chery   Patient presents with    Follow-up     PET scan follow up        Topher Steel is here for follow up for PET scan follow up. Patient is here today in a wheelchair as she reports she has had intermittent dizziness. At patient's last appointment, she had difficulty with inhaling Trelegy at times and was changed to [facility] with a spacer. She had a CT that revealed an increase in her DAWIT nodule 11 mm from 7 mm. She also had several lung nodules 5 mm or less in size. She had a 6 MWT and required 2 lpm with exertion. She admits to some noncompliance with the oxygen use at home. She was prescribed azithromycin and was advised to start her steroid taper, prescribed by her primary care provider. She has LYNN, however she refuses PAP therapy or sleep clinic follow up. She is here today with PET scan that revealed FDG avid DAWIT nodule with maximum SUV of 6 and multiple other small lung nodules at a size below resolution of PET. Overall patient reports respiratory symptoms have been stable since last appointment. Patient reports good compliance with inhaled medications Terrial Pih). She has been using her samples as she reports that the [facility] was denied. Patient using albuterol 2 times per day on average. Patient reports some physical limitation due to respiratory symptoms. Her past medical history is significant for anxiety, arthritis, COPD, enlarged spleen, fatty liver, GERD, Hep A, hypertension, LYNN (non compliant with PAP therapy), and ovarian cyst.     COPD  She complains of cough, shortness of breath, sputum production and wheezing. There is no chest tightness or hemoptysis. This is a chronic problem. The current episode started more than 1 year ago. The problem occurs daily. The problem has been unchanged.  The cough is productive of sputum (clear to yellow/green). Associated symptoms include dyspnea on exertion and nasal congestion. Pertinent negatives include no chest pain, fever, postnasal drip, rhinorrhea, sneezing, sore throat or weight loss. Her symptoms are aggravated by strenuous activity, exercise and change in weather. Her symptoms are alleviated by beta-agonist. She reports significant improvement on treatment. Risk factors for lung disease include smoking/tobacco exposure. Her past medical history is significant for COPD. MMRC Dyspnea Scale:   0: Dyspneic on strenuous exercise  1: Dyspneic on walking a slight hill  2: Dyspneic on walking level ground; must stop occasionally due to breathlessness  3: Must stop for breathlessness after walking 100 yards or after a few minutes  4: Cannot leave house; breathless on dressing/undressing    MMRC dyspnea score: 3    Progress History:   Since last visit any new medical issues? Pain due to rib fractures being followed by primary care provider  New ER or hospital visits? No  Any new or changes in medicines? Yes Zanaflex  Using inhalers? Yes Breztri and as needed albuterol   Are they helpful? Yes   Previous inhalers? Anoro - escalated to Trelegy, Trelegy - changed to [employer] due to poor inspiratory capacity  Any recent exacerbations? Yes, last appt  Last PFT: 6/29/2020 - moderate obstruction  Last 6 MWT: 2/17/2022 - 2 lpm with exertion  Last A1A: Serum level of 133 on 10/10/2019     Smoking History:  Current smoker of 0.5 PPD with 47 PY history  She has been using a nicotrol inhaler to try to quit smoking. 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 (Coal, dust, asbestos, molds ie Hay)  [place]  Denies exposure to pets/animals at home.   Denies exposure to puffs into the lungs 2 times daily 10.7 g 11    guaiFENesin (MUCINEX) 600 MG extended release tablet Take 1 tablet by mouth 2 times daily 60 tablet 6    lidocaine (XYLOCAINE) 5 % ointment Apply topically as needed.  50 g 0    nicotine (NICOTROL) 10 MG inhaler Inhale 1 puff into the lungs as needed for Smoking cessation 1 each 3    Incontinence Supply Disposable (BLADDER CONTROL PADS EX ABSORB) MISC Use up to 4 a day 120 each 3    Probiotic Product (PROBIOTIC ADVANCED PO) Take by mouth      Incontinence Supply Disposable (PROCARE ADULT BRIEFS LARGE) MISC Use 2 a day 60 each 5    acetaminophen (TYLENOL) 500 MG tablet Take 1-2 tablets by mouth 4 times daily as needed for Pain 120 tablet 5    Multiple Vitamins-Minerals (THERAPEUTIC MULTIVITAMIN-MINERALS) tablet Take 1 tablet by mouth daily 30 tablet 11    albuterol sulfate HFA (VENTOLIN HFA) 108 (90 Base) MCG/ACT inhaler Inhale 2 puffs into the lungs every 6 hours as needed for Wheezing or Shortness of Breath 18 g 11    albuterol (PROVENTIL) (2.5 MG/3ML) 0.083% nebulizer solution Take 3 mLs by nebulization every 6 hours as needed for Wheezing or Shortness of Breath (Patient taking differently: Take 2.5 mg by nebulization every 4 hours as needed for Wheezing or Shortness of Breath ) 120 each 11    bumetanide (BUMEX) 1 MG tablet Take 1 tablet by mouth daily 90 tablet 3    carvedilol (COREG) 25 MG tablet Take 1 tablet by mouth 2 times daily (with meals) 180 tablet 3    cloNIDine (CATAPRES) 0.1 MG tablet Take 1 tablet by mouth daily 90 tablet 3    sucralfate (CARAFATE) 1 GM tablet Take 1 tablet by mouth 4 times daily 120 tablet 5    ondansetron (ZOFRAN) 8 MG tablet Take 1 tablet by mouth every 8 hours as needed for Nausea or Vomiting 30 tablet 5    fluticasone (FLONASE) 50 MCG/ACT nasal spray 2 SPRAYS BY NASAL ROUTE DAILY 1 Bottle 11    CPAP Machine MISC by Does not apply route Please change APAP pressure to 6-14 cm H20. 1 each 0    Lactobacillus (PROBIOTIC ACIDOPHILUS PO) Take 1 capsule by mouth daily       pantoprazole (PROTONIX) 40 MG tablet TAKE 1 TABLET BY MOUTH 2 TIMES DAILY NO REFILLS DUE FOR CHECK UP 60 tablet 10     No current facility-administered medications for this visit. Rometta Favre ROS   Review of Systems   Constitutional: Negative for fever, unexpected weight change and weight loss. Has difficulty with poor appetite but reports is eating better   HENT: Positive for congestion. Negative for postnasal drip, rhinorrhea, sinus pressure, sinus pain, sneezing and sore throat. Respiratory: Positive for cough, sputum production, shortness of breath and wheezing. Negative for hemoptysis. Cardiovascular: Positive for dyspnea on exertion. Negative for chest pain, palpitations and leg swelling. Genitourinary: Negative for difficulty urinating. Musculoskeletal:        Rib pain on right side from rib fractures   Allergic/Immunologic: Positive for environmental allergies. Neurological: Positive for dizziness. Physical exam   BP (!) 144/82 (Site: Right Upper Arm, Position: Sitting, Cuff Size: Medium Adult)   Pulse 87   Temp 97.6 °F (36.4 °C) (Oral)   Ht 5' 2\" (1.575 m)   SpO2 95% Comment: room air  BMI 35.67 kg/m²    Wt Readings from Last 3 Encounters:   02/17/22 195 lb (88.5 kg)   02/14/22 195 lb (88.5 kg)   01/04/22 197 lb (89.4 kg)       Physical Exam  Constitutional:       General: She is not in acute distress. Appearance: She is well-developed. She is obese. Comments: BMI 35.67   HENT:      Head: Normocephalic and atraumatic. Right Ear: External ear normal.      Left Ear: External ear normal.      Mouth/Throat:      Mouth: Mucous membranes are moist.      Pharynx: Oropharynx is clear. No oropharyngeal exudate. Eyes:      General:         Right eye: No discharge. Left eye: No discharge. Cardiovascular:      Rate and Rhythm: Normal rate and regular rhythm.    Pulmonary:      Effort: Pulmonary effort is normal. No respiratory distress. Breath sounds: No wheezing, rhonchi or rales. Chest:      Chest wall: No tenderness. Abdominal:      General: Bowel sounds are normal.   Musculoskeletal:      Cervical back: Neck supple. Right lower leg: No edema. Left lower leg: No edema. Skin:     General: Skin is warm and dry. Neurological:      General: No focal deficit present. Mental Status: She is alert. Psychiatric:         Mood and Affect: Mood normal.         Thought Content: Thought content normal.         Judgment: Judgment normal.          Results   Lung Nodule Screening     [x] Qualifies    [] Does not qualify   [] Declined    [] Completed  52 PY history   The USPSTF recommends annual screening for lung cancer with low-dose computed tomography (LDCT) in adults aged 48 to 80 years who have a 20 pack-year smoking history and currently smoke or have quit within the past 15 years. Screening should be discontinued once a person has not smoked for 15 years or develops a health problem that substantially limits life expectancy or the ability or willingness to have curative lung surgery.      PET scan 3/7/2022 (Reviewed) FDG avid DAWIT nodule with maximum SUV of 6  Narrative   PROCEDURE: PET CT SKULL BASE TO MID THIGH       CLINICAL INFORMATION: 79-year-old woman with left upper lobe lung nodule highly suspicious for malignancy based on recent CT of the chest; initial treatment strategy.       Radiopharmaceutical: 12.1 mCi F-18 FDG, intravenously.       TECHNIQUE: PET/CT imaging was performed from the skull base to the midthigh levels using routine PET acquisition.       Injection site: Left hand.       Time of FDG injection: 8:26 AM.       Serum glucose: 116 mg/dL at 8:18 AM.       Time of imagin:33 AM.       COMPARISON: CT chest 2022       FINDINGS:        Neck: No FDG avid cervical lymphadenopathy.       Chest: Cardiac size is normal. Atherosclerotic calcifications are present in the thoracic aorta and coronary arteries without evidence of aneurysm. There is no pleural or pericardial effusion. Emphysematous changes are again noted in the bilateral lungs.    A 1.0 cm nodule in the left upper lobe is stable in size and has a maximum SUV of 6.0 (image 69). Smaller nodular densities measuring less than 3 mm in the bilateral lungs are not metabolically active but are likely at sizes below the resolution there    is no hypermetabolic mediastinal, hilar or axillary lymphadenopathy. Degenerative changes are present in the thoracic spine without evidence of hypermetabolic osseous metastatic disease. There is increased activity associated with multiple right mid rib    fractures at various stages of healing. No expansile lesions are identified.       Abdomen/pelvis: Physiologic activity is present in the liver, spleen and glands kidneys. The left hepatic lobe is enlarged. The patient in the liver may be secondary to hepatic steatosis. There are diverticula throughout the colon without evidence of    diverticulitis. Atherosclerotic calcifications are present in the abdominal aorta without evidence of aneurysm. A 4.0 cm hypoattenuating lesion lower right pelvis is photopenic (image 12). There is no FDG avid mesenteric, retroperitoneal, pelvic or    inguinal lymphadenopathy. Degenerative changes are present in the lumbar spine and pelvis without evidence of hypermetabolic osseous metastatic disease.           Impression   1. FDG avid left upper lobe lung nodule highly suspicious for malignancy. 2. Hypoattenuating photopenic lesion in the right adnexa, likely an ovarian or paraovarian cyst.   3. Increased activity associated with multiple right rib fractures at various stages of healing.       Final report electronically signed by Dr. Jason Morocho on 3/7/2022 11:03 AM     Assessment      Diagnosis Orders   1. Abnormal PET scan, lung  Angie Castro MD, General Surgery, [facility] JESENIA BRENDAERTLYRIC   2.  Nodule of upper lobe of left lung  Angie Maria MD, General Surgery, [facility] [address]   3. Moderate COPD (chronic obstructive pulmonary disease) ([address]  Anna Peña MD, General Surgery, [facility] [address]   4. Pulmonary emphysema, unspecified emphysema type [facility])  [facility] [place] [address]   5. Chronic respiratory failure with hypoxia [facility])  Anna Peña MD, General Surgery, [facility] [address]   6. Tobacco abuse  Angie Maria MD, General Surgery, [facility]   1. Abnormal PET scan, lung with Nodule of upper lobe of left lung  - Discussed PET scan results with patient. Discussed options including referral to surgeon vs CT guided biopsy. Discussed with patient benefits and risks of both options. She opted for referral to Dr. Jenn Ovalles. - Referral to Anna Peña MD, General Surgery, [facility] [address] for abnormal PET scan with 1 cm FDG avid left upper lobe lung nodule highly suspicious for malignancy    2. Moderate COPD (chronic obstructive pulmonary disease) with Pulmonary Emphysema  - Patient reports that David Gutierrez was denied. Her symptoms are very well controlled currently on Breztri samples. Patient has poor inspiratory capacity and requires Breztri with a spacer. She has tried and failed Anoro and Trelegy. Will have staff appeal prior authorization denial for David Gutierrez. I gave patient 2 samples of Breztri in the office today  - Advised to maintain pneumonia vaccine with primary care provider  - Declines flu and COVID-19 vaccination    3. Chronic respiratory failure with hypoxia (HCC)  - Advised patient to continue 2 lpm with exertion     4. Tobacco abuse  - Discussed with patient smoking cessation techniques including patches and gum patient reports has used in the past and did not help, reinforced to patient the importance of quitting smoking to prevent further damage to lung function, patient verbalized understanding. (Approximately 3 mins)     5.  LYNN  - Patient declines treatment for LYNN at this time    6. Rib pain  - Patient has rib pain due to rib fractures that is currently being managed by her primary care provider. Discussed with patient alternative therapies, including ice/heat therapy, splinting her side when coughing and deep breathing, and continuing lidocaine ointment as needed     Advised patient to call office with any changes, questions, or concerns regarding respiratory status or issues with prescribed medications    Return in about 2 months (around 5/10/2022) for COPD and Abnormal PET scan (referral to Dr. Pastor Tony). I spent a total of 45 minutes on the day of the visit. Time spent included review of previous notes and test results and face to face time with the patient discussing diagnosis and importance of compliance with the treatment plan. Time spent also includes documentation on the day of the visit.     Electronically signed by JASON Oglesby CNP on 3/10/2022 at 3:12 PM

## 2022-02-24 NOTE — TELEPHONE ENCOUNTER
Patient cancelled her PET scan with follow up due to being sick. Can we check in on patient to ensure that her PET scan is rescheduled and that she has follow up with me after she completes the PET. Thanks!

## 2022-02-24 NOTE — TELEPHONE ENCOUNTER
Called patient and she is having some pain in her rib area. She called her family doctor to try and get a muscle relaxer. She stated she broke a rib or two and she worries that she will not be able to lay down for the pet scan. I told her to lets go ahead and get another scan scheduled for now and hopefully she will be better by then. She will call back to schedule her follow up.

## 2022-02-24 NOTE — TELEPHONE ENCOUNTER
Her primary care provider manages her pain. She should seek treatment for this from her primary care provider if she needs something to help her get through the scan. Thanks!

## 2022-02-28 ENCOUNTER — TELEPHONE (OUTPATIENT)
Dept: FAMILY MEDICINE CLINIC | Age: 57
End: 2022-02-28

## 2022-03-07 ENCOUNTER — HOSPITAL ENCOUNTER (OUTPATIENT)
Dept: PET IMAGING | Age: 57
Discharge: HOME | End: 2022-03-07
Payer: COMMERCIAL

## 2022-03-07 ENCOUNTER — TELEPHONE (OUTPATIENT)
Dept: FAMILY MEDICINE CLINIC | Age: 57
End: 2022-03-07

## 2022-03-07 DIAGNOSIS — F41.9 ANXIETY: Primary | ICD-10-CM

## 2022-03-07 DIAGNOSIS — R91.1 NODULE OF UPPER LOBE OF LEFT LUNG: ICD-10-CM

## 2022-03-07 PROCEDURE — 3430000000 HC RX DIAGNOSTIC RADIOPHARMACEUTICAL

## 2022-03-07 PROCEDURE — 78815 PET IMAGE W/CT SKULL-THIGH: CPT

## 2022-03-07 PROCEDURE — A9552 F18 FDG: HCPCS

## 2022-03-07 RX ORDER — DIAZEPAM 5 MG/1
5-10 TABLET ORAL
Qty: 2 TABLET | Refills: 0 | Status: SHIPPED | OUTPATIENT
Start: 2022-03-07 | End: 2022-03-07

## 2022-03-07 RX ORDER — FLUDEOXYGLUCOSE F 18 200 MCI/ML
12.1 INJECTION, SOLUTION INTRAVENOUS
Status: COMPLETED | OUTPATIENT
Start: 2022-03-07 | End: 2022-03-07

## 2022-03-07 RX ADMIN — FLUDEOXYGLUCOSE F 18 12.1 MILLICURIE: 200 INJECTION, SOLUTION INTRAVENOUS at 08:26

## 2022-03-07 NOTE — TELEPHONE ENCOUNTER
----- Message from Harmeet Mark sent at 3/4/2022 12:42 PM EST -----  Subject: Message to Provider    QUESTIONS  Information for Provider? pt calling as she has a PET scan on 3/7 @ 8:15   AM and needs something called in or ordered for relaxation as she has few   cracked ribs and laying on her back is difficult Please call with   instructions  ---------------------------------------------------------------------------  --------------  CALL BACK INFO  What is the best way for the office to contact you? OK to leave message on   voicemail  Preferred Call Back Phone Number?  [phone]  ---------------------------------------------------------------------------  --------------  SCRIPT ANSWERS  undefined

## 2022-03-10 ENCOUNTER — OFFICE VISIT (OUTPATIENT)
Dept: PULMONOLOGY | Age: 57
End: 2022-03-10
Payer: COMMERCIAL

## 2022-03-10 ENCOUNTER — TELEPHONE (OUTPATIENT)
Dept: PULMONOLOGY | Age: 57
End: 2022-03-10

## 2022-03-10 VITALS
SYSTOLIC BLOOD PRESSURE: 144 MMHG | TEMPERATURE: 97.6 F | OXYGEN SATURATION: 95 % | BODY MASS INDEX: 35.67 KG/M2 | DIASTOLIC BLOOD PRESSURE: 82 MMHG | HEIGHT: 62 IN | HEART RATE: 87 BPM

## 2022-03-10 DIAGNOSIS — R07.81 RIB PAIN: ICD-10-CM

## 2022-03-10 DIAGNOSIS — J96.11 CHRONIC RESPIRATORY FAILURE WITH HYPOXIA (HCC): ICD-10-CM

## 2022-03-10 DIAGNOSIS — Z72.0 TOBACCO ABUSE: ICD-10-CM

## 2022-03-10 DIAGNOSIS — J44.9 MODERATE COPD (CHRONIC OBSTRUCTIVE PULMONARY DISEASE) (HCC): ICD-10-CM

## 2022-03-10 DIAGNOSIS — R91.1 NODULE OF UPPER LOBE OF LEFT LUNG: ICD-10-CM

## 2022-03-10 DIAGNOSIS — G47.33 OSA (OBSTRUCTIVE SLEEP APNEA): ICD-10-CM

## 2022-03-10 DIAGNOSIS — J43.9 PULMONARY EMPHYSEMA, UNSPECIFIED EMPHYSEMA TYPE (HCC): ICD-10-CM

## 2022-03-10 DIAGNOSIS — R94.2 ABNORMAL PET SCAN, LUNG: Primary | ICD-10-CM

## 2022-03-10 PROCEDURE — 3023F SPIROM DOC REV: CPT

## 2022-03-10 PROCEDURE — 4004F PT TOBACCO SCREEN RCVD TLK: CPT

## 2022-03-10 PROCEDURE — G8417 CALC BMI ABV UP PARAM F/U: HCPCS

## 2022-03-10 PROCEDURE — 99215 OFFICE O/P EST HI 40 MIN: CPT

## 2022-03-10 PROCEDURE — 3017F COLORECTAL CA SCREEN DOC REV: CPT

## 2022-03-10 PROCEDURE — G8482 FLU IMMUNIZE ORDER/ADMIN: HCPCS

## 2022-03-10 PROCEDURE — G8427 DOCREV CUR MEDS BY ELIG CLIN: HCPCS

## 2022-03-10 PROCEDURE — 99406 BEHAV CHNG SMOKING 3-10 MIN: CPT

## 2022-03-10 RX ORDER — IBUPROFEN 200 MG
200 TABLET ORAL EVERY 6 HOURS PRN
COMMUNITY
End: 2022-08-18

## 2022-03-10 ASSESSMENT — ENCOUNTER SYMPTOMS
SPUTUM PRODUCTION: 1
SINUS PRESSURE: 0
WHEEZING: 1
CHEST TIGHTNESS: 0
COUGH: 1
SORE THROAT: 0
RHINORRHEA: 0
SINUS PAIN: 0
SHORTNESS OF BREATH: 1
HEMOPTYSIS: 0

## 2022-03-10 ASSESSMENT — COPD QUESTIONNAIRES: COPD: 1

## 2022-03-10 NOTE — TELEPHONE ENCOUNTER
Received fax communication from [facility] that [facility] was denied. Patient must try and fail Serevent, striverdi and tudorza or advair and symbicort. But I do not see that these listed medications are the same classification as Breztri. See media scanned to this encounter, please advise.

## 2022-03-10 NOTE — PATIENT INSTRUCTIONS
Continue your Breztri inhaler. Make sure to rinse and spit after use. Make sure to continue your Spacer. Continue your albuterol inhaler and nebulizer. You may use one or the other every 6 hours as needed for shortness of breath or wheezing. Do NOT use both at the same time as they continue the same medication. I am referring you to Dr. Floresita Ventura to be evaluated regarding your abnormal PET scan. Continue to work on quitting smoking. We will see you back in about 2 months. Learning About Benefits From Quitting Smoking  How does quitting smoking make you healthier? If you're thinking about quitting smoking, you may have a few reasons to be smoke-free. Your health may be one of them. · When you quit smoking, you lower your risks for cancer, lung disease, heart attack, stroke, blood vessel disease, and blindness from macular degeneration. · When you're smoke-free, you get sick less often, and you heal faster. You are less likely to get colds, flu, bronchitis, and pneumonia. · As a nonsmoker, you may find that your mood is better and you are less stressed. When and how will you feel healthier? Quitting has real health benefits that start from day 1 of being smoke-free. And the longer you stay smoke-free, the healthier you get and the better you feel. The first hours  · After just 20 minutes, your blood pressure and heart rate go down. That means there's less stress on your heart and blood vessels. · Within 12 hours, the level of carbon monoxide in your blood drops back to normal. That makes room for more oxygen. With more oxygen in your body, you may notice that you have more energy than when you smoked. After 2 weeks  · Your lungs start to work better. · Your risk of heart attack starts to drop. After 1 month  · When your lungs are clear, you cough less and breathe deeper, so it's easier to be active. · Your sense of taste and smell return.  That means you can enjoy food more than you have since you started smoking. Over the years  · Over the years, your risks of heart disease, heart attack, and stroke are lower. · After 10 years, your risk of dying from lung cancer is cut by about half. And your risk for many other types of cancer is lower too. How would quitting help others in your life? When you quit smoking, you improve the health of everyone who now breathes in your smoke. · Their heart, lung, and cancer risks drop, much like yours. · They are sick less. For babies and small children, living smoke-free means they're less likely to have ear infections, pneumonia, and bronchitis. · If you're a woman who is or will be pregnant someday, quitting smoking means a healthier . · Children who are close to you are less likely to become adult smokers. Where can you learn more? Go to https://Fastnoterobyeb.RunSignUp.com. org and sign in to your [employer] account. Enter [phone] in the [facility] box to learn more about \"Learning About Benefits From Quitting Smoking. \"     If you do not have an account, please click on the \"Sign Up Now\" link. Current as of: 2021               Content Version: 12.9   [employer], [employer]. Care instructions adapted under license by [facility] ([facility]). If you have questions about a medical condition or this instruction, always ask your healthcare professional. [place] [address] any warranty or liability for your use of this information.

## 2022-03-10 NOTE — TELEPHONE ENCOUNTER
These are not equal inhalers to [facility]. Please appeal denial. Patient requires [facility] with a spacer due to poor inspiratory capacity. Thanks!

## 2022-03-14 ENCOUNTER — TELEPHONE (OUTPATIENT)
Dept: PULMONOLOGY | Age: 57
End: 2022-03-14

## 2022-03-14 NOTE — TELEPHONE ENCOUNTER
Great, please call Dr. Ryan Turner office to notify so that patient's appointment may be rescheduled. Thanks!

## 2022-03-14 NOTE — TELEPHONE ENCOUNTER
Received call from Dr. Barrera Limb office reporting that they can not do anything with patient until PFT are done, last PFT 6/2020.

## 2022-03-14 NOTE — TELEPHONE ENCOUNTER
Spoke with patient and reviewed PFT prep and testing location, patient to call office with any questions.

## 2022-03-14 NOTE — TELEPHONE ENCOUNTER
OK to use order placed by Dr. Jose J Munoz. Please let me know if they need a new order. Please ensure that patient's pulmonary function test is prioritized and completed as soon as possible so that she may be evaluated by Dr. Bruce Garber for abnormal PET scan as soon as possible. Let me know if I need to place STAT order for pulmonary function test. Thanks!

## 2022-03-14 NOTE — TELEPHONE ENCOUNTER
Orders in chart for PFT from Dr. Zaki Graham. Patient called and notified and was transferred to  to schedule PFT, would you like to place different order for PFT? Please advise.

## 2022-03-14 NOTE — TELEPHONE ENCOUNTER
Spoke with Dr. Naren Yee office, they are going to call patient and have her come in on Monday 3/21/22

## 2022-03-17 ENCOUNTER — HOSPITAL ENCOUNTER (OUTPATIENT)
Dept: PULMONOLOGY | Age: 57
Discharge: HOME | End: 2022-03-17
Payer: COMMERCIAL

## 2022-03-17 DIAGNOSIS — J44.9 CHRONIC OBSTRUCTIVE PULMONARY DISEASE, UNSPECIFIED COPD TYPE (HCC): ICD-10-CM

## 2022-03-17 PROCEDURE — 94060 EVALUATION OF WHEEZING: CPT

## 2022-03-17 PROCEDURE — 94726 PLETHYSMOGRAPHY LUNG VOLUMES: CPT

## 2022-03-17 PROCEDURE — 94729 DIFFUSING CAPACITY: CPT

## 2022-03-21 ENCOUNTER — OFFICE VISIT (OUTPATIENT)
Dept: SURGERY | Age: 57
End: 2022-03-21
Payer: COMMERCIAL

## 2022-03-21 VITALS
TEMPERATURE: 97.3 F | DIASTOLIC BLOOD PRESSURE: 78 MMHG | SYSTOLIC BLOOD PRESSURE: 120 MMHG | HEART RATE: 103 BPM | BODY MASS INDEX: 35.68 KG/M2 | OXYGEN SATURATION: 91 % | WEIGHT: 193.9 LBS | HEIGHT: 62 IN | RESPIRATION RATE: 18 BRPM

## 2022-03-21 DIAGNOSIS — Z99.81 OXYGEN DEPENDENT: ICD-10-CM

## 2022-03-21 DIAGNOSIS — D38.1 NEOPLASM OF UNCERTAIN BEHAVIOR OF LEFT UPPER LOBE OF LUNG: Primary | ICD-10-CM

## 2022-03-21 DIAGNOSIS — R94.2 ABNORMAL PET SCAN, LUNG: ICD-10-CM

## 2022-03-21 DIAGNOSIS — G47.33 OSA (OBSTRUCTIVE SLEEP APNEA): ICD-10-CM

## 2022-03-21 DIAGNOSIS — R91.1 NODULE OF UPPER LOBE OF LEFT LUNG: ICD-10-CM

## 2022-03-21 DIAGNOSIS — Z99.3 WHEELCHAIR DEPENDENCE: ICD-10-CM

## 2022-03-21 DIAGNOSIS — I10 HYPERTENSION, UNCONTROLLED: ICD-10-CM

## 2022-03-21 PROCEDURE — G8417 CALC BMI ABV UP PARAM F/U: HCPCS | Performed by: SURGERY

## 2022-03-21 PROCEDURE — 99204 OFFICE O/P NEW MOD 45 MIN: CPT | Performed by: SURGERY

## 2022-03-21 PROCEDURE — 4004F PT TOBACCO SCREEN RCVD TLK: CPT | Performed by: SURGERY

## 2022-03-21 PROCEDURE — G8482 FLU IMMUNIZE ORDER/ADMIN: HCPCS | Performed by: SURGERY

## 2022-03-21 PROCEDURE — G8427 DOCREV CUR MEDS BY ELIG CLIN: HCPCS | Performed by: SURGERY

## 2022-03-21 PROCEDURE — 3017F COLORECTAL CA SCREEN DOC REV: CPT | Performed by: SURGERY

## 2022-03-21 ASSESSMENT — ENCOUNTER SYMPTOMS
EYE DISCHARGE: 0
PHOTOPHOBIA: 0
CHOKING: 0
SORE THROAT: 0
STRIDOR: 0
SINUS PRESSURE: 0
COUGH: 1
WHEEZING: 1
APNEA: 0
RECTAL PAIN: 0
ANAL BLEEDING: 0
VOICE CHANGE: 0
DIARRHEA: 0
COLOR CHANGE: 0
BLOOD IN STOOL: 0
EYE ITCHING: 0
FACIAL SWELLING: 0
EYE PAIN: 0
RHINORRHEA: 0
ABDOMINAL DISTENTION: 0
CHEST TIGHTNESS: 0
ABDOMINAL PAIN: 0
BACK PAIN: 0
SHORTNESS OF BREATH: 1
NAUSEA: 0
CONSTIPATION: 0
EYE REDNESS: 0
TROUBLE SWALLOWING: 0
VOMITING: 0

## 2022-03-21 NOTE — PROGRESS NOTES
Burak Millard MD [facility]  General Surgery  New Patient Evaluation in Office  Pt Name: Reny Reyes  Date of Birth 1965   Today's Date: 3/21/2022  Medical Record Number: [id number]  Referring Provider: Candace Pretty*  Primary Care Provider: JASON Winkler - [place]  Chief Complaint   Patient presents with   [facility] Surgical Consult     New patient-referred by ALEJANDRO Jones CNP-Abnormal PET scan with 1 cm FDG avid left upper lobe lung nodule-PFT's done on 3/17/2022     ASSESSMENT      Problem List Items Addressed This Visit     Abnormal PET scan, lung    Hypertension, uncontrolled    Nodule of upper lobe of left lung    LYNN (obstructive sleep apnea)    Oxygen dependent    Wheelchair dependence      Other Visit Diagnoses     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 Schedule [employer] for interventional radiology biopsy left upper lobe lesion. Potential diagnostic and therapeutic modalities were discussed with the patient and her  who was present. She has very limited functional reserve she can even walk because of pain or shortness of breath and uses a wheelchair to get around and is also dependent on oxygen.   Based on her most recent pulmonary functions she would drop below 40% for FEV1 and DLCO to have a left upper lobectomy if this was cancer  Functionally she is very poor as she cannot walk because of pain and shortness of breath she has poor nutrition as she vomits and has abdominal pain on a regular basis. Reviewing her films in the office this nodule is very close to the chest wall and would likely have a very low risk for pneumothorax with biopsy. The lesion is rounded well-circumscribed and not clearly a carcinoma and has enlarged 2 mm in 6 months. In addition multiple other smaller noncalcified nodules too small to characterize showed up on the CT chest as well so based on her poor performance status I think CT-guided needle biopsy first..  Based on her poor pulmonary functions and her predicted postop values after left upper lobectomy she is certainly not a candidate for lobectomy and I think her pulmonary functions are bad enough that she would not tolerate 1 lung anesthesia to do a minimally invasive wedge resection and would require thoracotomy with wedge resection. 2.    3.   Orders Placed This Encounter:  Orders Placed This Encounter   Procedures    IR NEEDLE BIOPSY LUNG/MEDIASTINUM PERC     Standing Status:   Future     Standing Expiration Date:   3/21/2023        Lashawn Vela is a 61 y.o. female seen in the office today enlarging left upper lobe mass. She had a CT scan in August of last year that showed a 9 mm left upper lobe nodule peripheral rounded. She had a 6-month follow-up which showed that this was now 11 mm some smaller pulmonary nodules showed up in both lobes of the lungs but too small to characterize. PET scan was done showed that the lesion measured 1 cm and had an SUV of 6.0. The CT scan in August they read it as 7 mm but I measured it in the office today and it measured closer to 9 mm to me. She is a heavy smoking history and continues to smoke and has pulmonary functions as listed below with an FEV1 of 43% of predicted just barely over 1.0. She also complains of multiple other chronic pain issues pain in her back and her legs that she cannot walk and use a wheelchair she is using oxygen dependently.   She has problems with eating with intermittent abdominal pain and vomiting and all these chronic issues been going on since she has had a case of pneumonia in the past she relates. Calculating predicted postop values for left upper lobectomy are below with a predicted FEV1 of 0.88 L and a predicted postop DLCO of only 39%. Certainly at all think these are acceptable numbers for resection if it was cancer    ppo FEV1 DAWIT=.88 Liters =36%  PpoDLCO=8.54 = 39%          Past Medical History  Past Medical History:   Diagnosis Date    Anxiety     Arthritis     COPD (chronic obstructive pulmonary disease) (HCC)     Enlargement, spleen     Fatty liver     GERD (gastroesophageal reflux disease)     Hepatitis A     Hypertension     saw Dr Leni Woods in the past    Ovarian cyst     Oxygen dependent     o2 2liters when walking and at night-sees ALEJANDRO Braun 54    Pneumonia     Sleep apnea        Past Surgical History  Past Surgical History:   Procedure Laterality Date    CARDIOVASCULAR STRESS TEST  2020    TRANSTHORACIC ECHOCARDIOGRAM  2021    TRANSTHORACIC ECHOCARDIOGRAM  2020    UPPER GASTROINTESTINAL ENDOSCOPY      [facility]    WISDOM TOOTH EXTRACTION         Medications  Current Outpatient Medications on File Prior to Visit   Medication Sig Dispense Refill    ibuprofen (ADVIL;MOTRIN) 200 MG tablet Take 200 mg by mouth every 6 hours as needed for Pain      tiZANidine (ZANAFLEX) 4 MG tablet Take 1 tablet by mouth every 8 hours as needed (pain) 30 tablet 0    lisinopril (PRINIVIL;ZESTRIL) 30 MG tablet Take 1 tablet by mouth daily 90 tablet 3    Budeson-Glycopyrrol-Formoterol (BREZTRI AEROSPHERE) 160-9-4.8 MCG/ACT AERO Inhale 2 puffs into the lungs 2 times daily 10.7 g 11    lidocaine (XYLOCAINE) 5 % ointment Apply topically as needed.  50 g 0    nicotine (NICOTROL) 10 MG inhaler Inhale 1 puff into the lungs as needed for Smoking cessation 1 each 3    Incontinence Supply Disposable (BLADDER CONTROL PADS EX ABSORB) MISC Use up to 4 a day 120 each 3    Probiotic Product (PROBIOTIC ADVANCED PO) Take by mouth      Incontinence Supply Disposable (PROCARE ADULT BRIEFS LARGE) MISC Use 2 a day 60 each 5    acetaminophen (TYLENOL) 500 MG tablet Take 1-2 tablets by mouth 4 times daily as needed for Pain 120 tablet 5    Multiple Vitamins-Minerals (THERAPEUTIC MULTIVITAMIN-MINERALS) tablet Take 1 tablet by mouth daily 30 tablet 11    albuterol sulfate HFA (VENTOLIN HFA) 108 (90 Base) MCG/ACT inhaler Inhale 2 puffs into the lungs every 6 hours as needed for Wheezing or Shortness of Breath 18 g 11    albuterol (PROVENTIL) (2.5 MG/3ML) 0.083% nebulizer solution Take 3 mLs by nebulization every 6 hours as needed for Wheezing or Shortness of Breath (Patient taking differently: Take 2.5 mg by nebulization every 4 hours as needed for Wheezing or Shortness of Breath ) 120 each 11    bumetanide (BUMEX) 1 MG tablet Take 1 tablet by mouth daily 90 tablet 3    carvedilol (COREG) 25 MG tablet Take 1 tablet by mouth 2 times daily (with meals) 180 tablet 3    cloNIDine (CATAPRES) 0.1 MG tablet Take 1 tablet by mouth daily 90 tablet 3    sucralfate (CARAFATE) 1 GM tablet Take 1 tablet by mouth 4 times daily 120 tablet 5    ondansetron (ZOFRAN) 8 MG tablet Take 1 tablet by mouth every 8 hours as needed for Nausea or Vomiting 30 tablet 5    fluticasone (FLONASE) 50 MCG/ACT nasal spray 2 SPRAYS BY NASAL ROUTE DAILY 1 Bottle 11    CPAP Machine MISC by Does not apply route Please change APAP pressure to 6-14 cm H20. 1 each 0    Lactobacillus (PROBIOTIC ACIDOPHILUS PO) Take 1 capsule by mouth daily       pantoprazole (PROTONIX) 40 MG tablet TAKE 1 TABLET BY MOUTH 2 TIMES DAILY NO REFILLS DUE FOR CHECK UP 60 tablet 10     No current facility-administered medications on file prior to visit.      Allergies  Allergies   Allergen Reactions    Pcn [Penicillins] Hives       Family History  Family History   Problem Relation Age of Onset    Heart Surgery Mother         dies at 30    Heart Disease Mother     Other Sister         Cardiomyopathy    Other Brother         cardiomyopathy    Stroke Maternal Grandmother     Heart Attack Maternal Grandfather     Heart Attack Paternal Grandfather        Social History  Social History     Socioeconomic History    Marital status:      Spouse name: Not on file    Number of children: Not on file    Years of education: Not on file    Highest education level: Not on file   Occupational History    Not on file   Tobacco Use    Smoking status: Current Every Day Smoker     Packs/day: 1.50     Years: 65.00     Pack years: 97.50     Types: Cigarettes     Start date: 1/1/1957    Smokeless tobacco: Never Used    Tobacco comment: 0.5 ppd 3/10/22   Vaping Use    Vaping Use: Never used   Substance and Sexual Activity    Alcohol use: Yes     Comment: several times/week    Drug use: Not Currently    Sexual activity: Not on file   Other Topics Concern    Not on file   Social History Narrative    Not on file     Social Determinants of Health     Financial Resource Strain: Low Risk     Difficulty of Paying Living Expenses: Not hard at all   Food Insecurity: No Food Insecurity    Worried About [address] in the Last Year: Never true    [address] in the Last Year: Never true   Transportation Needs:     Lack of Transportation (Medical): Not on file    Lack of Transportation (Non-Medical):  Not on file   Physical Activity:     Days of Exercise per Week: Not on file    Minutes of Exercise per Session: Not on file   Stress:     Feeling of Stress : Not on file   Social Connections:     Frequency of Communication with Friends and Family: Not on file    Frequency of Social Gatherings with Friends and Family: Not on file    Attends Hinduism Services: Not on file    Active Member of Clubs or Organizations: Not on file    Attends Club or Organization Meetings: Not on file    Marital Status: Not on file   Intimate Partner Violence:  Fear of Current or Ex-Partner: Not on file    Emotionally Abused: Not on file    Physically Abused: Not on file    Sexually Abused: Not on file   Housing Stability:     Unable to Pay for Housing in the Last Year: Not on file    Number of [place] in the Last Year: Not on file    Unstable Housing in the Last Year: Not on [address] Maintenance   Topic Date Due    Cervical cancer screen  Never done    Colorectal Cancer Screen  Never done    Breast cancer screen  09/25/2021    COVID-19 Vaccine (1) 06/14/2022 (Originally 5/24/1970)    Shingles Vaccine (1 of 2) 02/14/2023 (Originally 5/24/2014)    Depression Screen  06/14/2022    Potassium monitoring  12/27/2022    Creatinine monitoring  12/27/2022    Low dose CT lung screening  03/07/2023    Lipid screen  09/19/2024    Pneumococcal 0-64 years Vaccine (2 of 2 - PPSV23) 05/24/2029    DTaP/Tdap/Td vaccine (2 - Td or Tdap) 06/14/2031    Flu vaccine  Completed    Hepatitis C screen  Completed    Hepatitis A vaccine  Aged Out    Hepatitis B vaccine  Aged Out    Hib vaccine  Aged Out    Meningococcal (ACWY) vaccine  Aged Out    HIV screen  Discontinued       Review of Systems  Constitutional: Negative for activity change, appetite change, chills, diaphoresis, fatigue, fever and unexpected weight change. HENT: Negative for congestion, dental problem, drooling, ear discharge, ear pain, facial swelling, hearing loss, mouth sores, nosebleeds, postnasal drip, rhinorrhea, sinus pressure, sneezing, sore throat, tinnitus, trouble swallowing and voice change. Eyes: Negative for photophobia, pain, discharge, redness, itching and visual disturbance. Respiratory: Positive for cough, shortness of breath and wheezing. Negative for apnea, choking, chest tightness and stridor. Cardiovascular: Negative for chest pain, palpitations and leg swelling.    Gastrointestinal: Negative for abdominal distention, abdominal pain, anal bleeding, blood in stool, constipation, diarrhea, nausea, rectal pain and vomiting. Endocrine: Negative for cold intolerance, heat intolerance, polydipsia, polyphagia and polyuria. Genitourinary: Negative for decreased urine volume, difficulty urinating, dysuria, enuresis, flank pain, frequency, genital sores, hematuria and urgency. Musculoskeletal: Positive for gait problem. Negative for arthralgias, back pain, joint swelling, myalgias, neck pain and neck stiffness. Skin: Negative for color change, pallor, rash and wound. Allergic/Immunologic: Negative for environmental allergies, food allergies and immunocompromised state. Neurological: Negative for dizziness, tremors, seizures, syncope, facial asymmetry, speech difficulty, weakness, light-headedness, numbness and headaches. Hematological: Negative for adenopathy. Does not bruise/bleed easily. Psychiatric/Behavioral: Negative for agitation, behavioral problems, confusion, decreased concentration, dysphoric mood, hallucinations, self-injury, sleep disturbance and suicidal ideas. The patient is not nervous/anxious and is not hyperactive    OBJECTIVE    VITALS:  height is 5' 2\" (1.575 m) and weight is 193 lb 14.4 oz (88 kg). Her temporal temperature is 97.3 °F (36.3 °C). Her blood pressure is 120/78 and her pulse is 103. Her respiration is 18 and oxygen saturation is 91%. CONSTITUTIONAL: Alert and oriented times 3, no acute distress and cooperative to examination with proper mood and affect. Sitting in a wheelchair not obviously short of breath  SKIN: Skin color, texture, turgor normal. No rashes or lesions. Some facial appearing acne  LYMPH: no cervical nodes, no inguinal nodes  HEENT: Head is normocephalic, atraumatic. EOMI, PERRLA.   NECK: Supple, symmetrical, trachea midline, no adenopathy, thyroid symmetric, not enlarged and no tenderness, skin normal.  CHEST/LUNGS: chest symmetric with normal A/P diameter, normal respiratory rate and rhythm, lungs clear to auscultation without wheezes, rales or rhonchi. No accessory muscle use. Scars None   CARDIOVASCULAR: Heart sounds are normal.  Regular rate and rhythm without murmur, gallop or rub. Normal S1 and S2. . Carotid and femoral pulses 2+/4 and equal bilaterally. ABDOMEN: Normal shape. No scar(s) present. Normal bowel sounds. No bruits. Vonne Dach \"soft, nontender, nondistended, no masses or organomegaly. no evidence of hernia. Percussion: Normal without hepatosplenomegally. Tenderness: absent. RECTAL: deferred, not clinically indicated  NEUROLOGIC: There are no focalizing motor or sensory deficits. CN II-XII are grossly intact. Vonne Dac EXTREMITIES: no cyanosis, no clubbing and no edema. Electronically signed by Marietta Hitchcock MD on 3/21/2022 at 4:00 PM   The patients records and films were reviewed independently. Time was given for questions . All questions were answered to the patients satisfaction. A total time of 45 minutes  was spent with at least 51% related to counseling and coordination of care.

## 2022-03-21 NOTE — LETTER
[facility]  Chriss Mcwilliams MD FACS  Phone- [phone]  Fax [phone]    Pt Name: [address] Record Number: [id number]  Date of Birth 1965   Today's Date: 3/21/2022    Basim Patel,    [facility] was evaluated in the office today. My assessment and plans are listed below. Assessment:     [facility] was seen today for surgical consult. 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 Schedule Mary Luo for interventional radiology biopsy left upper lobe lesion. Potential diagnostic and therapeutic modalities were discussed with the patient and her  who was present. She has very limited functional reserve she can even walk because of pain or shortness of breath and uses a wheelchair to get around and is also dependent on oxygen. Based on her most recent pulmonary functions she would drop below 40% for FEV1 and DLCO to have a left upper lobectomy if this was cancer  Functionally she is very poor as she cannot walk because of pain and shortness of breath she has poor nutrition as she vomits and has abdominal pain on a regular basis. Reviewing her films in the office this nodule is very close to the chest wall and would likely have a very low risk for pneumothorax with biopsy. The lesion is rounded well-circumscribed and not clearly a carcinoma and has enlarged 2 mm in 6 months.   In addition multiple other smaller noncalcified nodules too small to characterize showed up on the CT chest as well so based on her poor performance status I think CT-guided needle biopsy first..  Based on her poor pulmonary functions and her predicted postop values after left upper lobectomy

## 2022-03-21 NOTE — PROGRESS NOTES
Subjective:      Patient ID: Annette Bardales is a 61 y.o. female. Chief Complaint   Patient presents with    Surgical Consult     New patient-referred by ALEJANDRO Dao CNP-Abnormal PET scan with 1 cm FDG avid left upper lobe lung nodule-PFT's done on 3/17/2022       HPI    Review of Systems   Constitutional: Negative for activity change, appetite change, chills, diaphoresis, fatigue, fever and unexpected weight change. HENT: Negative for congestion, dental problem, drooling, ear discharge, ear pain, facial swelling, hearing loss, mouth sores, nosebleeds, postnasal drip, rhinorrhea, sinus pressure, sneezing, sore throat, tinnitus, trouble swallowing and voice change. Eyes: Negative for photophobia, pain, discharge, redness, itching and visual disturbance. Respiratory: Positive for cough, shortness of breath and wheezing. Negative for apnea, choking, chest tightness and stridor. Cardiovascular: Negative for chest pain, palpitations and leg swelling. Gastrointestinal: Negative for abdominal distention, abdominal pain, anal bleeding, blood in stool, constipation, diarrhea, nausea, rectal pain and vomiting. Endocrine: Negative for cold intolerance, heat intolerance, polydipsia, polyphagia and polyuria. Genitourinary: Negative for decreased urine volume, difficulty urinating, dysuria, enuresis, flank pain, frequency, genital sores, hematuria and urgency. Musculoskeletal: Positive for gait problem. Negative for arthralgias, back pain, joint swelling, myalgias, neck pain and neck stiffness. Skin: Negative for color change, pallor, rash and wound. Allergic/Immunologic: Negative for environmental allergies, food allergies and immunocompromised state. Neurological: Negative for dizziness, tremors, seizures, syncope, facial asymmetry, speech difficulty, weakness, light-headedness, numbness and headaches. Hematological: Negative for adenopathy. Does not bruise/bleed easily.    Psychiatric/Behavioral:

## 2022-03-22 ENCOUNTER — TELEPHONE (OUTPATIENT)
Dept: SURGERY | Age: 57
End: 2022-03-22

## 2022-03-22 NOTE — TELEPHONE ENCOUNTER
Patient scheduled in IR for her CT guided lung biopsy on 03- with an arrival of 7:00am.    Per Patricia CPT code 51550 - CORE NEEDLE BX LUNG/MEDIASTINUM PERQ W/IMG  Pre-authorization is required for non participating providers only.     Follow up appointment on 04- at 2:45pm

## 2022-04-06 ENCOUNTER — HOSPITAL ENCOUNTER (OUTPATIENT)
Dept: CT IMAGING | Age: 57
Discharge: HOME | End: 2022-04-06
Payer: COMMERCIAL

## 2022-04-06 ENCOUNTER — HOSPITAL ENCOUNTER (OUTPATIENT)
Dept: GENERAL RADIOLOGY | Age: 57
Discharge: HOME | End: 2022-04-06
Payer: COMMERCIAL

## 2022-04-06 VITALS
HEART RATE: 78 BPM | DIASTOLIC BLOOD PRESSURE: 82 MMHG | RESPIRATION RATE: 16 BRPM | SYSTOLIC BLOOD PRESSURE: 138 MMHG | BODY MASS INDEX: 35.51 KG/M2 | WEIGHT: 193 LBS | TEMPERATURE: 97.1 F | HEIGHT: 62 IN | OXYGEN SATURATION: 95 %

## 2022-04-06 DIAGNOSIS — D38.1 NEOPLASM OF UNCERTAIN BEHAVIOR OF LEFT UPPER LOBE OF LUNG: ICD-10-CM

## 2022-04-06 LAB
CREAT SERPL-MCNC: 0.5 MG/DL (ref 0.4–1.2)
ERYTHROCYTE [DISTWIDTH] IN BLOOD BY AUTOMATED COUNT: 14.7 % (ref 11.5–14.5)
ERYTHROCYTE [DISTWIDTH] IN BLOOD BY AUTOMATED COUNT: 52.1 FL (ref 35–45)
GFR SERPL CREATININE-BSD FRML MDRD: > 90 ML/MIN/1.73M2
HCT VFR BLD CALC: 42.5 % (ref 37–47)
HEMOGLOBIN: 14.2 GM/DL (ref 12–16)
MCH RBC QN AUTO: 32.2 PG (ref 26–33)
MCHC RBC AUTO-ENTMCNC: 33.4 GM/DL (ref 32.2–35.5)
MCV RBC AUTO: 96.4 FL (ref 81–99)
PLATELET # BLD: 277 THOU/MM3 (ref 130–400)
PMV BLD AUTO: 9.2 FL (ref 9.4–12.4)
RBC # BLD: 4.41 MILL/MM3 (ref 4.2–5.4)
WBC # BLD: 11.8 THOU/MM3 (ref 4.8–10.8)

## 2022-04-06 PROCEDURE — 2580000003 HC RX 258: Performed by: RADIOLOGY

## 2022-04-06 PROCEDURE — 77012 CT SCAN FOR NEEDLE BIOPSY: CPT

## 2022-04-06 PROCEDURE — 71045 X-RAY EXAM CHEST 1 VIEW: CPT

## 2022-04-06 PROCEDURE — 6360000002 HC RX W HCPCS: Performed by: RADIOLOGY

## 2022-04-06 PROCEDURE — 85027 COMPLETE CBC AUTOMATED: CPT

## 2022-04-06 PROCEDURE — 88334 PATH CONSLTJ SURG CYTO XM EA: CPT

## 2022-04-06 PROCEDURE — 6370000000 HC RX 637 (ALT 250 FOR IP): Performed by: RADIOLOGY

## 2022-04-06 PROCEDURE — 88333 PATH CONSLTJ SURG CYTO XM 1: CPT

## 2022-04-06 PROCEDURE — 32408 CORE NDL BX LNG/MED PERQ: CPT

## 2022-04-06 PROCEDURE — 88305 TISSUE EXAM BY PATHOLOGIST: CPT

## 2022-04-06 PROCEDURE — 82565 ASSAY OF CREATININE: CPT

## 2022-04-06 RX ORDER — SODIUM CHLORIDE 450 MG/100ML
INJECTION, SOLUTION INTRAVENOUS CONTINUOUS
Status: DISCONTINUED | OUTPATIENT
Start: 2022-04-06 | End: 2022-04-07 | Stop reason: HOSPADM

## 2022-04-06 RX ORDER — FENTANYL CITRATE 50 UG/ML
50 INJECTION, SOLUTION INTRAMUSCULAR; INTRAVENOUS ONCE
Status: DISCONTINUED | OUTPATIENT
Start: 2022-04-06 | End: 2022-04-07 | Stop reason: HOSPADM

## 2022-04-06 RX ORDER — MIDAZOLAM HYDROCHLORIDE 1 MG/ML
1 INJECTION INTRAMUSCULAR; INTRAVENOUS ONCE
Status: DISCONTINUED | OUTPATIENT
Start: 2022-04-06 | End: 2022-04-07 | Stop reason: HOSPADM

## 2022-04-06 RX ORDER — FENTANYL CITRATE 50 UG/ML
INJECTION, SOLUTION INTRAMUSCULAR; INTRAVENOUS
Status: COMPLETED | OUTPATIENT
Start: 2022-04-06 | End: 2022-04-06

## 2022-04-06 RX ORDER — MIDAZOLAM HYDROCHLORIDE 1 MG/ML
INJECTION INTRAMUSCULAR; INTRAVENOUS
Status: COMPLETED | OUTPATIENT
Start: 2022-04-06 | End: 2022-04-06

## 2022-04-06 RX ORDER — BACITRACIN, NEOMYCIN, POLYMYXIN B 400; 3.5; 5 [USP'U]/G; MG/G; [USP'U]/G
OINTMENT TOPICAL
Status: COMPLETED | OUTPATIENT
Start: 2022-04-06 | End: 2022-04-06

## 2022-04-06 RX ADMIN — FENTANYL CITRATE 25 MCG: 50 INJECTION, SOLUTION INTRAMUSCULAR; INTRAVENOUS at 10:47

## 2022-04-06 RX ADMIN — FENTANYL CITRATE 25 MCG: 50 INJECTION, SOLUTION INTRAMUSCULAR; INTRAVENOUS at 10:56

## 2022-04-06 RX ADMIN — MIDAZOLAM 0.5 MG: 1 INJECTION INTRAMUSCULAR; INTRAVENOUS at 10:47

## 2022-04-06 RX ADMIN — SODIUM CHLORIDE: 4.5 INJECTION, SOLUTION INTRAVENOUS at 08:59

## 2022-04-06 RX ADMIN — MIDAZOLAM 1 MG: 1 INJECTION INTRAMUSCULAR; INTRAVENOUS at 10:41

## 2022-04-06 RX ADMIN — FENTANYL CITRATE 50 MCG: 50 INJECTION, SOLUTION INTRAMUSCULAR; INTRAVENOUS at 10:40

## 2022-04-06 RX ADMIN — MIDAZOLAM 0.5 MG: 1 INJECTION INTRAMUSCULAR; INTRAVENOUS at 10:56

## 2022-04-06 RX ADMIN — BACITRACIN ZINC, NEOMYCIN SULFATE, AND POLYMYXIN B SULFATE 1 EACH: 400; 3.5; 5 OINTMENT TOPICAL at 11:16

## 2022-04-06 ASSESSMENT — PAIN DESCRIPTION - LOCATION
LOCATION: BACK
LOCATION: BACK

## 2022-04-06 ASSESSMENT — PAIN DESCRIPTION - PAIN TYPE
TYPE: CHRONIC PAIN
TYPE: ACUTE PAIN

## 2022-04-06 ASSESSMENT — PAIN SCALES - GENERAL
PAINLEVEL_OUTOF10: 3
PAINLEVEL_OUTOF10: 2
PAINLEVEL_OUTOF10: 4

## 2022-04-06 ASSESSMENT — PAIN DESCRIPTION - DESCRIPTORS
DESCRIPTORS: ACHING
DESCRIPTORS: CONSTANT;ACHING

## 2022-04-06 ASSESSMENT — PAIN DESCRIPTION - FREQUENCY: FREQUENCY: INTERMITTENT

## 2022-04-06 ASSESSMENT — PAIN DESCRIPTION - ORIENTATION: ORIENTATION: LOWER

## 2022-04-06 ASSESSMENT — PAIN DESCRIPTION - ONSET: ONSET: ON-GOING

## 2022-04-06 NOTE — POST SEDATION
Sedation Post Procedure Note    Patient Name: Melissa Bergman   YOB: 1965  Room/Bed: Room/bed info not found  Medical Record Number: [id number]  Date: 4/6/2022   Time: 11:22 AM         Physicians/Assistants: Anh Boone DO, MD    Procedure Performed:  CT guided DAWIT lung nodule biopsy    Post-Sedation Vital Signs:  Vitals:    04/06/22 1115   BP: 130/64   Pulse: 73   Resp: 15   Temp:    SpO2: 93%      Vital signs were reviewed and were stable after the procedure (see flow sheet for vitals)            Post-Sedation Exam: stable           Complications: none    Electronically signed by Anh Boone DO on 4/6/2022 at 11:22 AM

## 2022-04-06 NOTE — PROGRESS NOTES
0830: Patient arrived in wheelchair for lung biopsy.  and daughter at bedside. PT RIGHTS AND RESPONSIBILITIES OFFERED TO PT. Patient does not take any blood thinners. Has been NPO since last night. Bloodwork collected and sent to lab. IV fluid infusing.                   _m___ Safety:       (Environmental)   [place] to environment   Ensure ID band is correct and in place/ allergy band as needed   Assess for fall risk   Initiate fall precautions as applicable (fall band, side rails, etc.)   Call light within reach   Bed in low position/ wheels locked    _m___ Pain:        Assess pain level and characteristics   Administer analgesics as ordered   Assess effectiveness of pain management and report to MD as needed    _m___ Knowledge Deficit:   Assess baseline knowledge   Provide teaching at level of understanding   Provide teaching via preferred learning method   Evaluate teaching effectiveness    _m___ Hemodynamic/Respiratory Status:       (Pre and Post Procedure Monitoring)   Assess/Monitor vital signs and LOC   Assess Baseline SpO2 prior to any sedation   Obtain weight/height   Assess vital signs/ LOC until patient meets discharge criteria   Monitor procedure site and notify MD of any issues

## 2022-04-06 NOTE — H&P
Formulation and discussion of sedation / procedure plans, risks, benefits, side effects and alternatives with patient and/or responsible adult completed.     Electronically signed by Duke Demarco DO on 4/6/2022 at 10:18 AM

## 2022-04-06 NOTE — PROGRESS NOTES
1145: Bandaid clean, dry, intact. Vitals as charted. Provided with snack and beverage. 1200: Bandaid clean, dry, intact. Vitals as charted. 1215: Bandaid clean, dry, intact. Vitals as charted. 1230: Bandaid clean, dry, intact. Vitals as charted. Patient transported to bathroom via wheelchair. 1245: Bandaid clean, dry, intact. Vitals as charted. 1300: Patient transported to [facility] via cart. 1330: Back from xray. Bandaid clean, dry, intact. Vitals as charted. 1400: Bandaid clean, dry, intact. Vitals as charted. 1430: Bandaid clean, dry, intact. Vitals as charted. 1500: Patient transported to [facility] via cart. 1600: Patient back from [facility]. Per Dr. Angie Ng xray good and patient can be discharged. AVS reviewed with patient and , voiced understanding. Patient discharged in wheelchair.

## 2022-04-06 NOTE — PROGRESS NOTES
1130 pt admitted to opn per bed post procedure. bandaid to left upper chest area . No bleeding, swelling noted to  Area. Pt resp even and easy. Family at bedside. Pt states her pain is about a 3-4. It is back pain, stiffness from lying on the table.

## 2022-04-10 NOTE — PROGRESS NOTES
Mary Arias MD [facility]  General Surgery  Follow up  Evaluation in Office  Pt Name: Lynda Lai  Date of Birth 1965   Today's Date: 4/11/2022  Medical Record Number: [id number]  Referring Provider: No ref. provider found  Primary Care Provider: JASON Aiken - KASIA  Chief Complaint   Patient presents with    Results     f/u biopsy-4/6/2022     ASSESSMENT      Problem List Items Addressed This Visit     Abnormal PET scan, lung    Moderate COPD (chronic obstructive pulmonary disease) ([address] - Primary    Nicotine dependence    Nodule of upper lobe of left lung    LYNN (obstructive sleep apnea)    Oxygen dependent    Wheelchair dependence        Past Medical History:   Diagnosis Date    Anxiety     Arthritis     COPD (chronic obstructive pulmonary disease) (HCC)     Enlargement, spleen     Fatty liver     GERD (gastroesophageal reflux disease)     Hepatitis A     Hypertension     saw Dr Ermias Avendano in the past    Ovarian cyst     Oxygen dependent     o2 2liters when walking and at night-sees ALEJANDRO Hinds    Pneumonia     Sleep apnea           PLANS      1. Schedule Patience Screen for nothing at this time  Potential diagnostic and therapeutic modalities were discussed with the patient and her  who was present. I did discuss with her that I felt that the pathology is not representative of the lesion and that I recommended a second opinion at [facility] for an opinion on management because of her underlying lung function and comorbidities versus a 3-month follow-up however I did caution them that in 3 months if it was larger we be still left in the same space we are now deciding on management. She wanted to think about it for a while and she will let us know how she wants to proceed. She has very limited functional reserve she can even walk because of pain or shortness of breath and uses a wheelchair to get around and is also dependent on oxygen at home .   Based on her most recent pulmonary functions she would drop below 40% for FEV1 and DLCO to have a left upper lobectomy if this was cancer  Functionally she is very poor as she cannot walk because of pain and shortness of breath she has poor nutrition as she vomits and has abdominal pain on a regular basis. Reviewing her films in the office this nodule is very close to the chest wall and would likely have a very low risk for pneumothorax with biopsy. The lesion is rounded well-circumscribed and not clearly a carcinoma and has enlarged 2 mm in 6 months. In addition multiple other smaller noncalcified nodules too small to characterize showed up on the CT chest as well so based on her poor performance status I think CT-guided needle biopsy first..  Based on her poor pulmonary functions and her predicted postop values after left upper lobectomy she is certainly not a candidate for lobectomy and I think her pulmonary functions are bad enough that she would not tolerate 1 lung anesthesia to do a minimally invasive wedge resection and would require thoracotomy with wedge resection. 2.    3.   Orders Placed This Encounter:  No orders of the defined types were placed in this encounter. Enid Clark is a 61 y.o. female seen in the office today for follow-up of the enlarging left upper lobe mass. Because of her underlying poor medical condition and poor pulmonary functions I sent her for a CT-guided biopsy of this lesion to try and get a diagnosis. Pathology returned respiratory bronchiolitis which does not really explain this lung mass so I am not really sure this is an adequate sample of this lesion. She had a CT scan in August of last year that showed a 9 mm left upper lobe nodule peripheral rounded. She had a 6-month follow-up which showed that this was now 11 mm some smaller pulmonary nodules showed up in both lobes of the lungs but too small to characterize. PET scan was done showed that the lesion measured 1 cm and had an SUV of 6.0. The CT scan in August they read it as 7 mm but I measured it in the office today and it measured closer to 9 mm to me. She is a heavy smoking history and continues to smoke and has pulmonary functions as listed below with an FEV1 of 43% of predicted just barely over 1.0. She also complains of multiple other chronic pain issues pain in her back and her legs that she cannot walk and use a wheelchair she is using oxygen dependently. She has problems with eating with intermittent abdominal pain and vomiting and all these chronic issues been going on since she has had a case of pneumonia in the past she relates. Calculating predicted postop values for left upper lobectomy are below with a predicted FEV1 of 0.88 L and a predicted postop DLCO of only 39%. Certainly I do not think  these are acceptable numbers for resection if it was cancer    ppo FEV1 DAWIT=.88 Liters =36%  PpoDLCO=8.54 = 39%          Past Medical History  Past Medical History:   Diagnosis Date    Anxiety     Arthritis     COPD (chronic obstructive pulmonary disease) (HCC)     Enlargement, spleen     Fatty liver     GERD (gastroesophageal reflux disease)     Hepatitis A     Hypertension     saw Dr Ruthie Bello in the past    Ovarian cyst     Oxygen dependent     o2 2liters when walking and at night-sees ALEJANDRO [address]    Pneumonia     Sleep apnea        Past Surgical History  Past Surgical History:   Procedure Laterality Date    CARDIOVASCULAR STRESS TEST  2020    CT NEEDLE BIOPSY LUNG PERCUTANEOUS  4/6/2022    CT NEEDLE BIOPSY LUNG PERCUTANEOUS 4/6/2022 [facility] CT SCAN    TRANSTHORACIC ECHOCARDIOGRAM  2021    TRANSTHORACIC ECHOCARDIOGRAM  2020    [facility]INAL [facility]COPY      [facility]    WISDOM TOOTH EXTRACTION         Medications  Current Outpatient Medications on File Prior to Visit   Medication Sig Dispense Refill    ibuprofen (ADVIL;MOTRIN) 200 MG tablet Take 200 mg by mouth every 6 hours as needed for Pain      tiZANidine APAP pressure to 6-14 cm H20. 1 each 0    Lactobacillus (PROBIOTIC ACIDOPHILUS PO) Take 1 capsule by mouth daily       pantoprazole (PROTONIX) 40 MG tablet TAKE 1 TABLET BY MOUTH 2 TIMES DAILY NO REFILLS DUE FOR CHECK UP 60 tablet 10    fluticasone (FLONASE) 50 MCG/ACT nasal spray 2 SPRAYS BY NASAL ROUTE DAILY 1 Bottle 11     No current facility-administered medications on file prior to visit. Allergies  Allergies   Allergen Reactions    Pcn [Penicillins] Hives       Family History  Family History   Problem Relation Age of Onset    Heart Surgery Mother         dies at [address]    Heart Disease Mother     Other Sister         Cardiomyopathy    Other Brother         cardiomyopathy    Stroke Maternal Grandmother     Heart Attack Maternal Grandfather     Heart Attack Paternal Grandfather        Social History  Social History     Socioeconomic History    Marital status:      Spouse name: Not on file    Number of children: Not on file    Years of education: Not on file    Highest education level: Not on file   Occupational History    Not on file   Tobacco Use    Smoking status: Current Every Day Smoker     Packs/day: 1.50     Years: 65.00     Pack years: 97.50     Types: Cigarettes     Start date: 1/1/1957    Smokeless tobacco: Never Used    Tobacco comment: 0.5 ppd 3/10/22   Vaping Use    Vaping Use: Never used   Substance and Sexual Activity    Alcohol use: Yes     Comment: several times/week    Drug use: Not Currently    Sexual activity: Not on file   Other Topics Concern    Not on file   Social History Narrative    Not on file     Social Determinants of Health     Financial Resource Strain: Low Risk     Difficulty of Paying Living Expenses: Not hard at all   Food Insecurity: No Food Insecurity    Worried About [address] in the Last Year: Never true    [facility] [facility] in the Last Year: Never true   Transportation Needs:     Lack of Transportation (Medical):  Not on file    Lack of Transportation (Non-Medical): Not on file   Physical Activity:     Days of Exercise per Week: Not on file    Minutes of Exercise per Session: Not on file   Stress:     Feeling of Stress : Not on file   Social Connections:     Frequency of Communication with Friends and Family: Not on file    Frequency of Social Gatherings with Friends and Family: Not on file    Attends Evangelical Services: Not on file    Active Member of [address] [place] [address] or Organizations: Not on file    Attends Club or Organization Meetings: Not on file    Marital Status: Not on file   Intimate Partner Violence:     Fear of Current or Ex-Partner: Not on file    Emotionally Abused: Not on file    Physically Abused: Not on file    Sexually Abused: Not on file   Housing Stability:     Unable to Pay for Housing in the Last Year: Not on file    Number of [place] in the Last Year: Not on file    Unstable Housing in the Last Year: Not on [address] Maintenance   Topic Date Due    Cervical cancer screen  Never done    Colorectal Cancer Screen  Never done    Breast cancer screen  09/25/2021    COVID-19 Vaccine (1) 06/14/2022 (Originally 5/24/1970)    Shingles Vaccine (1 of 2) 02/14/2023 (Originally 5/24/2014)    Depression Screen  06/14/2022    Potassium monitoring  12/27/2022    Creatinine monitoring  04/06/2023    Low dose CT lung screening  04/06/2023    Lipid screen  09/19/2024    Pneumococcal 0-64 years Vaccine (2 of 2 - PPSV23) 05/24/2029    DTaP/Tdap/Td vaccine (2 - Td or Tdap) 06/14/2031    Flu vaccine  Completed    Hepatitis C screen  Completed    Hepatitis A vaccine  Aged Out    Hepatitis B vaccine  Aged Out    Hib vaccine  Aged Out    Meningococcal (ACWY) vaccine  Aged Out    HIV screen  Discontinued       Review of Systems  Constitutional: Negative for activity change, appetite change, chills, diaphoresis, fatigue, fever and unexpected weight change.    HENT: Negative for congestion, dental problem, drooling, ear discharge, ear pain, facial swelling, hearing loss, mouth sores, nosebleeds, postnasal drip, rhinorrhea, sinus pressure, sneezing, sore throat, tinnitus, trouble swallowing and voice change. Eyes: Negative for photophobia, pain, discharge, redness, itching and visual disturbance. Respiratory: Positive for cough, shortness of breath and wheezing. Negative for apnea, choking, chest tightness and stridor. Cardiovascular: Negative for chest pain, palpitations and leg swelling. Gastrointestinal: Negative for abdominal distention, abdominal pain, anal bleeding, blood in stool, constipation, diarrhea, nausea, rectal pain and vomiting. Endocrine: Negative for cold intolerance, heat intolerance, polydipsia, polyphagia and polyuria. Genitourinary: Negative for decreased urine volume, difficulty urinating, dysuria, enuresis, flank pain, frequency, genital sores, hematuria and urgency. Musculoskeletal: Positive for gait problem. Negative for arthralgias, back pain, joint swelling, myalgias, neck pain and neck stiffness. Skin: Negative for color change, pallor, rash and wound. Allergic/Immunologic: Negative for environmental allergies, food allergies and immunocompromised state. Neurological: Negative for dizziness, tremors, seizures, syncope, facial asymmetry, speech difficulty, weakness, light-headedness, numbness and headaches. Hematological: Negative for adenopathy. Does not bruise/bleed easily. Psychiatric/Behavioral: Negative for agitation, behavioral problems, confusion, decreased concentration, dysphoric mood, hallucinations, self-injury, sleep disturbance and suicidal ideas. The patient is not nervous/anxious and is not hyperactive    OBJECTIVE    VITALS:  height is 5' 2\" (1.575 m) and weight is 193 lb (87.5 kg). Her temporal temperature is 97.8 °F (36.6 °C). Her blood pressure is 118/60 and her pulse is 88.  Her respiration is 20 and oxygen saturation is 91%. CONSTITUTIONAL: Alert and oriented times 3, no acute distress and cooperative to examination with proper mood and affect. Sitting in a wheelchair not obviously short of breath                     Electronically signed by Claudette Ch MD on 4/11/2022 at 10:41 AM   The patients records and films were reviewed independently. We reviewed her pathology report with both her and her  reviewing the terminology and at the results meant. We also discussed the issues of making sure that a needle biopsy was felt to be representative of what was expected from the result and to me it was not an adequate explanation. Time was given for questions . All questions were answered to the patients satisfaction. A total time of 20 minutes  was spent with at least 51% related to counseling and coordination of care.

## 2022-04-11 ENCOUNTER — TELEPHONE (OUTPATIENT)
Dept: PULMONOLOGY | Age: 57
End: 2022-04-11

## 2022-04-11 ENCOUNTER — OFFICE VISIT (OUTPATIENT)
Dept: SURGERY | Age: 57
End: 2022-04-11
Payer: COMMERCIAL

## 2022-04-11 VITALS
TEMPERATURE: 97.8 F | RESPIRATION RATE: 20 BRPM | DIASTOLIC BLOOD PRESSURE: 60 MMHG | BODY MASS INDEX: 35.51 KG/M2 | SYSTOLIC BLOOD PRESSURE: 118 MMHG | OXYGEN SATURATION: 91 % | HEIGHT: 62 IN | HEART RATE: 88 BPM | WEIGHT: 193 LBS

## 2022-04-11 DIAGNOSIS — R94.2 ABNORMAL PET SCAN, LUNG: ICD-10-CM

## 2022-04-11 DIAGNOSIS — R91.1 NODULE OF UPPER LOBE OF LEFT LUNG: ICD-10-CM

## 2022-04-11 DIAGNOSIS — F17.218 CIGARETTE NICOTINE DEPENDENCE WITH OTHER NICOTINE-INDUCED DISORDER: ICD-10-CM

## 2022-04-11 DIAGNOSIS — Z99.81 OXYGEN DEPENDENT: ICD-10-CM

## 2022-04-11 DIAGNOSIS — Z99.3 WHEELCHAIR DEPENDENCE: ICD-10-CM

## 2022-04-11 DIAGNOSIS — J44.9 MODERATE COPD (CHRONIC OBSTRUCTIVE PULMONARY DISEASE) (HCC): Primary | ICD-10-CM

## 2022-04-11 DIAGNOSIS — G47.33 OSA (OBSTRUCTIVE SLEEP APNEA): ICD-10-CM

## 2022-04-11 PROCEDURE — 4004F PT TOBACCO SCREEN RCVD TLK: CPT | Performed by: SURGERY

## 2022-04-11 PROCEDURE — 99213 OFFICE O/P EST LOW 20 MIN: CPT | Performed by: SURGERY

## 2022-04-11 PROCEDURE — G8417 CALC BMI ABV UP PARAM F/U: HCPCS | Performed by: SURGERY

## 2022-04-11 PROCEDURE — 3023F SPIROM DOC REV: CPT | Performed by: SURGERY

## 2022-04-11 PROCEDURE — 3017F COLORECTAL CA SCREEN DOC REV: CPT | Performed by: SURGERY

## 2022-04-11 PROCEDURE — G8427 DOCREV CUR MEDS BY ELIG CLIN: HCPCS | Performed by: SURGERY

## 2022-04-11 NOTE — LETTER
[facility]  Aimee Loco MD FACS  Phone- [phone]  Fax [phone]    Pt Name: [address] Record Number: [id number]  Date of Birth 1965   Today's Date: 4/11/2022    Hue Barber was evaluated in the office today. My assessment and plans are listed below. Assessment:     Hue Mccabe was seen today for results. Diagnoses and all orders for this visit:    Moderate COPD (chronic obstructive pulmonary disease) (HCC)    Cigarette nicotine dependence with other nicotine-induced disorder    Nodule of upper lobe of left lung    LYNN (obstructive sleep apnea)    Oxygen dependent    Wheelchair dependence    Abnormal PET scan, lung         Plan:  1. Schedule Hue Mccabe for nothing at this time  Potential diagnostic and therapeutic modalities were discussed with the patient and her  who was present. I did discuss with her that I felt that the pathology is not representative of the lesion and that I recommended a second opinion at [facility] for an opinion on management because of her underlying lung function and comorbidities versus a 3-month follow-up however I did caution them that in 3 months if it was larger we be still left in the same space we are now deciding on management. She wanted to think about it for a while and she will let us know how she wants to proceed. She has very limited functional reserve she can even walk because of pain or shortness of breath and uses a wheelchair to get around and is also dependent on oxygen at home . Based on her most recent pulmonary functions she would drop below 40% for FEV1 and DLCO to have a left upper lobectomy if this was cancer  Functionally she is very poor as she cannot walk because of pain and shortness of breath she has poor nutrition as she vomits and has abdominal pain on a regular basis.   Reviewing her films in the office this nodule is very close to the chest wall and would likely have a very low risk for pneumothorax with biopsy. The lesion is rounded well-circumscribed and not clearly a carcinoma and has enlarged 2 mm in 6 months. In addition multiple other smaller noncalcified nodules too small to characterize showed up on the CT chest as well so based on her poor performance status I think CT-guided needle biopsy first..  Based on her poor pulmonary functions and her predicted postop values after left upper lobectomy she is certainly not a candidate for lobectomy and I think her pulmonary functions are bad enough that she would not tolerate 1 lung anesthesia to do a minimally invasive wedge resection and would require thoracotomy with wedge resection. 2.    3.   Orders Placed This Encounter:  No orders of the defined types were placed in this encounter. If I can provide any additional assistance or you have any concerns, please feel free to contact me. Thank you for allowing to participate in the care of your patients. Sincerely,      Ajay Penny MD FACS  [address]  [place], [address]  Office: [phone]  Fax: [phone]

## 2022-04-11 NOTE — TELEPHONE ENCOUNTER
This is an [place] patient. Patient was switched to [facility] from Trelegy because Trelegy was too hard to use. She was given a couple of samples but she is almost out and is waiting on a appeal from her insurance. She wanted us to mail her samples but I told her that she would have to pick them up. She does not want to come to [place] so she wants to know if it is ok for her to use her Trelegy until we hear from insurance.

## 2022-04-13 ENCOUNTER — TELEPHONE (OUTPATIENT)
Dept: PULMONOLOGY | Age: 57
End: 2022-04-13

## 2022-04-18 ENCOUNTER — OFFICE VISIT (OUTPATIENT)
Dept: PULMONOLOGY | Age: 57
End: 2022-04-18
Payer: COMMERCIAL

## 2022-04-18 VITALS
BODY MASS INDEX: 35.33 KG/M2 | HEIGHT: 62 IN | SYSTOLIC BLOOD PRESSURE: 126 MMHG | TEMPERATURE: 97.7 F | HEART RATE: 91 BPM | WEIGHT: 192 LBS | DIASTOLIC BLOOD PRESSURE: 78 MMHG | OXYGEN SATURATION: 96 %

## 2022-04-18 DIAGNOSIS — Z72.0 TOBACCO ABUSE: ICD-10-CM

## 2022-04-18 DIAGNOSIS — J44.9 MODERATE COPD (CHRONIC OBSTRUCTIVE PULMONARY DISEASE) (HCC): ICD-10-CM

## 2022-04-18 DIAGNOSIS — R94.2 ABNORMAL PET SCAN, LUNG: Primary | ICD-10-CM

## 2022-04-18 DIAGNOSIS — R91.1 LUNG NODULE, SOLITARY: ICD-10-CM

## 2022-04-18 PROCEDURE — 99214 OFFICE O/P EST MOD 30 MIN: CPT | Performed by: NURSE PRACTITIONER

## 2022-04-18 RX ORDER — GUAIFENESIN 600 MG/1
600 TABLET, EXTENDED RELEASE ORAL 2 TIMES DAILY
Qty: 60 TABLET | Refills: 3 | Status: SHIPPED | OUTPATIENT
Start: 2022-04-18 | End: 2022-08-01

## 2022-04-18 ASSESSMENT — ENCOUNTER SYMPTOMS
COUGH: 1
TROUBLE SWALLOWING: 0
SINUS PRESSURE: 0
WHEEZING: 1
CHEST TIGHTNESS: 1
CONSTIPATION: 1
NAUSEA: 0
HEARTBURN: 1
DIARRHEA: 1
SORE THROAT: 0
VOMITING: 0
EYE ITCHING: 0
SHORTNESS OF BREATH: 1
RHINORRHEA: 0
SINUS PAIN: 0

## 2022-04-18 ASSESSMENT — COPD QUESTIONNAIRES: COPD: 1

## 2022-04-18 NOTE — PROGRESS NOTES
[facility]    Patient: Ana Sexton, 61 y.o.   : 1965    Patient of Dr. Kym Sellers   Patient presents with    Follow-up     2 weeks after inital visit with Carlos Manuel ROMO  She complains of cough, shortness of breath and wheezing. This is a chronic problem. The current episode started more than 1 year ago. The problem occurs daily. The problem has been gradually improving. The cough is productive of sputum and productive. Associated symptoms include headaches, heartburn, myalgias (worse with using trelegy) and orthopnea. Pertinent negatives include no appetite change, chest pain, fever, nasal congestion, postnasal drip, rhinorrhea, sneezing, sore throat or trouble swallowing. Her symptoms are aggravated by exercise, change in weather, exposure to fumes and lying down. Her symptoms are alleviated by beta-agonist and rest. She reports moderate improvement on treatment. She admits to significant limitation in her physical activity due to the COPD and breathing issues. She has severe difficulty getting up the stairs and is unable to get and downstairs on her own. She states that she had significant improvement in her symptoms with using the breztri, though was not initially covered by her insurance so she had to go back to using the trelegy about one week ago. She states that with the trelegy she has increased symptoms of shortness of breath and wheezing. She admits to also having issues with leg and feet pains with using the trelegy though not as bad as she had when she was using the anoro. She usually has increased productive cough with phlegm mostly in the mornings. She was previously taking mucinex twice a day which helped though ran out. Nicki Mendez is here for follow up after her biopsy and visit with Dr. Flakito Doran.  She states that her biopsy results were inconclusive and was offered to have a second opinion and repeat biopsy or to wait and repeat imaging in 3 months. Progress History:   Since last visit any new medical issues? No  New ER or hospital visits? Yes biopsy   Any new or changes in medicines? Yes recently switched back to trelegy   Using inhalers? Yes,   Are they helpful? Yes   Previous inhalers? Anoro, breztri  Any recent exacerbations? no  Last PFT: 6/29/2020 - moderate obstruction  Last 6 MWT: 2/17/2022 - 2 lpm with exertion  Last A1A: Serum level of 133 on 10/10/2019     Smoking History:  Current smoker of 0.5 PPD with 47 PY history  She has been using a nicotrol inhaler to try to quit smoking. She is not currently using nicotrol inhaler  She has an blanco on her phone for smoking cessation     Social History:  Patient job history: worked for paint shop and cleaned houses and [address] not had exposure to aerosolized particles or hazardous fumes. (Coal, dust, asbestos, molds ie Hay)  [place]  Denies exposure to pets/animals at home. Denies exposure to tuberculosis. Denies history of glaucoma or urinary retention.     Flu vaccine: has not received and does not plan to   Pneumonia vaccine: [id number] 6/14/2021 - up to date   COVID-19 vaccine: has not received and reports that she is scared to receive  Past Medical hx   PMH:  Past Medical History:   Diagnosis Date    Anxiety     Arthritis     COPD (chronic obstructive pulmonary disease) (HCC)     Enlargement, spleen     Fatty liver     GERD (gastroesophageal reflux disease)     Hepatitis A     Hypertension     saw Dr Rupal Thibodeaux in the past    Ovarian cyst     Oxygen dependent     o2 2liters when walking and at night-sees ALEJANDRO Hinds    Pneumonia     Sleep apnea      SURGICAL HISTORY:  Past Surgical History:   Procedure Laterality Date    CARDIOVASCULAR STRESS TEST  2020    CT NEEDLE BIOPSY LUNG PERCUTANEOUS  4/6/2022    CT NEEDLE BIOPSY LUNG PERCUTANEOUS 4/6/2022 [facility] SCAN    TRANSTHORACIC ECHOCARDIOGRAM  2021    TRANSTHORACIC ECHOCARDIOGRAM  2020    [facility]COPY      [facility]    WISDOM TOOTH EXTRACTION       SOCIAL HISTORY:  Social History     Tobacco Use    Smoking status: Current Every Day Smoker     Packs/day: 1.50     Years: 65.00     Pack years: 97.50     Types: Cigarettes     Start date: 1/1/1957    Smokeless tobacco: Never Used    Tobacco comment: 0.5 ppd 4/18/22   Vaping Use    Vaping Use: Never used   Substance Use Topics    Alcohol use: Yes     Comment: several times/week    Drug use: Not Currently     ALLERGIES:  Allergies   Allergen Reactions    Pcn [Penicillins] Hives     FAMILY HISTORY:  Family History   Problem Relation Age of Onset    Heart Surgery Mother         dies at 29    Heart Disease Mother     Other Sister         Cardiomyopathy    Other Brother         cardiomyopathy    Stroke Maternal Grandmother     Heart Attack Maternal Grandfather     Heart Attack Paternal Grandfather      CURRENT MEDICATIONS:  Current Outpatient Medications   Medication Sig Dispense Refill    Fluticasone-Umeclidin-Vilant (TRELEGY ELLIPTA IN) Inhale into the lungs      guaiFENesin (MUCINEX) 600 MG extended release tablet Take 1 tablet by mouth 2 times daily 60 tablet 3    ibuprofen (ADVIL;MOTRIN) 200 MG tablet Take 200 mg by mouth every 6 hours as needed for Pain      tiZANidine (ZANAFLEX) 4 MG tablet Take 1 tablet by mouth every 8 hours as needed (pain) 30 tablet 0    lisinopril (PRINIVIL;ZESTRIL) 30 MG tablet Take 1 tablet by mouth daily 90 tablet 3    lidocaine (XYLOCAINE) 5 % ointment Apply topically as needed.  50 g 0    nicotine (NICOTROL) 10 MG inhaler Inhale 1 puff into the lungs as needed for Smoking cessation 1 each 3    Incontinence Supply Disposable (BLADDER CONTROL PADS EX ABSORB) MISC Use up to 4 a day 120 each 3    Probiotic Product (PROBIOTIC ADVANCED PO) Take by mouth      Incontinence Supply Disposable (PROCARE ADULT BRIEFS LARGE) MISC Use 2 a day 60 each 5    acetaminophen (TYLENOL) 500 MG tablet Take 1-2 tablets by mouth 4 times daily as needed for Pain 120 tablet 5    Multiple Vitamins-Minerals (THERAPEUTIC MULTIVITAMIN-MINERALS) tablet Take 1 tablet by mouth daily 30 tablet 11    albuterol sulfate HFA (VENTOLIN HFA) 108 (90 Base) MCG/ACT inhaler Inhale 2 puffs into the lungs every 6 hours as needed for Wheezing or Shortness of Breath 18 g 11    bumetanide (BUMEX) 1 MG tablet Take 1 tablet by mouth daily 90 tablet 3    carvedilol (COREG) 25 MG tablet Take 1 tablet by mouth 2 times daily (with meals) 180 tablet 3    cloNIDine (CATAPRES) 0.1 MG tablet Take 1 tablet by mouth daily 90 tablet 3    sucralfate (CARAFATE) 1 GM tablet Take 1 tablet by mouth 4 times daily 120 tablet 5    ondansetron (ZOFRAN) 8 MG tablet Take 1 tablet by mouth every 8 hours as needed for Nausea or Vomiting 30 tablet 5    CPAP Machine MISC by Does not apply route Please change APAP pressure to 6-14 cm H20. 1 each 0    Lactobacillus (PROBIOTIC ACIDOPHILUS PO) Take 1 capsule by mouth daily       Budeson-Glycopyrrol-Formoterol (BREZTRI AEROSPHERE) 160-9-4.8 MCG/ACT AERO Inhale 2 puffs into the lungs 2 times daily (Patient not taking: Reported on 4/18/2022) 10.7 g 11    albuterol (PROVENTIL) (2.5 MG/3ML) 0.083% nebulizer solution Take 3 mLs by nebulization every 6 hours as needed for Wheezing or Shortness of Breath (Patient not taking: Reported on 4/18/2022) 120 each 11    pantoprazole (PROTONIX) 40 MG tablet TAKE 1 TABLET BY MOUTH 2 TIMES DAILY NO REFILLS DUE FOR CHECK UP 60 tablet 10    fluticasone (FLONASE) 50 MCG/ACT nasal spray 2 SPRAYS BY NASAL ROUTE DAILY 1 Bottle 11     No current facility-administered medications for this visit. Jacob WEEMS   Review of Systems   Constitutional: Negative for appetite change, chills, fever and unexpected weight change.    HENT: Negative for congestion, postnasal drip, rhinorrhea, sinus pressure, sinus pain, sneezing, sore throat and trouble swallowing. Eyes: Positive for visual disturbance (wears glasses). Negative for itching. Respiratory: Positive for cough, chest tightness (occasionally ), shortness of breath and wheezing. Cardiovascular: Negative for chest pain, palpitations and leg swelling. Gastrointestinal: Positive for constipation, diarrhea and heartburn. Negative for nausea and vomiting. Genitourinary: Negative for difficulty urinating. Musculoskeletal: Positive for gait problem (related to shortness of breath) and myalgias (worse with using trelegy). Allergic/Immunologic: Positive for environmental allergies. Neurological: Positive for dizziness, light-headedness and headaches. Physical exam   /78 (Site: Left Upper Arm, Position: Sitting, Cuff Size: Medium Adult)   Pulse 91   Temp 97.7 °F (36.5 °C) (Oral)   Ht 5' 2\" (1.575 m)   Wt 192 lb (87.1 kg)   SpO2 96%   BMI 35.12 kg/m²    Wt Readings from Last 3 Encounters:   04/18/22 192 lb (87.1 kg)   04/11/22 193 lb (87.5 kg)   04/06/22 193 lb (87.5 kg)       Physical Exam  Constitutional:       General: She is not in acute distress. HENT:      Head: Normocephalic and atraumatic. Right Ear: External ear normal.      Left Ear: External ear normal.      Nose: No congestion or rhinorrhea. Mouth/Throat:      Mouth: Mucous membranes are moist.      Pharynx: No oropharyngeal exudate or posterior oropharyngeal erythema. Eyes:      General:         Right eye: No discharge. Left eye: No discharge. Cardiovascular:      Rate and Rhythm: Normal rate and regular rhythm. Pulses: Normal pulses. Heart sounds: Normal heart sounds. Pulmonary:      Effort: Pulmonary effort is normal.      Breath sounds: No wheezing, rhonchi or rales. Chest:      Chest wall: No tenderness. Abdominal:      General: Bowel sounds are normal.      Palpations: Abdomen is soft. Tenderness: There is no abdominal tenderness. There is no guarding. Musculoskeletal:      Cervical back: Neck supple. Right lower leg: No edema. Left lower leg: No edema. Skin:     Capillary Refill: Capillary refill takes less than 2 seconds. Neurological:      General: No focal deficit present. Mental Status: She is alert. Psychiatric:         Mood and Affect: Mood normal.         Behavior: Behavior normal.         Thought Content: Thought content normal.          Results   Lung Nodule Screening     [x] Qualifies    [] Does not qualify   [] Declined    [] Completed     The USPSTF recommends annual screening for lung cancer with low-dose computed tomography (LDCT) in adults aged 48 to [de-identified] years who have a 20 pack-year smoking history and currently smoke or have quit within the past 15 years. Screening should be discontinued once a person has not smoked for 15 years or develops a health problem that substantially limits life expectancy or the ability or willingness to have curative lung surgery. Assessment      Diagnosis Orders   1. Abnormal PET scan, lung  CT CHEST WO CONTRAST   2. Lung nodule, solitary  CT CHEST WO CONTRAST   3. Moderate COPD (chronic obstructive pulmonary disease) (HCC)  guaiFENesin (MUCINEX) 600 MG extended release tablet   4. Tobacco abuse           Plan   1. Abnormal PET scan, lung  - Patient declines to have second opinion or additional biopsy completed at this time and prefers to monitor with 3 month CT scan. Discussed risks and benefits with patient and .   - CT CHEST WO CONTRAST; Future    2. Lung nodule, solitary    - CT CHEST WO CONTRAST; Future    3. Moderate COPD (chronic obstructive pulmonary disease) ([facility] [address]  -Discontinue trelegy use; restart breztri as previously ordered as prior authorization was approved and patient can  breztri from pharmacy today   - guaiFENesin (MUCINEX) 600 MG extended release tablet; Take 1 tablet by mouth 2 times daily  Dispense: 60 tablet;  Refill: 3  - Continue albuterol neb or inhaler prn - Discussed albuterol inhaler and nebulizer use with the patient. Reviewed signs and symptoms indicating need for use including shortness of breath and wheezing. Discussed with the patient the importance of using the inhaler or nebulizer within the prescribed time frames. Patient verbalized understanding to use one or the other not both within prescribed time frame. Patient also verbalized understanding that if there is no relief of symptoms after using albuterol and resting for 15 minutes they need to go to nearest ER or call 911.    4. Tobacco abuse         - Advised to maintain pneumonia vaccine with PCP and to take flu vaccine this coming season. Advised patient to call office with any changes, questions, or concerns regarding respiratory status or issues with prescribed medications    Return in about 2 months (around 6/18/2022).        Electronically signed by Hardy Barthel, APRN - CNP on 4/18/2022 at 3:23 PM

## 2022-04-25 DIAGNOSIS — J30.9 ALLERGIC RHINITIS, UNSPECIFIED SEASONALITY, UNSPECIFIED TRIGGER: ICD-10-CM

## 2022-04-25 RX ORDER — FLUTICASONE PROPIONATE 50 MCG
2 SPRAY, SUSPENSION (ML) NASAL DAILY
Qty: 16 G | Refills: 11 | Status: SHIPPED | OUTPATIENT
Start: 2022-04-25 | End: 2023-04-25

## 2022-04-25 NOTE — TELEPHONE ENCOUNTER
Received refill request for flonase . Medication was last ordered by Jose. Medication was last ordered on 4/2/21 with 11 refills. Patient was last seen in the office 4/18/22. Patient has a scheduled follow up 6/13/22.

## 2022-05-18 ENCOUNTER — OFFICE VISIT (OUTPATIENT)
Dept: FAMILY MEDICINE CLINIC | Age: 57
End: 2022-05-18
Payer: COMMERCIAL

## 2022-05-18 VITALS
TEMPERATURE: 96.9 F | HEART RATE: 93 BPM | SYSTOLIC BLOOD PRESSURE: 140 MMHG | BODY MASS INDEX: 34.93 KG/M2 | WEIGHT: 191 LBS | RESPIRATION RATE: 16 BRPM | DIASTOLIC BLOOD PRESSURE: 72 MMHG | OXYGEN SATURATION: 96 %

## 2022-05-18 DIAGNOSIS — R91.8 LUNG MASS: ICD-10-CM

## 2022-05-18 DIAGNOSIS — I10 ESSENTIAL HYPERTENSION: Primary | ICD-10-CM

## 2022-05-18 DIAGNOSIS — J43.9 PULMONARY EMPHYSEMA, UNSPECIFIED EMPHYSEMA TYPE (HCC): ICD-10-CM

## 2022-05-18 DIAGNOSIS — F41.9 ANXIETY: ICD-10-CM

## 2022-05-18 PROCEDURE — 3023F SPIROM DOC REV: CPT | Performed by: NURSE PRACTITIONER

## 2022-05-18 PROCEDURE — G8417 CALC BMI ABV UP PARAM F/U: HCPCS | Performed by: NURSE PRACTITIONER

## 2022-05-18 PROCEDURE — G8427 DOCREV CUR MEDS BY ELIG CLIN: HCPCS | Performed by: NURSE PRACTITIONER

## 2022-05-18 PROCEDURE — 4004F PT TOBACCO SCREEN RCVD TLK: CPT | Performed by: NURSE PRACTITIONER

## 2022-05-18 PROCEDURE — 3017F COLORECTAL CA SCREEN DOC REV: CPT | Performed by: NURSE PRACTITIONER

## 2022-05-18 PROCEDURE — 99214 OFFICE O/P EST MOD 30 MIN: CPT | Performed by: NURSE PRACTITIONER

## 2022-05-18 RX ORDER — ALPRAZOLAM 0.25 MG/1
TABLET ORAL
COMMUNITY
Start: 2022-03-23

## 2022-05-18 RX ORDER — EPINEPHRINE 0.3 MG/.3ML
0.3 INJECTION SUBCUTANEOUS ONCE
Qty: 0.3 ML | Refills: 0 | Status: SHIPPED | OUTPATIENT
Start: 2022-05-18 | End: 2022-08-18

## 2022-05-18 ASSESSMENT — ENCOUNTER SYMPTOMS
SHORTNESS OF BREATH: 1
COUGH: 1
EYES NEGATIVE: 1
VOMITING: 1

## 2022-05-18 NOTE — PROGRESS NOTES
Jovanny Jansen is a 61 y.o. female whopresents today for :  Chief Complaint   Patient presents with    3 Month Follow-Up    Insect Bite     discuss epi pen       HPI:     HPI  Pt here for fu. Reviewed her recent biopsy and pet scan. Going to repeat ct chest in 3months. Pt reports that she has periodic vomiting. Another issue is pt is highly allergic to bee stings. Would like epipen     Patient Active Problem List   Diagnosis    Hypertension, uncontrolled    Anxiety    Gastroesophageal reflux disease without esophagitis    Seasonal allergies    Bronchospasm    History of tobacco abuse    Moderate COPD (chronic obstructive pulmonary disease) (HCC)    Allergic rhinitis    Nodule of upper lobe of left lung    Pulmonary emphysema (HCC)    LYNN (obstructive sleep apnea)    COPD exacerbation (HCC)    Nicotine dependence    Abnormal PET scan, lung    Oxygen dependent    Wheelchair dependence        Past Medical History:   Diagnosis Date    Anxiety     Arthritis     COPD (chronic obstructive pulmonary disease) (HCC)     Enlargement, spleen     Fatty liver     GERD (gastroesophageal reflux disease)     Hepatitis A     Hypertension     saw Dr Ruth Salgado in the past    Ovarian cyst     Oxygen dependent     o2 2liters when walking and at night-sees ALEJANDRO Hinds    Pneumonia     Sleep apnea       Past Surgical History:   Procedure Laterality Date    CARDIOVASCULAR STRESS TEST  2020    CT NEEDLE BIOPSY LUNG PERCUTANEOUS  4/6/2022    CT NEEDLE BIOPSY LUNG PERCUTANEOUS 4/6/2022 [facility] CT SCAN    TRANSTHORACIC ECHOCARDIOGRAM  2021    TRANSTHORACIC ECHOCARDIOGRAM  2020    [facility]AL [facility]OSCOPY      [facility]    WISDOM TOOTH EXTRACTION       Family History   Problem Relation Age of Onset    Heart Surgery Mother         dies at 29    Heart Disease Mother     Other Sister         Cardiomyopathy    Other Brother         cardiomyopathy    Stroke Maternal Grandmother     Heart Attack Maternal Grandfather     Heart Attack Paternal Grandfather      Social History     Tobacco Use    Smoking status: Current Every Day Smoker     Packs/day: 1.50     Years: 65.00     Pack years: 97.50     Types: Cigarettes     Start date: 1/1/1957    Smokeless tobacco: Never Used    Tobacco comment: 0.5 ppd 4/18/22   Substance Use Topics    Alcohol use: Yes     Comment: several times/week      Current Outpatient Medications   Medication Sig Dispense Refill    EPINEPHrine (EPIPEN 2-JERE) 0.3 MG/0.3ML SOAJ injection Inject 0.3 mLs into the skin once for 1 dose Use as directed for allergic reaction 0.3 mL 0    fluticasone (FLONASE) 50 MCG/ACT nasal spray 2 SPRAYS BY NASAL ROUTE DAILY 16 g 11    guaiFENesin (MUCINEX) 600 MG extended release tablet Take 1 tablet by mouth 2 times daily 60 tablet 3    ibuprofen (ADVIL;MOTRIN) 200 MG tablet Take 200 mg by mouth every 6 hours as needed for Pain      lisinopril (PRINIVIL;ZESTRIL) 30 MG tablet Take 1 tablet by mouth daily 90 tablet 3    Budeson-Glycopyrrol-Formoterol (BREZTRI AEROSPHERE) 160-9-4.8 MCG/ACT AERO Inhale 2 puffs into the lungs 2 times daily 10.7 g 11    lidocaine (XYLOCAINE) 5 % ointment Apply topically as needed.  50 g 0    Incontinence Supply Disposable (BLADDER CONTROL PADS EX ABSORB) MISC Use up to 4 a day 120 each 3    Probiotic Product (PROBIOTIC ADVANCED PO) Take by mouth      Incontinence Supply Disposable (PROCARE ADULT BRIEFS LARGE) MISC Use 2 a day 60 each 5    acetaminophen (TYLENOL) 500 MG tablet Take 1-2 tablets by mouth 4 times daily as needed for Pain 120 tablet 5    Multiple Vitamins-Minerals (THERAPEUTIC MULTIVITAMIN-MINERALS) tablet Take 1 tablet by mouth daily 30 tablet 11    albuterol sulfate HFA (VENTOLIN HFA) 108 (90 Base) MCG/ACT inhaler Inhale 2 puffs into the lungs every 6 hours as needed for Wheezing or Shortness of Breath 18 g 11    albuterol (PROVENTIL) (2.5 MG/3ML) 0.083% nebulizer solution Take 3 mLs by nebulization every 6 hours as needed for Wheezing or Shortness of Breath 120 each 11    pantoprazole (PROTONIX) 40 MG tablet TAKE 1 TABLET BY MOUTH 2 TIMES DAILY NO REFILLS DUE FOR CHECK UP 60 tablet 10    bumetanide (BUMEX) 1 MG tablet Take 1 tablet by mouth daily 90 tablet 3    carvedilol (COREG) 25 MG tablet Take 1 tablet by mouth 2 times daily (with meals) 180 tablet 3    cloNIDine (CATAPRES) 0.1 MG tablet Take 1 tablet by mouth daily 90 tablet 3    sucralfate (CARAFATE) 1 GM tablet Take 1 tablet by mouth 4 times daily 120 tablet 5    ondansetron (ZOFRAN) 8 MG tablet Take 1 tablet by mouth every 8 hours as needed for Nausea or Vomiting 30 tablet 5    CPAP Machine MISC by Does not apply route Please change APAP pressure to 6-14 cm H20. 1 each 0    Lactobacillus (PROBIOTIC ACIDOPHILUS PO) Take 1 capsule by mouth daily       ALPRAZolam (XANAX) 0.25 MG tablet       tiZANidine (ZANAFLEX) 4 MG tablet Take 1 tablet by mouth every 8 hours as needed (pain) 30 tablet 0     No current facility-administered medications for this visit. Allergies   Allergen Reactions    Pcn [Penicillins] Hives     Health Maintenance   Topic Date Due    Cervical cancer screen  Never done    Colorectal Cancer Screen  Never done    Breast cancer screen  09/25/2021    Depression Screen  06/14/2022    COVID-19 Vaccine (1) 06/14/2022 (Originally 5/24/1970)    Shingles vaccine (1 of 2) 02/14/2023 (Originally 5/24/2014)    Pneumococcal 0-64 years Vaccine (2 - PCV) 06/14/2022    Low dose CT lung screening  08/11/2022    Lipids  09/19/2024    DTaP/Tdap/Td vaccine (2 - Td or Tdap) 06/14/2031    Flu vaccine  Completed    Hepatitis C screen  Completed    Hepatitis A vaccine  Aged Out    Hepatitis B vaccine  Aged Out    Hib vaccine  Aged Out    Meningococcal (ACWY) vaccine  Aged Out    HIV screen  Discontinued       Subjective:     Review of Systems   Constitutional: Negative. HENT: Negative. Eyes: Negative.     Respiratory: Positive for cough and shortness of breath. Cardiovascular: Negative. Gastrointestinal: Positive for vomiting. Musculoskeletal: Negative. Skin: Negative. Neurological: Negative. Objective:     Vitals:    05/18/22 1331 05/18/22 1359   BP: (!) 148/88 (!) 140/72   Site: Right Upper Arm    Position: Sitting    Cuff Size: Large Adult    Pulse: 93    Resp: 16    Temp: 96.9 °F (36.1 °C)    TempSrc: Temporal    SpO2: 96%    Weight: 191 lb (86.6 kg)        Physical Exam  Constitutional:       Appearance: She is well-developed. HENT:      Head: Normocephalic. Right Ear: Tympanic membrane and external ear normal.      Left Ear: Tympanic membrane and external ear normal.      Nose: Nose normal.   Cardiovascular:      Rate and Rhythm: Normal rate and regular rhythm. Heart sounds: Normal heart sounds. No murmur heard. No friction rub. No gallop. Pulmonary:      Effort: Pulmonary effort is normal.      Breath sounds: Decreased breath sounds and wheezing present. No rales. Abdominal:      General: Bowel sounds are normal.      Palpations: Abdomen is soft. Tenderness: There is no abdominal tenderness. There is no guarding. Musculoskeletal:         General: Normal range of motion. Cervical back: Normal range of motion and neck supple. Lymphadenopathy:      Cervical: No cervical adenopathy. Skin:     General: Skin is warm. Neurological:      Mental Status: She is alert and oriented to person, place, and time. Deep Tendon Reflexes: Reflexes are normal and symmetric. Assessment:      Diagnosis Orders   1. Essential hypertension     2. Anxiety     3. Pulmonary emphysema, unspecified emphysema type ([address]     4. Lung mass         Plan:      Return in about 3 months (around 8/18/2022). No orders of the defined types were placed in this encounter.   cont meds  epipen called in  Will await repeat ct results   Orders Placed This Encounter   Medications    EPINEPHrine (EPIPEN 2-JERE) 0.3 MG/0.3ML SOAJ injection     Sig: Inject 0.3 mLs into the skin once for 1 dose Use as directed for allergic reaction     Dispense:  0.3 mL     Refill:  0          Patient given educational materials - seepatient instructions. Discussed use, benefit, and side effects of prescribed medications. All patient questions answered. Pt voiced understanding. Patient agreed withtreatment plan. Follow up as directed.      Electronically signed by JASON Green CNP on 5/18/2022 at 4:07 PM

## 2022-06-01 ENCOUNTER — HOSPITAL ENCOUNTER (OUTPATIENT)
Dept: CT IMAGING | Age: 57
Discharge: HOME | End: 2022-06-01
Payer: COMMERCIAL

## 2022-06-01 DIAGNOSIS — R94.2 ABNORMAL PET SCAN, LUNG: ICD-10-CM

## 2022-06-01 DIAGNOSIS — R91.1 LUNG NODULE, SOLITARY: ICD-10-CM

## 2022-06-01 PROCEDURE — 71250 CT THORAX DX C-: CPT

## 2022-06-23 DIAGNOSIS — J43.9 PULMONARY EMPHYSEMA, UNSPECIFIED EMPHYSEMA TYPE (HCC): ICD-10-CM

## 2022-06-23 DIAGNOSIS — J44.9 MODERATE COPD (CHRONIC OBSTRUCTIVE PULMONARY DISEASE) (HCC): ICD-10-CM

## 2022-06-24 RX ORDER — ALBUTEROL SULFATE 90 UG/1
AEROSOL, METERED RESPIRATORY (INHALATION)
Qty: 18 G | Refills: 1 | OUTPATIENT
Start: 2022-06-24

## 2022-06-29 DIAGNOSIS — I10 ESSENTIAL HYPERTENSION: ICD-10-CM

## 2022-06-29 RX ORDER — CARVEDILOL 25 MG/1
25 TABLET ORAL 2 TIMES DAILY WITH MEALS
Qty: 180 TABLET | Refills: 3 | Status: SHIPPED | OUTPATIENT
Start: 2022-06-29

## 2022-06-29 RX ORDER — CLONIDINE HYDROCHLORIDE 0.1 MG/1
0.1 TABLET ORAL DAILY
Qty: 90 TABLET | Refills: 3 | Status: SHIPPED | OUTPATIENT
Start: 2022-06-29

## 2022-07-26 NOTE — PROGRESS NOTES
[facility]    Patient: Jennifer Lopez, 61 y.o.   : 1965    Patient of Dr. Micheal Bowman   Patient presents with    Follow-up     3 month follow up with CT W/O contrast         Amauri Collado is here for follow up for Severe COPD and lung nodule. Patient was last seen on 2022 by JASON Zhao CNP. At that time she was seen after biopsy and visit with Dr. Lucía Leroy. She was told that her results were inconclusive and was offered to have a second opinion and repeat biopsy or to wait and repeat imaging in 3 months. After that visit, she declined to have a second opinion or additional biopsy at this time and wished to complete CT chest in 3 months. Patient's inhaler therapy was adjusted to Ioana Parish as she had an increase in symptoms when she was on Trelegy. She is here with CT chest that revealed stable 11 mm nodule. She had a PET 3/7/22 scan that revealed FDG avid DAWIT nodule with maximum SUV of 6 and multiple other small lung nodules at a size below resolution of PET. Overall patient reports respiratory symptoms have been stable since last appointment. Patient reports good compliance with inhaled medications Jane Swenson). Patient reports that she had a defective Breztri inhaler around [facility] Day. During this time period, she had to go without Breztri and had an increase in symptoms. She reports that she has been having thick brown/yellow sputum that is very difficult to cough up. She also reports having sinus pressure and a sinus headache in the morning. She states that this has been ongoing. She states that her cough was getting better until the past month. She reports that she will cough so hard that she will throw up. She does admit that she throws up easily due to having GERD. She has been taking Mucinex, however, she does not feel that this has been absorbing as she has seen the pill passed in her stool.   She does have a spacer at home. Patient using albuterol 2 times per day on average. Patient reports physical limitation due to respiratory symptoms. Her past medical history is significant for anxiety, arthritis, COPD, enlarged spleen, fatty liver, GERD, Hep A, hypertension, LYNN (non compliant with PAP therapy), and ovarian cyst. She has LYNN, however she refuses PAP therapy or sleep clinic follow up. COPD  She complains of cough, hemoptysis (Wednesday AM), shortness of breath, sputum production and wheezing. There is no chest tightness. This is a chronic problem. The current episode started more than 1 year ago. Associated symptoms include headaches. Pertinent negatives include no appetite change, chest pain, fever, postnasal drip, rhinorrhea, sneezing or sore throat. Her symptoms are aggravated by change in weather and minimal activity. Her symptoms are alleviated by beta-agonist. She reports significant improvement on treatment. Risk factors for lung disease include smoking/tobacco exposure. Her past medical history is significant for COPD. Hemoptysis  Patient had 1 episode on Wednesday morning. She states that she had bright red blood in her napkin mixed, with her sputum. This was not a blood clot. This occurred when she was coughing very hard. Progress History:   Since last visit any new medical issues? No  New ER or hospital visits? No  Any new or changes in medicines? No  Using inhalers? Yes Breztri and as needed albuterol   Are they helpful? Yes   Previous inhalers? Trelegy-increase symptoms of shortness of breath and wheezing and leg and feet pains, Anoro-leg and feet pains  Last PFT: 6/29/2020 - moderate obstruction  Last 6 MWT: 2/17/2022 - 2 lpm with exertion  Last A1A: Serum level of 133 on 10/10/2019     Smoking History:  Current smoker of about 0.5 PPD with 47 PY history  She has been using a nicotrol inhaler to try to quit smoking.  She is not currently using nicotrol inhaler  She has an blanco on her phone for smoking cessation     Social History:  Patient job history: worked for paint shop and cleaned houses and [employer]  She has not had exposure to aerosolized particles or hazardous fumes. (Coal, dust, asbestos, molds ie Hay)  [place]  Denies exposure to pets/animals at home. Denies exposure to tuberculosis. Denies history of glaucoma or urinary retention. Pneumonia vaccine: [id number] 6/14/2021  COVID-19 vaccine: has not received and reports that she is scared to receive  Past Medical hx   PMH:  Past Medical History:   Diagnosis Date    Anxiety     Arthritis     COPD (chronic obstructive pulmonary disease) (HCC)     Enlargement, spleen     Fatty liver     GERD (gastroesophageal reflux disease)     Hepatitis A     Hypertension     saw Dr Lucina Cartwright in the past    Ovarian cyst     Oxygen dependent     o2 2liters when walking and at night-sees ALEJANDRO Hinds    Pneumonia     Sleep apnea      SURGICAL HISTORY:  Past Surgical History:   Procedure Laterality Date    CARDIOVASCULAR STRESS TEST  2020    CT NEEDLE BIOPSY LUNG PERCUTANEOUS  4/6/2022    CT NEEDLE BIOPSY LUNG PERCUTANEOUS 4/6/2022 [facility] CT SCAN    TRANSTHORACIC ECHOCARDIOGRAM  2021    TRANSTHORACIC ECHOCARDIOGRAM  2020    UPPER GASTROINTESTINAL ENDOSCOPY      [facility]    WISDOM TOOTH EXTRACTION       SOCIAL HISTORY:  Social History     Tobacco Use    Smoking status: Every Day     Packs/day: 1.50     Years: 65.00     Pack years: 97.50     Types: Cigarettes    Smokeless tobacco: Never    Tobacco comments:     0.5 ppd 4/18/22 or less depends on day patient states    Vaping Use    Vaping Use: Never used   Substance Use Topics    Alcohol use: Yes     Comment: occasional    Drug use: Not Currently     ALLERGIES:  Allergies   Allergen Reactions    Pcn [Penicillins] Hives    Seasonal Other (See Comments)     Seasonal Allergies      FAMILY HISTORY:  Family History   Problem Relation Age of Onset    Heart Surgery Mother         dies at 29 Heart Disease Mother     Other Sister         Cardiomyopathy    Other Brother         cardiomyopathy    Stroke Maternal Grandmother     Heart Attack Maternal Grandfather     Heart Attack Paternal Grandfather      CURRENT MEDICATIONS:  Current Outpatient Medications   Medication Sig Dispense Refill    acetylcysteine (MUCOMYST) 10 % nebulizer solution Inhale 4 mLs into the lungs in the morning and 4 mLs before bedtime. 240 mL 6    sodium chloride (BRONCHO SALINE) 0.9 % inhaler solution Use 3 mL by nebulization three times per day as needed for throat/airway secretions. 360 mL 11    azithromycin (ZITHROMAX) 250 MG tablet Z- Pack to use as directed. 1 packet 0    nystatin (MYCOSTATIN) 797715 UNIT/ML suspension Take 5 mLs by mouth in the morning and 5 mLs at noon and 5 mLs in the evening and 5 mLs before bedtime. Retain mouth as long as possible the swallow  Diagnosis: Thrush.  200 mL 0    carvedilol (COREG) 25 MG tablet Take 1 tablet by mouth 2 times daily (with meals) 180 tablet 3    cloNIDine (CATAPRES) 0.1 MG tablet Take 1 tablet by mouth daily 90 tablet 3    bumetanide (BUMEX) 1 MG tablet TAKE 1 TABLET BY MOUTH DAILY 90 tablet 0    ALPRAZolam (XANAX) 0.25 MG tablet       EPINEPHrine (EPIPEN 2-JERE) 0.3 MG/0.3ML SOAJ injection Inject 0.3 mLs into the skin once for 1 dose Use as directed for allergic reaction 0.3 mL 0    fluticasone (FLONASE) 50 MCG/ACT nasal spray 2 SPRAYS BY NASAL ROUTE DAILY 16 g 11    ibuprofen (ADVIL;MOTRIN) 200 MG tablet Take 200 mg by mouth every 6 hours as needed for Pain      lisinopril (PRINIVIL;ZESTRIL) 30 MG tablet Take 1 tablet by mouth daily 90 tablet 3    Budeson-Glycopyrrol-Formoterol (BREZTRI AEROSPHERE) 160-9-4.8 MCG/ACT AERO Inhale 2 puffs into the lungs 2 times daily 10.7 g 11    Incontinence Supply Disposable (BLADDER CONTROL PADS EX ABSORB) MISC Use up to 4 a day 120 each 3    Probiotic Product (PROBIOTIC ADVANCED PO) Take by mouth      Incontinence Supply Disposable (PROCARE ADULT BRIEFS LARGE) MISC Use 2 a day 60 each 5    acetaminophen (TYLENOL) 500 MG tablet Take 1-2 tablets by mouth 4 times daily as needed for Pain 120 tablet 5    Multiple Vitamins-Minerals (THERAPEUTIC MULTIVITAMIN-MINERALS) tablet Take 1 tablet by mouth daily 30 tablet 11    albuterol sulfate HFA (VENTOLIN HFA) 108 (90 Base) MCG/ACT inhaler Inhale 2 puffs into the lungs every 6 hours as needed for Wheezing or Shortness of Breath 18 g 11    albuterol (PROVENTIL) (2.5 MG/3ML) 0.083% nebulizer solution Take 3 mLs by nebulization every 6 hours as needed for Wheezing or Shortness of Breath 120 each 11    pantoprazole (PROTONIX) 40 MG tablet TAKE 1 TABLET BY MOUTH 2 TIMES DAILY NO REFILLS DUE FOR CHECK UP 60 tablet 10    sucralfate (CARAFATE) 1 GM tablet Take 1 tablet by mouth 4 times daily 120 tablet 5    ondansetron (ZOFRAN) 8 MG tablet Take 1 tablet by mouth every 8 hours as needed for Nausea or Vomiting 30 tablet 5    CPAP Machine MISC by Does not apply route Please change APAP pressure to 6-14 cm H20. 1 each 0    Lactobacillus (PROBIOTIC ACIDOPHILUS PO) Take 1 capsule by mouth daily       tiZANidine (ZANAFLEX) 4 MG tablet Take 1 tablet by mouth every 8 hours as needed (pain) (Patient not taking: Reported on 8/1/2022) 30 tablet 0    lidocaine (XYLOCAINE) 5 % ointment Apply topically as needed. (Patient not taking: Reported on 8/1/2022) 50 g 0     No current facility-administered medications for this visit. Dary WEEMS   Review of Systems   Constitutional:  Negative for appetite change and fever. Denies unplanned weight loss   HENT:  Positive for congestion and sinus pressure. Negative for postnasal drip, rhinorrhea, sneezing and sore throat. Respiratory:  Positive for cough, hemoptysis (Wednesday AM), sputum production, shortness of breath and wheezing. Cardiovascular:  Negative for chest pain, palpitations and leg swelling. Genitourinary:  Negative for difficulty urinating.    Neurological:  Positive for dizziness and headaches. Physical exam   /72 (Site: Right Upper Arm, Position: Sitting, Cuff Size: Medium Adult)   Pulse 94   Temp 97.7 °F (36.5 °C)   Ht 5' 2\" (1.575 m)   Wt 190 lb (86.2 kg)   SpO2 97% Comment: on room air  BMI 34.75 kg/m²    Wt Readings from Last 3 Encounters:   08/01/22 190 lb (86.2 kg)   05/18/22 191 lb (86.6 kg)   04/18/22 192 lb (87.1 kg)       Physical Exam  Constitutional:       General: She is not in acute distress. Appearance: She is well-developed. She is obese. Comments: Appears deconditioned  BMI 34.75   HENT:      Head: Normocephalic and atraumatic. Right Ear: External ear normal.      Left Ear: External ear normal.      Mouth/Throat:      Mouth: Mucous membranes are moist.      Pharynx: Oropharyngeal exudate present. Eyes:      General:         Right eye: No discharge. Left eye: No discharge. Cardiovascular:      Rate and Rhythm: Normal rate and regular rhythm. Pulmonary:      Effort: Pulmonary effort is normal. No respiratory distress. Breath sounds: No wheezing, rhonchi or rales. Comments: Diminished breath sounds  Chest:      Chest wall: No tenderness. Abdominal:      General: Bowel sounds are normal.      Palpations: Abdomen is soft. Musculoskeletal:      Cervical back: Neck supple. Right lower leg: No edema. Left lower leg: No edema. Comments: Presents in wheelchair   Skin:     General: Skin is warm and dry. Neurological:      General: No focal deficit present. Mental Status: She is alert. Psychiatric:         Mood and Affect: Mood normal.         Behavior: Behavior normal.         Thought Content:  Thought content normal.         Judgment: Judgment normal.        Results   Lung Nodule Screening     [x] Qualifies    [] Does not qualify   [] Declined    [] Completed  52 PY history   The USPSTF recommends annual screening for lung cancer with low-dose computed tomography (LDCT) in adults aged 48 to 80 years who have a 20 pack-year smoking history and currently smoke or have quit within the past 15 years. Screening should be discontinued once a person has not smoked for 15 years or develops a health problem that substantially limits life expectancy or the ability or willingness to have curative lung surgery. CT Chest 2022  Narrative   PROCEDURE: CT CHEST WO CONTRAST       CLINICAL INFORMATION: Abnormal PET scan, lung, Lung nodule, solitary . TECHNIQUE: 2-D multiplanar noncontrast CT chest at 5 mm intervals. All CT scans at this facility use dose modulation, iterative reconstruction, and/or weight-based dosing when appropriate to reduce radiation dose to as low as reasonably achievable. COMPARISON: 2022           FINDINGS: Left upper lobe lung nodule measures 11 mm stable. No other lung masses are identified. No infiltrates or pleural effusions. No mediastinal hilar or axillary lymphadenopathy. Heart size normal coronary artery calcific ages are present. Upper abdomen   No suspicious findings       MSK   No suspicious findings. Impression   Stable 11 mm nodule left upper lobe. **This report has been created using voice recognition software. It may contain minor errors which are inherent in voice recognition technology. **       Final report electronically signed by Dr. Laci Owen on 2022 2:44 PM               [facility]      Ivelisse Gamboa                  [id number]   Assoc.                                               Page [facility] of [facility]                                                       PROC: 2022   [facility]                                    RECV: 2022   [address]                                    RPTD: 2022   Toalie Nicholson, [address]                       MRN:  [id number]     LOC: CT                       ACCT: [de-identified]  SEX: F                       : 1965  AGE: 61 Y PATHOLOGY REPORT                       ATTN: COLBY COSTA                       REQ: Chrissy Mario       Copies To:   Alfredito Yoon       Clinical Information: LEFT LUNG NEOPLASM     FINAL DIAGNOSIS:   Left lung, lesion, biopsy:     Negative for malignancy. Respiratory bronchiolitis. Specimen:   BIOPSY OF LUNG, LEFT LESION     Intraoperative Consultation:   Touch Prep DX:  1. Nondiagnostic. 2.  Nondiagnostic. 3.   Nondiagnostic. 4.  Rare atypical cells, mostly anthracotic pigment   laden histiocytes and blood. SMW     Gross Examination:   The container is labeled Jeraline Fling, left lung lesion. Received in   formalin are scant bits of tan tissue with red discoloration. The   longest fragment is 1 cm in length. Two smaller additional fragments   aggregate to 2 mm.  1 ns. PCF:[id number]     Microscopic Examination:   Sections show multiple fragments of benign lung with respiratory   bronchiolitis, hemorrhage and type II pneumocyte hyperplasia. There is   no evidence of atypia or malignancy. [id number]   [id number]   [id number]                                                         <Sign Out Dr. Violet Wilson M.D., F.C. A. P       [facility]  Printed on:  4/7/2022   Renee Hernandez [address] [place] [address], [address] [facility]   Original print date: 04/07/2022      Specimen Collected: 04/06/22 11:29 EDT Last Resulted: 04/07/22 14:05 EDT           Assessment      Diagnosis Orders   1. Stage 3 severe COPD by GOLD classification ([facility])  CT CHEST WO CONTRAST    acetylcysteine (MUCOMYST) 10 % nebulizer solution    sodium chloride (BRONCHO SALINE) 0.9 % inhaler solution    azithromycin (ZITHROMAX) 250 MG tablet      2.  Pulmonary emphysema, unspecified emphysema type ([facility])  CT CHEST WO CONTRAST    acetylcysteine (MUCOMYST) 10 % nebulizer solution    sodium chloride (BRONCHO SALINE) 0.9 % inhaler solution    azithromycin (ZITHROMAX) 250 MG strong cough. Advised patient to call with any further hemoptysis  -Start azithromycin (ZITHROMAX) 250 MG tablet; Z- Pack to use as directed for acute bronchitis and acute sinusitis  -Advised patient to call her primary care provider if her sinus symptoms persist  -Start nystatin (MYCOSTATIN) 632747 UNIT/ML suspension; Take 5 mLs by mouth in the morning and 5 mLs at noon and 5 mLs in the evening and 5 mLs before bedtime. Retain mouth as long as possible the swallow  Diagnosis: Thrush. -Maintain pneumonia vaccine with PCP  -Patient does not wish for COVID-19 vaccination  - Discussed with patient smoking cessation techniques including patches and gum patient reports has used in the past and did not help, reinforced to patient the importance of quitting smoking to prevent further damage to lung function, patient verbalized understanding. (Approximately 5 mins)   - Patient has nicotrol at home that she plans to start using for smoking cessation. Patient is not interested in a referral to the smoking cessation clinic at this time. Advised patient to call office with any changes, questions, or concerns regarding respiratory status or issues with prescribed medications    Return in about 4 months (around 12/1/2022) for COPD with lung nodule. I spent a total of 50 minutes on the day of the visit. Time spent included review of previous notes and test results and face to face time with the patient discussing diagnosis and importance of compliance with the treatment plan. Time spent also includes documentation on the day of the visit.     Electronically signed by JASON Woodall CNP on 8/1/2022 at 4:06 PM

## 2022-08-01 ENCOUNTER — OFFICE VISIT (OUTPATIENT)
Dept: PULMONOLOGY | Age: 57
End: 2022-08-01
Payer: COMMERCIAL

## 2022-08-01 VITALS
DIASTOLIC BLOOD PRESSURE: 72 MMHG | BODY MASS INDEX: 34.96 KG/M2 | HEART RATE: 94 BPM | HEIGHT: 62 IN | SYSTOLIC BLOOD PRESSURE: 130 MMHG | OXYGEN SATURATION: 97 % | WEIGHT: 190 LBS | TEMPERATURE: 97.7 F

## 2022-08-01 DIAGNOSIS — J44.9 STAGE 3 SEVERE COPD BY GOLD CLASSIFICATION (HCC): Primary | ICD-10-CM

## 2022-08-01 DIAGNOSIS — J41.1 MUCOPURULENT CHRONIC BRONCHITIS (HCC): ICD-10-CM

## 2022-08-01 DIAGNOSIS — R91.1 NODULE OF UPPER LOBE OF LEFT LUNG: ICD-10-CM

## 2022-08-01 DIAGNOSIS — F17.200 CURRENT SMOKER: ICD-10-CM

## 2022-08-01 DIAGNOSIS — R04.2 HEMOPTYSIS: ICD-10-CM

## 2022-08-01 DIAGNOSIS — J20.9 ACUTE BRONCHITIS, UNSPECIFIED ORGANISM: ICD-10-CM

## 2022-08-01 DIAGNOSIS — J43.9 PULMONARY EMPHYSEMA, UNSPECIFIED EMPHYSEMA TYPE (HCC): ICD-10-CM

## 2022-08-01 DIAGNOSIS — J01.00 ACUTE MAXILLARY SINUSITIS, RECURRENCE NOT SPECIFIED: ICD-10-CM

## 2022-08-01 DIAGNOSIS — J21.9 BRONCHIOLITIS: ICD-10-CM

## 2022-08-01 DIAGNOSIS — B37.0 ORAL THRUSH: ICD-10-CM

## 2022-08-01 PROCEDURE — 4004F PT TOBACCO SCREEN RCVD TLK: CPT

## 2022-08-01 PROCEDURE — 3017F COLORECTAL CA SCREEN DOC REV: CPT

## 2022-08-01 PROCEDURE — 3023F SPIROM DOC REV: CPT

## 2022-08-01 PROCEDURE — 99215 OFFICE O/P EST HI 40 MIN: CPT

## 2022-08-01 PROCEDURE — G8427 DOCREV CUR MEDS BY ELIG CLIN: HCPCS

## 2022-08-01 PROCEDURE — G8417 CALC BMI ABV UP PARAM F/U: HCPCS

## 2022-08-01 RX ORDER — ACETYLCYSTEINE 100 MG/ML
4 SOLUTION ORAL; RESPIRATORY (INHALATION) 2 TIMES DAILY
Qty: 240 ML | Refills: 6 | Status: SHIPPED | OUTPATIENT
Start: 2022-08-01 | End: 2022-08-18

## 2022-08-01 RX ORDER — AZITHROMYCIN 250 MG/1
TABLET, FILM COATED ORAL
Qty: 1 PACKET | Refills: 0 | Status: SHIPPED | OUTPATIENT
Start: 2022-08-01 | End: 2022-08-11

## 2022-08-01 ASSESSMENT — ENCOUNTER SYMPTOMS
SHORTNESS OF BREATH: 1
WHEEZING: 1
CHEST TIGHTNESS: 0
SORE THROAT: 0
COUGH: 1
SINUS PRESSURE: 1
RHINORRHEA: 0
HEMOPTYSIS: 1
SPUTUM PRODUCTION: 1

## 2022-08-01 ASSESSMENT — COPD QUESTIONNAIRES: COPD: 1

## 2022-08-01 NOTE — PATIENT INSTRUCTIONS
Continue Breztri. Rinse mouth with water and spit after use. If you ever run out of Breztri again, call us as we may be able to get you a sample. I am starting you on Nystatin. You will take 5 mLs by mouth 4 times per day swish and retain in the mouth as long as possible and then swallow. Do this for 10 days. Continue your albuterol inhaler and nebulizer. You may use one or the other every 6 hours as needed for shortness of breath or wheezing. Do NOT use both at the same time as they continue the same medication. I am starting you on Mucomyst that you may use through your nebulizer twice daily. If this is too expensive, you can try robitussin over the counter. Stop using Mucinex. Start saline nebulizer three times per day for chest congestion. Try this for at least 1 month of 3 times daily. You may go down to 2 times daily if your chest congestion improves. Continue to work on quitting smoking. We will repeat your CT scan in December 2022.

## 2022-08-01 NOTE — PROGRESS NOTES
Patient is currently taking the following inhaler(s): Albuterol inhaler and Breztri     Patient is currently taking the following nebulizer treatment(s): Albuterol     Patient is using their rescue inhaler 2 times per Day if neb is not working . Patient is currently using 2 liters of oxygen during the day. Patient needs refills of the following medications: Patient does not believe she needs any refills     All refills should be sent to [facility]     Other: Patient states her mornings are really rough with the mucus. Coughing up blood Wednesday Morning Patient States. Also Has a headache in the mornings. Patient is also struggling with sitting up in the mornings states she gets extremely Dizzy.  States it is an on and off since last appointment

## 2022-08-04 ENCOUNTER — TELEPHONE (OUTPATIENT)
Dept: PULMONOLOGY | Age: 57
End: 2022-08-04

## 2022-08-04 NOTE — TELEPHONE ENCOUNTER
Yes, patient can take robitussin over the counter and also take the saline nebulizer. Please let me know if the patient has any further questions, thanks!

## 2022-08-04 NOTE — TELEPHONE ENCOUNTER
Patient called stating that she cant take the mucomyst solution, she said it burns her throat, makes her cough, and made her stomach upset to the point of getting sick. I advised her that you suggested Robitussin and she said that's what she will do instead but she wants to know from you if she should take the Robitussin and still take the Bronco saline. Please advise, thank you.

## 2022-08-18 ENCOUNTER — OFFICE VISIT (OUTPATIENT)
Dept: FAMILY MEDICINE CLINIC | Age: 57
End: 2022-08-18
Payer: COMMERCIAL

## 2022-08-18 VITALS
HEART RATE: 91 BPM | OXYGEN SATURATION: 96 % | RESPIRATION RATE: 20 BRPM | HEIGHT: 62 IN | SYSTOLIC BLOOD PRESSURE: 130 MMHG | TEMPERATURE: 97.8 F | BODY MASS INDEX: 34.78 KG/M2 | WEIGHT: 189 LBS | DIASTOLIC BLOOD PRESSURE: 86 MMHG

## 2022-08-18 DIAGNOSIS — R53.81 PHYSICAL DECONDITIONING: ICD-10-CM

## 2022-08-18 DIAGNOSIS — J43.9 PULMONARY EMPHYSEMA, UNSPECIFIED EMPHYSEMA TYPE (HCC): ICD-10-CM

## 2022-08-18 DIAGNOSIS — J44.1 COPD EXACERBATION (HCC): ICD-10-CM

## 2022-08-18 DIAGNOSIS — I10 ESSENTIAL HYPERTENSION: Primary | ICD-10-CM

## 2022-08-18 PROCEDURE — 3023F SPIROM DOC REV: CPT | Performed by: NURSE PRACTITIONER

## 2022-08-18 PROCEDURE — 3017F COLORECTAL CA SCREEN DOC REV: CPT | Performed by: NURSE PRACTITIONER

## 2022-08-18 PROCEDURE — 4004F PT TOBACCO SCREEN RCVD TLK: CPT | Performed by: NURSE PRACTITIONER

## 2022-08-18 PROCEDURE — G8427 DOCREV CUR MEDS BY ELIG CLIN: HCPCS | Performed by: NURSE PRACTITIONER

## 2022-08-18 PROCEDURE — G8417 CALC BMI ABV UP PARAM F/U: HCPCS | Performed by: NURSE PRACTITIONER

## 2022-08-18 PROCEDURE — 99214 OFFICE O/P EST MOD 30 MIN: CPT | Performed by: NURSE PRACTITIONER

## 2022-08-18 RX ORDER — METHYLPREDNISOLONE 4 MG/1
TABLET ORAL
Qty: 1 KIT | Refills: 0 | Status: SHIPPED | OUTPATIENT
Start: 2022-08-18 | End: 2022-08-24

## 2022-08-18 RX ORDER — GUAIFENESIN DEXTROMETHORPHAN HYDROBROMIDE ORAL SOLUTION 10; 100 MG/5ML; MG/5ML
10 SOLUTION ORAL EVERY 4 HOURS PRN
Status: CANCELLED | OUTPATIENT
Start: 2022-08-18

## 2022-08-18 RX ORDER — GUAIFENESIN 600 MG/1
600 TABLET, EXTENDED RELEASE ORAL 2 TIMES DAILY
Qty: 60 TABLET | Refills: 5 | Status: SHIPPED | OUTPATIENT
Start: 2022-08-18 | End: 2022-09-17

## 2022-08-18 RX ORDER — DOXYCYCLINE HYCLATE 100 MG
100 TABLET ORAL 2 TIMES DAILY
Qty: 20 TABLET | Refills: 0 | Status: SHIPPED | OUTPATIENT
Start: 2022-08-18 | End: 2022-08-28

## 2022-08-18 RX ORDER — GUAIFENESIN DEXTROMETHORPHAN HYDROBROMIDE ORAL SOLUTION 10; 100 MG/5ML; MG/5ML
10 SOLUTION ORAL EVERY 4 HOURS PRN
COMMUNITY

## 2022-08-18 RX ORDER — PANTOPRAZOLE SODIUM 40 MG/1
40 TABLET, DELAYED RELEASE ORAL 2 TIMES DAILY
Qty: 60 TABLET | Refills: 10 | Status: SHIPPED | OUTPATIENT
Start: 2022-08-18 | End: 2022-09-17

## 2022-08-18 SDOH — ECONOMIC STABILITY: FOOD INSECURITY: WITHIN THE PAST 12 MONTHS, THE FOOD YOU BOUGHT JUST DIDN'T LAST AND YOU DIDN'T HAVE MONEY TO GET MORE.: NEVER TRUE

## 2022-08-18 SDOH — ECONOMIC STABILITY: FOOD INSECURITY: WITHIN THE PAST 12 MONTHS, YOU WORRIED THAT YOUR FOOD WOULD RUN OUT BEFORE YOU GOT MONEY TO BUY MORE.: NEVER TRUE

## 2022-08-18 ASSESSMENT — PATIENT HEALTH QUESTIONNAIRE - PHQ9
SUM OF ALL RESPONSES TO PHQ QUESTIONS 1-9: 7
SUM OF ALL RESPONSES TO PHQ QUESTIONS 1-9: 7
10. IF YOU CHECKED OFF ANY PROBLEMS, HOW DIFFICULT HAVE THESE PROBLEMS MADE IT FOR YOU TO DO YOUR WORK, TAKE CARE OF THINGS AT HOME, OR GET ALONG WITH OTHER PEOPLE: 0
7. TROUBLE CONCENTRATING ON THINGS, SUCH AS READING THE NEWSPAPER OR WATCHING TELEVISION: 0
5. POOR APPETITE OR OVEREATING: 0
8. MOVING OR SPEAKING SO SLOWLY THAT OTHER PEOPLE COULD HAVE NOTICED. OR THE OPPOSITE, BEING SO FIGETY OR RESTLESS THAT YOU HAVE BEEN MOVING AROUND A LOT MORE THAN USUAL: 0
1. LITTLE INTEREST OR PLEASURE IN DOING THINGS: 0
SUM OF ALL RESPONSES TO PHQ9 QUESTIONS 1 & 2: 3
3. TROUBLE FALLING OR STAYING ASLEEP: 3
9. THOUGHTS THAT YOU WOULD BE BETTER OFF DEAD, OR OF HURTING YOURSELF: 0
SUM OF ALL RESPONSES TO PHQ QUESTIONS 1-9: 7
SUM OF ALL RESPONSES TO PHQ QUESTIONS 1-9: 7
6. FEELING BAD ABOUT YOURSELF - OR THAT YOU ARE A FAILURE OR HAVE LET YOURSELF OR YOUR FAMILY DOWN: 0
2. FEELING DOWN, DEPRESSED OR HOPELESS: 3
4. FEELING TIRED OR HAVING LITTLE ENERGY: 1

## 2022-08-18 ASSESSMENT — ENCOUNTER SYMPTOMS
GASTROINTESTINAL NEGATIVE: 1
WHEEZING: 1
EYES NEGATIVE: 1
COUGH: 1
SHORTNESS OF BREATH: 1

## 2022-08-18 ASSESSMENT — SOCIAL DETERMINANTS OF HEALTH (SDOH): HOW HARD IS IT FOR YOU TO PAY FOR THE VERY BASICS LIKE FOOD, HOUSING, MEDICAL CARE, AND HEATING?: NOT HARD AT ALL

## 2022-08-18 NOTE — PROGRESS NOTES
Chapin Irwin is a 61 y.o. female whopresents today for :  Chief Complaint   Patient presents with    3 Month Follow-Up     Ess htn       HPI:     HPI  Pt here for fu. Bp is doing well. With her breathing. That has not been well. Having a lot phlegm. Oxygen dropping at night. Was rx for zpak. Pt would like script for hospital bed. Reports has trouble positioning herself in bed. Has trouble breathing. She cant get use her steps at home. Chapin Irwin was evaluated today and a DME order was entered for a semi-electric hospital bed because she requires assistance for positioning needs not possible in an ordinary bed, complexity of body positioning needs, requirement of elevation of head of bed more than 30 degrees for the diagnosis of copd . Patient requires frequent and immediate positioning changes for relief of pain and care needs related to medical diagnoses. Patient needs variability of bed height requirements to perform patient transfers and for eating, personal cares, ambulating, dressing upper body, and taking own medications. Current body Weight: 189 lb (85.7 kg). The need for this equipment was discussed with the patient and she understands and is in agreement.    We are also ordered a dry pressure mattress due to pt limited mobility, urinary incontinence and compromised circulatory status   Patient Active Problem List   Diagnosis    Hypertension, uncontrolled    Anxiety    Gastroesophageal reflux disease without esophagitis    Seasonal allergies    Bronchospasm    History of tobacco abuse    Moderate COPD (chronic obstructive pulmonary disease) (HCC)    Allergic rhinitis    Nodule of upper lobe of left lung    Pulmonary emphysema (HCC)    LYNN (obstructive sleep apnea)    COPD exacerbation (HCC)    Nicotine dependence    Abnormal PET scan, lung    Oxygen dependent    Wheelchair dependence        Past Medical History:   Diagnosis Date    Anxiety     Arthritis     COPD (chronic obstructive pulmonary disease) (HCC)     Enlargement, spleen     Fatty liver     GERD (gastroesophageal reflux disease)     Hepatitis A     Hypertension     saw Dr Cyn Javier in the past    Ovarian cyst     Oxygen dependent     o2 2liters when walking and at night-sees ALEJANDRO Hinds    Pneumonia     Sleep apnea       Past Surgical History:   Procedure Laterality Date    CARDIOVASCULAR STRESS TEST  2020    CT NEEDLE BIOPSY LUNG PERCUTANEOUS  4/6/2022    CT NEEDLE BIOPSY LUNG PERCUTANEOUS 4/6/2022 [facility] CT SCAN    TRANSTHORACIC ECHOCARDIOGRAM  2021    TRANSTHORACIC ECHOCARDIOGRAM  2020    [facility]INTESTINAL ENDOSCOPY      [facility]    WISDOM TOOTH EXTRACTION       Family History   Problem Relation Age of Onset    Heart Surgery Mother         dies at 29    Heart Disease Mother     Other Sister         Cardiomyopathy    Other Brother         cardiomyopathy    Stroke Maternal Grandmother     Heart Attack Maternal Grandfather     Heart Attack Paternal Grandfather      Social History     Tobacco Use    Smoking status: Every Day     Packs/day: 1.50     Years: 65.00     Pack years: 97.50     Types: Cigarettes    Smokeless tobacco: Never    Tobacco comments:     0.5 ppd 4/18/22 or less depends on day patient states    Substance Use Topics    Alcohol use: Yes     Comment: occasional      Current Outpatient Medications   Medication Sig Dispense Refill    pantoprazole (PROTONIX) 40 MG tablet Take 1 tablet by mouth 2 times daily No refills due for check up 60 tablet 10    dextromethorphan-guaiFENesin (ROBITUSSIN COLD COUGH+ CHEST)  MG/5ML syrup Take 10 mLs by mouth every 4 hours as needed for Cough      guaiFENesin (MUCINEX) 600 MG extended release tablet Take 1 tablet by mouth 2 times daily 60 tablet 5    doxycycline hyclate (VIBRA-TABS) 100 MG tablet Take 1 tablet by mouth 2 times daily for 10 days 20 tablet 0    methylPREDNISolone (MEDROL DOSEPACK) 4 MG tablet Take by mouth.  1 kit 0    nystatin (MYCOSTATIN) 667798 UNIT/ML suspension Take 5 mLs by mouth in the morning and 5 mLs at noon and 5 mLs in the evening and 5 mLs before bedtime. Retain mouth as long as possible the swallow  Diagnosis: Thrush.  200 mL 0    carvedilol (COREG) 25 MG tablet Take 1 tablet by mouth 2 times daily (with meals) 180 tablet 3    cloNIDine (CATAPRES) 0.1 MG tablet Take 1 tablet by mouth daily 90 tablet 3    bumetanide (BUMEX) 1 MG tablet TAKE 1 TABLET BY MOUTH DAILY 90 tablet 0    ALPRAZolam (XANAX) 0.25 MG tablet       EPINEPHrine (EPIPEN 2-JERE) 0.3 MG/0.3ML SOAJ injection Inject 0.3 mLs into the skin once for 1 dose Use as directed for allergic reaction 0.3 mL 0    fluticasone (FLONASE) 50 MCG/ACT nasal spray 2 SPRAYS BY NASAL ROUTE DAILY 16 g 11    lisinopril (PRINIVIL;ZESTRIL) 30 MG tablet Take 1 tablet by mouth daily 90 tablet 3    Budeson-Glycopyrrol-Formoterol (BREZTRI AEROSPHERE) 160-9-4.8 MCG/ACT AERO Inhale 2 puffs into the lungs 2 times daily 10.7 g 11    Incontinence Supply Disposable (BLADDER CONTROL PADS EX ABSORB) MISC Use up to 4 a day 120 each 3    Probiotic Product (PROBIOTIC ADVANCED PO) Take by mouth      Incontinence Supply Disposable (PROCARE ADULT BRIEFS LARGE) MISC Use 2 a day 60 each 5    acetaminophen (TYLENOL) 500 MG tablet Take 1-2 tablets by mouth 4 times daily as needed for Pain 120 tablet 5    Multiple Vitamins-Minerals (THERAPEUTIC MULTIVITAMIN-MINERALS) tablet Take 1 tablet by mouth daily 30 tablet 11    albuterol sulfate HFA (VENTOLIN HFA) 108 (90 Base) MCG/ACT inhaler Inhale 2 puffs into the lungs every 6 hours as needed for Wheezing or Shortness of Breath 18 g 11    albuterol (PROVENTIL) (2.5 MG/3ML) 0.083% nebulizer solution Take 3 mLs by nebulization every 6 hours as needed for Wheezing or Shortness of Breath 120 each 11    sucralfate (CARAFATE) 1 GM tablet Take 1 tablet by mouth 4 times daily 120 tablet 5    ondansetron (ZOFRAN) 8 MG tablet Take 1 tablet by mouth every 8 hours as needed for Nausea or Vomiting 30 tablet 5    Lactobacillus (PROBIOTIC ACIDOPHILUS PO) Take 1 capsule by mouth daily        No current facility-administered medications for this visit. Allergies   Allergen Reactions    Pcn [Penicillins] Hives    Seasonal Other (See Comments)     Seasonal Allergies      Health Maintenance   Topic Date Due    Cervical cancer screen  Never done    Colorectal Cancer Screen  Never done    Breast cancer screen  09/25/2021    Depression Screen  06/14/2022    Pneumococcal 0-64 years Vaccine (2 - PCV) 06/14/2022    Low dose CT lung screening  08/11/2022    Shingles vaccine (1 of 2) 02/14/2023 (Originally 5/24/2014)    COVID-19 Vaccine (1) 08/13/2023 (Originally 1965)    Flu vaccine (1) 09/01/2022    Lipids  09/19/2024    DTaP/Tdap/Td vaccine (2 - Td or Tdap) 06/14/2031    Hepatitis C screen  Completed    Hepatitis A vaccine  Aged Out    Hepatitis B vaccine  Aged Out    Hib vaccine  Aged Out    Meningococcal (ACWY) vaccine  Aged Out    HIV screen  Discontinued       Subjective:     Review of Systems   Constitutional:  Positive for fatigue. HENT: Negative. Eyes: Negative. Respiratory:  Positive for cough, shortness of breath and wheezing. Cardiovascular: Negative. Gastrointestinal: Negative. Musculoskeletal:  Positive for myalgias. Skin: Negative. Neurological: Negative. Psychiatric/Behavioral:  The patient is nervous/anxious. Objective:     Vitals:    08/18/22 1332   BP: 130/86   Site: Left Upper Arm   Position: Sitting   Cuff Size: Large Adult   Pulse: 91   Resp: 20   Temp: 97.8 °F (36.6 °C)   TempSrc: Temporal   SpO2: 96%   Weight: 189 lb (85.7 kg)   Height: 5' 2.01\" (1.575 m)       Physical Exam  Constitutional:       Appearance: She is well-developed. She is ill-appearing. HENT:      Head: Normocephalic.       Right Ear: Tympanic membrane and external ear normal.      Left Ear: Tympanic membrane and external ear normal.      Nose: Nose normal.   Cardiovascular:      Rate and Rhythm: Normal rate and regular rhythm. Heart sounds: Normal heart sounds. No murmur heard. No friction rub. No gallop. Pulmonary:      Effort: Accessory muscle usage present. Breath sounds: Decreased air movement present. Wheezing present. No rales. Abdominal:      General: Bowel sounds are normal.      Palpations: Abdomen is soft. Tenderness: There is no abdominal tenderness. There is no guarding. Musculoskeletal:         General: Normal range of motion. Cervical back: Normal range of motion and neck supple. Lymphadenopathy:      Cervical: No cervical adenopathy. Skin:     General: Skin is warm. Neurological:      Mental Status: She is alert and oriented to person, place, and time. Deep Tendon Reflexes: Reflexes are normal and symmetric. Assessment:      Diagnosis Orders   1. Essential hypertension        2. COPD exacerbation (HCC)  doxycycline hyclate (VIBRA-TABS) 100 MG tablet    DME Order for Hospital Bed as OP    External Referral To Home Health      3. Pulmonary emphysema, unspecified emphysema type ([address]  methylPREDNISolone (MEDROL DOSEPACK) 4 MG tablet    DME Order for Hospital Bed as OP    External Referral To Home Health      4. Physical deconditioning  DME Order for Hospital Bed as OP    External Referral To Home Health          Plan:      Return in about 3 months (around 11/18/2022). Orders Placed This Encounter   Procedures    External Referral To Home Health     Referral Priority:   Routine     Referral Type:   Home Health Care     Referral Reason:   Specialty Services Required     Referred to Provider:   [address]     Requested Specialty:   [address]     Number of Visits Requested:   1    DME Order for Hospital Bed as OP     You must complete the order parameters below and add the medical necessity documentation for this DME in a separate note.     [employer] hospital bed with mattress and any type rails    Current patient weight: Weight: 189 lb (85.7 kg)  Current patient height: Height: 5' 2.01\" (157.5 cm)  Diagnosis: Essential hypertension  (primary encounter diagnosis)    COPD exacerbation (HCC)    Pulmonary emphysema, unspecified emphysema type (HCC)      Length of need: Lifetime     Orders Placed This Encounter   Medications    pantoprazole (PROTONIX) 40 MG tablet     Sig: Take 1 tablet by mouth 2 times daily No refills due for check up     Dispense:  60 tablet     Refill:  10    guaiFENesin (MUCINEX) 600 MG extended release tablet     Sig: Take 1 tablet by mouth 2 times daily     Dispense:  60 tablet     Refill:  5    doxycycline hyclate (VIBRA-TABS) 100 MG tablet     Sig: Take 1 tablet by mouth 2 times daily for 10 days     Dispense:  20 tablet     Refill:  0    methylPREDNISolone (MEDROL DOSEPACK) 4 MG tablet     Sig: Take by mouth. Dispense:  1 kit     Refill:  0      See orders  Will order home health to focus on strengthening. Hopefully get her some independence back  Rx fo hospital bed, rx for mattress  Treat current copd flair     Patient given educational materials - seepatient instructions. Discussed use, benefit, and side effects of prescribed medications. All patient questions answered. Pt voiced understanding. Patient agreed withtreatment plan. Follow up as directed.      Electronically signed by JASON Goodwin CNP on 8/18/2022 at 5:16 PM

## 2022-08-18 NOTE — Clinical Note
Hello Just wanted to keep you updated on our mutual patient. Apryl Laurent is still having some copd issues. I placed her on doxycycline and medrol. I also had her resume mucinex.   Advised she is likely seeing the outer shell of the tablet in her stool but is absorbing the medication

## 2022-08-19 ENCOUNTER — TELEPHONE (OUTPATIENT)
Dept: FAMILY MEDICINE CLINIC | Age: 57
End: 2022-08-19

## 2022-08-19 DIAGNOSIS — J43.9 PULMONARY EMPHYSEMA, UNSPECIFIED EMPHYSEMA TYPE (HCC): ICD-10-CM

## 2022-08-19 DIAGNOSIS — R53.81 PHYSICAL DECONDITIONING: Primary | ICD-10-CM

## 2022-08-19 DIAGNOSIS — J44.1 COPD EXACERBATION (HCC): ICD-10-CM

## 2022-08-19 NOTE — TELEPHONE ENCOUNTER
Can they suggest a diagnosis that will work. Pt has severe lung disease and wants to get stronger to be more independent.

## 2022-08-19 NOTE — TELEPHONE ENCOUNTER
Consider taking Tums before bedtime reduce stomach acid if you have had an acid producing meal.    Continue the Zantac twice a day.    If your raspy voice quality does not improve or if it worsens, please contact us for another appointment.    Keep your head more elevated than your stomach at night.   [facility]       Per Nazanin Chavarria will need a different diagnosis or can enter an order for skilled nursing

## 2022-08-22 ENCOUNTER — TELEPHONE (OUTPATIENT)
Dept: FAMILY MEDICINE CLINIC | Age: 57
End: 2022-08-22

## 2022-08-22 NOTE — TELEPHONE ENCOUNTER
Spoke with MARISABEL GOMES [facility] nursing supervisor. HH asking for order for skilled nursing be sent as well.

## 2022-08-22 NOTE — TELEPHONE ENCOUNTER
Patient stating order for hospital bed and office note needs faxed to [facility].     Order and note faxed to [phone]

## 2022-08-23 ENCOUNTER — TELEPHONE (OUTPATIENT)
Dept: PULMONOLOGY | Age: 57
End: 2022-08-23

## 2022-08-23 ENCOUNTER — TELEPHONE (OUTPATIENT)
Dept: FAMILY MEDICINE CLINIC | Age: 57
End: 2022-08-23

## 2022-08-23 NOTE — TELEPHONE ENCOUNTER
I received an update from patient's primary care provider that she was still having difficulty with her COPD and that he had placed her on doxycyline and medrol. Please call patient to notify her to call our office if her symptoms do not improve with treatment. If she is still having difficulty with her COPD post treatment, I would like her to make an earlier appointment. Thanks!

## 2022-08-30 ENCOUNTER — TELEPHONE (OUTPATIENT)
Dept: FAMILY MEDICINE CLINIC | Age: 57
End: 2022-08-30

## 2022-08-30 DIAGNOSIS — I10 ESSENTIAL HYPERTENSION: ICD-10-CM

## 2022-08-30 RX ORDER — LISINOPRIL 10 MG/1
30 TABLET ORAL DAILY
Qty: 90 TABLET | Refills: 3 | Status: SHIPPED | OUTPATIENT
Start: 2022-08-30 | End: 2022-09-02

## 2022-08-30 RX ORDER — FLUCONAZOLE 100 MG/1
100 TABLET ORAL DAILY
Qty: 14 TABLET | Refills: 0 | Status: SHIPPED | OUTPATIENT
Start: 2022-08-30 | End: 2022-09-13

## 2022-08-30 NOTE — TELEPHONE ENCOUNTER
Susie with [facility] called stating that pt is having oral thrush, sores on the corner of her mouth. She is having low BP readings that are trending down, todays reading was 90/70, Buzz Garza wants to know if Raza Barnhart wants to put perimeters on BP for her medication. Patient also has been vomiting every day, watery and clear.     Saima Pineda [phone]

## 2022-09-02 ENCOUNTER — TELEPHONE (OUTPATIENT)
Dept: FAMILY MEDICINE CLINIC | Age: 57
End: 2022-09-02

## 2022-09-02 DIAGNOSIS — I10 ESSENTIAL HYPERTENSION: ICD-10-CM

## 2022-09-02 RX ORDER — LISINOPRIL 30 MG/1
30 TABLET ORAL DAILY
Qty: 90 TABLET | Refills: 3 | Status: SHIPPED | OUTPATIENT
Start: 2022-09-02

## 2022-09-12 ENCOUNTER — TELEPHONE (OUTPATIENT)
Dept: FAMILY MEDICINE CLINIC | Age: 57
End: 2022-09-12

## 2022-09-12 DIAGNOSIS — B37.0 ORAL THRUSH: ICD-10-CM

## 2022-09-12 NOTE — TELEPHONE ENCOUNTER
Cami Fragoso from [facility] called stating that the patient is done with the medication she was on for thrush but she still have many white patches on the left side of her mouth. Can she get a continuation of the script or something else called in.  Please advise    Myranda machuca  Pt will check pharmacy, call her with opal

## 2022-10-19 ENCOUNTER — TELEPHONE (OUTPATIENT)
Dept: FAMILY MEDICINE CLINIC | Age: 57
End: 2022-10-19

## 2022-10-19 DIAGNOSIS — R32 URINARY INCONTINENCE, UNSPECIFIED TYPE: ICD-10-CM

## 2022-10-19 DIAGNOSIS — R53.81 PHYSICAL DECONDITIONING: Primary | ICD-10-CM

## 2022-10-19 NOTE — TELEPHONE ENCOUNTER
Kyaw Hill with [facility] is requesting for the patient to have [address] [facility] write an order to [facility] medical equipment for the patient to have a dry pressure mattress since the mattress that came with the hospital bed is uncomfortable for the pt    Per the nurse at [facility], she states that they patient has many qualifications such as incontinence, limited mobility, and impaired nutrition etc     For more questions regina's  [phone]

## 2022-11-16 RX ORDER — PANTOPRAZOLE SODIUM 40 MG/1
40 TABLET, DELAYED RELEASE ORAL 2 TIMES DAILY
Qty: 60 TABLET | Refills: 10 | Status: SHIPPED | OUTPATIENT
Start: 2022-11-16 | End: 2022-12-16

## 2022-11-21 ENCOUNTER — OFFICE VISIT (OUTPATIENT)
Dept: FAMILY MEDICINE CLINIC | Age: 57
End: 2022-11-21
Payer: MEDICARE

## 2022-11-21 VITALS
WEIGHT: 185 LBS | DIASTOLIC BLOOD PRESSURE: 70 MMHG | RESPIRATION RATE: 14 BRPM | HEART RATE: 90 BPM | SYSTOLIC BLOOD PRESSURE: 124 MMHG | TEMPERATURE: 98.1 F | BODY MASS INDEX: 33.83 KG/M2

## 2022-11-21 DIAGNOSIS — R53.81 PHYSICAL DECONDITIONING: ICD-10-CM

## 2022-11-21 DIAGNOSIS — Z23 NEED FOR INFLUENZA VACCINATION: ICD-10-CM

## 2022-11-21 DIAGNOSIS — Z12.31 ENCOUNTER FOR SCREENING MAMMOGRAM FOR MALIGNANT NEOPLASM OF BREAST: ICD-10-CM

## 2022-11-21 DIAGNOSIS — B37.0 ORAL THRUSH: ICD-10-CM

## 2022-11-21 DIAGNOSIS — F41.9 ANXIETY: ICD-10-CM

## 2022-11-21 DIAGNOSIS — J44.1 COPD EXACERBATION (HCC): ICD-10-CM

## 2022-11-21 DIAGNOSIS — J43.9 PULMONARY EMPHYSEMA, UNSPECIFIED EMPHYSEMA TYPE (HCC): ICD-10-CM

## 2022-11-21 DIAGNOSIS — I10 ESSENTIAL HYPERTENSION: Primary | ICD-10-CM

## 2022-11-21 PROCEDURE — 99214 OFFICE O/P EST MOD 30 MIN: CPT | Performed by: NURSE PRACTITIONER

## 2022-11-21 PROCEDURE — 3017F COLORECTAL CA SCREEN DOC REV: CPT | Performed by: NURSE PRACTITIONER

## 2022-11-21 PROCEDURE — 4004F PT TOBACCO SCREEN RCVD TLK: CPT | Performed by: NURSE PRACTITIONER

## 2022-11-21 PROCEDURE — 90674 CCIIV4 VAC NO PRSV 0.5 ML IM: CPT | Performed by: NURSE PRACTITIONER

## 2022-11-21 PROCEDURE — G8427 DOCREV CUR MEDS BY ELIG CLIN: HCPCS | Performed by: NURSE PRACTITIONER

## 2022-11-21 PROCEDURE — G8417 CALC BMI ABV UP PARAM F/U: HCPCS | Performed by: NURSE PRACTITIONER

## 2022-11-21 PROCEDURE — G8482 FLU IMMUNIZE ORDER/ADMIN: HCPCS | Performed by: NURSE PRACTITIONER

## 2022-11-21 PROCEDURE — 3023F SPIROM DOC REV: CPT | Performed by: NURSE PRACTITIONER

## 2022-11-21 PROCEDURE — 3078F DIAST BP <80 MM HG: CPT | Performed by: NURSE PRACTITIONER

## 2022-11-21 PROCEDURE — G0008 ADMIN INFLUENZA VIRUS VAC: HCPCS | Performed by: NURSE PRACTITIONER

## 2022-11-21 PROCEDURE — 3074F SYST BP LT 130 MM HG: CPT | Performed by: NURSE PRACTITIONER

## 2022-11-21 RX ORDER — DOXYCYCLINE HYCLATE 100 MG
100 TABLET ORAL 2 TIMES DAILY
Qty: 20 TABLET | Refills: 0 | Status: SHIPPED | OUTPATIENT
Start: 2022-11-21 | End: 2022-12-01

## 2022-11-21 RX ORDER — FLUCONAZOLE 100 MG/1
100 TABLET ORAL DAILY
Qty: 14 TABLET | Refills: 0 | Status: SHIPPED | OUTPATIENT
Start: 2022-11-21 | End: 2022-12-05

## 2022-11-21 ASSESSMENT — ENCOUNTER SYMPTOMS
SHORTNESS OF BREATH: 1
EYES NEGATIVE: 1
GASTROINTESTINAL NEGATIVE: 1

## 2022-11-21 NOTE — PROGRESS NOTES
Adeel Mooney is a 61 y.o. female whopresents today for :  Chief Complaint   Patient presents with    3 Month Follow-Up     HTN    Epistaxis     In the morning for the past few weeks. Insomnia     Due to anxiety          HPI:     HPI  Pt here for fu. Pt has been losing weight. With trying to eat better. Does feel like sinus are acting up again. More congestion. Having more nose bleeds, oxygen has been stable. Patient Active Problem List   Diagnosis    Hypertension, uncontrolled    Anxiety    Gastroesophageal reflux disease without esophagitis    Seasonal allergies    Bronchospasm    History of tobacco abuse    Moderate COPD (chronic obstructive pulmonary disease) (HCC)    Allergic rhinitis    Nodule of upper lobe of left lung    Pulmonary emphysema (HCC)    LYNN (obstructive sleep apnea)    COPD exacerbation (HCC)    Nicotine dependence    Abnormal PET scan, lung    Oxygen dependent    Wheelchair dependence        Past Medical History:   Diagnosis Date    Anxiety     Arthritis     COPD (chronic obstructive pulmonary disease) (HCC)     Enlargement, spleen     Fatty liver     GERD (gastroesophageal reflux disease)     Hepatitis A     Hypertension     saw Dr Flower Shea in the past    Ovarian cyst     Oxygen dependent     o2 2liters when walking and at night-sees ALEJANDRO Hinds    Pneumonia     Sleep apnea       Past Surgical History:   Procedure Laterality Date    CARDIOVASCULAR STRESS TEST  2020    CT NEEDLE BIOPSY LUNG PERCUTANEOUS  4/6/2022    CT NEEDLE BIOPSY LUNG PERCUTANEOUS 4/6/2022 [facility] CT SCAN    TRANSTHORACIC ECHOCARDIOGRAM  2021    TRANSTHORACIC ECHOCARDIOGRAM  2020    [facility]ER [facility]INTESTINAL ENDOSCOPY      [facility]    WISDOM TOOTH EXTRACTION       Family History   Problem Relation Age of Onset    Heart Surgery Mother         dies at 29    Heart Disease Mother     Other Sister         Cardiomyopathy    Other Brother         cardiomyopathy    Stroke Maternal Grandmother     Heart Attack Maternal Grandfather     Heart Attack Paternal Grandfather      Social History     Tobacco Use    Smoking status: Every Day     Packs/day: 1.50     Years: 65.00     Pack years: 97.50     Types: Cigarettes    Smokeless tobacco: Never    Tobacco comments:     0.5 ppd 4/18/22 or less depends on day patient states    Substance Use Topics    Alcohol use: Yes     Comment: occasional      Current Outpatient Medications   Medication Sig Dispense Refill    fluconazole (DIFLUCAN) 100 MG tablet Take 1 tablet by mouth daily for 14 days 14 tablet 0    doxycycline hyclate (VIBRA-TABS) 100 MG tablet Take 1 tablet by mouth 2 times daily for 10 days 20 tablet 0    pantoprazole (PROTONIX) 40 MG tablet Take 1 tablet by mouth 2 times daily No refills due for check up 60 tablet 10    Misc. Devices (MATTRESS PAD) MISC Dry pressure mattress 1 each 0    nystatin (MYCOSTATIN) 407393 UNIT/ML suspension Take 5 mLs by mouth 4 times daily Retain mouth as long as possible the swallow Diagnosis:  Thrush 200 mL 0    lisinopril (PRINIVIL;ZESTRIL) 30 MG tablet Take 1 tablet by mouth daily 90 tablet 3    dextromethorphan-guaiFENesin (ROBITUSSIN-DM)  MG/5ML syrup Take 10 mLs by mouth every 4 hours as needed for Cough      carvedilol (COREG) 25 MG tablet Take 1 tablet by mouth 2 times daily (with meals) 180 tablet 3    cloNIDine (CATAPRES) 0.1 MG tablet Take 1 tablet by mouth daily 90 tablet 3    bumetanide (BUMEX) 1 MG tablet TAKE 1 TABLET BY MOUTH DAILY 90 tablet 0    ALPRAZolam (XANAX) 0.25 MG tablet       fluticasone (FLONASE) 50 MCG/ACT nasal spray 2 SPRAYS BY NASAL ROUTE DAILY 16 g 11    Budeson-Glycopyrrol-Formoterol (BREZTRI AEROSPHERE) 160-9-4.8 MCG/ACT AERO Inhale 2 puffs into the lungs 2 times daily 10.7 g 11    Incontinence Supply Disposable (BLADDER CONTROL PADS EX ABSORB) MISC Use up to 4 a day 120 each 3    Probiotic Product (PROBIOTIC ADVANCED PO) Take by mouth      Incontinence Supply Disposable (PROCARE ADULT BRIEFS LARGE) MISC Use 2 a day 60 each 5    acetaminophen (TYLENOL) 500 MG tablet Take 1-2 tablets by mouth 4 times daily as needed for Pain 120 tablet 5    Multiple Vitamins-Minerals (THERAPEUTIC MULTIVITAMIN-MINERALS) tablet Take 1 tablet by mouth daily 30 tablet 11    albuterol sulfate HFA (VENTOLIN HFA) 108 (90 Base) MCG/ACT inhaler Inhale 2 puffs into the lungs every 6 hours as needed for Wheezing or Shortness of Breath 18 g 11    albuterol (PROVENTIL) (2.5 MG/3ML) 0.083% nebulizer solution Take 3 mLs by nebulization every 6 hours as needed for Wheezing or Shortness of Breath 120 each 11    sucralfate (CARAFATE) 1 GM tablet Take 1 tablet by mouth 4 times daily 120 tablet 5    ondansetron (ZOFRAN) 8 MG tablet Take 1 tablet by mouth every 8 hours as needed for Nausea or Vomiting 30 tablet 5    Lactobacillus (PROBIOTIC ACIDOPHILUS PO) Take 1 capsule by mouth daily       EPINEPHrine (EPIPEN 2-JERE) 0.3 MG/0.3ML SOAJ injection Inject 0.3 mLs into the skin once for 1 dose Use as directed for allergic reaction 0.3 mL 0     No current facility-administered medications for this visit. Allergies   Allergen Reactions    Pcn [Penicillins] Hives    Seasonal Other (See Comments)     Seasonal Allergies      Health Maintenance   Topic Date Due    Cervical cancer screen  Never done    Colorectal Cancer Screen  Never done    Breast cancer screen  09/25/2021    Low dose CT lung screening  08/11/2022    Shingles vaccine (1 of 2) 02/14/2023 (Originally 5/24/2014)    COVID-19 Vaccine (1) 08/13/2023 (Originally 1965)    Depression Screen  08/18/2023    Lipids  09/19/2024    DTaP/Tdap/Td vaccine (2 - Td or Tdap) 06/14/2031    Flu vaccine  Completed    Pneumococcal 0-64 years Vaccine  Completed    Hepatitis C screen  Completed    Hepatitis A vaccine  Aged Out    Hib vaccine  Aged Out    Meningococcal (ACWY) vaccine  Aged Out    HIV screen  Discontinued       Subjective:     Review of Systems   Constitutional: Negative. HENT:  Positive for congestion. Eyes: Negative. Respiratory:  Positive for shortness of breath. Cardiovascular: Negative. Gastrointestinal: Negative. Musculoskeletal: Negative. Skin: Negative. Neurological: Negative. Psychiatric/Behavioral:  The patient is nervous/anxious. Objective:     Vitals:    11/21/22 1306   BP: 124/70   Site: Left Upper Arm   Position: Sitting   Cuff Size: Large Adult   Pulse: 90   Resp: 14   Temp: 98.1 °F (36.7 °C)   TempSrc: Temporal   Weight: 185 lb (83.9 kg)       Physical Exam  Constitutional:       Appearance: She is well-developed. Comments: Since I have seen patient she looked the best since. The weight loss has helped. She was breathing much more comfortably. HENT:      Head: Normocephalic. Right Ear: Tympanic membrane and external ear normal.      Left Ear: Tympanic membrane and external ear normal.      Nose:      Right Sinus: Maxillary sinus tenderness present. Left Sinus: Maxillary sinus tenderness present. Mouth/Throat:     Cardiovascular:      Rate and Rhythm: Normal rate and regular rhythm. Heart sounds: Normal heart sounds. No murmur heard. No friction rub. No gallop. Pulmonary:      Effort: Pulmonary effort is normal.      Breath sounds: Normal breath sounds. No wheezing or rales. Abdominal:      General: Bowel sounds are normal.      Palpations: Abdomen is soft. Tenderness: There is no abdominal tenderness. There is no guarding. Musculoskeletal:         General: Normal range of motion. Cervical back: Normal range of motion and neck supple. Lymphadenopathy:      Cervical: No cervical adenopathy. Skin:     General: Skin is warm. Neurological:      Mental Status: She is alert and oriented to person, place, and time. Deep Tendon Reflexes: Reflexes are normal and symmetric. Assessment:      Diagnosis Orders   1. Essential hypertension        2. Physical deconditioning        3.  Pulmonary emphysema, unspecified emphysema type ([address]        4. Anxiety        5. COPD exacerbation (HCC)  doxycycline hyclate (VIBRA-TABS) 100 MG tablet      6. Oral thrush  fluconazole (DIFLUCAN) 100 MG tablet      7. Need for influenza vaccination  Influenza, FLUCELVAX, (age 10 mo+), IM, Preservative Free, 0.5 mL      8. Encounter for screening mammogram for malignant neoplasm of breast  [facility] DIGITAL SCREEN W OR WO CAD BILATERAL          Plan:      Return in about 4 months (around 3/21/2023). See orders  For anxiety start taking clonidine at night. Cont other meds  Cont o2  Praised her weight loss   Orders Placed This Encounter   Procedures    [facility] DIGITAL SCREEN W OR WO CAD BILATERAL     Standing Status:   Future     Standing Expiration Date:   11/21/2023     Order Specific Question:   Reason for exam:     Answer:   screening    Influenza, FLUCELVAX, (age 10 mo+), IM, Preservative Free, 0.5 mL     Orders Placed This Encounter   Medications    fluconazole (DIFLUCAN) 100 MG tablet     Sig: Take 1 tablet by mouth daily for 14 days     Dispense:  14 tablet     Refill:  0    doxycycline hyclate (VIBRA-TABS) 100 MG tablet     Sig: Take 1 tablet by mouth 2 times daily for 10 days     Dispense:  20 tablet     Refill:  0          Patient given educational materials - seepatient instructions. Discussed use, benefit, and side effects of prescribed medications. All patient questions answered. Pt voiced understanding. Patient agreed withtreatment plan. Follow up as directed.      Electronically signed by Beryle Ratel, APRN - CNP on 11/21/2022 at 5:45 PM

## 2022-11-21 NOTE — PROGRESS NOTES
Vaccine Information Sheet, \"Influenza - Inactivated\"  given to Alexa España, or parent/legal guardian of  Alexa España and verbalized understanding. Patient responses:    Have you ever had a reaction to a flu vaccine? No  Do you have an allergy to eggs, neomycin or polymixin? No  Do you have an allergy to Thimerosal, contact lens solution, or Merthiolate? No  Have you ever had Guillian Tryon Syndrome? No  Do you have any current illness? No  Do you have a temperature above 100 degrees? No  Are you pregnant? N/A  If pregnant, permission obtained from physician? N/A  Do you have an active neurological disorder? No      Flu vaccine given per order. Please see immunization tab. Immunizations Administered       Name Date Dose Route    Influenza, FLUCELVAX, (age 10 mo+), MDCK, PF, 0.5mL 11/21/2022 0.5 mL Intramuscular    Site: Deltoid- Left    Lot: [id number]    NDC: 51468-653-96            VIS GIVEN. CONSENT SIGNED  PATIENT TOLERATED WELL.

## 2022-11-23 ENCOUNTER — TELEPHONE (OUTPATIENT)
Dept: PULMONOLOGY | Age: 57
End: 2022-11-23

## 2022-12-05 RX ORDER — BUMETANIDE 1 MG/1
1 TABLET ORAL DAILY
Qty: 90 TABLET | Refills: 3 | Status: SHIPPED | OUTPATIENT
Start: 2022-12-05

## 2022-12-06 DIAGNOSIS — J43.9 PULMONARY EMPHYSEMA, UNSPECIFIED EMPHYSEMA TYPE (HCC): ICD-10-CM

## 2022-12-06 DIAGNOSIS — J44.9 MODERATE COPD (CHRONIC OBSTRUCTIVE PULMONARY DISEASE) (HCC): ICD-10-CM

## 2022-12-06 NOTE — TELEPHONE ENCOUNTER
Received refill request for Albuterol. Medication was last ordered by [place]. Medication was last ordered on 11/22/21 with 11 refills. Patient was last seen in the office 8/1/22. Does patient have a scheduled follow up?: yes - 12/13/22    Medication needs to be sent to [facility] [phone]. Thank you, please advise! Patient's Allergies:   Allergies   Allergen Reactions    Pcn [Penicillins] Hives    Seasonal Other (See Comments)     Seasonal Allergies

## 2022-12-07 ENCOUNTER — HOSPITAL ENCOUNTER (OUTPATIENT)
Dept: CT IMAGING | Age: 57
Discharge: HOME | End: 2022-12-07
Payer: MEDICARE

## 2022-12-07 DIAGNOSIS — J21.9 BRONCHIOLITIS: ICD-10-CM

## 2022-12-07 DIAGNOSIS — J43.9 PULMONARY EMPHYSEMA, UNSPECIFIED EMPHYSEMA TYPE (HCC): ICD-10-CM

## 2022-12-07 DIAGNOSIS — J44.9 STAGE 3 SEVERE COPD BY GOLD CLASSIFICATION (HCC): ICD-10-CM

## 2022-12-07 DIAGNOSIS — F17.200 CURRENT SMOKER: ICD-10-CM

## 2022-12-07 DIAGNOSIS — R91.1 NODULE OF UPPER LOBE OF LEFT LUNG: ICD-10-CM

## 2022-12-07 PROCEDURE — 71250 CT THORAX DX C-: CPT

## 2022-12-08 NOTE — PROGRESS NOTES
[facility]    Patient: Carla Rutherford, 61 y.o.   : 2022    Patient of Dr. Nicki Gamez   Patient presents with    Follow-up     Copd lung nodules , with ct chest        HPI  Cassie Patel is here for follow up for Severe COPD and lung nodule. Patient is here with CT Chest with stable 1.1 cm supleural DAWIT nodule. I was notified by patient's primary care provider that patient was having difficulty with shortness of breath and was prescribed medrol dose pack and doxycyline. Patient called in yesterday reporting that she never took the doxy as she was feeling better however she was feeling worse again. Today, she reports she is feeling much better. Patient was unable to take mucomyst as it burned her throat and made her stomach upset. She was thus changed to robitussin and saline neb. Overall patient reports respiratory symptoms have been stable since last appointment. Patient reports good compliance with inhaled medications Vashti Dandy). She reports that Ala Pennant has been alleviating her breathing symptoms, however she has been getting thrush. Patient using albuterol 2 times per day on average. Patient reports physical limitation due to respiratory symptoms. Her past medical history is significant for anxiety, arthritis, COPD, enlarged spleen, fatty liver, GERD, Hep A, hypertension, LYNN (non compliant with PAP therapy), and ovarian cyst. She has LYNN, however she refuses PAP therapy or sleep clinic follow up. Patient lost 9 lbs since last appointment and reports that she is feeling better after having weight loss. She reports that she has a good appetite. She has meal delivery and reports that she is eating more balanced meals - she is feeling more hungry.      CT Chest 2021 - 7 mm DAWIT nodule  CT Chest 22 - 11 mm DAWIT nodule increased in size  PET 3/7/22 - FDG avid DAWIT nodule suspicious for malignancy  Biopsy 22 - negative for malignancy, evidence of respiratory bronchiolitis  CT Chest 6/1/22 - stable 11 mm DAWIT nodule  CT Chest 12/7/22 - stable 1.1 cm DAWIT nodule    Complaints: \"some\" SOB  Onset Duration: over a year  Exacerbating factors: exertion  Alleviating factors: cool air and albuterol treatments  Timing-exertion, certain times of day: no certain time of day  Associated symptoms: cough with clear to yellow sputum production and wheezing \"not a lot\"  Pertinent negatives: hemoptysis and chest tightness  Risk factors for lung disease:  tobacco exposure    Progress History:   Since last visit any new medical issues? No  New ER or hospital visits? No  Any new or changes in medicines? No  Using inhalers? Yes, Breztri and as needed albuterol  Are they helpful? Yes   Previous inhalers? Trelegy-increase symptoms of shortness of breath and wheezing and leg and feet pains, Anoro-leg and feet pains  Exacerbations: Not since last appointment  Last PFT: 3/17/22 - severe COPD, decreased DLCO, and restriction  Last 6 MWT: 2/17/2022 - 2 lpm with exertion  Last A1A: Serum level of 133 on 10/10/2019     Smoking History:  Current smoker of about 0.5 PPD with 47 PY history  She has been using a nicotrol inhaler to try to quit smoking. 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 (Coal, dust, asbestos, molds ie Hay)  [place]  Denies exposure to pets/animals at home. Denies exposure to tuberculosis. Denies history of glaucoma or urinary retention.      Flu vaccine: vaccinated  Pneumonia vaccine: [id number] 6/14/2021  COVID-19 vaccine: has not received and reports that she is scared to receive  Past Medical hx   PMH:  Past Medical History:   Diagnosis Date    Anxiety     Arthritis     COPD (chronic obstructive pulmonary disease) (HCC)     Enlargement, spleen     Fatty liver GERD (gastroesophageal reflux disease)     Hepatitis A     Hypertension     saw Dr Francis Hennessy in the past    Ovarian cyst     Oxygen dependent     o2 2liters when walking and at night-sees ALEJANDRO Hinds    Pneumonia     Sleep apnea      SURGICAL HISTORY:  Past Surgical History:   Procedure Laterality Date    CARDIOVASCULAR STRESS TEST  2020    CT NEEDLE BIOPSY LUNG PERCUTANEOUS  4/6/2022    CT NEEDLE BIOPSY LUNG PERCUTANEOUS 4/6/2022 [facility] CT SCAN    TRANSTHORACIC ECHOCARDIOGRAM  2021    TRANSTHORACIC ECHOCARDIOGRAM  2020    UPPER [facility]STROINTESTINAL ENDOSCOPY      [facility]    WISDOM TOOTH EXTRACTION       SOCIAL HISTORY:  Social History     Tobacco Use    Smoking status: Every Day     Packs/day: 1.50     Years: 65.00     Pack years: 97.50     Types: Cigarettes    Smokeless tobacco: Never    Tobacco comments:     0.5 ppd 4/18/22 or less depends on day patient states    Vaping Use    Vaping Use: Never used   Substance Use Topics    Alcohol use: Yes     Comment: occasional    Drug use: Not Currently     ALLERGIES:  Allergies   Allergen Reactions    Pcn [Penicillins] Hives    Seasonal Other (See Comments)     Seasonal Allergies      FAMILY HISTORY:  Family History   Problem Relation Age of Onset    Heart Surgery Mother         dies at 29    Heart Disease Mother     Other Sister         Cardiomyopathy    Other Brother         cardiomyopathy    Stroke Maternal Grandmother     Heart Attack Maternal Grandfather     Heart Attack Paternal Grandfather      CURRENT MEDICATIONS:  Current Outpatient Medications   Medication Sig Dispense Refill    clotrimazole (MYCELEX) 10 MG kalee Take 1 tablet by mouth 5 times daily for 10 days 50 tablet 0    predniSONE (DELTASONE) 20 MG tablet Take 2 tablets by mouth daily for 5 days 10 tablet 0    VENTOLIN  (90 Base) MCG/ACT inhaler Inhale 2 puffs into the lungs every 6 hours as needed for Wheezing or Shortness of Breath 18 g 11    bumetanide (BUMEX) 1 MG tablet Take 1 tablet by mouth daily 90 tablet 3    pantoprazole (PROTONIX) 40 MG tablet Take 1 tablet by mouth 2 times daily No refills due for check up 60 tablet 10    Misc. Devices (MATTRESS PAD) MISC Dry pressure mattress 1 each 0    lisinopril (PRINIVIL;ZESTRIL) 30 MG tablet Take 1 tablet by mouth daily 90 tablet 3    carvedilol (COREG) 25 MG tablet Take 1 tablet by mouth 2 times daily (with meals) 180 tablet 3    cloNIDine (CATAPRES) 0.1 MG tablet Take 1 tablet by mouth daily 90 tablet 3    ALPRAZolam (XANAX) 0.25 MG tablet       EPINEPHrine (EPIPEN 2-JERE) 0.3 MG/0.3ML SOAJ injection Inject 0.3 mLs into the skin once for 1 dose Use as directed for allergic reaction 0.3 mL 0    fluticasone (FLONASE) 50 MCG/ACT nasal spray 2 SPRAYS BY NASAL ROUTE DAILY 16 g 11    Budeson-Glycopyrrol-Formoterol (BREZTRI AEROSPHERE) 160-9-4.8 MCG/ACT AERO Inhale 2 puffs into the lungs 2 times daily 10.7 g 11    Incontinence Supply Disposable (BLADDER CONTROL PADS EX ABSORB) MISC Use up to 4 a day 120 each 3    Probiotic Product (PROBIOTIC ADVANCED PO) Take by mouth      Incontinence Supply Disposable (PROCARE ADULT BRIEFS LARGE) MISC Use 2 a day 60 each 5    acetaminophen (TYLENOL) 500 MG tablet Take 1-2 tablets by mouth 4 times daily as needed for Pain 120 tablet 5    Multiple Vitamins-Minerals (THERAPEUTIC MULTIVITAMIN-MINERALS) tablet Take 1 tablet by mouth daily 30 tablet 11    albuterol (PROVENTIL) (2.5 MG/3ML) 0.083% nebulizer solution Take 3 mLs by nebulization every 6 hours as needed for Wheezing or Shortness of Breath 120 each 11    sucralfate (CARAFATE) 1 GM tablet Take 1 tablet by mouth 4 times daily 120 tablet 5    ondansetron (ZOFRAN) 8 MG tablet Take 1 tablet by mouth every 8 hours as needed for Nausea or Vomiting 30 tablet 5    Lactobacillus (PROBIOTIC ACIDOPHILUS PO) Take 1 capsule by mouth daily        No current facility-administered medications for this visit. Lynda WEEMS   Review of Systems   Constitutional:  Negative for appetite change, fever and unexpected weight change. HENT:  Positive for nosebleeds. Negative for congestion, postnasal drip, rhinorrhea, sinus pressure, sinus pain and sneezing. Respiratory:  Positive for cough, shortness of breath and wheezing. Negative for chest tightness. Denies hemoptysis   Cardiovascular:  Positive for chest pain (reports is reflux) and palpitations. Negative for leg swelling. Genitourinary:  Negative for difficulty urinating. Some difficulty with fully emptying bladder   Allergic/Immunologic: Negative for environmental allergies. Physical exam   /72 (Site: Left Upper Arm, Position: Sitting, Cuff Size: Medium Adult)   Pulse 86   Temp 98 °F (36.7 °C) (Infrared)   Ht 5' 2\" (1.575 m)   Wt 181 lb 6.4 oz (82.3 kg)   SpO2 95%   BMI 33.18 kg/m²    Wt Readings from Last 3 Encounters:   12/13/22 181 lb 6.4 oz (82.3 kg)   11/21/22 185 lb (83.9 kg)   08/18/22 189 lb (85.7 kg)       Physical Exam  Constitutional:       General: She is not in acute distress. Comments: BMI 33   HENT:      Head: Normocephalic and atraumatic. Right Ear: External ear normal.      Left Ear: External ear normal.      Mouth/Throat:      Mouth: Mucous membranes are moist.      Pharynx: No oropharyngeal exudate or posterior oropharyngeal erythema. Eyes:      General:         Right eye: No discharge. Left eye: No discharge. Cardiovascular:      Rate and Rhythm: Normal rate and regular rhythm. Pulmonary:      Effort: Pulmonary effort is normal. No respiratory distress. Breath sounds: No wheezing, rhonchi or rales. Comments: Decreased breath sounds  Chest:      Chest wall: No tenderness. Musculoskeletal:      Cervical back: Neck supple. Right lower leg: No edema. Left lower leg: No edema. Skin:     General: Skin is warm and dry. Neurological:      General: No focal deficit present. Mental Status: She is alert.    Psychiatric:         Mood and Affect: Mood normal. Behavior: Behavior normal.         Thought Content: Thought content normal.        Results   Lung Nodule Screening     [x] Qualifies    [] Does not qualify   [] Declined    [] Completed  52 PY history   The USPSTF recommends annual screening for lung cancer with low-dose computed tomography (LDCT) in adults aged 48 to 80 years who have a 20 pack-year smoking history and currently smoke or have quit within the past 15 years. Screening should be discontinued once a person has not smoked for 15 years or develops a health problem that substantially limits life expectancy or the ability or willingness to have curative lung surgery. CT Chest 12/7/22  Narrative   PROCEDURE: CT CHEST WO CONTRAST       CLINICAL INFORMATION: Stage 3 severe COPD by GOLD classification ([address] Pulmonary emphysema, unspecified emphysema type ([address] Bronchiolitis, Nodule of upper lobe of left lung, Current smoker       COMPARISON: CT chest 6/12/2022, 2/8/2022, 8/11/2021. TECHNIQUE:    Helical CT acquisition of the chest was performed without intravenous contrast. Multiplanar reformats are provided. All CT scans at this facility use dose modulation, iterative reconstruction, and/or weight based dosing when appropriate to reduce the radiation dose to as low as reasonably achievable. FINDINGS:       The noncontrast CT limits the evaluation for solid organ pathology, vascular pathology, and lymphadenopathy. A 1.1 x 1.1 cm subpleural left upper lobe pulmonary nodule (series 2, image 14) is relatively stable in size. Mild emphysematous changes. Many of the previously seen sub-5 mm pulmonary nodules are no longer appreciated. Mild aortocoronary    calcifications. .       No focal pulmonary consolidation. No large pulmonary mass. Central airway is patent. No pleural effusions. Very mild pericardial effusion. The heart is not enlarged. Ascending thoracic aorta is not dilated.   The main pulmonary artery is not dilated. No significantly enlarged mediastinal or  axillary lymph node. The thyroid is not enlarged. No chest wall mass. Limited evaluation below the diaphragm show fatty infiltration of the liver. Bones: The bones appear demineralized. Mild degenerative changes of the thoracic spine. . Old right-sided rib fractures are seen. Impression   1. A stable 1.1 cm subpleural left upper lobe pulmonary nodule is seen               **This report has been created using voice recognition software. It may contain minor errors which are inherent in voice recognition technology. **       Final report electronically signed by Dr Butch Montilla on 12/7/2022 4:45 PM     Assessment      Diagnosis Orders   1. Stage 3 severe COPD by GOLD classification ([address]        2. Pulmonary emphysema, unspecified emphysema type ([address]        3. Mucopurulent chronic bronchitis ([address]        4. Current smoker        5. Nodule of upper lobe of left lung        6. Thrush, oral  clotrimazole (MYCELEX) 10 MG kalee            Plan   1. Stage 3 severe COPD by GOLD classification with Pulmonary emphysema  -Symptoms fairly well controlled on Breztri. Will continue Breztri. Rinse mouth with water and spit after use. -Continue albuterol inhaler and nebulizer, one or the other, every 6 hours as needed for shortness of breath or wheezing   -Advised patient to  prednisone and take if needed for shortness of breath or wheezing  -Advised patient to hold off on taking doxycycline for now  -Received flu vaccine  -Maintain pneumonia vaccine with primary care provider     2. Mucopurulent chronic bronchitis ([address]  -Advised patient to use acapella and saline nebulizer. If symptoms persistent, will consider CPT vest  -Continue Mucinex    3.  Current smoker  - Discussed with patient smoking cessation techniques including patches and gum patient reports has used in the past and did not help, reinforced to patient the importance of quitting smoking to prevent further damage to lung function, patient verbalized understanding. (Approximately 3 mins)   Advised patient to quit and offered support. 4. Nodule of upper lobe of left lung  -Reviewed CT Chest with patient. Stable 1.1 cm lung nodule - stable for 10 months. Will plan to follow with  CT Chest in 1 year, this will need ordered at patient's next appointment  [facility] malignancy risk score: 62.4%     5. Thrush, oral  -Start clotrimazole (MYCELEX) 10 MG kalee; Take 1 tablet by mouth 5 times daily for 10 days    -Advised patient of good oral hygiene after using Breztri. Advised patient to gargle with salt water and/or mouthwash after Breztri use    Advised patient to call office with any changes, questions, or concerns regarding respiratory status or issues with prescribed medications    Return in about 3 months (around 3/13/2023).        Electronically signed by JASON Chavez CNP on 12/13/2022 at 8:09 PM     (Please note that portions of this note may have been completed with a voice recognition program. Efforts were made to edit the dictation but occasionally words are mis-transcribed)

## 2022-12-12 ENCOUNTER — TELEPHONE (OUTPATIENT)
Dept: PULMONOLOGY | Age: 57
End: 2022-12-12

## 2022-12-12 DIAGNOSIS — J44.1 COPD EXACERBATION (HCC): Primary | ICD-10-CM

## 2022-12-12 NOTE — TELEPHONE ENCOUNTER
Yes, she may take this if she did not take it due to feeling better and is currently having an increase in her shortness of breath. If her phlegm is white or clear in color, she can hold off on the antibiotic and just take the steroid. If she starts the antibiotic and the steroid make sure she takes the full duration of treatment, thanks!

## 2022-12-12 NOTE — TELEPHONE ENCOUNTER
Patient was prescribed Doxycycline 100mg from her PCP but when she filled it she was feeling better so she never took it. She is currently having SOB, coughing up phlegm, chest/nasal congestion. Patient wanting to know if she she should be start taking this medication as well as on a steroid or not. Patient denies fever. Patient has been taking Mucinex.      Patient Reported Vitals:  /75   HR 90   O2 96%

## 2022-12-13 ENCOUNTER — OFFICE VISIT (OUTPATIENT)
Dept: PULMONOLOGY | Age: 57
End: 2022-12-13
Payer: MEDICARE

## 2022-12-13 VITALS
SYSTOLIC BLOOD PRESSURE: 128 MMHG | BODY MASS INDEX: 33.38 KG/M2 | TEMPERATURE: 98 F | HEIGHT: 62 IN | DIASTOLIC BLOOD PRESSURE: 72 MMHG | HEART RATE: 86 BPM | OXYGEN SATURATION: 95 % | WEIGHT: 181.4 LBS

## 2022-12-13 DIAGNOSIS — J41.1 MUCOPURULENT CHRONIC BRONCHITIS (HCC): ICD-10-CM

## 2022-12-13 DIAGNOSIS — R91.1 NODULE OF UPPER LOBE OF LEFT LUNG: ICD-10-CM

## 2022-12-13 DIAGNOSIS — B37.0 THRUSH, ORAL: ICD-10-CM

## 2022-12-13 DIAGNOSIS — J43.9 PULMONARY EMPHYSEMA, UNSPECIFIED EMPHYSEMA TYPE (HCC): ICD-10-CM

## 2022-12-13 DIAGNOSIS — F17.200 CURRENT SMOKER: ICD-10-CM

## 2022-12-13 DIAGNOSIS — J44.9 STAGE 3 SEVERE COPD BY GOLD CLASSIFICATION (HCC): Primary | ICD-10-CM

## 2022-12-13 PROCEDURE — 99406 BEHAV CHNG SMOKING 3-10 MIN: CPT

## 2022-12-13 PROCEDURE — 3074F SYST BP LT 130 MM HG: CPT

## 2022-12-13 PROCEDURE — 4004F PT TOBACCO SCREEN RCVD TLK: CPT

## 2022-12-13 PROCEDURE — 3023F SPIROM DOC REV: CPT

## 2022-12-13 PROCEDURE — G8482 FLU IMMUNIZE ORDER/ADMIN: HCPCS

## 2022-12-13 PROCEDURE — G8427 DOCREV CUR MEDS BY ELIG CLIN: HCPCS

## 2022-12-13 PROCEDURE — G8417 CALC BMI ABV UP PARAM F/U: HCPCS

## 2022-12-13 PROCEDURE — 3017F COLORECTAL CA SCREEN DOC REV: CPT

## 2022-12-13 PROCEDURE — 3078F DIAST BP <80 MM HG: CPT

## 2022-12-13 PROCEDURE — 99214 OFFICE O/P EST MOD 30 MIN: CPT

## 2022-12-13 RX ORDER — PREDNISONE 20 MG/1
40 TABLET ORAL DAILY
Qty: 10 TABLET | Refills: 0 | Status: SHIPPED | OUTPATIENT
Start: 2022-12-13 | End: 2022-12-18

## 2022-12-13 RX ORDER — CLOTRIMAZOLE 10 MG/1
10 LOZENGE ORAL; TOPICAL
Qty: 50 TABLET | Refills: 0 | Status: SHIPPED | OUTPATIENT
Start: 2022-12-13 | End: 2022-12-23

## 2022-12-13 ASSESSMENT — ENCOUNTER SYMPTOMS
SINUS PAIN: 0
RHINORRHEA: 0
SINUS PRESSURE: 0
COUGH: 1
CHEST TIGHTNESS: 0
WHEEZING: 1
SHORTNESS OF BREATH: 1

## 2022-12-13 NOTE — PATIENT INSTRUCTIONS
Continue Breztri. Rinse mouth with water and spit after use. Make sure to rinse and spit after use, you may use salt water or mouth wash. Start the Mycelex troches for thrush 5 x daily for 10 days. Call if this does not get rid of the thrush. Continue your albuterol inhaler and nebulizer. You may use one or the other every 6 hours as needed for shortness of breath or wheezing. Do NOT use both at the same time as they continue the same medication. I am starting you on a saline nebulizer to use up to 3 times daily for chest congestion and thick sputum. This is NOT to be used as a rescue medication. Start using acapella 4 times daily. Let me know if your chest congestion does not improve.  the prednisone to keep on hand in case you need it. Continue Mucinex     I will see you back in 3 months.

## 2022-12-15 ENCOUNTER — TELEPHONE (OUTPATIENT)
Dept: PULMONOLOGY | Age: 57
End: 2022-12-15

## 2022-12-15 NOTE — TELEPHONE ENCOUNTER
[facility], so she is able to qualify for oxygen. P/constantino said she is going to find someone to drive her and then call back and schedule the 6min walk.  Antonieta was very recently seen and will only require the 6min walk with a short encounter and can therefore be double-booked on nicol's schedule, per nicol

## 2022-12-16 NOTE — PROGRESS NOTES
[facility]    Patient: Emma Curry, 61 y.o.   : 2022    Patient of Dr. Ngoc Robbins   Patient presents with    Follow-up     Here for a 6min walk. 6min walk completed. No stops. PETR Yuan is here for follow up for 6 MWT for O2 for insurance and Severe COPD. Overall patient reports respiratory symptoms have been stable since last appointment. Patient reports good compliance with inhaled medications Bel Nevarez). Patient using albuterol 2 times per day on average. Patient reports physical limitation due to respiratory symptoms. Her past medical history is significant for anxiety, arthritis, COPD, enlarged spleen, fatty liver, GERD, Hep A, hypertension, LYNN (non compliant with PAP therapy), and ovarian cyst. She has LYNN, however she refuses PAP therapy or sleep clinic follow up. Patient reports that at home she requires her O2 with stronger exertional tasks around her home, when showering, and when bending. She reports that her SpO2 will drop to 84-87% . She reports that she is very limited without her oxygen and cannot do these tasks without her oxygen. CT Chest 2021 - 7 mm DAWIT nodule  CT Chest 22 - 11 mm DAWIT nodule increased in size  PET 3/7/22 - FDG avid DAWIT nodule suspicious for malignancy  Biopsy 22 - negative for malignancy, evidence of respiratory bronchiolitis  CT Chest 22 - stable 11 mm DAWIT nodule  CT Chest 22 - stable 1.1 cm DAWIT nodule     Complaints: \"some\" SOB  Onset Duration: over a year  Exacerbating factors: exertion, dry air  Alleviating factors: moisture and oxygen  Timing-exertion, certain times of day: no certain time of day  Associated symptoms: cough with clear sputum production   Pertinent negatives: hemoptysis, wheezing, and chest tightness  Risk factors for lung disease:  tobacco exposure      Progress History:   Since last visit any new medical issues?  No  New ER or hospital visits? No  Any new or changes in medicines? No  Using inhalers? Yes Breztri and as needed albuterol  Are they helpful? Yes   Previous inhalers? Trelegy-increase symptoms of shortness of breath and wheezing and leg and feet pains, Anoro-leg and feet pains  Exacerbations: Not since last appointment  Last PFT: 3/17/22 - severe COPD, decreased DLCO, and restriction  Last 6 MWT: 2/17/2022 - 2 lpm with exertion  Last A1A: Serum level of 133 on 10/10/2019     Smoking History:  Current smoker of about 0.5 PPD with 47 PY history  She has been using a nicotrol inhaler to try to quit smoking. 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 (Coal, dust, asbestos, molds ie Hay)  [place]  Denies exposure to pets/animals at home. Denies exposure to tuberculosis. Denies history of glaucoma or urinary retention. Flu vaccine: vaccinated  Pneumonia vaccine: [id number] 6/14/2021  COVID-19 vaccine: has not received and reports that she is scared to receive  Past Medical hx   PMH:  Past Medical History:   Diagnosis Date    Anxiety     Arthritis     COPD (chronic obstructive pulmonary disease) (HCC)     Enlargement, spleen     Fatty liver     GERD (gastroesophageal reflux disease)     Hepatitis A     Hypertension     saw Dr Светлана Segovia in the past    Ovarian cyst     Oxygen dependent     o2 2liters when walking and at night-sees ALEJANDRO Hinds    Pneumonia     Sleep apnea      SURGICAL HISTORY:  Past Surgical History:   Procedure Laterality Date    CARDIOVASCULAR STRESS TEST  2020    CT NEEDLE BIOPSY LUNG PERCUTANEOUS  4/6/2022    CT NEEDLE BIOPSY LUNG PERCUTANEOUS 4/6/2022 [facility] CT SCAN    TRANSTHORACIC ECHOCARDIOGRAM  2021    TRANSTHORACIC ECHOCARDIOGRAM  2020    [facility]PPER [facility]OINTESTINAL ENDOSCOPY      [facility]    WISDOM TOOTH EXTRACTION SOCIAL HISTORY:  Social History     Tobacco Use    Smoking status: Every Day     Packs/day: 1.50     Years: 65.00     Pack years: 97.50     Types: Cigarettes    Smokeless tobacco: Never    Tobacco comments:     0.5 ppd 4/18/22 or less depends on day patient states - Pt is still smoking   Vaping Use    Vaping Use: Never used   Substance Use Topics    Alcohol use: Yes     Comment: occasional    Drug use: Not Currently     ALLERGIES:  Allergies   Allergen Reactions    Pcn [Penicillins] Hives    Seasonal Other (See Comments)     Seasonal Allergies      FAMILY HISTORY:  Family History   Problem Relation Age of Onset    Heart Surgery Mother         dies at 29    Heart Disease Mother     Other Sister         Cardiomyopathy    Other Brother         cardiomyopathy    Stroke Maternal Grandmother     Heart Attack Maternal Grandfather     Heart Attack Paternal Grandfather      CURRENT MEDICATIONS:  Current Outpatient Medications   Medication Sig Dispense Refill    clotrimazole (MYCELEX) 10 MG kalee Take 1 tablet by mouth 5 times daily for 10 days 50 tablet 0    VENTOLIN  (90 Base) MCG/ACT inhaler Inhale 2 puffs into the lungs every 6 hours as needed for Wheezing or Shortness of Breath 18 g 11    bumetanide (BUMEX) 1 MG tablet Take 1 tablet by mouth daily 90 tablet 3    Misc.  Devices (MATTRESS PAD) MISC Dry pressure mattress 1 each 0    lisinopril (PRINIVIL;ZESTRIL) 30 MG tablet Take 1 tablet by mouth daily 90 tablet 3    carvedilol (COREG) 25 MG tablet Take 1 tablet by mouth 2 times daily (with meals) 180 tablet 3    cloNIDine (CATAPRES) 0.1 MG tablet Take 1 tablet by mouth daily 90 tablet 3    ALPRAZolam (XANAX) 0.25 MG tablet       fluticasone (FLONASE) 50 MCG/ACT nasal spray 2 SPRAYS BY NASAL ROUTE DAILY 16 g 11    Budeson-Glycopyrrol-Formoterol (BREZTRI AEROSPHERE) 160-9-4.8 MCG/ACT AERO Inhale 2 puffs into the lungs 2 times daily 10.7 g 11    Incontinence Supply Disposable (BLADDER CONTROL PADS EX ABSORB) MISC Use up to 4 a day 120 each 3    Probiotic Product (PROBIOTIC ADVANCED PO) Take by mouth      Incontinence Supply Disposable (PROCARE ADULT BRIEFS LARGE) MISC Use 2 a day 60 each 5    acetaminophen (TYLENOL) 500 MG tablet Take 1-2 tablets by mouth 4 times daily as needed for Pain 120 tablet 5    Multiple Vitamins-Minerals (THERAPEUTIC MULTIVITAMIN-MINERALS) tablet Take 1 tablet by mouth daily 30 tablet 11    sucralfate (CARAFATE) 1 GM tablet Take 1 tablet by mouth 4 times daily 120 tablet 5    ondansetron (ZOFRAN) 8 MG tablet Take 1 tablet by mouth every 8 hours as needed for Nausea or Vomiting 30 tablet 5    Lactobacillus (PROBIOTIC ACIDOPHILUS PO) Take 1 capsule by mouth daily       pantoprazole (PROTONIX) 40 MG tablet Take 1 tablet by mouth 2 times daily No refills due for check up 60 tablet 10    EPINEPHrine (EPIPEN 2-JERE) 0.3 MG/0.3ML SOAJ injection Inject 0.3 mLs into the skin once for 1 dose Use as directed for allergic reaction 0.3 mL 0    albuterol (PROVENTIL) (2.5 MG/3ML) 0.083% nebulizer solution Take 3 mLs by nebulization every 6 hours as needed for Wheezing or Shortness of Breath 120 each 11     No current facility-administered medications for this visit. Tanda Bun ROS   Review of Systems   Constitutional:  Negative for appetite change, fever and unexpected weight change. HENT:  Negative for congestion, postnasal drip, rhinorrhea, sinus pressure, sinus pain and sneezing. Respiratory:  Positive for cough and shortness of breath. Negative for chest tightness and wheezing. Denies hemoptysis   Cardiovascular:  Negative for chest pain, palpitations and leg swelling. Genitourinary:  Negative for difficulty urinating. Allergic/Immunologic: Negative for environmental allergies.       Physical exam   /64 (Site: Right Upper Arm, Position: Sitting, Cuff Size: Medium Adult)   Pulse 79   Temp 97.9 °F (36.6 °C) (Oral)   Ht 5' 2\" (1.575 m)   Wt 181 lb 12.8 oz (82.5 kg)   SpO2 94%   BMI 33.25 kg/m²    Wt Readings from Last 3 Encounters:   12/21/22 181 lb 12.8 oz (82.5 kg)   12/13/22 181 lb 6.4 oz (82.3 kg)   11/21/22 185 lb (83.9 kg)       Physical Exam  Constitutional:       General: She is not in acute distress. Comments: BMI 33  Appears deconditioned   HENT:      Head: Normocephalic and atraumatic. Right Ear: External ear normal.      Left Ear: External ear normal.      Mouth/Throat:      Mouth: Mucous membranes are moist.      Pharynx: No oropharyngeal exudate or posterior oropharyngeal erythema. Eyes:      General:         Right eye: No discharge. Left eye: No discharge. Cardiovascular:      Rate and Rhythm: Normal rate and regular rhythm. Pulmonary:      Effort: Pulmonary effort is normal. No respiratory distress. Breath sounds: No wheezing, rhonchi or rales. Comments: Diminished breath sounds  Chest:      Chest wall: No tenderness. Musculoskeletal:      Cervical back: Neck supple. Right lower leg: No edema. Left lower leg: No edema. Skin:     General: Skin is warm and dry. Neurological:      General: No focal deficit present. Mental Status: She is alert. Psychiatric:         Mood and Affect: Mood normal.         Behavior: Behavior normal.         Thought Content: Thought content normal.        Results   Lung Nodule Screening     [x] Qualifies    [] Does not qualify   [] Declined    [] Completed   The USPSTF recommends annual screening for lung cancer with low-dose computed tomography (LDCT) in adults aged 48 to [de-identified] years who have a 20 pack-year smoking history and currently smoke or have quit within the past 15 years. Screening should be discontinued once a person has not smoked for 15 years or develops a health problem that substantially limits life expectancy or the ability or willingness to have curative lung surgery.        Six Minute Walk Test  Jamari Coleman 1965    Six minute walk test done in my office today by my medical assistant. Mary Lou's oxygen saturation at rest on room air was 94%. Her oxygen saturation dropped to 93% on room air with exertion after walking 864 feet in 6 minutes. Patient reports that at home she requires her O2 with stronger exertional tasks around her home, when showering, and when bending. She reports that her SpO2 will drop to 84-87% . She reports that she is very limited without her oxygen and cannot do these tasks without her oxygen. Order placed for 2 lpm as needed with stronger exertional tasks      Assessment      Diagnosis Orders   1. Chronic respiratory failure with hypoxia (HCC)  6 Minute Walk Test    DME Order for Home Oxygen as OP      2. Stage 3 severe COPD by GOLD classification ([address]  6 Minute Walk Test    DME Order for Home Oxygen as OP      3. Pulmonary emphysema, unspecified emphysema type ([address]        4. Nodule of upper lobe of left lung        5. Current smoker              Plan   1. Chronic respiratory failure with hypoxia (HCC)  - 6 Minute Walk Test - Did not qualify for supplemental O2   -Patient reports that at home she requires her O2 with stronger exertional tasks around her home, when showering, and when bending. She reports that her SpO2 will drop to 84-87% . She reports that she is very limited without her oxygen and cannot do these tasks without her oxygen. Order placed for 2 lpm to use as needed for stronger exertional tasks in her home and when showering. 2. Stage 3 severe COPD by GOLD classification with Pulmonary emphysema, unspecified emphysema type ([address]  -Symptoms fairly well controlled on Breztri. Will continue Breztri. Rinse mouth with water and spit after use. 2 samples given to patient today  -Continue albuterol inhaler and nebulizer, one or the other, every 6 hours as needed for shortness of breath or wheezing   -Received flu vaccine  -Maintain pneumonia vaccine with primary care provider     3.  Nodule of upper lobe of left lung  -Needs CT follow up in Dec 2023, patient does not wish to schedule this until her next appointment    4. Current smoker  Advised patient to quit and offered support.        Advised patient to call office with any changes, questions, or concerns regarding respiratory status or issues with prescribed medications    Keep currently scheduled follow up      Electronically signed by JASON Hatch CNP on 12/21/2022 at 2:05 PM     (Please note that portions of this note may have been completed with a voice recognition program. Efforts were made to edit the dictation but occasionally words are mis-transcribed)

## 2022-12-21 ENCOUNTER — OFFICE VISIT (OUTPATIENT)
Dept: PULMONOLOGY | Age: 57
End: 2022-12-21
Payer: MEDICARE

## 2022-12-21 VITALS
OXYGEN SATURATION: 94 % | WEIGHT: 181.8 LBS | BODY MASS INDEX: 33.45 KG/M2 | TEMPERATURE: 97.9 F | DIASTOLIC BLOOD PRESSURE: 64 MMHG | SYSTOLIC BLOOD PRESSURE: 108 MMHG | HEART RATE: 79 BPM | HEIGHT: 62 IN

## 2022-12-21 DIAGNOSIS — J43.9 PULMONARY EMPHYSEMA, UNSPECIFIED EMPHYSEMA TYPE (HCC): ICD-10-CM

## 2022-12-21 DIAGNOSIS — J44.9 STAGE 3 SEVERE COPD BY GOLD CLASSIFICATION (HCC): ICD-10-CM

## 2022-12-21 DIAGNOSIS — F17.200 CURRENT SMOKER: ICD-10-CM

## 2022-12-21 DIAGNOSIS — J96.11 CHRONIC RESPIRATORY FAILURE WITH HYPOXIA (HCC): Primary | ICD-10-CM

## 2022-12-21 DIAGNOSIS — R91.1 NODULE OF UPPER LOBE OF LEFT LUNG: ICD-10-CM

## 2022-12-21 PROCEDURE — 4004F PT TOBACCO SCREEN RCVD TLK: CPT

## 2022-12-21 PROCEDURE — 94618 PULMONARY STRESS TESTING: CPT

## 2022-12-21 PROCEDURE — 3023F SPIROM DOC REV: CPT

## 2022-12-21 PROCEDURE — 3017F COLORECTAL CA SCREEN DOC REV: CPT

## 2022-12-21 PROCEDURE — G8482 FLU IMMUNIZE ORDER/ADMIN: HCPCS

## 2022-12-21 PROCEDURE — 3078F DIAST BP <80 MM HG: CPT

## 2022-12-21 PROCEDURE — G8417 CALC BMI ABV UP PARAM F/U: HCPCS

## 2022-12-21 PROCEDURE — 99214 OFFICE O/P EST MOD 30 MIN: CPT

## 2022-12-21 PROCEDURE — 3074F SYST BP LT 130 MM HG: CPT

## 2022-12-21 PROCEDURE — G8427 DOCREV CUR MEDS BY ELIG CLIN: HCPCS

## 2022-12-21 ASSESSMENT — ENCOUNTER SYMPTOMS
COUGH: 1
SINUS PRESSURE: 0
SINUS PAIN: 0
RHINORRHEA: 0
SHORTNESS OF BREATH: 1
CHEST TIGHTNESS: 0
WHEEZING: 0

## 2022-12-28 ENCOUNTER — OFFICE VISIT (OUTPATIENT)
Dept: PULMONOLOGY | Age: 57
End: 2022-12-28
Payer: MEDICARE

## 2022-12-28 VITALS
HEART RATE: 88 BPM | WEIGHT: 181 LBS | TEMPERATURE: 97.1 F | OXYGEN SATURATION: 98 % | DIASTOLIC BLOOD PRESSURE: 70 MMHG | HEIGHT: 62 IN | SYSTOLIC BLOOD PRESSURE: 124 MMHG | BODY MASS INDEX: 33.31 KG/M2

## 2022-12-28 DIAGNOSIS — R91.1 NODULE OF UPPER LOBE OF LEFT LUNG: ICD-10-CM

## 2022-12-28 DIAGNOSIS — J96.11 CHRONIC RESPIRATORY FAILURE WITH HYPOXIA (HCC): Primary | ICD-10-CM

## 2022-12-28 DIAGNOSIS — G47.33 OSA (OBSTRUCTIVE SLEEP APNEA): ICD-10-CM

## 2022-12-28 DIAGNOSIS — F17.200 CURRENT SMOKER: ICD-10-CM

## 2022-12-28 DIAGNOSIS — J44.9 STAGE 3 SEVERE COPD BY GOLD CLASSIFICATION (HCC): ICD-10-CM

## 2022-12-28 PROCEDURE — 4004F PT TOBACCO SCREEN RCVD TLK: CPT

## 2022-12-28 PROCEDURE — 3023F SPIROM DOC REV: CPT

## 2022-12-28 PROCEDURE — 3074F SYST BP LT 130 MM HG: CPT

## 2022-12-28 PROCEDURE — G8417 CALC BMI ABV UP PARAM F/U: HCPCS

## 2022-12-28 PROCEDURE — G8427 DOCREV CUR MEDS BY ELIG CLIN: HCPCS

## 2022-12-28 PROCEDURE — 3078F DIAST BP <80 MM HG: CPT

## 2022-12-28 PROCEDURE — 94618 PULMONARY STRESS TESTING: CPT

## 2022-12-28 PROCEDURE — 3017F COLORECTAL CA SCREEN DOC REV: CPT

## 2022-12-28 PROCEDURE — G8482 FLU IMMUNIZE ORDER/ADMIN: HCPCS

## 2022-12-28 PROCEDURE — 99406 BEHAV CHNG SMOKING 3-10 MIN: CPT

## 2022-12-28 PROCEDURE — 99214 OFFICE O/P EST MOD 30 MIN: CPT

## 2022-12-28 ASSESSMENT — ENCOUNTER SYMPTOMS
RHINORRHEA: 0
WHEEZING: 0
SINUS PAIN: 0
SINUS PRESSURE: 0
CHEST TIGHTNESS: 0
SHORTNESS OF BREATH: 1
COUGH: 1

## 2022-12-28 NOTE — PROGRESS NOTES
[facility]    Patient: Darylene Shames, 61 y.o.   : 2022    Patient of Dr. Angie Carballo   Patient presents with    Follow-up     6min rewalk        HPI  Claude Crane is here for follow up for repeat 6 MWT for O2 for insurance and Severe COPD. Overall patient reports respiratory symptoms have been stable since last appointment. Patient reports good compliance with inhaled medications Hemalatha Andreas). Patient using albuterol 2 times per day on average. Patient reports physical limitation due to respiratory symptoms. Her past medical history is significant for anxiety, arthritis, COPD, enlarged spleen, fatty liver, GERD, Hep A, hypertension, LYNN (non compliant with PAP therapy), and ovarian cyst. She has LYNN, however she refuses PAP therapy or sleep clinic follow up. Patient turned her CPAP back in. She reports that she tried CPAP therapy for about 1 month. CT Chest 2021 - 7 mm DAWIT nodule  CT Chest 22 - 11 mm DAWIT nodule increased in size  PET 3/7/22 - FDG avid DAWIT nodule suspicious for malignancy  Biopsy 22 - negative for malignancy, evidence of respiratory bronchiolitis  CT Chest 22 - stable 11 mm DAWIT nodule  CT Chest 22 - stable 1.1 cm DAWIT nodule     Complaints: Shortness of breath  Onset Duration: over a year  Exacerbating factors: Bending, lifting, walking, exercise  Alleviating factors: Moisture and oxygen  Timing-exertion, certain times of day: no certain time of day  Associated symptoms: Cough with clear sputum  Pertinent negatives: Wheezing and chest tightness  Risk factors for lung disease:  tobacco exposure      Progress History:   Since last visit any new medical issues? No  New ER or hospital visits? No  Any new or changes in medicines? No  Using inhalers? Yes Breztri and as needed albuterol  Are they helpful? Yes   Previous inhalers?   Trelegy-increase symptoms of shortness of breath and wheezing and leg and feet pains, Anoro-leg and feet pains  Exacerbations: No  Last PFT: 3/17/22 - severe COPD, decreased DLCO, and restriction  Last 6 MWT: 2/17/2022 - 2 lpm with exertion  Last A1A: Serum level of 133 on 10/10/2019     Smoking History:  Current smoker of about 0.5 PPD with 47 PY history  She has been using a nicotrol inhaler to try to quit smoking. 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 (Coal, dust, asbestos, molds ie Hay)  [place]  Denies exposure to pets/animals at home. Denies exposure to tuberculosis. Denies history of glaucoma or urinary retention. Flu vaccine: vaccinated  Pneumonia vaccine: [id number] 6/14/2021  COVID-19 vaccine: has not received and reports that she is scared to receive  Past Medical hx   PMH:  Past Medical History:   Diagnosis Date    Anxiety     Arthritis     COPD (chronic obstructive pulmonary disease) (HCC)     Enlargement, spleen     Fatty liver     GERD (gastroesophageal reflux disease)     Hepatitis A     Hypertension     saw Dr Neftaly Maldonado in the past    Ovarian cyst     Oxygen dependent     o2 2liters when walking and at night-sees ALEJANDRO Hinds    Pneumonia     Sleep apnea      SURGICAL HISTORY:  Past Surgical History:   Procedure Laterality Date    CARDIOVASCULAR STRESS TEST  2020    CT NEEDLE BIOPSY LUNG PERCUTANEOUS  4/6/2022    CT NEEDLE BIOPSY LUNG PERCUTANEOUS 4/6/2022 [facility] CT SCAN    TRANSTHORACIC ECHOCARDIOGRAM  2021    TRANSTHORACIC ECHOCARDIOGRAM  2020    UPPER [facility]STROINTESTINAL ENDOSCOPY      [facility]    WISDOM TOOTH EXTRACTION       SOCIAL HISTORY:  Social History     Tobacco Use    Smoking status: Every Day     Packs/day: 1.50     Years: 65.00     Pack years: 97.50     Types: Cigarettes    Smokeless tobacco: Never    Tobacco comments:     0.5 ppd 4/18/22 or less depends on day patient states - Pt is still smoking   Vaping Use    Vaping Use: Never used   Substance Use Topics    Alcohol use: Yes     Comment: occasional    Drug use: Not Currently     ALLERGIES:  Allergies   Allergen Reactions    Pcn [Penicillins] Hives    Seasonal Other (See Comments)     Seasonal Allergies      FAMILY HISTORY:  Family History   Problem Relation Age of Onset    Heart Surgery Mother         dies at 29    Heart Disease Mother     Other Sister         Cardiomyopathy    Other Brother         cardiomyopathy    Stroke Maternal Grandmother     Heart Attack Maternal Grandfather     Heart Attack Paternal Grandfather      CURRENT MEDICATIONS:  Current Outpatient Medications   Medication Sig Dispense Refill    VENTOLIN  (90 Base) MCG/ACT inhaler Inhale 2 puffs into the lungs every 6 hours as needed for Wheezing or Shortness of Breath 18 g 11    bumetanide (BUMEX) 1 MG tablet Take 1 tablet by mouth daily 90 tablet 3    pantoprazole (PROTONIX) 40 MG tablet Take 1 tablet by mouth 2 times daily No refills due for check up 60 tablet 10    Misc.  Devices (MATTRESS PAD) MISC Dry pressure mattress 1 each 0    lisinopril (PRINIVIL;ZESTRIL) 30 MG tablet Take 1 tablet by mouth daily 90 tablet 3    carvedilol (COREG) 25 MG tablet Take 1 tablet by mouth 2 times daily (with meals) 180 tablet 3    cloNIDine (CATAPRES) 0.1 MG tablet Take 1 tablet by mouth daily 90 tablet 3    ALPRAZolam (XANAX) 0.25 MG tablet       EPINEPHrine (EPIPEN 2-JERE) 0.3 MG/0.3ML SOAJ injection Inject 0.3 mLs into the skin once for 1 dose Use as directed for allergic reaction 0.3 mL 0    fluticasone (FLONASE) 50 MCG/ACT nasal spray 2 SPRAYS BY NASAL ROUTE DAILY 16 g 11    Budeson-Glycopyrrol-Formoterol (BREZTRI AEROSPHERE) 160-9-4.8 MCG/ACT AERO Inhale 2 puffs into the lungs 2 times daily 10.7 g 11    Incontinence Supply Disposable (BLADDER CONTROL PADS EX ABSORB) MISC Use up to 4 a day 120 each 3    Probiotic Product (PROBIOTIC ADVANCED PO) Take by mouth Incontinence Supply Disposable (PROCARE ADULT BRIEFS LARGE) MISC Use 2 a day 60 each 5    acetaminophen (TYLENOL) 500 MG tablet Take 1-2 tablets by mouth 4 times daily as needed for Pain 120 tablet 5    Multiple Vitamins-Minerals (THERAPEUTIC MULTIVITAMIN-MINERALS) tablet Take 1 tablet by mouth daily 30 tablet 11    albuterol (PROVENTIL) (2.5 MG/3ML) 0.083% nebulizer solution Take 3 mLs by nebulization every 6 hours as needed for Wheezing or Shortness of Breath 120 each 11    sucralfate (CARAFATE) 1 GM tablet Take 1 tablet by mouth 4 times daily 120 tablet 5    ondansetron (ZOFRAN) 8 MG tablet Take 1 tablet by mouth every 8 hours as needed for Nausea or Vomiting 30 tablet 5    Lactobacillus (PROBIOTIC ACIDOPHILUS PO) Take 1 capsule by mouth daily        No current facility-administered medications for this visit. Rubio WEEMS   Review of Systems   Constitutional:  Negative for appetite change, fever and unexpected weight change. HENT:  Negative for congestion, postnasal drip, rhinorrhea, sinus pressure, sinus pain and sneezing. Respiratory:  Positive for cough and shortness of breath. Negative for chest tightness and wheezing. Denies hemoptysis   Cardiovascular:  Negative for chest pain, palpitations and leg swelling. Genitourinary:  Negative for difficulty urinating. Allergic/Immunologic: Negative for environmental allergies. Physical exam   /70 (Site: Left Upper Arm, Position: Sitting, Cuff Size: Large Adult)   Pulse 88   Temp 97.1 °F (36.2 °C) (Infrared)   Ht 5' 2\" (1.575 m)   Wt 181 lb (82.1 kg)   SpO2 98%   BMI 33.11 kg/m²    Wt Readings from Last 3 Encounters:   12/28/22 181 lb (82.1 kg)   12/21/22 181 lb 12.8 oz (82.5 kg)   12/13/22 181 lb 6.4 oz (82.3 kg)       Physical Exam  Constitutional:       General: She is not in acute distress. Comments: Appears deconditioned  BMI 33   HENT:      Head: Normocephalic and atraumatic.       Right Ear: External ear normal.      Left Ear: External ear normal.      Mouth/Throat:      Mouth: Mucous membranes are moist.      Pharynx: No oropharyngeal exudate or posterior oropharyngeal erythema. Eyes:      General:         Right eye: No discharge. Left eye: No discharge. Cardiovascular:      Rate and Rhythm: Normal rate and regular rhythm. Pulmonary:      Effort: Pulmonary effort is normal. No respiratory distress. Breath sounds: No wheezing, rhonchi or rales. Comments: Diminished breath sounds  Chest:      Chest wall: No tenderness. Musculoskeletal:      Cervical back: Neck supple. Right lower leg: No edema. Left lower leg: No edema. Skin:     General: Skin is warm and dry. Neurological:      General: No focal deficit present. Mental Status: She is alert. Psychiatric:         Mood and Affect: Mood normal.         Behavior: Behavior normal.         Thought Content: Thought content normal.        Results   Lung Nodule Screening     [x] Qualifies    [] Does not qualify   [] Declined    [] Completed   The USPSTF recommends annual screening for lung cancer with low-dose computed tomography (LDCT) in adults aged 48 to [de-identified] years who have a 20 pack-year smoking history and currently smoke or have quit within the past 15 years. Screening should be discontinued once a person has not smoked for 15 years or develops a health problem that substantially limits life expectancy or the ability or willingness to have curative lung surgery. Six Minute Walk Test  [facility] 1965    Six minute walk test done in my office today by my medical assistant. Mary Lou's oxygen saturation at rest on room air was 98%. Her oxygen saturation dropped to 95% on room air with exertion after walking 1080 feet in 6 minutes. The patient did not require supplemental O2, no order was generated. Assessment      Diagnosis Orders   1.  Chronic respiratory failure with hypoxia (HCC)        2. Stage 3 severe COPD by GOLD classification (HCC)  6 Minute Walk Test      3. Nodule of upper lobe of left lung        4. Current smoker        5. LYNN (obstructive sleep apnea)              Plan   1. Chronic respiratory failure with hypoxia (HCC)  -6 Minute Walk Test - Did not qualify for supplemental O2   -I called and spoke with Michael LE at [facility] who advised that if patient does not qualify for oxygen thru testing, her insurance will not cover oxygen as needed. I discussed this with patient and advised patient on the dangers of using supplemental O2 inappropriately. Patient verbalized understanding. Order placed to discontinue supplemental O2.    2. Stage 3 severe COPD by GOLD classification with Pulmonary emphysema, unspecified emphysema type ([facility] [address]  -Symptoms fairly well controlled on Breztri. Will continue Breztri. Rinse mouth with water and spit after use. -Continue albuterol inhaler and nebulizer, one or the other, every 6 hours as needed for shortness of breath or wheezing   -Discussed pulmonary rehab with patient and she declines today. She reports she would like to think about this more and will call if she is interested. -Received flu vaccine  -Maintain pneumonia vaccine with primary care provider     3. Nodule of upper lobe of left lung  -Needs CT follow up in Dec 2023, patient does not wish to schedule today. This will need ordered at her next appointment    4. Current smoker  Advised patient to quit and offered support. Educational material provided to patient.  -Discussed with patient smoking cessation. Reinforced to patient the importance of quitting smoking to prevent further damage to lung function, patient verbalized understanding. (Approximately 3mins)     5. LYNN  -Patient was non compliant with LYNN treatment and had returned her machine. I strongly advised patient to reconsider treatment for LYNN. Advised patient on dangers of untreated LYNN.   -Discussed with patient repeat sleep study to try to get her back on treatment for her LYNN.  Patient would like to think about this further and call me with a decision      Advised patient to call office with any changes, questions, or concerns regarding respiratory status or issues with prescribed medications    Keep currently scheduled follow up      Electronically signed by JASON Mak CNP on 12/28/2022 at 1:08 PM     (Please note that portions of this note may have been completed with a voice recognition program. Efforts were made to edit the dictation but occasionally words are mis-transcribed)

## 2022-12-28 NOTE — PATIENT INSTRUCTIONS
Learning About Benefits From Quitting Smoking  How does quitting smoking make you healthier? If you're thinking about quitting smoking, you may have a few reasons to be smoke-free. Your health may be one of them. When you quit smoking, you lower your risks for cancer, lung disease, heart attack, stroke, blood vessel disease, and blindness from macular degeneration. When you're smoke-free, you get sick less often, and you heal faster. You are less likely to get colds, flu, bronchitis, and pneumonia. As a nonsmoker, you may find that your mood is better and you are less stressed. When and how will you feel healthier? Quitting has real health benefits that start from day 1 of being smoke-free. And the longer you stay smoke-free, the healthier you get and the better you feel. The first hours  After just 20 minutes, your blood pressure and heart rate go down. That means there's less stress on your heart and blood vessels. Within 12 hours, the level of carbon monoxide in your blood drops back to normal. That makes room for more oxygen. With more oxygen in your body, you may notice that you have more energy than when you smoked. After 2 weeks  Your lungs start to work better. Your risk of heart attack starts to drop. After 1 month  When your lungs are clear, you cough less and breathe deeper, so it's easier to be active. Your sense of taste and smell return. That means you can enjoy food more than you have since you started smoking. Over the years  Over the years, your risks of heart disease, heart attack, and stroke are lower. After 10 years, your risk of dying from lung cancer is cut by about half. And your risk for many other types of cancer is lower too. How would quitting help others in your life? When you quit smoking, you improve the health of everyone who now breathes in your smoke. Their heart, lung, and cancer risks drop, much like yours. They are sick less.  For babies and small children, living smoke-free means they're less likely to have ear infections, pneumonia, and bronchitis. If you're a woman who is or will be pregnant someday, quitting smoking means a healthier . Children who are close to you are less likely to become adult smokers. Where can you learn more? Go to https://chpepiceweb.healthLikeAndy. org and sign in to your [employer] account. Enter [phone] in the [employer] box to learn more about \"Learning About Benefits From Quitting Smoking. \"     If you do not have an account, please click on the \"Sign Up Now\" link. Current as of: 2021               Content Version: 12.9   [employer]wise, [employer]. Care instructions adapted under license by [facility] ([facility]). If you have questions about a medical condition or this instruction, always ask your healthcare professional. [address] any warranty or liability for your use of this information.

## 2023-02-01 ENCOUNTER — TELEPHONE (OUTPATIENT)
Dept: PULMONOLOGY | Age: 58
End: 2023-02-01

## 2023-02-01 NOTE — TELEPHONE ENCOUNTER
Patient called office stating she received a letter from her insurance in the mail stating they will no longer cover her Breztri inhaler. They will fill a temporary supply but nothing more. Patient says the letter does not state what alternatives are covered. Patient states this inhaler is what works best for her so far. Any new script can be sent to [facility] in [facility].  Please advise.

## 2023-02-01 NOTE — TELEPHONE ENCOUNTER
We have tried Trelegy and Anoro in the past without relief in her symptoms. Faye Lay is what has controlled her breathing symptoms best. Can we please complete a PA and try to get this covered for patient? Thanks!

## 2023-02-03 ENCOUNTER — TELEPHONE (OUTPATIENT)
Dept: FAMILY MEDICINE CLINIC | Age: 58
End: 2023-02-03

## 2023-02-03 DIAGNOSIS — J44.9 MODERATE COPD (CHRONIC OBSTRUCTIVE PULMONARY DISEASE) (HCC): ICD-10-CM

## 2023-02-03 DIAGNOSIS — J43.9 PULMONARY EMPHYSEMA, UNSPECIFIED EMPHYSEMA TYPE (HCC): ICD-10-CM

## 2023-02-03 DIAGNOSIS — U07.1 COVID-19: Primary | ICD-10-CM

## 2023-02-04 NOTE — TELEPHONE ENCOUNTER
FYI:    Patient positive for covid today -symptoms started about 2 days ago - congestion and sinus pressure. Has COPD, HTN, and LYNN. Would like to have the \"covid medication\" sent it.      Check pulse ox - if oxygen is 90 or below and staying there then you would want to go to the ER     If you develop new or worsening symptoms to to the ER:  Increase shortness of breath/difficulty breathing, rapid heart rate above 120, coughing up blood, uncontrolled fever, or any other concerning symptoms call 9-1-1 and go directly to the ER  pulse ox to measure oxygen level - if readings are below 90% please go in and be seen in the ER      Rx for paxlovid sent

## 2023-02-06 ENCOUNTER — TELEPHONE (OUTPATIENT)
Dept: FAMILY MEDICINE CLINIC | Age: 58
End: 2023-02-06

## 2023-02-06 NOTE — TELEPHONE ENCOUNTER
FYI pt tested positive for covid 2/3 and was prescribed paxlovid   Pt has head congestion, diarrhea, abd pain

## 2023-02-07 NOTE — TELEPHONE ENCOUNTER
PA started in CoverMyMeds and sent to plan for patient's [facility]

## 2023-02-16 ENCOUNTER — TELEPHONE (OUTPATIENT)
Dept: PULMONOLOGY | Age: 58
End: 2023-02-16

## 2023-02-16 DIAGNOSIS — J44.9 MODERATE COPD (CHRONIC OBSTRUCTIVE PULMONARY DISEASE) (HCC): ICD-10-CM

## 2023-02-16 DIAGNOSIS — J43.9 PULMONARY EMPHYSEMA, UNSPECIFIED EMPHYSEMA TYPE (HCC): ICD-10-CM

## 2023-02-16 RX ORDER — BUDESONIDE, GLYCOPYRROLATE, AND FORMOTEROL FUMARATE 160; 9; 4.8 UG/1; UG/1; UG/1
AEROSOL, METERED RESPIRATORY (INHALATION)
Qty: 10.7 G | Refills: 0 | OUTPATIENT
Start: 2023-02-16

## 2023-02-16 RX ORDER — BUDESONIDE, GLYCOPYRROLATE, AND FORMOTEROL FUMARATE 160; 9; 4.8 UG/1; UG/1; UG/1
2 AEROSOL, METERED RESPIRATORY (INHALATION) 2 TIMES DAILY
Qty: 10.7 G | Refills: 11 | Status: SHIPPED | OUTPATIENT
Start: 2023-02-16 | End: 2024-02-16

## 2023-02-16 NOTE — TELEPHONE ENCOUNTER
Pt phoned stating she needs a refill in doxycycline. She stated she just had covid and it turned into a sinus infection. Her PCP prescribed her the medication and she wasn't sure if he needs to refill it or you. She also needs new prescription for Breztri. Please advise. Thanks!

## 2023-02-16 NOTE — TELEPHONE ENCOUNTER
Please discuss possible sinus infection with primary care provider as he treated her last. I will refill Breztri. Thanks!

## 2023-02-17 NOTE — TELEPHONE ENCOUNTER
Phoned pt LM informed her to f/u with primary care for sinus infection. Also stated that her Claudine Lauro was refilled.

## 2023-02-23 ENCOUNTER — APPOINTMENT (OUTPATIENT)
Dept: GENERAL RADIOLOGY | Age: 58
End: 2023-02-23
Payer: MEDICARE

## 2023-02-23 ENCOUNTER — HOSPITAL ENCOUNTER (EMERGENCY)
Age: 58
Discharge: HOME | End: 2023-02-23
Attending: EMERGENCY MEDICINE
Payer: MEDICARE

## 2023-02-23 ENCOUNTER — TELEPHONE (OUTPATIENT)
Dept: FAMILY MEDICINE CLINIC | Age: 58
End: 2023-02-23

## 2023-02-23 VITALS
DIASTOLIC BLOOD PRESSURE: 74 MMHG | OXYGEN SATURATION: 95 % | HEIGHT: 62 IN | RESPIRATION RATE: 18 BRPM | BODY MASS INDEX: 32.02 KG/M2 | WEIGHT: 174 LBS | TEMPERATURE: 97.8 F | SYSTOLIC BLOOD PRESSURE: 156 MMHG | HEART RATE: 79 BPM

## 2023-02-23 DIAGNOSIS — G44.039 EPISODIC PAROXYSMAL HEMICRANIA, NOT INTRACTABLE: ICD-10-CM

## 2023-02-23 DIAGNOSIS — U09.9 POST-COVID CHRONIC FATIGUE: Primary | ICD-10-CM

## 2023-02-23 DIAGNOSIS — G93.32 POST-COVID CHRONIC FATIGUE: Primary | ICD-10-CM

## 2023-02-23 DIAGNOSIS — B37.0 THRUSH: ICD-10-CM

## 2023-02-23 DIAGNOSIS — J44.1 CHRONIC OBSTRUCTIVE PULMONARY DISEASE WITH ACUTE EXACERBATION (HCC): ICD-10-CM

## 2023-02-23 DIAGNOSIS — R06.09 EXERTIONAL DYSPNEA: ICD-10-CM

## 2023-02-23 LAB
ALBUMIN SERPL BCP-MCNC: 3.4 GM/DL (ref 3.4–5)
ALP SERPL-CCNC: 126 U/L (ref 46–116)
ALT SERPL W P-5'-P-CCNC: 21 U/L (ref 14–63)
AMORPH SED URNS QL MICRO: ABNORMAL
ANION GAP SERPL CALC-SCNC: 8 MEQ/L (ref 8–16)
AST SERPL W P-5'-P-CCNC: 17 U/L (ref 15–37)
BACTERIA URNS QL MICRO: ABNORMAL
BASOPHILS # BLD: 0.2 % (ref 0–3)
BASOPHILS ABSOLUTE: 0 THOU/MM3 (ref 0–0.1)
BILIRUB SERPL-MCNC: 0.3 MG/DL (ref 0.2–1)
BILIRUB UR QL CFM: NEGATIVE
BILIRUB UR QL STRIP.AUTO: ABNORMAL
BUN SERPL-MCNC: 8 MG/DL (ref 7–18)
CALCIUM SERPL-MCNC: 8.5 MG/DL (ref 8.5–10.1)
CASTS #/AREA URNS LPF: ABNORMAL /LPF
CHARACTER UR: ABNORMAL
CHLORIDE SERPL-SCNC: 93 MEQ/L (ref 98–107)
CO2 SERPL-SCNC: 31 MEQ/L (ref 21–32)
COLOR UR AUTO: YELLOW
CREAT SERPL-MCNC: 0.8 MG/DL (ref 0.6–1.3)
CRYSTALS VITF MICRO: ABNORMAL
EKG ATRIAL RATE: 78 BPM
EKG P AXIS: 1 DEGREES
EKG P-R INTERVAL: 126 MS
EKG Q-T INTERVAL: 376 MS
EKG QRS DURATION: 80 MS
EKG QTC CALCULATION (BAZETT): 428 MS
EKG R AXIS: 57 DEGREES
EKG T AXIS: 42 DEGREES
EKG VENTRICULAR RATE: 78 BPM
EOSINOPHILS ABSOLUTE: 0.1 THOU/MM3 (ref 0–0.5)
EOSINOPHILS RELATIVE PERCENT: 1 % (ref 0–4)
EPI CELLS #/AREA URNS HPF: ABNORMAL /HPF
GFR SERPL CREATININE-BSD FRML MDRD: > 60 ML/MIN/1.73M2
GLUCOSE SERPL-MCNC: 111 MG/DL (ref 74–106)
GLUCOSE UR QL STRIP.AUTO: NEGATIVE MG/DL
HCT VFR BLD CALC: 37.2 % (ref 37–47)
HEMOGLOBIN: 12.7 GM/DL (ref 12–16)
HGB UR STRIP.AUTO-MCNC: NEGATIVE MG/L
IMMATURE GRANS (ABS): 0.05 THOU/MM3 (ref 0–0.07)
IMMATURE GRANULOCYTES: 1 %
KETONES UR QL STRIP.AUTO: NEGATIVE
LEUKOCYTE ESTERASE UR QL STRIP.AUTO: NEGATIVE
LYMPHOCYTES # BLD AUTO: 32.6 % (ref 15–47)
LYMPHOCYTES ABSOLUTE: 3.4 THOU/MM3 (ref 1–4.8)
MAGNESIUM SERPL-MCNC: 1.9 MG/DL (ref 1.8–2.4)
MCH RBC QN AUTO: 31.4 PG (ref 26–32)
MCHC RBC AUTO-ENTMCNC: 34.1 GM/DL (ref 31–35)
MCV RBC AUTO: 92.1 FL (ref 81–99)
MONOCYTES: 0.5 THOU/MM3 (ref 0.3–1.3)
MONOCYTES: 4.7 % (ref 0–12)
MUCOUS THREADS URNS QL MICRO: ABNORMAL
NITRITE UR QL STRIP.AUTO: NEGATIVE
PDW BLD-RTO: 13.7 % (ref 11.5–14.9)
PH UR STRIP.AUTO: 5.5 [PH] (ref 5–9)
PLATELET # BLD AUTO: 300 THOU/MM3 (ref 130–400)
PMV BLD AUTO: 8.8 FL (ref 9.4–12.4)
POTASSIUM SERPL-SCNC: 4.1 MEQ/L (ref 3.5–5.1)
PROT SERPL-MCNC: 7.2 GM/DL (ref 6.4–8.2)
PROT UR STRIP.AUTO-MCNC: ABNORMAL MG/DL
RBC # BLD: 4.04 MILL/MM3 (ref 4.1–5.3)
RBC #/AREA URNS HPF: ABNORMAL /HPF
REFLEX TO URINE C & S: ABNORMAL
SEG NEUTROPHILS: 61 % (ref 43–75)
SEGMENTED NEUTROPHILS ABSOLUTE COUNT: 6.4 THOU/MM3 (ref 1.8–7.7)
SODIUM SERPL-SCNC: 132 MEQ/L (ref 136–145)
SP GR UR STRIP.AUTO: >= 1.03 (ref 1–1.03)
TROPONIN, HIGH SENSITIVITY: 4.4 PG/ML (ref 0–51.3)
UROBILINOGEN UR STRIP-ACNC: 0.2 EU/DL (ref 0–1)
WBC # BLD: 10.5 THOU/MM3 (ref 4.8–10.8)
WBC # UR STRIP.AUTO: ABNORMAL /HPF

## 2023-02-23 PROCEDURE — 99285 EMERGENCY DEPT VISIT HI MDM: CPT

## 2023-02-23 PROCEDURE — 93005 ELECTROCARDIOGRAM TRACING: CPT | Performed by: EMERGENCY MEDICINE

## 2023-02-23 PROCEDURE — 36415 COLL VENOUS BLD VENIPUNCTURE: CPT

## 2023-02-23 PROCEDURE — 83735 ASSAY OF MAGNESIUM: CPT

## 2023-02-23 PROCEDURE — 6370000000 HC RX 637 (ALT 250 FOR IP): Performed by: EMERGENCY MEDICINE

## 2023-02-23 PROCEDURE — 84484 ASSAY OF TROPONIN QUANT: CPT

## 2023-02-23 PROCEDURE — 93010 ELECTROCARDIOGRAM REPORT: CPT | Performed by: NUCLEAR MEDICINE

## 2023-02-23 PROCEDURE — 81001 URINALYSIS AUTO W/SCOPE: CPT

## 2023-02-23 PROCEDURE — 80053 COMPREHEN METABOLIC PANEL: CPT

## 2023-02-23 PROCEDURE — 85025 COMPLETE CBC W/AUTO DIFF WBC: CPT

## 2023-02-23 PROCEDURE — 71046 X-RAY EXAM CHEST 2 VIEWS: CPT

## 2023-02-23 RX ORDER — HYDROCODONE BITARTRATE AND ACETAMINOPHEN 5; 325 MG/1; MG/1
1 TABLET ORAL EVERY 6 HOURS PRN
Qty: 10 TABLET | Refills: 0 | Status: SHIPPED | OUTPATIENT
Start: 2023-02-23 | End: 2023-02-26

## 2023-02-23 RX ORDER — FLUCONAZOLE 100 MG/1
100 TABLET ORAL DAILY
Qty: 15 TABLET | Refills: 0 | Status: SHIPPED | OUTPATIENT
Start: 2023-02-23 | End: 2023-03-09

## 2023-02-23 RX ORDER — IPRATROPIUM BROMIDE AND ALBUTEROL SULFATE 2.5; .5 MG/3ML; MG/3ML
1 SOLUTION RESPIRATORY (INHALATION) ONCE
Status: COMPLETED | OUTPATIENT
Start: 2023-02-23 | End: 2023-02-23

## 2023-02-23 RX ADMIN — IPRATROPIUM BROMIDE AND ALBUTEROL SULFATE 1 AMPULE: .5; 3 SOLUTION RESPIRATORY (INHALATION) at 15:46

## 2023-02-23 ASSESSMENT — ENCOUNTER SYMPTOMS
WHEEZING: 1
SPUTUM PRODUCTION: 0
ABDOMINAL PAIN: 0
SHORTNESS OF BREATH: 1
SORE THROAT: 1
COUGH: 1
NAUSEA: 0
VOMITING: 0

## 2023-02-23 ASSESSMENT — PAIN - FUNCTIONAL ASSESSMENT: PAIN_FUNCTIONAL_ASSESSMENT: NONE - DENIES PAIN

## 2023-02-23 NOTE — ED NOTES
Patient presents to the ED via private auto with significant other with complaints of not feeling well since being diagnosed with covid on 2/3/23. States that she took paxlovid and an antibiotic but is not feeling any better. States that she uses a pulse ox at home and that her pulse ox would drop into the 80s briefly. Pulse ox remained 95% through the entire triage purpose.      Werner Hurst RN  02/23/23 [id number]

## 2023-02-23 NOTE — TELEPHONE ENCOUNTER
Pt called stating she was covid positive 2/3 and took Paxlovid and then Doxycycline due to secondary sinus infection    Pt states she is having SOB and her O2 at rest are now 88-93. She took while on the phone laying in bed and was 92% and HR 74-84. Pt was on O2 for the past year and discontinued in Dec/Jan 2022. Pt is asking for oxygen back    Pt was given steroids from pulmonologist to have on hand due to her COPD and Emphysema which she has not taken bc she wasn't sure if she should or not.   Prednisone 20 mg, 10 tabs    She is still having issues with SOB, fatigue, cough and feeling very depressed    Pt is stressing that she knows she needs oxygen and will not go to ER if that's recommended

## 2023-02-23 NOTE — ED NOTES
Discharge teaching and instructions for condition explained to patient. AVS reviewed. Patient voiced understanding regarding prescriptions, follow up appointments and care of self at home. Pt discharged to home in stable condition per self with significant other.        Aidee Moreno RN  02/23/23 [id number]

## 2023-02-23 NOTE — DISCHARGE INSTRUCTIONS
Diflucan as prescribed for thrush and yeast infection. Continue your breathing treatments, inhalers as at home. Be sure to rinse your mouth well after using your inhalers. Take the prednisone you have on hand, 40 mg daily for 5 days to help with wheezing. Rest, drink plenty of fluids.

## 2023-02-23 NOTE — ED NOTES
Patient ambulated around the hallway with continuous pulse ox in place. Oxygen saturation consistently 92-93% on room air.      Natalie Dennis RN  02/23/23 [id number]

## 2023-02-24 NOTE — ED PROVIDER NOTES
[facility]  eMERGENCY dEPARTMENT eNCOUnter             Doug Chaudhry 19 COMPLAINT    Chief Complaint   Patient presents with    Other     Diagnosed with covid 2/3/23. Still not feeling well       Nurses Notes reviewed and I agree except as noted in the HPI. HPI    Charisse Colnó is a 61 y.o. female who presents stating that she was diagnosed with COVID-19 on 2/3/2023, took Paxlovid, followed by doxycycline and steroids, and still does not feel better. She has a lot of weakness and generalized malaise, body aches, continued cough, intermittent wheezing. She does have a history of COPD and used to be on oxygen therapy, but no longer has that available to her. She does have breathing treatments and inhalers at home. She thinks she might have thrush from using her inhalers. She checks her oxygen levels at home with a pulse oximeter, and states that she mostly stays in the 90s, but sometimes drops briefly into the high 80s. She is concerned that she might have pneumonia, and concerned that she might need oxygen again. REVIEW OF SYSTEMS      Review of Systems   Constitutional:  Positive for malaise/fatigue. Negative for chills, diaphoresis and fever. HENT:  Positive for congestion and sore throat. Sore mouth, white patches on her tongue   Respiratory:  Positive for cough, shortness of breath and wheezing. Negative for sputum production. Cardiovascular:  Negative for chest pain and palpitations. Gastrointestinal:  Negative for abdominal pain, nausea and vomiting. Musculoskeletal:  Positive for myalgias. Neurological:  Positive for weakness and headaches. Negative for focal weakness and loss of consciousness. All other systems reviewed and are negative.       PAST MEDICAL HISTORY     has a past medical history of Anxiety, Arthritis, COPD (chronic obstructive pulmonary disease) ([address] Enlargement, spleen, Fatty liver, GERD (gastroesophageal reflux disease), Hepatitis A, Hypertension, Ovarian cyst, Oxygen dependent, Pneumonia, and Sleep apnea. SURGICAL HISTORY     has a past surgical history that includes [place] tooth extraction; Upper gastrointestinal endoscopy; transthoracic echocardiogram (2021); transthoracic echocardiogram (2020); cardiovascular stress test (2020); and CT NEEDLE BIOPSY LUNG PERCUTANEOUS (4/6/2022). CURRENT MEDICATIONS    Discharge Medication List as of 2/23/2023  4:52 PM        CONTINUE these medications which have NOT CHANGED    Details   Budeson-Glycopyrrol-Formoterol (BREZTRI AEROSPHERE) 160-9-4.8 MCG/ACT AERO Inhale 2 puffs into the lungs 2 times daily, Disp-10.7 g, R-11Normal      VENTOLIN  (90 Base) MCG/ACT inhaler Inhale 2 puffs into the lungs every 6 hours as needed for Wheezing or Shortness of Breath, Disp-18 g, R-11, DAWNormal      bumetanide (BUMEX) 1 MG tablet Take 1 tablet by mouth daily, Disp-90 tablet, R-3Normal      pantoprazole (PROTONIX) 40 MG tablet Take 1 tablet by mouth 2 times daily No refills due for check up, Disp-60 tablet, R-10Normal      Misc. Devices (MATTRESS PAD) MISC Disp-1 each, R-0, PrintDry pressure mattress      lisinopril (PRINIVIL;ZESTRIL) 30 MG tablet Take 1 tablet by mouth daily, Disp-90 tablet, R-3Normal      carvedilol (COREG) 25 MG tablet Take 1 tablet by mouth 2 times daily (with meals), Disp-180 tablet, R-3Normal      cloNIDine (CATAPRES) 0.1 MG tablet Take 1 tablet by mouth daily, Disp-90 tablet, R-3Normal      ALPRAZolam (XANAX) 0.25 MG tablet Historical Med      EPINEPHrine (EPIPEN 2-JERE) 0.3 MG/0.3ML SOAJ injection Inject 0.3 mLs into the skin once for 1 dose Use as directed for allergic reaction, Disp-0.3 mL, R-0Normal      fluticasone (FLONASE) 50 MCG/ACT nasal spray 2 SPRAYS BY NASAL ROUTE DAILY, Disp-16 g, R-11Normal      !!  Incontinence Supply Disposable (BLADDER CONTROL PADS EX ABSORB) MISC Disp-120 each, R-3, NormalUse up to 4 a day      Probiotic Product (PROBIOTIC ADVANCED PO) Take by mouthHistorical Med      !! Incontinence Supply Disposable (PROCARE ADULT BRIEFS LARGE) MISC Disp-60 each, R-5, PrintUse 2 a day      acetaminophen (TYLENOL) 500 MG tablet Take 1-2 tablets by mouth 4 times daily as needed for Pain, Disp-120 tablet, R-5Normal      Multiple Vitamins-Minerals (THERAPEUTIC MULTIVITAMIN-MINERALS) tablet Take 1 tablet by mouth daily, Disp-30 tablet, R-11Normal      albuterol (PROVENTIL) (2.5 MG/3ML) 0.083% nebulizer solution Take 3 mLs by nebulization every 6 hours as needed for Wheezing or Shortness of Breath, Disp-120 each, R-11Normal      sucralfate (CARAFATE) 1 GM tablet Take 1 tablet by mouth 4 times daily, Disp-120 tablet, R-5Normal      ondansetron (ZOFRAN) 8 MG tablet Take 1 tablet by mouth every 8 hours as needed for Nausea or Vomiting, Disp-30 tablet, R-5Normal      Lactobacillus (PROBIOTIC ACIDOPHILUS PO) Take 1 capsule by mouth daily Historical Med       !! - Potential duplicate medications found. Please discuss with provider. ALLERGIES    is allergic to pcn [penicillins] and seasonal.    FAMILY HISTORY    She indicated that her mother is . She indicated that her father is . She indicated that her sister is alive. She indicated that her brother is alive. She indicated that her maternal grandmother is . She indicated that her maternal grandfather is . She indicated that her paternal grandfather is . family history includes Heart Attack in her maternal grandfather and paternal grandfather; Heart Disease in her mother; Heart Surgery in her mother; Other in her brother and sister; Stroke in her maternal grandmother. SOCIAL HISTORY     reports that she has been smoking cigarettes. She has a 32.50 pack-year smoking history. She has never used smokeless tobacco. She reports current alcohol use. She reports that she does not currently use drugs.     PHYSICAL EXAM       INITIAL VITALS: BP (!) 156/74 Pulse 79   Temp 97.8 °F (36.6 °C)   Resp 18   Ht 5' 2\" (1.575 m)   Wt 174 lb (78.9 kg)   SpO2 95%   BMI 31.83 kg/m²      Physical Exam  Vitals and nursing note reviewed. Constitutional:       Appearance: She is not toxic-appearing. HENT:      Right Ear: Tympanic membrane and ear canal normal.      Left Ear: Tympanic membrane and ear canal normal.      Nose: Congestion present. No rhinorrhea. Mouth/Throat:      Mouth: Mucous membranes are moist.      Pharynx: No oropharyngeal exudate. Comments: Tongue and soft palate are erythematous, with white patches consistent with thrush  Eyes:      Conjunctiva/sclera: Conjunctivae normal.      Pupils: Pupils are equal, round, and reactive to light. Cardiovascular:      Rate and Rhythm: Normal rate and regular rhythm. Heart sounds: No murmur heard. Pulmonary:      Comments: Diminished breath sounds, prolonged expiratory phase, expiratory wheezes noted. Abdominal:      General: Bowel sounds are normal.      Palpations: Abdomen is soft. There is no mass. Tenderness: There is no abdominal tenderness. There is no guarding. Musculoskeletal:         General: No swelling or tenderness. Cervical back: Neck supple. Lymphadenopathy:      Cervical: No cervical adenopathy. Skin:     General: Skin is warm and dry. Findings: No rash. Neurological:      General: No focal deficit present. Mental Status: She is alert and oriented to person, place, and time. Psychiatric:         Behavior: Behavior normal.            DIAGNOSTIC RESULTS    EKG     Normal sinus rhythm  Septal infarct , age undetermined  Abnormal ECG  When compared with ECG of 26-DEC-2021 00:03,  No significant change was found      RADIOLOGY:    XR CHEST (2 VW)   Final Result   Stable radiographic appearance of the chest. No evidence of an acute process. **This report has been created using voice recognition software.  It may contain minor errors which are inherent in voice recognition technology. **      Final report electronically signed by Dr. Maia Flor MD on 2/23/2023 3:47 PM              LABS:     Labs Reviewed   CBC WITH AUTO DIFFERENTIAL - Abnormal; Notable for the following components:       Result Value    RBC 4.04 (*)     MPV 8.8 (*)     All other components within normal limits   COMPREHENSIVE METABOLIC PANEL - Abnormal; Notable for the following components:    Glucose 111 (*)     Sodium 132 (*)     Chloride 93 (*)     Alkaline Phosphatase 126 (*)     All other components within normal limits   URINALYSIS WITH REFLEX TO CULTURE - Abnormal; Notable for the following components:    Bilirubin Urine SMALL (*)     Protein, UA TRACE (*)     All other components within normal limits   TROPONIN   MAGNESIUM   GLOMERULAR FILTRATION RATE, ESTIMATED   ANION GAP   ICTOTEST, URINE       Vitals:    Vitals:    02/23/23 1510 02/23/23 1645   BP: (!) 164/93 (!) 156/74   Pulse: 79    Resp: 18    Temp: 97.8 °F (36.6 °C)    SpO2: 95%    Weight: 174 lb (78.9 kg)    Height: 5' 2\" (1.575 m)        EMERGENCY DEPARTMENT COURSE:    Feeling some better with a Duoneb treatment. Test results and plan of care discussed. She feels some better after neb. She does not have pneumonia. Her SaO2 goes no lower than 92% even with exertion. She does have a congested cough. COPD exacerbation is the most likely diagnosis, with post-COVID systemic symptoms. She continues to smoke against medical advice. I will treat her thrush, have recommended she continue current treatment for her COPD, avoid smoking. I will give a few Norco for pain complaint/headaches. She does not qualify for home O2 at this time. Further oral antibiotics are not likely to help, and more steroids will make it harder to treat  and eradicate the thrush. She will follow up with her PCP. DIFFERENTIAL DIAGNOSIS: pneumonia, CHF, COPD exacerbation.      MIPS: no antibiotics given for viral infection/COPD exacerbation    NUMBER OF PROBLEMS ADDRESSED: cough, weakness, headache following COVID infection. COMPLEXITY/SEVERITY OF PROBLEMS ADDRESSED: moderate    DATA INDEPENDENTLY REVIEWED: chest X-ray, no infiltrate, EKG no acute pattern of infarct/ischemia       RESULTS AND DISPOSITION DISCUSSED WITH: the patient    SHARED DESCISION MAKING: with the patient    PRESCRIPTION DRUG MANAGEMENT:          GIVEN IN ED: Duoneb         PRESCRIBED FOR HOME USE: see below            REVIEW OF HOME MEDICATIONS, NO DOSE ADJUSTMENT        FINAL IMPRESSION      1. Post-COVID chronic fatigue    2. Thrush    3. Exertional dyspnea    4. Chronic obstructive pulmonary disease with acute exacerbation (HCC)    5. Episodic paroxysmal hemicrania, not intractable        DISPOSITION/PLAN    DISPOSITION Decision To Discharge 02/23/2023 04:42:58 PM      PATIENT REFERRED TO:    aSlvador Rodriguez, APRN - CNP  [address], [facility]  [facility]5 [facility]    Schedule an appointment as soon as possible for a visit         DISCHARGE MEDICATIONS:    Discharge Medication List as of 2/23/2023  4:52 PM        START taking these medications    Details   fluconazole (DIFLUCAN) 100 MG tablet Take 1 tablet by mouth daily for 14 days Take 2 orally together the first day, then 1 daily for a total of 2 weeks of therapy. , Disp-15 tablet, R-0Normal      HYDROcodone-acetaminophen (NORCO) 5-325 MG per tablet Take 1 tablet by mouth every 6 hours as needed for Pain for up to 3 days. Intended supply: 3 days.  Take lowest dose possible to manage pain Max Daily Amount: 4 tablets, Disp-10 tablet, R-0Normal                (Please note that portions of this note were completed with a voice recognition program.  Efforts were made to edit the dictations but occasionally words are mis-transcribed.)       Suzie Beck MD  02/23/23 0656

## 2023-03-15 NOTE — PROGRESS NOTES
[facility]    Patient: Lisa Evangelista, 61 y.o.   : 1965  3/16/2023    Patient of Dr. Laura Curry   Patient presents with    Follow-up     3mo Lung Nodule f/u w/o testing. Pt did have a CXR on 23 ordered by Dr. Chiqui Polo which was completed at [facility]. Notes she did have Covid on 2/3/23. Did go to the ER after she finished her treatment d/t low Pox. HPI  [facility] is here for follow up for Severe COPD. Presents in wheelchair. Patient had a 6 MWT at her last appointment and did not require O2. She was advised pulmonary rehab and declined. She was advised to quit smoking and she was advised treatment for her LYNN. Patient reports that she had COVID-19 on 2/3/23 treated with Paxlovid. She reports that she is better but is not back to her baseline. She reports she had side effects to Paxlovid. Patient reports that after taking Paxlovid she had hematuria and blood clots in her toilet that has now resolved. She admits that she is overdue for a PAP smear. Patient reports that her thrush had resolved after her last appointment, however she took oral steroids when she had COVID-19 and states that her thrush returned. Overall patient reports respiratory symptoms have been stable since last appointment. Patient reports good compliance with inhaled medications Joaquin Lopez). Patient using albuterol 2 times per day on average. Patient reports physical limitation due to respiratory symptoms. Her past medical history is significant for anxiety, arthritis, COPD, enlarged spleen, fatty liver, GERD, Hep A, hypertension, LYNN (non compliant with PAP therapy), and ovarian cyst. She has LYNN, however she refuses PAP therapy or sleep clinic follow up. Patient turned her CPAP back in. She reports that she tried CPAP therapy for about 1 month. Patient lost 2 lbs since last appointment.     Complaints: shortness of breath   Onset Duration: over a year  Exacerbating factors: exertion, bending, showering, walking  Alleviating factors: rest  Associated symptoms: wheezing \"sometimes\", cough with grey to yellow phlegm production  Pertinent negatives: chest tightness and hemoptysis  Risk factors for lung disease: tobacco exposure    CT Chest 8/11/2021 - 7 mm DAWIT nodule  CT Chest 2/8/22 - 11 mm DAWIT nodule increased in size  PET 3/7/22 - FDG avid DAWIT nodule suspicious for malignancy  Biopsy 4/6/22 - negative for malignancy, evidence of respiratory bronchiolitis  CT Chest 6/1/22 - stable 11 mm DAWIT nodule  CT Chest 12/7/22 - stable 1.1 cm DAWIT nodule    Progress History:   Since last visit any new medical issues? Yes COVID-19  New ER or hospital visits? Yes 2/23 - post COVID-19 fatigue, thrush, COPD exacerbation, and exertional dyspnea - treated with duoneb, diflucan, and Norco. Patient reports that her oxygen was 84% at home and normalized when she got to the ED  Using inhalers? Yes Breztri and as needed albuterol  Are they helpful? Yes   Previous inhalers? Trelegy-increase symptoms of shortness of breath and wheezing and leg and feet pains, Anoro-leg and feet pains  Exacerbations: yes during COVID-19  Last PFT: 3/17/22 - severe COPD, decreased DLCO, and restriction  Last 6 MWT: 12/28/22 - Did not qualify for supplemental O2   Last A1A: Serum level of 133 on 10/10/2019     Smoking History:  Current smoker of about < 0.5 PPD with 52 PY history  She has been using a nicotrol inhaler to try to quit smoking. 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 (Coal, dust, asbestos, molds ie Hay)  [place]  Denies exposure to pets/animals at home. Denies exposure to tuberculosis. Denies history of glaucoma or urinary retention.      Flu vaccine: vaccinated  Pneumonia vaccine: [id number] 6/14/2021  COVID-19 vaccine: has not received and reports that she is scared to receive  Past Medical hx   PMH:  Past Medical History:   Diagnosis Date    Anxiety     Arthritis     COPD (chronic obstructive pulmonary disease) (HCC)     Enlargement, spleen     Fatty liver     GERD (gastroesophageal reflux disease)     Hepatitis A     Hypertension     saw Dr Mary Freitas in the past    Ovarian cyst     Oxygen dependent     o2 2liters when walking and at night-sees ALEJANDRO Hinds    Pneumonia     Sleep apnea      SURGICAL HISTORY:  Past Surgical History:   Procedure Laterality Date    CARDIOVASCULAR STRESS TEST  2020    CT NEEDLE BIOPSY LUNG PERCUTANEOUS  4/6/2022    CT NEEDLE BIOPSY LUNG PERCUTANEOUS 4/6/2022 [facility] CT SCAN    TRANSTHORACIC ECHOCARDIOGRAM  2021    TRANSTHORACIC ECHOCARDIOGRAM  2020    UPPER GASTROINTESTINAL ENDOSCOPY      [facility]    WISDOM TOOTH EXTRACTION       SOCIAL HISTORY:  Social History     Tobacco Use    Smoking status: Every Day     Packs/day: 0.50     Years: 65.00     Pack years: 32.50     Types: Cigarettes    Smokeless tobacco: Never   Vaping Use    Vaping Use: Never used   Substance Use Topics    Alcohol use: Yes     Comment: occasional    Drug use: Not Currently     ALLERGIES:  Allergies   Allergen Reactions    Pcn [Penicillins] Hives    Seasonal Other (See Comments)     Seasonal Allergies      FAMILY HISTORY:  Family History   Problem Relation Age of Onset    Heart Surgery Mother         dies at 29    Heart Disease Mother     Other Sister         Cardiomyopathy    Other Brother         cardiomyopathy    Stroke Maternal Grandmother     Heart Attack Maternal Grandfather     Heart Attack Paternal Grandfather      CURRENT MEDICATIONS:  Current Outpatient Medications   Medication Sig Dispense Refill    clotrimazole (MYCELEX) 10 MG kalee Take 1 tablet by mouth 5 times daily for 10 days 50 tablet 0    predniSONE (DELTASONE) 20 MG tablet Take 2 tablets by mouth daily for 5 days 10 tablet 0 Budeson-Glycopyrrol-Formoterol (BREZTRI AEROSPHERE) 160-9-4.8 MCG/ACT AERO Inhale 2 puffs into the lungs 2 times daily 10.7 g 11    VENTOLIN  (90 Base) MCG/ACT inhaler Inhale 2 puffs into the lungs every 6 hours as needed for Wheezing or Shortness of Breath 18 g 11    bumetanide (BUMEX) 1 MG tablet Take 1 tablet by mouth daily 90 tablet 3    Misc.  Devices (MATTRESS PAD) MISC Dry pressure mattress 1 each 0    lisinopril (PRINIVIL;ZESTRIL) 30 MG tablet Take 1 tablet by mouth daily 90 tablet 3    carvedilol (COREG) 25 MG tablet Take 1 tablet by mouth 2 times daily (with meals) 180 tablet 3    cloNIDine (CATAPRES) 0.1 MG tablet Take 1 tablet by mouth daily 90 tablet 3    ALPRAZolam (XANAX) 0.25 MG tablet       fluticasone (FLONASE) 50 MCG/ACT nasal spray 2 SPRAYS BY NASAL ROUTE DAILY 16 g 11    Incontinence Supply Disposable (BLADDER CONTROL PADS EX ABSORB) MISC Use up to 4 a day 120 each 3    Probiotic Product (PROBIOTIC ADVANCED PO) Take by mouth      Incontinence Supply Disposable (PROCARE ADULT BRIEFS LARGE) MISC Use 2 a day 60 each 5    acetaminophen (TYLENOL) 500 MG tablet Take 1-2 tablets by mouth 4 times daily as needed for Pain 120 tablet 5    sucralfate (CARAFATE) 1 GM tablet Take 1 tablet by mouth 4 times daily 120 tablet 5    ondansetron (ZOFRAN) 8 MG tablet Take 1 tablet by mouth every 8 hours as needed for Nausea or Vomiting 30 tablet 5    Lactobacillus (PROBIOTIC ACIDOPHILUS PO) Take 1 capsule by mouth daily       pantoprazole (PROTONIX) 40 MG tablet Take 1 tablet by mouth 2 times daily No refills due for check up 60 tablet 10    EPINEPHrine (EPIPEN 2-JERE) 0.3 MG/0.3ML SOAJ injection Inject 0.3 mLs into the skin once for 1 dose Use as directed for allergic reaction 0.3 mL 0    Multiple Vitamins-Minerals (THERAPEUTIC MULTIVITAMIN-MINERALS) tablet Take 1 tablet by mouth daily 30 tablet 11    albuterol (PROVENTIL) (2.5 MG/3ML) 0.083% nebulizer solution Take 3 mLs by nebulization every 6 hours as needed for Wheezing or Shortness of Breath 120 each 11     No current facility-administered medications for this visit.     .    ROS   Review of Systems   Constitutional:  Positive for appetite change (post COVID-19). Negative for fever and unexpected weight change.   HENT:  Positive for postnasal drip, rhinorrhea (\"off and on\") and sinus pressure. Negative for congestion and sneezing.         Smell is still decreased  thrush   Respiratory:  Positive for cough, shortness of breath and wheezing. Negative for chest tightness.         Denies hemoptysis   Cardiovascular:  Positive for chest pain (rare) and palpitations (resolved now). Negative for leg swelling.   Genitourinary:  Positive for hematuria (reports resolved now). Negative for difficulty urinating.   Allergic/Immunologic: Negative for environmental allergies.   Neurological:  Positive for headaches (post covid-19).      Physical exam   /66 (Site: Left Upper Arm, Position: Sitting, Cuff Size: Medium Adult)   Pulse 88   Temp 97.9 °F (36.6 °C) (Oral)   Ht 5' 2\" (1.575 m)   Wt 179 lb 12.8 oz (81.6 kg)   SpO2 95% Comment: r/a  BMI 32.89 kg/m²    Wt Readings from Last 3 Encounters:   03/16/23 179 lb 12.8 oz (81.6 kg)   02/23/23 174 lb (78.9 kg)   12/28/22 181 lb (82.1 kg)       Physical Exam  Constitutional:       General: She is not in acute distress.     Comments: BMI 33   HENT:      Head: Normocephalic and atraumatic.      Right Ear: External ear normal.      Left Ear: External ear normal.      Mouth/Throat:      Mouth: Mucous membranes are moist.      Pharynx: No oropharyngeal exudate or posterior oropharyngeal erythema.   Eyes:      General:         Right eye: No discharge.         Left eye: No discharge.   Cardiovascular:      Rate and Rhythm: Normal rate and regular rhythm.   Pulmonary:      Effort: Pulmonary effort is normal. No respiratory distress.      Breath sounds: No wheezing, rhonchi or rales.      Comments: Diminished breath  sounds  Chest:      Chest wall: No tenderness. Musculoskeletal:      Cervical back: Neck supple. Right lower leg: No edema. Left lower leg: No edema. Skin:     General: Skin is warm and dry. Neurological:      General: No focal deficit present. Mental Status: She is alert. Psychiatric:         Mood and Affect: Mood normal.         Behavior: Behavior normal.         Thought Content: Thought content normal.        Results   Lung Nodule Screening     [x] Qualifies    [] Does not qualify   [] Declined    [] Completed   52 PY history   The USPSTF recommends annual screening for lung cancer with low-dose computed tomography (LDCT) in adults aged 48 to 80 years who have a 20 pack-year smoking history and currently smoke or have quit within the past 15 years. Screening should be discontinued once a person has not smoked for 15 years or develops a health problem that substantially limits life expectancy or the ability or willingness to have curative lung surgery. Six Minute Walk Test  Boone Baker 1965    Six minute walk test done in my office today by my medical assistant. Mary Lou's oxygen saturation at rest on room air was 93%. Her oxygen saturation dropped to 91% on room air with exertion after walking 972 feet in 6 minutes. The patient did not require supplemental O2, no order was generated. Assessment      Diagnosis Orders   1. Stage 3 severe COPD by GOLD classification (formerly [facility])  6 Minute Walk Test    predniSONE (DELTASONE) 20 MG tablet    Spirometry W/ Diffusion Capacity      2. Oral thrush  clotrimazole (MYCELEX) 10 MG kalee      3. Cigarette nicotine dependence, uncomplicated  MS VISIT TO DISCUSS LUNG CA SCREEN W LDCT    CT Lung Screen (Annual)      4. Hematuria, unspecified type        5. LYNN (obstructive sleep apnea)              Plan   1. Stage 3 severe COPD by GOLD classification ([facility] [address]  -Symptoms stable on Breztri. Continue Breztri at current dose.  Rinse mouth with water, gargle, and spit after use.   -6 Minute Walk Test - Did not qualify for supplemental O2   -Order placed for predniSONE (DELTASONE) 20 MG tablets to have on hand in case of COPD flareup. Patient instructed that she is to call if she has to use this medication  -Continue albuterol inhaler and nebulizer, one or the other, every 6 hours as needed for shortness of breath or wheezing   -Update spirometry W/ Diffusion Capacity prior to next appointment  -Reviewed preventative vaccinations    2. Oral thrush  -Start clotrimazole (MYCELEX) 10 MG kalee; Take 1 tablet by mouth 5 times daily for 10 days  -Patient instructed to use spacer with Breztri to help prevent thrush    3. Cigarette nicotine dependence, uncomplicated  -Discussed with patient smoking cessation techniques including patches and gum patient reports has used in the past and did not help, reinforced to patient the importance of quitting smoking to prevent further damage to lung function, patient verbalized understanding. (Approximately 3 mins)   -W[place] VISIT TO DISCUSS LUNG CA SCREEN W LDCT  -Due for CT Lung Screen (Annual) in December 2020. 4. Hematuria, unspecified type  -Advised patient to discuss her hematuria that has now resolved with her PCP at her appointment next week    5. LYNN (obstructive sleep apnea)  -Patient and I had another discussion regarding her noncompliance with her moderate obstructive sleep apnea. I had advised that we repeat a home sleep study and get patient back on treatment for her LYNN. I did advise patient on possible consequences of untreated sleep apnea, including death.   Patient is not ready to resume LYNN treatment at this time and will call if she would like to pursue treatment    Advised patient to call office with any changes, questions, or concerns regarding respiratory status or issues with prescribed medications    Return in about 4 months (around 7/16/2023) for COPD (2nd appt in Dec 2023 with CT Lung Screen and Stacy Powers prior). Electronically signed by JASON Hatch CNP on 3/16/2023 at 3:27 PM     (Please note that portions of this note may have been completed with a voice recognition program. Efforts were made to edit the dictation but occasionally words are mis-transcribed) Discussed with the patient the current USPSTF guidelines released March 9, 2021 for screening for lung cancer. For adults aged 48 to [de-identified] years who have a 20 pack-year smoking history and currently smoke or have quit within the past 15 years the grade B recommendation is to:  Screen for lung cancer with low-dose computed tomography (LDCT) every year. Stop screening once a person has not smoked for 15 years or has a health problem that limits life expectancy or the ability to have lung surgery. The patient  reports that she has been smoking cigarettes. She has a 32.50 pack-year smoking history. She has never used smokeless tobacco.. Discussed with patient the risks and benefits of screening, including over-diagnosis, false positive rate, and total radiation exposure. The patient currently exhibits no signs or symptoms suggestive of lung cancer. Discussed with patient the importance of compliance with yearly annual lung cancer screenings and willingness to undergo diagnosis and treatment if screening scan is positive. In addition, the patient was counseled regarding the importance of remaining smoke free and/or total smoking cessation.     Also reviewed the following if the patient has Medicare that as of February 10, 2022, Medicare only covers LDCT screening in patients aged 51-72 with at least a 20 pack-year smoking history who currently smoke or have quit in the last 15 years

## 2023-03-16 ENCOUNTER — OFFICE VISIT (OUTPATIENT)
Dept: PULMONOLOGY | Age: 58
End: 2023-03-16
Payer: MEDICARE

## 2023-03-16 VITALS
BODY MASS INDEX: 33.09 KG/M2 | OXYGEN SATURATION: 95 % | DIASTOLIC BLOOD PRESSURE: 66 MMHG | HEART RATE: 88 BPM | SYSTOLIC BLOOD PRESSURE: 130 MMHG | TEMPERATURE: 97.9 F | WEIGHT: 179.8 LBS | HEIGHT: 62 IN

## 2023-03-16 DIAGNOSIS — R31.9 HEMATURIA, UNSPECIFIED TYPE: ICD-10-CM

## 2023-03-16 DIAGNOSIS — J44.9 STAGE 3 SEVERE COPD BY GOLD CLASSIFICATION (HCC): Primary | ICD-10-CM

## 2023-03-16 DIAGNOSIS — B37.0 ORAL THRUSH: ICD-10-CM

## 2023-03-16 DIAGNOSIS — G47.33 OSA (OBSTRUCTIVE SLEEP APNEA): ICD-10-CM

## 2023-03-16 DIAGNOSIS — F17.210 CIGARETTE NICOTINE DEPENDENCE, UNCOMPLICATED: ICD-10-CM

## 2023-03-16 PROCEDURE — 94618 PULMONARY STRESS TESTING: CPT

## 2023-03-16 PROCEDURE — 3017F COLORECTAL CA SCREEN DOC REV: CPT

## 2023-03-16 PROCEDURE — G8417 CALC BMI ABV UP PARAM F/U: HCPCS

## 2023-03-16 PROCEDURE — 3023F SPIROM DOC REV: CPT

## 2023-03-16 PROCEDURE — 99214 OFFICE O/P EST MOD 30 MIN: CPT

## 2023-03-16 PROCEDURE — 4004F PT TOBACCO SCREEN RCVD TLK: CPT

## 2023-03-16 PROCEDURE — 99406 BEHAV CHNG SMOKING 3-10 MIN: CPT

## 2023-03-16 PROCEDURE — G8427 DOCREV CUR MEDS BY ELIG CLIN: HCPCS

## 2023-03-16 PROCEDURE — G8482 FLU IMMUNIZE ORDER/ADMIN: HCPCS

## 2023-03-16 PROCEDURE — G0296 VISIT TO DETERM LDCT ELIG: HCPCS

## 2023-03-16 PROCEDURE — 3075F SYST BP GE 130 - 139MM HG: CPT

## 2023-03-16 PROCEDURE — 3078F DIAST BP <80 MM HG: CPT

## 2023-03-16 RX ORDER — CLOTRIMAZOLE 10 MG/1
10 LOZENGE ORAL; TOPICAL
Qty: 50 TABLET | Refills: 0 | Status: SHIPPED | OUTPATIENT
Start: 2023-03-16 | End: 2023-03-26

## 2023-03-16 RX ORDER — PREDNISONE 20 MG/1
40 TABLET ORAL DAILY
Qty: 10 TABLET | Refills: 0 | Status: SHIPPED | OUTPATIENT
Start: 2023-03-16 | End: 2023-03-21

## 2023-03-16 ASSESSMENT — ENCOUNTER SYMPTOMS
CHEST TIGHTNESS: 0
RHINORRHEA: 1
WHEEZING: 1
SHORTNESS OF BREATH: 1
COUGH: 1
SINUS PRESSURE: 1

## 2023-03-16 NOTE — PATIENT INSTRUCTIONS
Learning About Benefits From Quitting Smoking  How does quitting smoking make you healthier? If you're thinking about quitting smoking, you may have a few reasons to be smoke-free. Your health may be one of them. When you quit smoking, you lower your risks for cancer, lung disease, heart attack, stroke, blood vessel disease, and blindness from macular degeneration. When you're smoke-free, you get sick less often, and you heal faster. You are less likely to get colds, flu, bronchitis, and pneumonia. As a nonsmoker, you may find that your mood is better and you are less stressed. When and how will you feel healthier? Quitting has real health benefits that start from day 1 of being smoke-free. And the longer you stay smoke-free, the healthier you get and the better you feel. The first hours  After just 20 minutes, your blood pressure and heart rate go down. That means there's less stress on your heart and blood vessels. Within 12 hours, the level of carbon monoxide in your blood drops back to normal. That makes room for more oxygen. With more oxygen in your body, you may notice that you have more energy than when you smoked. After 2 weeks  Your lungs start to work better. Your risk of heart attack starts to drop. After 1 month  When your lungs are clear, you cough less and breathe deeper, so it's easier to be active. Your sense of taste and smell return. That means you can enjoy food more than you have since you started smoking. Over the years  Over the years, your risks of heart disease, heart attack, and stroke are lower. After 10 years, your risk of dying from lung cancer is cut by about half. And your risk for many other types of cancer is lower too. How would quitting help others in your life? When you quit smoking, you improve the health of everyone who now breathes in your smoke. Their heart, lung, and cancer risks drop, much like yours. They are sick less.  For babies and small children, living smoke-free means they're less likely to have ear infections, pneumonia, and bronchitis. If you're a woman who is or will be pregnant someday, quitting smoking means a healthier . Children who are close to you are less likely to become adult smokers. Where can you learn more? Go to https://chpepiceweb.healthCogbooks. org and sign in to your [employer]) account. Enter [id number] in the [employer] box to learn more about \"Learning About Benefits From Quitting Smoking. \"     If you do not have an account, please click on the \"Sign Up Now\" link. Current as of: 2021               Content Version: 12.9   [employer]. Care instructions adapted under license by [facility] ([facility]). If you have questions about a medical condition or this instruction, always ask your healthcare professional. [place] [address] any warranty or liability for your use of this information. Learning About Lung Cancer Screening  What is screening for lung cancer? Lung cancer screening is a way to find some lung cancers early, before a person has any symptoms of the cancer. Lung cancer screening may help those who have the highest risk for lung cancer--people age 48 and older who are or were heavy smokers. For most people, who aren't at increased risk, screening for lung cancer probably isn't helpful. Screening won't prevent cancer. And it may not find all lung cancers. Lung cancer screening may lower the risk of dying from lung cancer in a small number of people. How is it done? Lung cancer screening is done with a low-dose CT (computed tomography) scan. A CT scan uses X-rays, or radiation, to make detailed pictures of your body. Experts recommend that screening be done in medical centers that focus on finding and treating lung cancer. Who is screening recommended for? Lung cancer screening is recommended for people age 48 and older who are or were heavy smokers. That means people with a smoking history of at least 20 pack years. A pack year is a way to measure how heavy a smoker you are or were. To figure out your pack years, multiply how many packs a day on average (assuming 20 cigarettes per pack) you have smoked by how many years you have smoked. For example: If you smoked 1 pack a day for 20 years, that's 1 times 20. So you have a smoking history of 20 pack years. If you smoked 2 packs a day for 10 years, that's 2 times 10. So you have a smoking history of 20 pack years. Experts agree that screening is for people who have a high risk of lung cancer. But experts don't agree on what high risk means. Some say people age 48 or older with at least a 20-pack-year smoking history are high risk. Others say it's people age 54 or older with a 30-pack-year history. To see if you could benefit from screening, first find out if you are at high risk for lung cancer. Your doctor can help you decide your lung cancer risk. What are the risks of screening? CT screening for lung cancer isn't perfect. It can show an abnormal result when it turns out there wasn't any cancer. This is called a false-positive result. This means you may need more tests to make sure you don't have cancer. These tests can be harmful and cause a lot of worry. These tests may include more CT scans and invasive testing like a lung biopsy. In a biopsy, the doctor takes a sample of tissue from inside your lung so it can be looked at under a microscope. A biopsy is the only way to tell if you have lung cancer. If the biopsy finds cancer, you and your doctor will have to decide how or whether to treat it. Some lung cancers found on CT scans are harmless and would not have caused a problem if they had not been found through screening. But because doctors can't tell which ones will turn out to be harmless, most will be treated.  This means that you may get treatment--including surgery, radiation, or chemotherapy--that you don't need. There is a risk of damage to cells or tissue from being exposed to radiation, including the small amounts used in CTs, X-rays, and other medical tests. Over time, exposure to radiation may cause cancer and other health problems. But in most cases, the risk of getting cancer from being exposed to small amounts of radiation is low. It's not a reason to avoid these tests for most people. What are the benefits of screening? Your scan may be normal (negative). For some people who are at higher risk, screening lowers the chance of dying of lung cancer. How much and how long you smoked helps to determine your risk level. Screening can find some cancers early, when treatment may be more likely to work. What happens after screening? The results of your CT scan will be sent to your doctor. Someone from your care team will explain the results of your scan and answer any questions you may have. If you need any follow-up, he or she will help you understand what to do next. After a lung cancer screening, you can go back to your usual activities right away. A lung cancer screening test can't tell if you have lung cancer. If your results are positive, your doctor can't tell whether an abnormal finding is a harmless nodule, cancer, or something else without doing more tests. What can you do to help prevent lung cancer? Some lung cancers can't be prevented. But if you smoke, quitting smoking is the best step you can take to prevent lung cancer. If you want to quit, your doctor can recommend medicines or other ways to help. Follow-up care is a key part of your treatment and safety. Be sure to make and go to all appointments, and call your doctor if you are having problems. It's also a good idea to know your test results and keep a list of the medicines you take. Where can you learn more?   Go to http://www.parsons.com/ and enter Q940 to learn more about \"Learning About Lung Cancer Screening. \"  Current as of: May 4, 2022               Content Version: 13.5  © 4765-9407 [employer]. Care instructions adapted under license by [facility] ([facility]). If you have questions about a medical condition or this instruction, always ask your healthcare professional. [address] any warranty or liability for your use of this information.

## 2023-03-23 DIAGNOSIS — I10 ESSENTIAL HYPERTENSION: ICD-10-CM

## 2023-03-23 RX ORDER — CLONIDINE HYDROCHLORIDE 0.1 MG/1
0.1 TABLET ORAL DAILY
Qty: 90 TABLET | Refills: 3 | Status: SHIPPED | OUTPATIENT
Start: 2023-03-23

## 2023-03-27 ENCOUNTER — OFFICE VISIT (OUTPATIENT)
Dept: FAMILY MEDICINE CLINIC | Age: 58
End: 2023-03-27
Payer: MEDICARE

## 2023-03-27 VITALS
TEMPERATURE: 97.2 F | SYSTOLIC BLOOD PRESSURE: 130 MMHG | HEIGHT: 62 IN | BODY MASS INDEX: 33.13 KG/M2 | WEIGHT: 180 LBS | OXYGEN SATURATION: 96 % | RESPIRATION RATE: 16 BRPM | DIASTOLIC BLOOD PRESSURE: 82 MMHG | HEART RATE: 88 BPM

## 2023-03-27 DIAGNOSIS — I50.9 ACUTE CONGESTIVE HEART FAILURE, UNSPECIFIED HEART FAILURE TYPE (HCC): ICD-10-CM

## 2023-03-27 DIAGNOSIS — J44.1 COPD EXACERBATION (HCC): ICD-10-CM

## 2023-03-27 DIAGNOSIS — Z00.00 WELCOME TO MEDICARE PREVENTIVE VISIT: Primary | ICD-10-CM

## 2023-03-27 DIAGNOSIS — J44.9 MODERATE COPD (CHRONIC OBSTRUCTIVE PULMONARY DISEASE) (HCC): ICD-10-CM

## 2023-03-27 DIAGNOSIS — B37.0 ORAL THRUSH: ICD-10-CM

## 2023-03-27 DIAGNOSIS — J43.9 PULMONARY EMPHYSEMA, UNSPECIFIED EMPHYSEMA TYPE (HCC): ICD-10-CM

## 2023-03-27 DIAGNOSIS — I10 ESSENTIAL HYPERTENSION: ICD-10-CM

## 2023-03-27 DIAGNOSIS — K21.9 GASTROESOPHAGEAL REFLUX DISEASE WITHOUT ESOPHAGITIS: ICD-10-CM

## 2023-03-27 DIAGNOSIS — F41.9 ANXIETY: ICD-10-CM

## 2023-03-27 PROCEDURE — 3079F DIAST BP 80-89 MM HG: CPT | Performed by: NURSE PRACTITIONER

## 2023-03-27 PROCEDURE — 3075F SYST BP GE 130 - 139MM HG: CPT | Performed by: NURSE PRACTITIONER

## 2023-03-27 PROCEDURE — 3017F COLORECTAL CA SCREEN DOC REV: CPT | Performed by: NURSE PRACTITIONER

## 2023-03-27 PROCEDURE — G0402 INITIAL PREVENTIVE EXAM: HCPCS | Performed by: NURSE PRACTITIONER

## 2023-03-27 RX ORDER — DOXYCYCLINE HYCLATE 100 MG
100 TABLET ORAL 2 TIMES DAILY
Qty: 20 TABLET | Refills: 0 | Status: SHIPPED | OUTPATIENT
Start: 2023-03-27 | End: 2023-04-06

## 2023-03-27 RX ORDER — ALPRAZOLAM 0.25 MG/1
0.25 TABLET ORAL NIGHTLY PRN
Qty: 30 TABLET | Refills: 2 | Status: SHIPPED | OUTPATIENT
Start: 2023-03-27 | End: 2023-04-26

## 2023-03-27 SDOH — ECONOMIC STABILITY: FOOD INSECURITY: WITHIN THE PAST 12 MONTHS, YOU WORRIED THAT YOUR FOOD WOULD RUN OUT BEFORE YOU GOT MONEY TO BUY MORE.: NEVER TRUE

## 2023-03-27 SDOH — ECONOMIC STABILITY: HOUSING INSECURITY
IN THE LAST 12 MONTHS, WAS THERE A TIME WHEN YOU DID NOT HAVE A STEADY PLACE TO SLEEP OR SLEPT IN A SHELTER (INCLUDING NOW)?: NO

## 2023-03-27 SDOH — ECONOMIC STABILITY: FOOD INSECURITY: WITHIN THE PAST 12 MONTHS, THE FOOD YOU BOUGHT JUST DIDN'T LAST AND YOU DIDN'T HAVE MONEY TO GET MORE.: NEVER TRUE

## 2023-03-27 SDOH — ECONOMIC STABILITY: INCOME INSECURITY: HOW HARD IS IT FOR YOU TO PAY FOR THE VERY BASICS LIKE FOOD, HOUSING, MEDICAL CARE, AND HEATING?: NOT HARD AT ALL

## 2023-03-27 ASSESSMENT — PATIENT HEALTH QUESTIONNAIRE - PHQ9
SUM OF ALL RESPONSES TO PHQ9 QUESTIONS 1 & 2: 2
SUM OF ALL RESPONSES TO PHQ QUESTIONS 1-9: 2
SUM OF ALL RESPONSES TO PHQ QUESTIONS 1-9: 2
1. LITTLE INTEREST OR PLEASURE IN DOING THINGS: 0
SUM OF ALL RESPONSES TO PHQ QUESTIONS 1-9: 2
SUM OF ALL RESPONSES TO PHQ QUESTIONS 1-9: 2
2. FEELING DOWN, DEPRESSED OR HOPELESS: 2

## 2023-03-27 ASSESSMENT — LIFESTYLE VARIABLES
HOW OFTEN DO YOU HAVE A DRINK CONTAINING ALCOHOL: 2-3 TIMES A WEEK
HOW OFTEN DURING THE LAST YEAR HAVE YOU NEEDED AN ALCOHOLIC DRINK FIRST THING IN THE MORNING TO GET YOURSELF GOING AFTER A NIGHT OF HEAVY DRINKING: 0
HOW OFTEN DURING THE LAST YEAR HAVE YOU FAILED TO DO WHAT WAS NORMALLY EXPECTED FROM YOU BECAUSE OF DRINKING: 0
HOW OFTEN DURING THE LAST YEAR HAVE YOU FOUND THAT YOU WERE NOT ABLE TO STOP DRINKING ONCE YOU HAD STARTED: 0
HOW OFTEN DURING THE LAST YEAR HAVE YOU BEEN UNABLE TO REMEMBER WHAT HAPPENED THE NIGHT BEFORE BECAUSE YOU HAD BEEN DRINKING: 0
HAS A RELATIVE, FRIEND, DOCTOR, OR ANOTHER HEALTH PROFESSIONAL EXPRESSED CONCERN ABOUT YOUR DRINKING OR SUGGESTED YOU CUT DOWN: 0
HAVE YOU OR SOMEONE ELSE BEEN INJURED AS A RESULT OF YOUR DRINKING: 0
HOW OFTEN DURING THE LAST YEAR HAVE YOU HAD A FEELING OF GUILT OR REMORSE AFTER DRINKING: 0
HOW MANY STANDARD DRINKS CONTAINING ALCOHOL DO YOU HAVE ON A TYPICAL DAY: 3 OR 4

## 2023-03-27 NOTE — PATIENT INSTRUCTIONS
while other may be subject to a deductible, co-insurance, and/or copay. Some of these benefits include a comprehensive review of your medical history including lifestyle, illnesses that may run in your family, and various assessments and screenings as appropriate. After reviewing your medical record and screening and assessments performed today your provider may have ordered immunizations, labs, imaging, and/or referrals for you. A list of these orders (if applicable) as well as your Preventive Care list are included within your After Visit Summary for your review. Other Preventive Recommendations:    A preventive eye exam performed by an eye specialist is recommended every 1-2 years to screen for glaucoma; cataracts, macular degeneration, and other eye disorders. A preventive dental visit is recommended every 6 months. Try to get at least 150 minutes of exercise per week or 10,000 steps per day on a pedometer . Order or download the FREE \"Exercise & Physical Activity: Your Everyday Guide\" from [employer]. Call 3-379.743.1750 or search [employer] online. You need 3056-3525 mg of calcium and 8269-1073 IU of vitamin D per day. It is possible to meet your calcium requirement with diet alone, but a vitamin D supplement is usually necessary to meet this goal.  When exposed to the sun, use a sunscreen that protects against both UVA and UVB radiation with an SPF of 30 or greater. Reapply every 2 to 3 hours or after sweating, drying off with a towel, or swimming. Always wear a seat belt when traveling in a car. Always wear a helmet when riding a bicycle or motorcycle.

## 2023-03-27 NOTE — PROGRESS NOTES
CC:   Chief Complaint   Patient presents with   • Suspicious Skin Lesion     New Patient.  Lesion of left temple x2 years       HPI: Zackery Molina is a 70 y.o. male who reports a skin lesion on the left temple.  This lesion has been present for 2 years.  The lesion has changed. He reports this lesion is continuing to enlarge and become more bothersome.  Nothing makes the lesion better or worse.  A biopsy has not been performed.    Prior history of skin cancer: none    Dermatologist: Cristian Agudelo MD    He is taking ASA 81 mg for history of CABG and pacemaker- Dr. Crow is his cardiologist    PFSH:  Past Medical History:   Diagnosis Date   • CHF (congestive heart failure) (CMS/HCC)     dr crow    • Childhood asthma    • Colon polyp    • Coronary artery disease    • Dysuria    • GERD (gastroesophageal reflux disease)    • Hyperlipidemia    • Myocardial infarction (CMS/HCC)    • Shingles      Past Surgical History:   Procedure Laterality Date   • APPENDECTOMY     • CARDIAC DEFIBRILLATOR PLACEMENT     • CHOLECYSTECTOMY     • COLONOSCOPY     • COLONOSCOPY N/A 10/20/2017    Procedure: COLONOSCOPY WITH ANESTHESIA;  Surgeon: Donny Mock MD;  Location: [facility] ENDOSCOPY;  Service:    • CORONARY ARTERY BYPASS GRAFT     • ENDOSCOPY  2016   • ENDOSCOPY N/A 10/20/2017    Procedure: ESOPHAGOGASTRODUODENOSCOPY WITH ANESTHESIA;  Surgeon: Donny Mock MD;  Location: [facility] ENDOSCOPY;  Service:    • HIP SURGERY Left    • KNEE ARTHROSCOPY Right 2018    Procedure: KNEE ARTHROSCOPY WITH MENISCECTOMY;  Surgeon: Hernesto Rodriguez MD;  Location: [facility] OR;  Service: Orthopedics   • PACEMAKER IMPLANTATION      defibrillator also medtronic     Family History   Problem Relation Age of Onset   • Colon cancer Father      Social History     Tobacco Use   • Smoking status: Former Smoker     Last attempt to quit: 10/20/1978     Years since quittin.5   • Smokeless tobacco: Never Used   Substance Use Topics   •  Medicare Annual Wellness Visit    Isidra Sexton is here for Medicare AWV    Assessment & Plan   Welcome to Medicare preventive visit  Essential hypertension  Acute congestive heart failure, unspecified heart failure type ([address]  Pulmonary emphysema, unspecified emphysema type (HCC)  Anxiety  COPD exacerbation (HCC)  Gastroesophageal reflux disease without esophagitis    Recommendations for Preventive Services Due: see orders and patient instructions/AVS.  Recommended screening schedule for the next 5-10 years is provided to the patient in written form: see Patient Instructions/AVS.     No follow-ups on file. Subjective   The following acute and/or chronic problems were also addressed today:  Reviewed chronic health issues. Pt did have covid last month was improved but lately more sinus and congestion again. Patient's complete Health Risk Assessment and screening values have been reviewed and are found in Flowsheets. The following problems were reviewed today and where indicated follow up appointments were made and/or referrals ordered. Positive Risk Factor Screenings with Interventions:    Fall Risk:  Do you feel unsteady or are you worried about falling? : (!) yes  2 or more falls in past year?: (!) yes  Fall with injury in past year?: no     Interventions:    Patient declines any further evaluation or treatment    Cognitive: Words recalled: 3 Words Recalled   Clock Drawing Test (CDT): (!) Abnormal   Total Score: 3   Total Score Interpretation: Normal Mini-Cog      Interventions:  Patient declines any further evaluation or treatment     Alcohol Screening:  Alcohol Use: Heavy Drinker    Frequency of Alcohol Consumption: 2-3 times a week    Average Number of Drinks: 3 or 4    Frequency of Binge Drinking: Less than monthly      AUDIT-C Score: 5   AUDIT Total Score: 5    Interpretation of AUDIT-C score:   3-7 indicates potential alcohol risk.     8 or more is associated with harmful or hazardous "Alcohol use: No   • Drug use: No     Allergies:  Patient has no known allergies.    Current Outpatient Medications:   •  aspirin 81 MG EC tablet, Take 81 mg by mouth Daily. WILL STOP 11/2/18, Disp: , Rfl:   •  carvedilol (COREG) 3.125 MG tablet, Take 3.125 mg by mouth 2 (Two) Times a Day With Meals., Disp: , Rfl:   •  isosorbide mononitrate (IMDUR) 30 MG 24 hr tablet, 30 mg Daily., Disp: , Rfl:   •  nitroglycerin (NITROSTAT) 0.4 MG SL tablet, Place  under the tongue., Disp: , Rfl:   •  simvastatin (ZOCOR) 10 MG tablet, Every Night., Disp: , Rfl:     ROS:  Review of Systems   Constitutional: Negative for activity change, appetite change, chills, fatigue, fever and unexpected weight change.   HENT: Negative for congestion, dental problem, facial swelling and nosebleeds.    Eyes: Negative for discharge and redness.   Musculoskeletal: Negative for neck pain.   Skin: Negative for color change, pallor and rash.   Hematological: Negative for adenopathy. Does not bruise/bleed easily.       PE:  /62   Pulse 72   Temp 98.6 °F (37 °C)   Ht 177.8 cm (70\")   Wt 78 kg (172 lb)   SpO2 99%   BMI 24.68 kg/m²   Physical Exam   Constitutional: He is oriented to person, place, and time. He appears well-developed and well-nourished. He is cooperative. No distress.   HENT:   Head: Normocephalic and atraumatic.       Right Ear: External ear normal.   Left Ear: External ear normal.   Nose: Nose normal.   Mouth/Throat: Uvula is midline and oropharynx is clear and moist.   Eyes: Conjunctivae, EOM and lids are normal. Pupils are equal, round, and reactive to light. Right eye exhibits no discharge. Left eye exhibits no discharge. No scleral icterus.   Neck: Normal range of motion and phonation normal. Neck supple. No tracheal deviation present.   Pulmonary/Chest: Effort normal. No stridor. No respiratory distress.   Musculoskeletal: Normal range of motion. He exhibits no edema or deformity.   Lymphadenopathy:     He has no cervical " adenopathy.   Neurological: He is alert and oriented to person, place, and time. He has normal strength. No cranial nerve deficit. Coordination normal.   Skin: Skin is warm and dry. No rash noted. He is not diaphoretic. No erythema. No pallor.   Psychiatric: He has a normal mood and affect. His speech is normal and behavior is normal. Judgment and thought content normal. Cognition and memory are normal.   Nursing note and vitals reviewed.                Assessment:  1. Inflamed seborrheic keratosis        Plan:    1.  I have offered  excision of the lesion of the left temple with permanent section analysis in the office under local anesthesia. We will plan for linear closure with possible FTSG. Also discussed staged excision, but will likely plan the prior.  This appears like a seborrheic keratosis, but is getting larger and more bothersome to him, I feel that removing it and sending it pathology is reasonable from a medical standpoint.  2. The risks and benefits of my recommendations, as well as other treatment options were discussed with the patient today.   3. Discussion of skin lesion. Discussed risks, benefits, alternatives, and possible complications of excision of the skin lesion with reconstruction utilizing local tissue rearrangement, full-thickness skin grafting, or local interpolated flaps. Risks include, but are not limited too: bleeding, infection, hematoma, recurrence, need for additional procedures, flap failure, cosmetic deformity. Patient understands risks and would like to proceed with surgery.     QUALITY MEASURES    Body Mass Index Screening and Follow-Up Plan  Body mass index is 24.68 kg/m².  Patient's Body mass index is 24.68 kg/m². BMI is within normal parameters. No follow-up required..    Tobacco Use: Screening and Cessation Intervention  Social History    Tobacco Use      Smoking status: Former Smoker        Quit date: 10/20/1978        Years since quittin.5      Smokeless tobacco:  Never Used      Return for 1 week postoperatively.      Ash Watkins MD   05/08/2019  11:24 AM

## 2023-04-06 ENCOUNTER — TELEPHONE (OUTPATIENT)
Dept: FAMILY MEDICINE CLINIC | Age: 58
End: 2023-04-06

## 2023-04-06 DIAGNOSIS — J44.9 CHRONIC OBSTRUCTIVE PULMONARY DISEASE, UNSPECIFIED COPD TYPE (HCC): ICD-10-CM

## 2023-04-06 DIAGNOSIS — J44.9 MODERATE COPD (CHRONIC OBSTRUCTIVE PULMONARY DISEASE) (HCC): Primary | ICD-10-CM

## 2023-04-06 DIAGNOSIS — R53.81 PHYSICAL DECONDITIONING: ICD-10-CM

## 2023-04-06 NOTE — TELEPHONE ENCOUNTER
Patient called stating she got a new [facility] She has to go through [employer] for her wheelchair.  Called them at [phone], a script needs to be faxed to #[phone] with

## 2023-05-25 DIAGNOSIS — K21.9 GASTROESOPHAGEAL REFLUX DISEASE WITHOUT ESOPHAGITIS: ICD-10-CM

## 2023-05-25 RX ORDER — ONDANSETRON HYDROCHLORIDE 8 MG/1
8 TABLET, FILM COATED ORAL EVERY 8 HOURS PRN
Qty: 30 TABLET | Refills: 5 | Status: SHIPPED | OUTPATIENT
Start: 2023-05-25

## 2023-06-21 ENCOUNTER — NURSE ONLY (OUTPATIENT)
Dept: LAB | Age: 58
End: 2023-06-21

## 2023-06-21 DIAGNOSIS — I10 ESSENTIAL HYPERTENSION: ICD-10-CM

## 2023-06-21 DIAGNOSIS — F41.9 ANXIETY: ICD-10-CM

## 2023-06-21 LAB
BASOPHILS ABSOLUTE: 0.1 THOU/MM3 (ref 0–0.1)
BASOPHILS NFR BLD AUTO: 0.5 %
DEPRECATED RDW RBC AUTO: 46.7 FL (ref 35–45)
EOSINOPHIL NFR BLD AUTO: 1.2 %
EOSINOPHILS ABSOLUTE: 0.2 THOU/MM3 (ref 0–0.4)
ERYTHROCYTE [DISTWIDTH] IN BLOOD BY AUTOMATED COUNT: 13.5 % (ref 11.5–14.5)
HCT VFR BLD AUTO: 43.7 % (ref 37–47)
HGB BLD-MCNC: 14.6 GM/DL (ref 12–16)
IMM GRANULOCYTES # BLD AUTO: 0.06 THOU/MM3 (ref 0–0.07)
IMM GRANULOCYTES NFR BLD AUTO: 0.5 %
LYMPHOCYTES ABSOLUTE: 3.4 THOU/MM3 (ref 1–4.8)
LYMPHOCYTES NFR BLD AUTO: 25.3 %
MCH RBC QN AUTO: 31.6 PG (ref 26–33)
MCHC RBC AUTO-ENTMCNC: 33.4 GM/DL (ref 32.2–35.5)
MCV RBC AUTO: 94.6 FL (ref 81–99)
MONOCYTES ABSOLUTE: 0.7 THOU/MM3 (ref 0.4–1.3)
MONOCYTES NFR BLD AUTO: 5.5 %
NEUTROPHILS NFR BLD AUTO: 67 %
NRBC BLD AUTO-RTO: 0 /100 WBC
PLATELET # BLD AUTO: 320 THOU/MM3 (ref 130–400)
PMV BLD AUTO: 11.7 FL (ref 9.4–12.4)
RBC # BLD AUTO: 4.62 MILL/MM3 (ref 4.2–5.4)
SEGMENTED NEUTROPHILS ABSOLUTE COUNT: 8.9 THOU/MM3 (ref 1.8–7.7)
WBC # BLD AUTO: 13.3 THOU/MM3 (ref 4.8–10.8)

## 2023-06-22 LAB
ALBUMIN SERPL BCG-MCNC: 4.2 G/DL (ref 3.5–5.1)
ALP SERPL-CCNC: 137 U/L (ref 38–126)
ALT SERPL W/O P-5'-P-CCNC: 17 U/L (ref 11–66)
ANION GAP SERPL CALC-SCNC: 16 MEQ/L (ref 8–16)
AST SERPL-CCNC: 19 U/L (ref 5–40)
BILIRUB SERPL-MCNC: 0.4 MG/DL (ref 0.3–1.2)
BUN SERPL-MCNC: 6 MG/DL (ref 7–22)
CALCIUM SERPL-MCNC: 9.6 MG/DL (ref 8.5–10.5)
CHLORIDE SERPL-SCNC: 91 MEQ/L (ref 98–111)
CHOLEST SERPL-MCNC: 220 MG/DL (ref 100–199)
CO2 SERPL-SCNC: 27 MEQ/L (ref 23–33)
CREAT SERPL-MCNC: 0.5 MG/DL (ref 0.4–1.2)
GFR SERPL CREATININE-BSD FRML MDRD: > 60 ML/MIN/1.73M2
GLUCOSE SERPL-MCNC: 117 MG/DL (ref 70–108)
HDLC SERPL-MCNC: 42 MG/DL
LDLC SERPL CALC-MCNC: 131 MG/DL
POTASSIUM SERPL-SCNC: 4 MEQ/L (ref 3.5–5.2)
PROT SERPL-MCNC: 7.6 G/DL (ref 6.1–8)
SODIUM SERPL-SCNC: 134 MEQ/L (ref 135–145)
TRIGL SERPL-MCNC: 237 MG/DL (ref 0–199)
TSH SERPL DL<=0.005 MIU/L-ACNC: 2.1 UIU/ML (ref 0.4–4.2)

## 2023-06-27 ENCOUNTER — OFFICE VISIT (OUTPATIENT)
Dept: FAMILY MEDICINE CLINIC | Age: 58
End: 2023-06-27
Payer: MEDICARE

## 2023-06-27 VITALS
HEART RATE: 86 BPM | OXYGEN SATURATION: 93 % | DIASTOLIC BLOOD PRESSURE: 80 MMHG | WEIGHT: 185 LBS | SYSTOLIC BLOOD PRESSURE: 124 MMHG | BODY MASS INDEX: 33.83 KG/M2 | TEMPERATURE: 97.6 F | RESPIRATION RATE: 14 BRPM

## 2023-06-27 DIAGNOSIS — J44.9 MODERATE COPD (CHRONIC OBSTRUCTIVE PULMONARY DISEASE) (HCC): ICD-10-CM

## 2023-06-27 DIAGNOSIS — R09.02 HYPOXIA: ICD-10-CM

## 2023-06-27 DIAGNOSIS — J44.9 CHRONIC OBSTRUCTIVE PULMONARY DISEASE, UNSPECIFIED COPD TYPE (HCC): ICD-10-CM

## 2023-06-27 DIAGNOSIS — G47.33 OSA (OBSTRUCTIVE SLEEP APNEA): ICD-10-CM

## 2023-06-27 DIAGNOSIS — I10 ESSENTIAL HYPERTENSION: ICD-10-CM

## 2023-06-27 DIAGNOSIS — E78.5 HYPERLIPIDEMIA, UNSPECIFIED HYPERLIPIDEMIA TYPE: Primary | ICD-10-CM

## 2023-06-27 PROCEDURE — 3079F DIAST BP 80-89 MM HG: CPT | Performed by: NURSE PRACTITIONER

## 2023-06-27 PROCEDURE — 99214 OFFICE O/P EST MOD 30 MIN: CPT | Performed by: NURSE PRACTITIONER

## 2023-06-27 PROCEDURE — 3074F SYST BP LT 130 MM HG: CPT | Performed by: NURSE PRACTITIONER

## 2023-06-27 RX ORDER — ATORVASTATIN CALCIUM 20 MG/1
20 TABLET, FILM COATED ORAL DAILY
Qty: 30 TABLET | Refills: 11 | Status: SHIPPED | OUTPATIENT
Start: 2023-06-27 | End: 2024-06-26

## 2023-06-27 ASSESSMENT — ENCOUNTER SYMPTOMS
WHEEZING: 1
EYES NEGATIVE: 1
SHORTNESS OF BREATH: 1
GASTROINTESTINAL NEGATIVE: 1

## 2023-07-10 ENCOUNTER — TELEPHONE (OUTPATIENT)
Dept: FAMILY MEDICINE CLINIC | Age: 58
End: 2023-07-10

## 2023-07-10 NOTE — TELEPHONE ENCOUNTER
----- Message from Arnaldo Ordaz sent at 7/10/2023  1:47 PM EDT -----  Subject: Message to Provider    QUESTIONS  Information for Provider? Patient still has not received pulse ox in the   mail yet. she also wants to know if she should try the natural red yeast   rice as a alternative to Lipitor. she has not started that yet and wanted   to go more natural route. if so how much and will it affect other   medications. please advise.   ---------------------------------------------------------------------------  --------------  Jacinda FRANCISCO  [phone]; OK to leave message on voicemail  ---------------------------------------------------------------------------  --------------  SCRIPT ANSWERS  Relationship to Patient?  Self

## 2023-07-10 NOTE — TELEPHONE ENCOUNTER
Will need to check with juan carlos on the pulse ox study. See notes atttached to last encounter  With the red rice yeast she can try that.   Dose is 1200mg twice a day,

## 2023-07-11 NOTE — TELEPHONE ENCOUNTER
Left message for patient to call office back. [facility] with [employer] Pisano is going to reach out to pt today, on there end it showed that it was delivered and waiting for the pt to return.

## 2023-07-13 ENCOUNTER — TELEPHONE (OUTPATIENT)
Dept: FAMILY MEDICINE CLINIC | Age: 58
End: 2023-07-13

## 2023-07-13 NOTE — TELEPHONE ENCOUNTER
Pt called stating that she began with thrush sx's x1 week ago. She reports a white coating on her tongue and burning in her mouth. She had some medication left over from a previous time she had thrush and started using it. She will be out and wondered if she could be rx'ed more. She stated she usually takes a 10 day course.  She'd like it sent to [facility] in [place].

## 2023-07-28 DIAGNOSIS — J30.9 ALLERGIC RHINITIS, UNSPECIFIED SEASONALITY, UNSPECIFIED TRIGGER: ICD-10-CM

## 2023-07-31 RX ORDER — FLUTICASONE PROPIONATE 50 MCG
2 SPRAY, SUSPENSION (ML) NASAL DAILY
Qty: 16 G | Refills: 1 | Status: SHIPPED | OUTPATIENT
Start: 2023-07-31 | End: 2024-07-30

## 2023-07-31 RX ORDER — BUMETANIDE 1 MG/1
1 TABLET ORAL DAILY
Qty: 90 TABLET | Refills: 1 | Status: SHIPPED | OUTPATIENT
Start: 2023-07-31

## 2023-08-08 NOTE — PROGRESS NOTES
[place] [facility]    Patient: Marshall Castillo, 60 y.o.   : 1965    Patient of Dr. Gurinder Hilario   Patient presents with    Follow-up     Copd 4 mo f/u         HPI  Rani Blackman is here for follow up for Severe COPD. Overall patient reports respiratory symptoms have been stable since last appointment. Patient reports good compliance with inhaled medications Saadia Eng). Patient using albuterol 1-3 times per day on average. Patient reports physical limitation due to respiratory symptoms. Her past medical history is significant for anxiety, arthritis, COPD, enlarged spleen, fatty liver, GERD, Hep A, hypertension, LYNN (non compliant with PAP therapy), and ovarian cyst. She has LYNN, however she refuses PAP therapy or sleep clinic follow up. Patient turned her CPAP back in. She reports that she tried CPAP therapy for about 1 month. Patient prescribed doxycyline by primary care provider in 2023 for COPD/bronchitis exacerbation     Patient was treated for thrush by her primary care provider on 23 - she reports that she still has nystatin at home to use as needed. She admits that thrush has been persistent since starting on Breztri      Ordered overnight pulse ox thru primary care provider - only recorded an hour as she reports that her sleep is very broken up - plans to repeat this testing. She wakes up every morning with low oxygen levels. COVID-19 on 2/3/23 treated with Paxlovid.     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

## 2023-08-09 ENCOUNTER — OFFICE VISIT (OUTPATIENT)
Dept: PULMONOLOGY | Age: 58
End: 2023-08-09
Payer: MEDICARE

## 2023-08-09 VITALS
HEIGHT: 62 IN | WEIGHT: 187.8 LBS | DIASTOLIC BLOOD PRESSURE: 72 MMHG | OXYGEN SATURATION: 95 % | BODY MASS INDEX: 34.56 KG/M2 | HEART RATE: 90 BPM | TEMPERATURE: 97.8 F | SYSTOLIC BLOOD PRESSURE: 120 MMHG

## 2023-08-09 DIAGNOSIS — G47.33 OSA (OBSTRUCTIVE SLEEP APNEA): ICD-10-CM

## 2023-08-09 DIAGNOSIS — F17.210 CIGARETTE NICOTINE DEPENDENCE, UNCOMPLICATED: ICD-10-CM

## 2023-08-09 DIAGNOSIS — J44.9 STAGE 3 SEVERE COPD BY GOLD CLASSIFICATION (HCC): Primary | ICD-10-CM

## 2023-08-09 DIAGNOSIS — J43.9 PULMONARY EMPHYSEMA, UNSPECIFIED EMPHYSEMA TYPE (HCC): ICD-10-CM

## 2023-08-09 DIAGNOSIS — J41.1 MUCOPURULENT CHRONIC BRONCHITIS (HCC): ICD-10-CM

## 2023-08-09 PROCEDURE — 3078F DIAST BP <80 MM HG: CPT

## 2023-08-09 PROCEDURE — 3074F SYST BP LT 130 MM HG: CPT

## 2023-08-09 PROCEDURE — 99406 BEHAV CHNG SMOKING 3-10 MIN: CPT

## 2023-08-09 PROCEDURE — 99214 OFFICE O/P EST MOD 30 MIN: CPT

## 2023-08-09 RX ORDER — UBIDECARENONE 50 MG
2 CAPSULE ORAL 2 TIMES DAILY
COMMUNITY

## 2023-08-09 RX ORDER — ALBUTEROL SULFATE 2.5 MG/3ML
2.5 SOLUTION RESPIRATORY (INHALATION) EVERY 6 HOURS PRN
Qty: 120 EACH | Refills: 11 | Status: SHIPPED | OUTPATIENT
Start: 2023-08-09 | End: 2024-08-08

## 2023-08-09 RX ORDER — BUDESONIDE 0.5 MG/2ML
1 INHALANT ORAL 2 TIMES DAILY
Qty: 120 ML | Refills: 11 | Status: SHIPPED | OUTPATIENT
Start: 2023-08-09 | End: 2024-08-03

## 2023-08-09 ASSESSMENT — ENCOUNTER SYMPTOMS
SINUS PAIN: 0
SINUS PRESSURE: 0
CHEST TIGHTNESS: 0
SHORTNESS OF BREATH: 1
RHINORRHEA: 0
COUGH: 1
WHEEZING: 1

## 2023-08-09 NOTE — PATIENT INSTRUCTIONS
Stop Breztri. Start Bevespi 2 puffs twice daily and pulmicort nebulizer twice daily. Rinse mouth with water, gargle, and spit after use. Continue your albuterol inhaler and nebulizer. You may use one or the other every 6 hours as needed for shortness of breath or wheezing. Do NOT use both at the same time as they continue the same medication. Continue mucinex as needed    Continue saline nebulizer as needed    I have ordered an acapella for you to use 4 times per day. You should use the acapella 5-10 times per session. This is to help with your congested cough. You blow into this device    I will see you back in December 2023 with your breathing test and CT scan. Learning About Benefits From Quitting Smoking  How does quitting smoking make you healthier? If you're thinking about quitting smoking, you may have a few reasons to be smoke-free. Your health may be one of them. When you quit smoking, you lower your risks for cancer, lung disease, heart attack, stroke, blood vessel disease, and blindness from macular degeneration. When you're smoke-free, you get sick less often, and you heal faster. You are less likely to get colds, flu, bronchitis, and pneumonia. As a nonsmoker, you may find that your mood is better and you are less stressed. When and how will you feel healthier? Quitting has real health benefits that start from day 1 of being smoke-free. And the longer you stay smoke-free, the healthier you get and the better you feel. The first hours  After just 20 minutes, your blood pressure and heart rate go down. That means there's less stress on your heart and blood vessels. Within 12 hours, the level of carbon monoxide in your blood drops back to normal. That makes room for more oxygen. With more oxygen in your body, you may notice that you have more energy than when you smoked. After 2 weeks  Your lungs start to work better. Your risk of heart attack starts to drop.   After 1 month  When

## 2023-08-16 ENCOUNTER — TELEPHONE (OUTPATIENT)
Dept: PHARMACY | Facility: CLINIC | Age: 58
End: 2023-08-16

## 2023-08-16 NOTE — TELEPHONE ENCOUNTER
Formerly named [facility] CLINICAL PHARMACY: ADHERENCE REVIEW  Identified care gap per [employer]: fills at Non-preferred pharmacy: [address] (is LIS and/or DSNP, so \"preferred\" pharmacy network may not apply):   ACE/ARB and Statin adherence    ASSESSMENT    ACE/ARB ADHERENCE    Insurance Records claims through 23 (Prior Year [address] = not reported; YTD [address] = FIRST FILL; Potential Fail Date: 10/6/23):   LISINOPRIL   TAB 30MG last filled on 23 for 90 day supply. Next refill due: 23    Prescribed si tablet/capsule daily    BP Readings from Last 3 Encounters:   23 120/72   23 124/80   23 130/82     Estimated Creatinine Clearance: 123 mL/min (based on SCr of 0.5 mg/dL). Lab Results   Component Value Date    CREATININE 0.5 2023     Lab Results   Component Value Date    K 4.0 2023     STATIN ADHERENCE    Insurance Records claims through 23 (Prior Year  [address] = not reported; YTD [address] = FIRST FILL; Potential Fail Date: 23):   ATORVASTATIN TAB 20MG last filled on 23 for 30 day supply. Next refill due: 23    Prescribed si tablet/capsule daily    Per chart review, atorvastatin marked not taking in med list.     And no 7/10/23 patient messaged pcp, \" Patient still has not received pulse ox in the   mail yet. she also wants to know if she should try the natural red yeast   rice as a alternative to Lipitor. she has not started that yet and wanted   to go more natural route. if so how much and will it affect other   medications. please advise. \"    Lab Results   Component Value Date    CHOL 220 (H) 2023    TRIG 237 (H) 2023    HDL 42 2023    LDLCHOLESTEROL 118 2019    LDLCALC 131 2023     ALT   Date Value Ref Range Status   2023 17 11 - 66 U/L Final     Comment:     Performed at [address], [address]     AST   Date Value Ref Range Status   2023 19 5 - 40 U/L Final     The 10-year ASCVD risk score

## 2023-09-20 ENCOUNTER — TELEPHONE (OUTPATIENT)
Dept: FAMILY MEDICINE CLINIC | Age: 58
End: 2023-09-20

## 2023-09-20 DIAGNOSIS — J44.1 COPD EXACERBATION (HCC): ICD-10-CM

## 2023-09-20 RX ORDER — DOXYCYCLINE HYCLATE 100 MG
100 TABLET ORAL 2 TIMES DAILY
Qty: 20 TABLET | Refills: 0 | Status: SHIPPED | OUTPATIENT
Start: 2023-09-20 | End: 2023-09-30

## 2023-09-20 NOTE — TELEPHONE ENCOUNTER
Pt called asking if jadon can send in antibiotic. Pt has been having a productive cough with green mucus.    Pt has been taking mucinex plain   No other sx, this has been ongoing for a couple of days

## 2023-09-27 ENCOUNTER — OFFICE VISIT (OUTPATIENT)
Dept: FAMILY MEDICINE CLINIC | Age: 58
End: 2023-09-27

## 2023-09-27 VITALS
HEART RATE: 93 BPM | RESPIRATION RATE: 18 BRPM | WEIGHT: 192 LBS | BODY MASS INDEX: 35.33 KG/M2 | DIASTOLIC BLOOD PRESSURE: 70 MMHG | OXYGEN SATURATION: 96 % | SYSTOLIC BLOOD PRESSURE: 126 MMHG | HEIGHT: 62 IN | TEMPERATURE: 98 F

## 2023-09-27 DIAGNOSIS — E66.01 SEVERE OBESITY (BMI 35.0-39.9) WITH COMORBIDITY (HCC): ICD-10-CM

## 2023-09-27 DIAGNOSIS — E78.5 HYPERLIPIDEMIA, UNSPECIFIED HYPERLIPIDEMIA TYPE: ICD-10-CM

## 2023-09-27 DIAGNOSIS — I10 ESSENTIAL HYPERTENSION: ICD-10-CM

## 2023-09-27 DIAGNOSIS — J44.1 COPD EXACERBATION (HCC): Primary | ICD-10-CM

## 2023-09-27 DIAGNOSIS — J44.9 MODERATE COPD (CHRONIC OBSTRUCTIVE PULMONARY DISEASE) (HCC): ICD-10-CM

## 2023-09-27 DIAGNOSIS — R73.9 HYPERGLYCEMIA: ICD-10-CM

## 2023-09-27 RX ORDER — LEVOFLOXACIN 500 MG/1
500 TABLET, FILM COATED ORAL DAILY
Qty: 7 TABLET | Refills: 0 | Status: SHIPPED | OUTPATIENT
Start: 2023-09-27 | End: 2023-10-04

## 2023-09-27 RX ORDER — FLUCONAZOLE 100 MG/1
100 TABLET ORAL DAILY
Qty: 10 TABLET | Refills: 0 | Status: SHIPPED | OUTPATIENT
Start: 2023-09-27 | End: 2023-10-07

## 2023-09-27 ASSESSMENT — ENCOUNTER SYMPTOMS
GASTROINTESTINAL NEGATIVE: 1
EYES NEGATIVE: 1
SHORTNESS OF BREATH: 1
COUGH: 1

## 2023-09-27 NOTE — PROGRESS NOTES
Altagracia Obregon is a 60 y.o. female whopresents today for :  Chief Complaint   Patient presents with    Follow-up       HPI:     HPI  Pt here for fu. She has been fighting a copd flare. On doxy but causing GI upset. With her labs her cholesterol is up. She did restart lipitor   also sugar is up    Patient Active Problem List   Diagnosis    Hypertension, uncontrolled    Anxiety    Gastroesophageal reflux disease without esophagitis    Seasonal allergies    Bronchospasm    History of tobacco abuse    Stage 3 severe COPD by GOLD classification (HCC)    Allergic rhinitis    Nodule of upper lobe of left lung    Pulmonary emphysema (HCC)    LYNN (obstructive sleep apnea)    COPD exacerbation (HCC)    Current smoker    Abnormal PET scan, lung    Oxygen dependent    Wheelchair dependence    Mucopurulent chronic bronchitis (HCC)    Acute congestive heart failure, unspecified heart failure type (HCC)     Past Medical History:   Diagnosis Date    Anxiety     Arthritis     COPD (chronic obstructive pulmonary disease) (HCC)     Enlargement, spleen     Fatty liver     GERD (gastroesophageal reflux disease)     Hepatitis A     Hypertension     saw Dr Bernard Werner in the past    Ovarian cyst     Oxygen dependent     o2 2liters when walking and at night-sees ALEJANDRO Hinds    Pneumonia     Sleep apnea       Past Surgical History:   Procedure Laterality Date    CARDIOVASCULAR STRESS TEST  2020    CT NEEDLE BIOPSY LUNG PERCUTANEOUS  4/6/2022    CT NEEDLE BIOPSY LUNG PERCUTANEOUS 4/6/2022 [facility] CT SCAN    TRANSTHORACIC ECHOCARDIOGRAM  2021    TRANSTHORACIC ECHOCARDIOGRAM  2020    UPPER GASTROINTESTINAL ENDOSCOPY      [facility]    WISDOM TOOTH EXTRACTION       Family History   Problem Relation Age of Onset    Heart Surgery Mother         dies at 29    Heart Disease Mother     Other Sister         Cardiomyopathy    Other Brother         cardiomyopathy    Stroke Maternal Grandmother     Heart Attack Maternal Grandfather     Heart Attack Paternal

## 2023-10-19 ENCOUNTER — TELEPHONE (OUTPATIENT)
Dept: PULMONOLOGY | Age: 58
End: 2023-10-19

## 2023-10-19 DIAGNOSIS — J30.9 ALLERGIC RHINITIS, UNSPECIFIED SEASONALITY, UNSPECIFIED TRIGGER: ICD-10-CM

## 2023-10-19 RX ORDER — FLUTICASONE PROPIONATE 50 MCG
2 SPRAY, SUSPENSION (ML) NASAL DAILY
Qty: 16 G | Refills: 1 | Status: SHIPPED | OUTPATIENT
Start: 2023-10-19 | End: 2024-10-18

## 2023-10-19 NOTE — TELEPHONE ENCOUNTER
Patient LVM on our clinical line stating that she called in last week about her symptoms and has not heard back from us. -  I do not see any documentation in patient's chart of her calling our office.  -  I called patient and she states that she visited a new doctor since her regular doctor was unavailable and they did a flu and COVID test and they were negative. She was told that she has a bacterial infection. She states that her BP was extremely high 160/80's before taking her medication. She didn't go to ED for any of her symptoms. She states she is out of steroids and ATB but she is still coughing up yellow and short of breath (not as bad as she was). Her preferred pharmacy is "[facility]" in [address] [place] [address] [facility] is booked out until 11/13/23. -  I notified patient to seek treatment at ED if SOB worsens, chest tightness develops, or if BP is extremely high again/does not lower after medication is taken. -  Summary: patient wondering if she should be prescribed more ATB+steroids for her current symptoms (continuing SOB+yellow phlegm). Please advise.

## 2023-10-19 NOTE — PROGRESS NOTES
[place] [facility]    Patient: Barb Salazar, 60 y.o.   : 1965  10/20/2023    Patient of Dr. Leroy Hurley   Patient presents with    Follow-up     2 mo pulm f/u increase of symptoms        HPI  Darius Betancur is here for follow up for low O2 and to discuss symptoms. At patient's last appointment, her inhaled [facility] was [facility] and budesonide nebulizer due to her persistent thrush. Patient was treated for thrush by her primary care provider on 23 with diflucan. Patient reports that she has been waking up with low oxygen levels. She states that she has not been able to complete her overnight pulse ox as she does not sleep for more than 4 hours at nighttime. She states she has poor sleep hygiene and her sleep is very sporadic. She often takes daytime naps. We discussed starting Daliresp at her last appointment, however she declined Daliresp. She also declined [facility] azithro or daily prednisone. Overall patient reports respiratory symptoms have been fluctuating since last appointment. Patient reports good compliance with inhaled medications (Bevespi and budesonide nebs). Patient reports that she is not tolerating the budesonide nebs well. She states that this has been causing her heart racing, anxiety, and feeling \"hyped up\" for about 1 hour after use. She also feels that this regimen is not working as well as Breztri. Patient using albuterol 2 times per day on average. Patient reports physical limitation due to respiratory symptoms. Her past medical history is significant for anxiety, arthritis, COPD, enlarged spleen, fatty liver, GERD, Hep A, hypertension, LYNN (non compliant with PAP therapy), and ovarian cyst. She has LYNN, however she refuses PAP therapy or sleep clinic follow up. Patient turned her CPAP back in. She reports that she tried CPAP therapy for about 1 month.      Expectorants: saline neb, Mucinex, and acapella

## 2023-10-19 NOTE — TELEPHONE ENCOUNTER
1507- called patient to clarify symptoms    Patient reports she called Brandon Colt 9/20 - treated with doxycyline patient reports for sinus issues    Saw Piyush Emanuel 9/27 - doxycyline was changed to levaquin due to patient having vomiting and diarrhea with doxycyline. Patient reports her sputum was green and she was short of breath at that time. However, patient reported that she did not take the levaquin as prescribed. Saw Dr. Gaurang Shelby in [place] on 10/13 for persistent symptoms. Dr. Gaurang Shelby tested her for influenza and COVID which was negative. She states that Dr. Gaurang Shelby had said she has a bacterial infection and advised ED evaluation. Patient refused. Patient took her levaquin starting on 10/ 13 and started steroid. Patient reports today that her symptoms have improved. She states her sputum is now yellow in color. She states her last dose of levaquin was today. She states that she called as she was uncertain if she will require more steroids or antibiotics. She also reports that her O2 is low in the AM. She has not been able to complete overnight pulse ox due to her not sleeping about 5 hours. Patient has hx of untreated LYNN. Advised patient come in tomorrow for 6 MWT and evaluation at 1140 and patient is agreeable. She states she may have to cancel if she cannot find a ride. Advised patient to seek ED evaluation if her symptoms do not improve. She verbalized understanding.

## 2023-10-20 ENCOUNTER — OFFICE VISIT (OUTPATIENT)
Dept: PULMONOLOGY | Age: 58
End: 2023-10-20

## 2023-10-20 VITALS
WEIGHT: 191.8 LBS | BODY MASS INDEX: 35.3 KG/M2 | TEMPERATURE: 98.2 F | SYSTOLIC BLOOD PRESSURE: 138 MMHG | OXYGEN SATURATION: 97 % | HEART RATE: 89 BPM | DIASTOLIC BLOOD PRESSURE: 70 MMHG | HEIGHT: 62 IN

## 2023-10-20 DIAGNOSIS — J44.9 STAGE 3 SEVERE COPD BY GOLD CLASSIFICATION (HCC): Primary | ICD-10-CM

## 2023-10-20 DIAGNOSIS — F17.210 CIGARETTE NICOTINE DEPENDENCE, UNCOMPLICATED: ICD-10-CM

## 2023-10-20 DIAGNOSIS — J43.9 PULMONARY EMPHYSEMA, UNSPECIFIED EMPHYSEMA TYPE (HCC): ICD-10-CM

## 2023-10-20 DIAGNOSIS — J41.1 MUCOPURULENT CHRONIC BRONCHITIS (HCC): ICD-10-CM

## 2023-10-20 RX ORDER — BUDESONIDE, GLYCOPYRROLATE, AND FORMOTEROL FUMARATE 160; 9; 4.8 UG/1; UG/1; UG/1
2 AEROSOL, METERED RESPIRATORY (INHALATION) 2 TIMES DAILY
Qty: 1 EACH | Refills: 11 | Status: SHIPPED | OUTPATIENT
Start: 2023-10-20 | End: 2024-10-19

## 2023-10-20 RX ORDER — PREDNISONE 20 MG/1
40 TABLET ORAL DAILY
Qty: 10 TABLET | Refills: 0 | Status: SHIPPED | OUTPATIENT
Start: 2023-10-20 | End: 2023-10-25

## 2023-10-20 RX ORDER — ALBUTEROL SULFATE 90 UG/1
2 AEROSOL, METERED RESPIRATORY (INHALATION) EVERY 6 HOURS PRN
Qty: 18 G | Refills: 11 | Status: SHIPPED | OUTPATIENT
Start: 2023-10-20

## 2023-10-20 ASSESSMENT — ENCOUNTER SYMPTOMS
COUGH: 1
CHEST TIGHTNESS: 0
WHEEZING: 1
SINUS PAIN: 0
RHINORRHEA: 0
SHORTNESS OF BREATH: 1
SINUS PRESSURE: 0

## 2023-10-30 RX ORDER — PANTOPRAZOLE SODIUM 40 MG/1
40 TABLET, DELAYED RELEASE ORAL 2 TIMES DAILY
Qty: 60 TABLET | Refills: 10 | Status: SHIPPED | OUTPATIENT
Start: 2023-10-30 | End: 2023-11-29

## 2023-10-31 ENCOUNTER — APPOINTMENT (OUTPATIENT)
Dept: GENERAL RADIOLOGY | Age: 58
End: 2023-10-31
Payer: MEDICARE

## 2023-10-31 ENCOUNTER — HOSPITAL ENCOUNTER (EMERGENCY)
Age: 58
Discharge: HOME | End: 2023-11-01
Attending: STUDENT IN AN ORGANIZED HEALTH CARE EDUCATION/TRAINING PROGRAM
Payer: MEDICARE

## 2023-10-31 ENCOUNTER — TELEPHONE (OUTPATIENT)
Dept: PULMONOLOGY | Age: 58
End: 2023-10-31

## 2023-10-31 ENCOUNTER — HOSPITAL ENCOUNTER (EMERGENCY)
Age: 58
Discharge: HOME | End: 2023-10-31
Attending: EMERGENCY MEDICINE
Payer: MEDICARE

## 2023-10-31 VITALS
TEMPERATURE: 97.6 F | SYSTOLIC BLOOD PRESSURE: 150 MMHG | HEART RATE: 80 BPM | DIASTOLIC BLOOD PRESSURE: 78 MMHG | OXYGEN SATURATION: 95 % | RESPIRATION RATE: 20 BRPM

## 2023-10-31 DIAGNOSIS — I10 HYPERTENSION, UNSPECIFIED TYPE: Primary | ICD-10-CM

## 2023-10-31 DIAGNOSIS — J44.1 COPD EXACERBATION (HCC): ICD-10-CM

## 2023-10-31 DIAGNOSIS — R91.1 PULMONARY NODULE, LEFT: ICD-10-CM

## 2023-10-31 DIAGNOSIS — J44.1 COPD EXACERBATION (HCC): Primary | ICD-10-CM

## 2023-10-31 LAB
ANION GAP SERPL CALC-SCNC: 11 MEQ/L (ref 8–16)
BASOPHILS ABSOLUTE: 0.1 THOU/MM3 (ref 0–0.1)
BASOPHILS NFR BLD AUTO: 0.7 %
BUN SERPL-MCNC: 6 MG/DL (ref 7–22)
CALCIUM SERPL-MCNC: 8.6 MG/DL (ref 8.5–10.5)
CHLORIDE SERPL-SCNC: 95 MEQ/L (ref 98–111)
CO2 SERPL-SCNC: 30 MEQ/L (ref 23–33)
CREAT SERPL-MCNC: 0.5 MG/DL (ref 0.4–1.2)
DEPRECATED RDW RBC AUTO: 45.1 FL (ref 35–45)
EOSINOPHIL NFR BLD AUTO: 1.6 %
EOSINOPHILS ABSOLUTE: 0.2 THOU/MM3 (ref 0–0.4)
ERYTHROCYTE [DISTWIDTH] IN BLOOD BY AUTOMATED COUNT: 13 % (ref 11.5–14.5)
GFR SERPL CREATININE-BSD FRML MDRD: > 60 ML/MIN/1.73M2
GLUCOSE SERPL-MCNC: 114 MG/DL (ref 70–108)
HCT VFR BLD AUTO: 41.3 % (ref 37–47)
HGB BLD-MCNC: 14 GM/DL (ref 12–16)
IMM GRANULOCYTES # BLD AUTO: 0.05 THOU/MM3 (ref 0–0.07)
IMM GRANULOCYTES NFR BLD AUTO: 0.4 %
LYMPHOCYTES ABSOLUTE: 3.3 THOU/MM3 (ref 1–4.8)
LYMPHOCYTES NFR BLD AUTO: 28.2 %
MCH RBC QN AUTO: 32 PG (ref 26–33)
MCHC RBC AUTO-ENTMCNC: 33.9 GM/DL (ref 32.2–35.5)
MCV RBC AUTO: 94.3 FL (ref 81–99)
MONOCYTES ABSOLUTE: 0.5 THOU/MM3 (ref 0.4–1.3)
MONOCYTES NFR BLD AUTO: 4.6 %
NEUTROPHILS NFR BLD AUTO: 64.5 %
NRBC BLD AUTO-RTO: 0 /100 WBC
NT-PROBNP SERPL IA-MCNC: 100.2 PG/ML (ref 0–124)
OSMOLALITY SERPL CALC.SUM OF ELEC: 270.4 MOSMOL/KG (ref 275–300)
PLATELET # BLD AUTO: 336 THOU/MM3 (ref 130–400)
PMV BLD AUTO: 9.4 FL (ref 9.4–12.4)
POTASSIUM SERPL-SCNC: 3.4 MEQ/L (ref 3.5–5.2)
RBC # BLD AUTO: 4.38 MILL/MM3 (ref 4.2–5.4)
SEGMENTED NEUTROPHILS ABSOLUTE COUNT: 7.5 THOU/MM3 (ref 1.8–7.7)
SODIUM SERPL-SCNC: 136 MEQ/L (ref 135–145)
TROPONIN, HIGH SENSITIVITY: 10 NG/L (ref 0–12)
WBC # BLD AUTO: 11.6 THOU/MM3 (ref 4.8–10.8)

## 2023-10-31 PROCEDURE — 84484 ASSAY OF TROPONIN QUANT: CPT

## 2023-10-31 PROCEDURE — 6360000002 HC RX W HCPCS: Performed by: STUDENT IN AN ORGANIZED HEALTH CARE EDUCATION/TRAINING PROGRAM

## 2023-10-31 PROCEDURE — 71045 X-RAY EXAM CHEST 1 VIEW: CPT

## 2023-10-31 PROCEDURE — 99285 EMERGENCY DEPT VISIT HI MDM: CPT

## 2023-10-31 PROCEDURE — 6370000000 HC RX 637 (ALT 250 FOR IP): Performed by: STUDENT IN AN ORGANIZED HEALTH CARE EDUCATION/TRAINING PROGRAM

## 2023-10-31 PROCEDURE — 96374 THER/PROPH/DIAG INJ IV PUSH: CPT

## 2023-10-31 PROCEDURE — 83880 ASSAY OF NATRIURETIC PEPTIDE: CPT

## 2023-10-31 PROCEDURE — 94640 AIRWAY INHALATION TREATMENT: CPT

## 2023-10-31 PROCEDURE — 93005 ELECTROCARDIOGRAM TRACING: CPT | Performed by: STUDENT IN AN ORGANIZED HEALTH CARE EDUCATION/TRAINING PROGRAM

## 2023-10-31 PROCEDURE — 80048 BASIC METABOLIC PNL TOTAL CA: CPT

## 2023-10-31 PROCEDURE — 2580000003 HC RX 258: Performed by: STUDENT IN AN ORGANIZED HEALTH CARE EDUCATION/TRAINING PROGRAM

## 2023-10-31 PROCEDURE — 93005 ELECTROCARDIOGRAM TRACING: CPT | Performed by: EMERGENCY MEDICINE

## 2023-10-31 PROCEDURE — 93010 ELECTROCARDIOGRAM REPORT: CPT | Performed by: NUCLEAR MEDICINE

## 2023-10-31 PROCEDURE — 85025 COMPLETE CBC W/AUTO DIFF WBC: CPT

## 2023-10-31 PROCEDURE — 36415 COLL VENOUS BLD VENIPUNCTURE: CPT

## 2023-10-31 RX ORDER — PREDNISONE 20 MG/1
40 TABLET ORAL DAILY
Qty: 10 TABLET | Refills: 0 | Status: SHIPPED | OUTPATIENT
Start: 2023-10-31 | End: 2023-11-05

## 2023-10-31 RX ORDER — IPRATROPIUM BROMIDE AND ALBUTEROL SULFATE 2.5; .5 MG/3ML; MG/3ML
1 SOLUTION RESPIRATORY (INHALATION) ONCE
Status: COMPLETED | OUTPATIENT
Start: 2023-10-31 | End: 2023-10-31

## 2023-10-31 RX ADMIN — IPRATROPIUM BROMIDE AND ALBUTEROL SULFATE 1 DOSE: .5; 3 SOLUTION RESPIRATORY (INHALATION) at 09:43

## 2023-10-31 RX ADMIN — WATER 125 MG: 1 INJECTION INTRAMUSCULAR; INTRAVENOUS; SUBCUTANEOUS at 09:50

## 2023-10-31 ASSESSMENT — PAIN SCALES - GENERAL: PAINLEVEL_OUTOF10: 0

## 2023-10-31 ASSESSMENT — PAIN - FUNCTIONAL ASSESSMENT
PAIN_FUNCTIONAL_ASSESSMENT: NONE - DENIES PAIN
PAIN_FUNCTIONAL_ASSESSMENT: NONE - DENIES PAIN

## 2023-10-31 NOTE — DISCHARGE INSTRUCTIONS
You are seen emergency department today for COPD exacerbation. Make sure you are using your CPAP as directed. Schedule appoint with your pulmonologist let them know what happened today. You can return the emergency department anytime if develop worsening shortness of breath, chest pain or if you have any other concerns. Take medications as directed.

## 2023-10-31 NOTE — TELEPHONE ENCOUNTER
Received notification that patient was in ED today for COPD exacerbation. Appears she had a chest x-ray with increase in size of DAWIT nodule. Can we try to push her December CT Chest up to next available? Thanks!

## 2023-10-31 NOTE — ED PROVIDER NOTES
ATTENDING NOTE:    I supervised and discussed the history, physical exam and the management of this patient with the resident. I reviewed the resident's note and agree with the documented findings and plan of care. Please see my additional note. Patient reports history of COPD, also sleep apnea. She is supposed to wear CPAP at night but states that she cannot wear the mask. She woke up this morning and states that her pulse ox reading was 78%, patient states that she will wake up with a low pulse ox in the morning and that it takes a little bit of time for her to improve. She reports she was recently treated with steroids and antibiotics due to a COPD exacerbation, was initially coughing up green phlegm. Reports the phlegm has improved but has continued to have cough. She states she has steroids at home and does not feel that she needs any here. She was given a breathing treatment in route and does feel better. She also did take 1 albuterol nebulizer at home. On exam, has diminished breath sounds, resting in bed with sats of 95% on room air upon my evaluation. Patient given nebulizer here. X-ray labs, and EKG reviewed. I personally saw and examined the patient. I have reviewed and agree with the resident's findings, including all diagnostic interpretations and treatment plans as written. I was present for the key portion of any procedures performed and the inclusive time noted in any critical care statement.     Electronically verified by Georgette Cornelius MD  11/02/23 1551

## 2023-10-31 NOTE — ED TRIAGE NOTES
Patient presents to ED from home by EMS with c/o shortness of breath. States that this morning her pulse ox was 78% on room air. EMS states that they gave the patient a breathing treatment in route. Patient is 95% on room air with an ambulating pulse ox of 88% on room air. EKG completed in triage. Call light in reach.

## 2023-11-01 ENCOUNTER — APPOINTMENT (OUTPATIENT)
Dept: GENERAL RADIOLOGY | Age: 58
End: 2023-11-01
Payer: MEDICARE

## 2023-11-01 VITALS
DIASTOLIC BLOOD PRESSURE: 92 MMHG | HEIGHT: 62 IN | WEIGHT: 191 LBS | TEMPERATURE: 97.8 F | RESPIRATION RATE: 19 BRPM | HEART RATE: 99 BPM | SYSTOLIC BLOOD PRESSURE: 156 MMHG | OXYGEN SATURATION: 99 % | BODY MASS INDEX: 35.15 KG/M2

## 2023-11-01 LAB
ALBUMIN SERPL BCG-MCNC: 3.9 G/DL (ref 3.5–5.1)
ALP SERPL-CCNC: 128 U/L (ref 38–126)
ALT SERPL W/O P-5'-P-CCNC: 21 U/L (ref 11–66)
ANION GAP SERPL CALC-SCNC: 15 MEQ/L (ref 8–16)
AST SERPL-CCNC: 23 U/L (ref 5–40)
BASOPHILS ABSOLUTE: 0 THOU/MM3 (ref 0–0.1)
BASOPHILS NFR BLD AUTO: 0.1 %
BILIRUB SERPL-MCNC: 0.5 MG/DL (ref 0.3–1.2)
BUN SERPL-MCNC: 11 MG/DL (ref 7–22)
CALCIUM SERPL-MCNC: 9.2 MG/DL (ref 8.5–10.5)
CHLORIDE SERPL-SCNC: 91 MEQ/L (ref 98–111)
CO2 SERPL-SCNC: 28 MEQ/L (ref 23–33)
CREAT SERPL-MCNC: 0.7 MG/DL (ref 0.4–1.2)
DEPRECATED RDW RBC AUTO: 44.7 FL (ref 35–45)
EKG ATRIAL RATE: 82 BPM
EKG ATRIAL RATE: 87 BPM
EKG P AXIS: 38 DEGREES
EKG P AXIS: 44 DEGREES
EKG P-R INTERVAL: 138 MS
EKG P-R INTERVAL: 148 MS
EKG Q-T INTERVAL: 360 MS
EKG Q-T INTERVAL: 368 MS
EKG QRS DURATION: 76 MS
EKG QRS DURATION: 76 MS
EKG QTC CALCULATION (BAZETT): 420 MS
EKG QTC CALCULATION (BAZETT): 442 MS
EKG R AXIS: 56 DEGREES
EKG R AXIS: 59 DEGREES
EKG T AXIS: 34 DEGREES
EKG T AXIS: 46 DEGREES
EKG VENTRICULAR RATE: 82 BPM
EKG VENTRICULAR RATE: 87 BPM
EOSINOPHIL NFR BLD AUTO: 0 %
EOSINOPHILS ABSOLUTE: 0 THOU/MM3 (ref 0–0.4)
ERYTHROCYTE [DISTWIDTH] IN BLOOD BY AUTOMATED COUNT: 13.1 % (ref 11.5–14.5)
GFR SERPL CREATININE-BSD FRML MDRD: > 60 ML/MIN/1.73M2
GLUCOSE SERPL-MCNC: 168 MG/DL (ref 70–108)
HCT VFR BLD AUTO: 40 % (ref 37–47)
HGB BLD-MCNC: 13.5 GM/DL (ref 12–16)
IMM GRANULOCYTES # BLD AUTO: 0.12 THOU/MM3 (ref 0–0.07)
IMM GRANULOCYTES NFR BLD AUTO: 0.8 %
LYMPHOCYTES ABSOLUTE: 1.2 THOU/MM3 (ref 1–4.8)
LYMPHOCYTES NFR BLD AUTO: 7.9 %
MCH RBC QN AUTO: 31.5 PG (ref 26–33)
MCHC RBC AUTO-ENTMCNC: 33.8 GM/DL (ref 32.2–35.5)
MCV RBC AUTO: 93.5 FL (ref 81–99)
MONOCYTES ABSOLUTE: 0.3 THOU/MM3 (ref 0.4–1.3)
MONOCYTES NFR BLD AUTO: 2.1 %
NEUTROPHILS NFR BLD AUTO: 89.1 %
NRBC BLD AUTO-RTO: 0 /100 WBC
OSMOLALITY SERPL CALC.SUM OF ELEC: 271.5 MOSMOL/KG (ref 275–300)
PLATELET # BLD AUTO: 337 THOU/MM3 (ref 130–400)
PMV BLD AUTO: 9.4 FL (ref 9.4–12.4)
POTASSIUM SERPL-SCNC: 3.5 MEQ/L (ref 3.5–5.2)
PROT SERPL-MCNC: 7.3 G/DL (ref 6.1–8)
RBC # BLD AUTO: 4.28 MILL/MM3 (ref 4.2–5.4)
SEGMENTED NEUTROPHILS ABSOLUTE COUNT: 14.1 THOU/MM3 (ref 1.8–7.7)
SODIUM SERPL-SCNC: 134 MEQ/L (ref 135–145)
TROPONIN, HIGH SENSITIVITY: 6 NG/L (ref 0–12)
WBC # BLD AUTO: 15.8 THOU/MM3 (ref 4.8–10.8)

## 2023-11-01 PROCEDURE — 71045 X-RAY EXAM CHEST 1 VIEW: CPT

## 2023-11-01 PROCEDURE — 94640 AIRWAY INHALATION TREATMENT: CPT

## 2023-11-01 PROCEDURE — 93010 ELECTROCARDIOGRAM REPORT: CPT | Performed by: NUCLEAR MEDICINE

## 2023-11-01 PROCEDURE — 36415 COLL VENOUS BLD VENIPUNCTURE: CPT

## 2023-11-01 PROCEDURE — 84484 ASSAY OF TROPONIN QUANT: CPT

## 2023-11-01 PROCEDURE — 80053 COMPREHEN METABOLIC PANEL: CPT

## 2023-11-01 PROCEDURE — 6370000000 HC RX 637 (ALT 250 FOR IP): Performed by: STUDENT IN AN ORGANIZED HEALTH CARE EDUCATION/TRAINING PROGRAM

## 2023-11-01 PROCEDURE — 94761 N-INVAS EAR/PLS OXIMETRY MLT: CPT

## 2023-11-01 PROCEDURE — 85025 COMPLETE CBC W/AUTO DIFF WBC: CPT

## 2023-11-01 RX ORDER — IPRATROPIUM BROMIDE AND ALBUTEROL SULFATE 2.5; .5 MG/3ML; MG/3ML
2 SOLUTION RESPIRATORY (INHALATION) ONCE
Status: COMPLETED | OUTPATIENT
Start: 2023-11-01 | End: 2023-11-01

## 2023-11-01 RX ORDER — PREDNISONE 20 MG/1
20 TABLET ORAL ONCE
Status: COMPLETED | OUTPATIENT
Start: 2023-11-01 | End: 2023-11-01

## 2023-11-01 RX ADMIN — PREDNISONE 20 MG: 20 TABLET ORAL at 01:35

## 2023-11-01 RX ADMIN — IPRATROPIUM BROMIDE AND ALBUTEROL SULFATE 2 DOSE: .5; 3 SOLUTION RESPIRATORY (INHALATION) at 01:13

## 2023-11-01 NOTE — ED NOTES
Pt and vs reassessed. RR easy and unlabored. Pt resting in bed alert and denies any needs.  Pt stable at this time     [place], [place]  11/01/23 1923

## 2023-11-01 NOTE — ED TRIAGE NOTES
Pt presents to the ED via EMS with c/o HTN. Pt states she was recently seen in the ED today for SOB and was discharged- upon discharge told to come back if BP is high. Pt states her BP at home was . Denies any chest pain or SOB at this time. States she was prescribed a steroid today upon discharge but has not taken a dose yet. States she took her nightly breathing treatment pta. RR easy and unlabored.  EKG complete

## 2023-11-01 NOTE — ED NOTES
PT resting in bed. Report received from [facility]. PT and VS assessed. Call light in reach.      Oz Gross RN  11/01/23 0132

## 2023-11-03 DIAGNOSIS — J44.9 MODERATE COPD (CHRONIC OBSTRUCTIVE PULMONARY DISEASE) (HCC): ICD-10-CM

## 2023-11-03 RX ORDER — ALPRAZOLAM 0.25 MG/1
0.25 TABLET ORAL NIGHTLY PRN
Qty: 30 TABLET | Refills: 2 | Status: SHIPPED | OUTPATIENT
Start: 2023-11-03 | End: 2023-12-03

## 2023-11-03 RX ORDER — ALPRAZOLAM 0.25 MG/1
0.25 TABLET ORAL
Status: CANCELLED | OUTPATIENT
Start: 2023-11-03

## 2023-11-03 NOTE — TELEPHONE ENCOUNTER
Took verbal order from [facility] for z-pack  Medication called into fransico Avila aware

## 2023-11-03 NOTE — TELEPHONE ENCOUNTER
Pt was seen in the ER twice on 10/31/23 for COPD and hypertension. Pt is asking for antibiotic to keep her out of the ER this weekend since she has been having a productive cough with green mucus. Pt does take mucinex everyday, pt states that she has had a cough since sept. Pt is also concerned about her O2 at night. She said it drops to the low 80s at night and does not have home o2 at this time.    Resting o2 is 89%  Pt also asking for a refill of xanx  Pt is on schedule already for monday

## 2023-11-06 ENCOUNTER — OFFICE VISIT (OUTPATIENT)
Dept: FAMILY MEDICINE CLINIC | Age: 58
End: 2023-11-06
Payer: MEDICARE

## 2023-11-06 VITALS
BODY MASS INDEX: 35.15 KG/M2 | RESPIRATION RATE: 18 BRPM | OXYGEN SATURATION: 98 % | TEMPERATURE: 97.3 F | SYSTOLIC BLOOD PRESSURE: 144 MMHG | DIASTOLIC BLOOD PRESSURE: 94 MMHG | WEIGHT: 191 LBS | HEART RATE: 77 BPM | HEIGHT: 62 IN

## 2023-11-06 DIAGNOSIS — J44.1 COPD EXACERBATION (HCC): ICD-10-CM

## 2023-11-06 DIAGNOSIS — B37.0 THRUSH: Primary | ICD-10-CM

## 2023-11-06 PROCEDURE — 3077F SYST BP >= 140 MM HG: CPT | Performed by: NURSE PRACTITIONER

## 2023-11-06 PROCEDURE — 99214 OFFICE O/P EST MOD 30 MIN: CPT | Performed by: NURSE PRACTITIONER

## 2023-11-06 PROCEDURE — 3080F DIAST BP >= 90 MM HG: CPT | Performed by: NURSE PRACTITIONER

## 2023-11-06 RX ORDER — METHYLPREDNISOLONE 4 MG/1
TABLET ORAL
Qty: 1 KIT | Refills: 1 | Status: SHIPPED | OUTPATIENT
Start: 2023-11-06 | End: 2023-11-12

## 2023-11-06 RX ORDER — AZITHROMYCIN 250 MG/1
TABLET, FILM COATED ORAL
Qty: 1 PACKET | Refills: 1 | Status: SHIPPED | OUTPATIENT
Start: 2023-11-06

## 2023-11-06 RX ORDER — FLUCONAZOLE 100 MG/1
100 TABLET ORAL DAILY
Qty: 10 TABLET | Refills: 0 | Status: SHIPPED | OUTPATIENT
Start: 2023-11-06 | End: 2023-11-16

## 2023-11-06 ASSESSMENT — ENCOUNTER SYMPTOMS
SHORTNESS OF BREATH: 1
WHEEZING: 1
EYES NEGATIVE: 1
GASTROINTESTINAL NEGATIVE: 1
COUGH: 1

## 2023-11-07 NOTE — PROGRESS NOTES
Left message for patient to call office back.
Ok please let pt know.   Perhaps pulm can help her more next week
Patient aware and voiced understanding, no concerns voiced at this time.
Spoke with Missy Pink at [employer], to qualify for even PRN o2. Medicare will not accept o2 readings that are out of the 48 hour range, the EMS report will not be accepted. Medicare will only accept walk test, explained that pt can not walk more than 3 mins without resting and her 02 wont drop too 88% within that time.      For pt to qualify, documentation at resting below 88% or walk test.
methylPREDNISolone (MEDROL DOSEPACK) 4 MG tablet    azithromycin (ZITHROMAX) 250 MG tablet          :      No follow-ups on file. Diagnosis Orders   1. Thrush  fluconazole (DIFLUCAN) 100 MG tablet      2. COPD exacerbation (HCC)  methylPREDNISolone (MEDROL DOSEPACK) 4 MG tablet    azithromycin (ZITHROMAX) 250 MG tablet          No orders of the defined types were placed in this encounter. Orders Placed This Encounter   Medications    fluconazole (DIFLUCAN) 100 MG tablet     Sig: Take 1 tablet by mouth daily for 10 days     Dispense:  10 tablet     Refill:  0    methylPREDNISolone (MEDROL DOSEPACK) 4 MG tablet     Sig: Take by mouth. Dispense:  1 kit     Refill:  1    azithromycin (ZITHROMAX) 250 MG tablet     Sig: Take 2 tabs (500 mg) on Day 1, and take 1 tab (250 mg) on days 2 through 5. Dispense:  1 packet     Refill:  1      Gave rx for medrol and zithromax to use prn  Fu with pulm    Patient given educational materials - seepatient instructions. Discussed use, benefit, and side effects of prescribed medications. All patient questions answered. Pt voiced understanding. Patient agreed withtreatment plan. Follow up as directed.      Electronically signed by JASON Carey CNP on 11/6/2023 at 5:07 PM

## 2023-11-08 ENCOUNTER — HOSPITAL ENCOUNTER (OUTPATIENT)
Dept: CT IMAGING | Age: 58
Discharge: HOME | End: 2023-11-08
Payer: MEDICARE

## 2023-11-08 DIAGNOSIS — F17.210 CIGARETTE NICOTINE DEPENDENCE, UNCOMPLICATED: ICD-10-CM

## 2023-11-08 PROCEDURE — 71271 CT THORAX LUNG CANCER SCR C-: CPT

## 2023-11-13 DIAGNOSIS — I10 ESSENTIAL HYPERTENSION: ICD-10-CM

## 2023-11-13 RX ORDER — LISINOPRIL 30 MG/1
30 TABLET ORAL DAILY
Qty: 90 TABLET | Refills: 3 | Status: SHIPPED | OUTPATIENT
Start: 2023-11-13

## 2023-11-13 NOTE — PROGRESS NOTES
[facility]    Patient: Estephanie Ramirez, 60 y.o.   : 2023    Patient of Dr. Moe Nolen   Patient presents with    Follow-up     Copd f/u after ct         HPI  Jose Race is here for follow up for CT results. Patient evaluated in ED 10/31 due to reported hypoxia at home of 78% in the AM. Her ambulating pulse ox in the ED was 88% prior to breathing treatment, she was 95% at rest. She was treated for COPD exacerbation. She had chest x-ray that revealed increase in size of DAWIT nodule. She came back to the ED that same day with . Her SpO2 was 95% in the ED at that time. Patient reports that her O2 when the EMS arrived for the second ED visit was 73%, her BP was 200s/100s, and her heart rate was elevated. Patient prescribed azithro and medrol dose pack by primary care provider on  - has not taken. She reports this was just to keep on hand. She is here with repeat CT Chest, see below. Patient was treated with prednisone taper at her last appointment. She reports good benefit with prednisone. Overall patient reports respiratory symptoms have been stable since last appointment. Patient reports good compliance with inhaled medications Barbara Leslie). Patient using albuterol 2-3 times per day on average. Patient reports physical limitation due to respiratory symptoms. Her past medical history is significant for anxiety, arthritis, COPD, enlarged spleen, fatty liver, GERD, Hep A, hypertension, LYNN (non compliant with PAP therapy), and ovarian cyst. She has LYNN, however she refuses PAP therapy or sleep clinic follow up. Patient turned her CPAP back in. She reports that she tried CPAP therapy for about 1 month. Patient gained 3 lbs in one day - did not take extra diuretic   Weight is up 1 lb since last appointment.      Expectorants: saline neb, Mucinex, and acapella     Allergy regimen: flonase      Hx of anaphylaxis to

## 2023-11-14 ENCOUNTER — OFFICE VISIT (OUTPATIENT)
Dept: PULMONOLOGY | Age: 58
End: 2023-11-14
Payer: MEDICARE

## 2023-11-14 VITALS
HEIGHT: 62 IN | OXYGEN SATURATION: 95 % | DIASTOLIC BLOOD PRESSURE: 82 MMHG | SYSTOLIC BLOOD PRESSURE: 136 MMHG | TEMPERATURE: 97.7 F | WEIGHT: 192.6 LBS | HEART RATE: 96 BPM | BODY MASS INDEX: 35.44 KG/M2

## 2023-11-14 DIAGNOSIS — J44.9 STAGE 3 SEVERE COPD BY GOLD CLASSIFICATION (HCC): Primary | ICD-10-CM

## 2023-11-14 DIAGNOSIS — F17.210 CIGARETTE NICOTINE DEPENDENCE, UNCOMPLICATED: ICD-10-CM

## 2023-11-14 DIAGNOSIS — J41.1 MUCOPURULENT CHRONIC BRONCHITIS (HCC): ICD-10-CM

## 2023-11-14 DIAGNOSIS — J43.9 PULMONARY EMPHYSEMA, UNSPECIFIED EMPHYSEMA TYPE (HCC): ICD-10-CM

## 2023-11-14 DIAGNOSIS — R94.2 ABNORMAL PET SCAN, LUNG: ICD-10-CM

## 2023-11-14 DIAGNOSIS — R91.1 NODULE OF UPPER LOBE OF LEFT LUNG: ICD-10-CM

## 2023-11-14 PROCEDURE — 99214 OFFICE O/P EST MOD 30 MIN: CPT

## 2023-11-14 PROCEDURE — 3079F DIAST BP 80-89 MM HG: CPT

## 2023-11-14 PROCEDURE — 3075F SYST BP GE 130 - 139MM HG: CPT

## 2023-11-14 PROCEDURE — 99406 BEHAV CHNG SMOKING 3-10 MIN: CPT

## 2023-11-14 PROCEDURE — 94618 PULMONARY STRESS TESTING: CPT

## 2023-11-14 ASSESSMENT — ENCOUNTER SYMPTOMS
DIARRHEA: 1
WHEEZING: 1
CHEST TIGHTNESS: 0
CONSTIPATION: 1
SHORTNESS OF BREATH: 1
SINUS PAIN: 0
RHINORRHEA: 0
COUGH: 1
SINUS PRESSURE: 1

## 2023-11-20 ENCOUNTER — HOSPITAL ENCOUNTER (OUTPATIENT)
Dept: PULMONOLOGY | Age: 58
Discharge: HOME | End: 2023-11-20
Payer: MEDICARE

## 2023-11-20 DIAGNOSIS — F17.210 CIGARETTE NICOTINE DEPENDENCE, UNCOMPLICATED: ICD-10-CM

## 2023-11-20 DIAGNOSIS — J41.1 MUCOPURULENT CHRONIC BRONCHITIS (HCC): ICD-10-CM

## 2023-11-20 DIAGNOSIS — J43.9 PULMONARY EMPHYSEMA, UNSPECIFIED EMPHYSEMA TYPE (HCC): ICD-10-CM

## 2023-11-20 DIAGNOSIS — J44.9 STAGE 3 SEVERE COPD BY GOLD CLASSIFICATION (HCC): ICD-10-CM

## 2023-11-20 PROCEDURE — 94726 PLETHYSMOGRAPHY LUNG VOLUMES: CPT

## 2023-11-20 PROCEDURE — 94729 DIFFUSING CAPACITY: CPT

## 2023-11-20 PROCEDURE — 94010 BREATHING CAPACITY TEST: CPT

## 2023-11-21 ENCOUNTER — TELEPHONE (OUTPATIENT)
Dept: PULMONOLOGY | Age: 58
End: 2023-11-21

## 2023-11-21 NOTE — TELEPHONE ENCOUNTER
Please notify patient that her pulmonary function test appears improved, however she does show worsening air trapping. I advise patient to work on pursed lip breathing techniques to help alleviate air trapping and dyspnea. Thanks!

## 2023-11-22 NOTE — TELEPHONE ENCOUNTER
Notified patient and she gave me an alternative fax number to send her [facility] referral to and I printed/re-faxed to provided number.

## 2023-12-14 ENCOUNTER — TELEPHONE (OUTPATIENT)
Dept: PULMONOLOGY | Age: 58
End: 2023-12-14

## 2023-12-14 ENCOUNTER — TELEPHONE (OUTPATIENT)
Dept: FAMILY MEDICINE CLINIC | Age: 58
End: 2023-12-14

## 2023-12-14 DIAGNOSIS — J44.1 COPD EXACERBATION (HCC): ICD-10-CM

## 2023-12-14 DIAGNOSIS — J30.9 ALLERGIC RHINITIS, UNSPECIFIED SEASONALITY, UNSPECIFIED TRIGGER: ICD-10-CM

## 2023-12-14 RX ORDER — FLUTICASONE PROPIONATE 50 MCG
2 SPRAY, SUSPENSION (ML) NASAL DAILY
Qty: 16 G | Refills: 1 | Status: SHIPPED | OUTPATIENT
Start: 2023-12-14 | End: 2024-12-13

## 2023-12-14 RX ORDER — METHYLPREDNISOLONE 4 MG/1
TABLET ORAL
Qty: 1 KIT | Refills: 1 | Status: SHIPPED | OUTPATIENT
Start: 2023-12-14 | End: 2023-12-20

## 2023-12-14 RX ORDER — AZITHROMYCIN 250 MG/1
TABLET, FILM COATED ORAL
Qty: 1 PACKET | Refills: 1 | Status: SHIPPED | OUTPATIENT
Start: 2023-12-14

## 2023-12-14 NOTE — TELEPHONE ENCOUNTER
Patient called office stating she currently has a head/chest cold. Patient's PCP, [facility], currently has patient on an antibiotic and steroid. Patient stated she used her 5 days of prednisone 20 mg tablets that she keeps on hand for flare ups and was asking for a refill of that. Patient would like that sent to [facility] in [place].    Patient mentioned she was seen at [facility] by a surgeon on 12/7/23. Patient stated she doesn't know if they will be able to perform biopsy due to location. Patient stated she may just have to do radiation. Faxed request to [address] [place] [address] to try and obtains office visit notes (see media). Patient stated she \"has to have oxygen\" and the last doctor she saw told her that \"just by looking at her he knew she needs to be on oxygen\". Asked patient if this doctor performed a walk test, patient answered no. Asked patient if this doctor ordered oxygen for her, patient answered no. Bring patient back in for office visit to do 6MWT again after patient is symptom free? Please advise, thank you!

## 2023-12-14 NOTE — TELEPHONE ENCOUNTER
Patient thinks she having a copd exacerbation. She has sinus cold/infection, green phlegm, sob, O2 is in the 87-low 90's back and forth. She is asking for an antibiotic to be sent in. She also went to [facility] recently and has to have more testing done due to they think she has cancer in her lungs some where. Please advise.

## 2023-12-15 NOTE — TELEPHONE ENCOUNTER
We can bring her in next week for a walk test. I will hold off on refilling any prednisone until I talk with her next week. She is to go to ED if symptoms worsen. Thanks!

## 2023-12-15 NOTE — TELEPHONE ENCOUNTER
Patient scheduled with Reyna on 12/21/23 at 3:30 pm at this time and stated she would call the office to reschedule if she is unable to make it. Patient advised to go to ED if symptoms worsen. Patient expressed understanding.

## 2023-12-21 DIAGNOSIS — R94.2 ABNORMAL PET SCAN, LUNG: ICD-10-CM

## 2023-12-21 DIAGNOSIS — J41.1 MUCOPURULENT CHRONIC BRONCHITIS (HCC): ICD-10-CM

## 2023-12-21 DIAGNOSIS — F17.210 CIGARETTE NICOTINE DEPENDENCE, UNCOMPLICATED: ICD-10-CM

## 2023-12-21 DIAGNOSIS — J43.9 PULMONARY EMPHYSEMA, UNSPECIFIED EMPHYSEMA TYPE (HCC): ICD-10-CM

## 2023-12-21 DIAGNOSIS — J44.9 STAGE 3 SEVERE COPD BY GOLD CLASSIFICATION (HCC): ICD-10-CM

## 2023-12-21 DIAGNOSIS — R91.1 NODULE OF UPPER LOBE OF LEFT LUNG: ICD-10-CM

## 2023-12-21 DIAGNOSIS — J96.11 CHRONIC RESPIRATORY FAILURE WITH HYPOXIA (HCC): ICD-10-CM

## 2024-01-23 ENCOUNTER — TELEPHONE (OUTPATIENT)
Dept: FAMILY MEDICINE CLINIC | Age: 59
End: 2024-01-23

## 2024-01-23 NOTE — TELEPHONE ENCOUNTER
Yes the [facility] oncology group is affiliated with [facility].  Once they have formulate a plan at [facility] they should be able to provide the treatment and also follow up in [place]

## 2024-01-23 NOTE — TELEPHONE ENCOUNTER
Pt called stating that she had seen a pulmonologist. They had sent her to the [facility]. She had a biopsy on her lung done and was diagnosed with non small cell lung cancer. She is scheduled to see a Dr Ferguson, who is an oncologist in [place] to come up with a plan. She wanted to know if there was an oncologist affiliated with the [facility] that came to [place] so she didn't have to travel as far.

## 2024-02-01 ENCOUNTER — TELEPHONE (OUTPATIENT)
Dept: PULMONOLOGY | Age: 59
End: 2024-02-01

## 2024-02-01 DIAGNOSIS — F17.210 CIGARETTE NICOTINE DEPENDENCE, UNCOMPLICATED: ICD-10-CM

## 2024-02-01 DIAGNOSIS — J44.9 STAGE 3 SEVERE COPD BY GOLD CLASSIFICATION (HCC): ICD-10-CM

## 2024-02-01 DIAGNOSIS — C34.92 ADENOCARCINOMA OF LEFT LUNG (HCC): Primary | ICD-10-CM

## 2024-02-01 DIAGNOSIS — R94.2 ABNORMAL PET SCAN, LUNG: ICD-10-CM

## 2024-02-01 NOTE — TELEPHONE ENCOUNTER
I called and spoke to central scheduling and they are able tog et patient in on 2/14 at 1245... I spoke to [place] which she is the manager in pet/ct and she said she can put this patient on the cancellation list but they can only order 6 injections for one day and she is full.... I also called the patient and informed her as well and she voce her understanding.

## 2024-02-01 NOTE — TELEPHONE ENCOUNTER
If that is the earliest that they can schedule it we will accept that for now. Can we add patient to a cancellation list?

## 2024-02-01 NOTE — TELEPHONE ENCOUNTER
Patient was recently diagnosed with pulmonary adenocarcinoma at [facility] and was referred to Dr. Wilkinson with radiation oncology. Dr. Wilkinson reached out to me as [facility] is recommending a repeat PET/CT prior to treatment and he is not be able to see her until Friday of next week. He asked if I could place orders for the PET. Order placed, please notify patient and assist with scheduling. She should keep her upcoming appt with Dr. Wilkinson. Thanks!

## 2024-02-01 NOTE — TELEPHONE ENCOUNTER
PETs are booking out until feb 21st is that acceptable? I wanted to check with you before I schedule with patient

## 2024-02-01 NOTE — TELEPHONE ENCOUNTER
Magdalena from [facility],  with medicaid, calling in for patient.  They are asking for an order for a wheeled walked with a seat? And due to her insurance she would need a physical therapy evaluation which patient is agreeable to.  Neto order fax to Magdalena at [phone], and physical therapy evaluation would go to [facility].  Please advise.

## 2024-02-05 PROBLEM — C34.12 PRIMARY MALIGNANT NEOPLASM OF LEFT UPPER LOBE OF LUNG (HCC): Status: ACTIVE | Noted: 2024-02-05

## 2024-02-09 ENCOUNTER — HOSPITAL ENCOUNTER (OUTPATIENT)
Dept: PET IMAGING | Age: 59
Discharge: HOME | End: 2024-02-09
Payer: MEDICARE

## 2024-02-09 ENCOUNTER — TELEPHONE (OUTPATIENT)
Dept: PULMONOLOGY | Age: 59
End: 2024-02-09

## 2024-02-09 DIAGNOSIS — J44.9 STAGE 3 SEVERE COPD BY GOLD CLASSIFICATION (HCC): ICD-10-CM

## 2024-02-09 DIAGNOSIS — R94.2 ABNORMAL PET SCAN, LUNG: ICD-10-CM

## 2024-02-09 DIAGNOSIS — C34.92 ADENOCARCINOMA OF LEFT LUNG (HCC): ICD-10-CM

## 2024-02-09 DIAGNOSIS — F17.210 CIGARETTE NICOTINE DEPENDENCE, UNCOMPLICATED: ICD-10-CM

## 2024-02-09 PROCEDURE — 78815 PET IMAGE W/CT SKULL-THIGH: CPT

## 2024-02-09 PROCEDURE — 3430000000 HC RX DIAGNOSTIC RADIOPHARMACEUTICAL

## 2024-02-09 PROCEDURE — A9609 HC RX DIAGNOSTIC RADIOPHARMACEUTICAL: HCPCS

## 2024-02-09 RX ORDER — FLUDEOXYGLUCOSE F 18 200 MCI/ML
11.1 INJECTION, SOLUTION INTRAVENOUS
Status: COMPLETED | OUTPATIENT
Start: 2024-02-09 | End: 2024-02-09

## 2024-02-09 RX ADMIN — FLUDEOXYGLUCOSE F 18 11.1 MILLICURIE: 200 INJECTION, SOLUTION INTRAVENOUS at 08:28

## 2024-02-09 NOTE — TELEPHONE ENCOUNTER
----- Message from JASON Donald CNP sent at 2/9/2024 11:20 AM EST -----  Please advise patient to keep her upcoming appointment with Dr. Wilkinson to review results and discuss possible radiation therapy.  Thanks!

## 2024-02-14 ENCOUNTER — HOSPITAL ENCOUNTER (OUTPATIENT)
Dept: RADIATION ONCOLOGY | Age: 59
Discharge: HOME | End: 2024-02-14
Payer: MEDICARE

## 2024-02-14 ENCOUNTER — CLINICAL DOCUMENTATION (OUTPATIENT)
Dept: CASE MANAGEMENT | Age: 59
End: 2024-02-14

## 2024-02-14 VITALS
RESPIRATION RATE: 18 BRPM | BODY MASS INDEX: 35.66 KG/M2 | SYSTOLIC BLOOD PRESSURE: 142 MMHG | HEART RATE: 85 BPM | DIASTOLIC BLOOD PRESSURE: 73 MMHG | TEMPERATURE: 97.6 F | HEIGHT: 62 IN | WEIGHT: 193.8 LBS | OXYGEN SATURATION: 94 %

## 2024-02-14 DIAGNOSIS — C34.12 PRIMARY MALIGNANT NEOPLASM OF LEFT UPPER LOBE OF LUNG (HCC): Primary | ICD-10-CM

## 2024-02-14 PROCEDURE — 99202 OFFICE O/P NEW SF 15 MIN: CPT | Performed by: RADIOLOGY

## 2024-02-14 NOTE — PROGRESS NOTES
[facility]           [address], [address]        [place], [place] [address]        O: [phone]        F: [phone]       [employer]            INITIAL CONSULTATION    Date of Service: 2024  Patient ID: Mary Lou Flores   : 1965  MRN: [id number]   Acct Number: [id number]         Requesting Provider:  Dr. ALEJADNRO Barber ([facility])  Reason for request: Evaluation for the potential role of {REASONCONS:61233} radiation therapy.     CONSULTANT: Dakota Wilkinson MD MS    CHIEF COMPLAINT: Evaluation for the potential role of {REASONCONS:30060} radiation therapy.    ASSESSMENT:   Cancer Staging   Primary malignant neoplasm of left upper lobe of lung (HCC)  Staging form: Lung, AJCC 8th Edition  - Clinical stage from 2024: Stage IA2 (cT1b, cN0, cM0) - Signed by Rena Mathews PA-C on 2024      PLAN:  {Side effects may include:50235}    Mary Lou Flores ***    {RTCMTH:07693}    Thank you for allowing my assistance in the care of your patient.    HISTORY OF PRESENT ILLNESS:  {HPIONCHX:20974}    Ms. Mary Lou Flores is a 58 y.o. female with above mentioned oncologic history presenting today for initial consultation regarding the potential role for radiation therapy.    Review of Systems   Constitutional:  Positive for activity change, appetite change and fatigue. Negative for unexpected weight change.   HENT:  Positive for congestion and trouble swallowing. Negative for sore throat.    Respiratory:  Positive for shortness of breath. Negative for apnea, cough, chest tightness and wheezing.    Cardiovascular:  Negative for chest pain and leg swelling.   Gastrointestinal:  Positive for abdominal pain (chronic), nausea (chronic) and vomiting (stomach issues, chronic). Negative for blood in stool.   Genitourinary:  Positive for frequency. Negative for dysuria and urgency.   Musculoskeletal:  Positive for back pain. Negative for arthralgias and myalgias.

## 2024-02-15 ENCOUNTER — SOCIAL WORK (OUTPATIENT)
Dept: RADIATION ONCOLOGY | Age: 59
End: 2024-02-15

## 2024-02-15 NOTE — PROGRESS NOTES
Oncology Social Work    Date: 2/15/2024  Time: 8:57 AM  Name: Mary Lou Flores  MRN: [id number]     Contact Type: Follow-up    Note:   Situation: This staff called Mary Lou Flores via phone support to introduce myself as her Oncology Social Worker.     Background:  Mary Lou had her recent appointment at the Radiation Dept of the [facility]. This staff was calling to review her Distress Thermometer completed with her nurse during this encounter.     Assessment: The contact number provided to this staff and Medical Records is her number and it was identified as such in the voicemail recording. Since the call went immediately to a voicemail, a message was left regarding the purpose of the call.   - Education regarding the services was provided on the recorded message from the SW.  - No community referrals were placed at this time because there was no conversation indicating where Mary Lou is in her treatment plan. Referral services may be reconsidered should she express needs regarding assistance.    Recommendation: Follow-up will be initiated by Mary Lou based on need should she choose to return a call to the identified number.  provided her with my contact information and will remain available for support.        JUANIS Schumacher, ANGELI, MANUEL  Oncology Social Worker      Electronically signed by JUANIS Schumacher, ANGELI, MANUEL on 2/15/2024 at 8:57 AM

## 2024-02-16 NOTE — PROGRESS NOTES
Name: Mary Lou Flores  : 1965  MRN: [id number]    Oncology Navigation- Initial Note:    Intake-  Contact Type: Radiation Oncology  Spouse Duarte accompanied consultation    Diagnosis: Thoracic- malignant    Home Disposition: Lives with other who is able to assist/ spouse Duarte  - has 3 children live within 12 miles - can assist  -has oxygen @ 2L per N/[place]  - able to perform ADL include dressing/bathing/ has /spouse cooks/ can drive short distances if need   - still smokes 1/2 pack daily/wants no information on cessation classes    RADON POC:  - MD discussed plan of care/ not surgical candidate/ plan 5 SBRT tx to chest  - MD reviewed side effects to rad tx   - CT sim       Patient needs and barriers to care: Coordination of Care, Knowledge deficit, and Symptom Management     Referral Source: Outpatient    Receptive to Advanced Care Planning/ Palliative Care:  deferred    Interventions-   General Interventions: navigation program discussed;welcome folder reviewed & given,including contact information      Education/Screenings: Mike reiterated RADON POC               Mike gave info regarding mileage reimbursement for [facility]          Continuum of Care: Diagnosis/Active Treatment    Notes: mike following to assist & support as needed    Electronically signed by Valeria Frost RN on 2024 at 2:16 PM

## 2024-02-20 ENCOUNTER — HOSPITAL ENCOUNTER (OUTPATIENT)
Dept: RADIATION ONCOLOGY | Age: 59
Discharge: HOME | End: 2024-02-20
Payer: MEDICARE

## 2024-02-20 ENCOUNTER — HOSPITAL ENCOUNTER (OUTPATIENT)
Dept: CT IMAGING | Age: 59
Discharge: HOME | End: 2024-02-20

## 2024-02-20 DIAGNOSIS — C34.12 MALIGNANT NEOPLASM OF UPPER LOBE, LEFT BRONCHUS OR LUNG (HCC): ICD-10-CM

## 2024-02-20 PROCEDURE — 3209999900 CT GUIDE RADIATION THERAPY NO CHARGE

## 2024-02-20 PROCEDURE — 77470 SPECIAL RADIATION TREATMENT: CPT | Performed by: RADIOLOGY

## 2024-02-20 PROCEDURE — 77334 RADIATION TREATMENT AID(S): CPT | Performed by: RADIOLOGY

## 2024-02-20 PROCEDURE — 77332 RADIATION TREATMENT AID(S): CPT | Performed by: RADIOLOGY

## 2024-02-23 PROCEDURE — 77300 RADIATION THERAPY DOSE PLAN: CPT | Performed by: RADIOLOGY

## 2024-02-23 PROCEDURE — 77301 RADIOTHERAPY DOSE PLAN IMRT: CPT | Performed by: RADIOLOGY

## 2024-02-23 PROCEDURE — 77293 RESPIRATOR MOTION MGMT SIMUL: CPT | Performed by: RADIOLOGY

## 2024-02-27 ENCOUNTER — HOSPITAL ENCOUNTER (OUTPATIENT)
Dept: RADIATION ONCOLOGY | Age: 59
End: 2024-02-27
Payer: MEDICARE

## 2024-02-27 PROCEDURE — 77338 DESIGN MLC DEVICE FOR IMRT: CPT | Performed by: RADIOLOGY

## 2024-03-01 ENCOUNTER — HOSPITAL ENCOUNTER (OUTPATIENT)
Dept: RADIATION ONCOLOGY | Age: 59
Discharge: HOME | End: 2024-03-01

## 2024-03-01 NOTE — DISCHARGE INSTRUCTIONS
PATIENT DISCHARGE INSTRUCTIONS    Remember that side effects present at the end of your treatments will improve within a few weeks after the last treatment.  Eat well balanced meals even though your treatments are finished.  This will help speed the healing process. Continue any special diets prescribed to control side effects until these side effects have been resolved.  Get plenty of rest.  If you have experienced fatigue and/or weakness, this may continue for several weeks after your last treatment.  Continue with your daily activities according to the way you feel.  Continue to be gentle with your skin.  Follow your present skin care instructions until your follow-up visit.  IF YOU DEVELOP ANY CHANGES IN YOUR SKIN IN THE AREA TREATED WITH RADIATION, PLEASE CALL THE RADIATION ONCOLOGY NURSE -[phone].  Protect your skin from any injury and avoid direct sun exposure in the treatment area.  The skin in the treated area may always be more sensitive than the rest of your skin.  Always use SPF 30 or higher sun block if you will be in the sun and cannot avoid exposure.  Please contact your referring physician for a follow-up appointment in addition to your Radiation Oncology appointment.  Presence of pain:   Medication Taper: No    See Instructions Dated: N/A  Follow up orders: Will be discussed at Follow-Up

## 2024-03-04 ENCOUNTER — HOSPITAL ENCOUNTER (OUTPATIENT)
Dept: RADIATION ONCOLOGY | Age: 59
Discharge: HOME | End: 2024-03-04
Payer: MEDICARE

## 2024-03-04 PROCEDURE — 77336 RADIATION PHYSICS CONSULT: CPT | Performed by: RADIOLOGY

## 2024-03-04 PROCEDURE — 77373 STRTCTC BDY RAD THER TX DLVR: CPT | Performed by: RADIOLOGY

## 2024-03-06 ENCOUNTER — HOSPITAL ENCOUNTER (OUTPATIENT)
Dept: RADIATION ONCOLOGY | Age: 59
Discharge: HOME | End: 2024-03-06
Payer: MEDICARE

## 2024-03-06 ENCOUNTER — CLINICAL DOCUMENTATION (OUTPATIENT)
Dept: CASE MANAGEMENT | Age: 59
End: 2024-03-06

## 2024-03-06 ENCOUNTER — TELEPHONE (OUTPATIENT)
Dept: FAMILY MEDICINE CLINIC | Age: 59
End: 2024-03-06

## 2024-03-06 PROCEDURE — 77373 STRTCTC BDY RAD THER TX DLVR: CPT | Performed by: RADIOLOGY

## 2024-03-06 NOTE — TELEPHONE ENCOUNTER
Oncologist's assistant checked her today at her appt and she listened to her lungs and they sounded good, no wheezing or anything. Patient states she is feeling a lot better as well. She would like to hold off for now but will call if she changes her mind or feels worse again.

## 2024-03-06 NOTE — PROGRESS NOTES
Name: Mary Lou Flores  : 1965  MRN: [id number]    Oncology Navigation Follow-Up Note    Contact Type:  Telephone    Notes: Pt called ramesh stating she has flu like symptoms and not sure if should come in for radiation tx today- ramesh transferred call to RN workstation in radiation. Checked back - Liya RN had spoke to patient.    Electronically signed by Valeria Frost RN on 3/6/2024 at 2:59 PM

## 2024-03-06 NOTE — TELEPHONE ENCOUNTER
Patient called in stating she got the stomach bug on Monday, it hit hard. Now she is dealing with the chest/head cold. Doesn't know if its flu, pnuemonia , or her COPD. She has lung cancer and doing radiation. Has treatment today at 1145am and cannot cough during treatment so she is going to take a elizabeth to help with that. She took mucinex this morning and her other meds. Her chest is heavy. Oncology nurse didn't know if she could take an antibiotic or steroid so she was advised to call us. She does have steroids on hand and azithromycin but that upsets her stomach so she doesn't want to take that if she can get an antibiotic. Please advise as patient does not know what to do. Please call patient and can leave detailed message if she does not answer.

## 2024-03-06 NOTE — TELEPHONE ENCOUNTER
Is she able to get a flu/covid test.  There are meds for those to help her get better more quickly   Patient Specific Counseling (Will Not Stick From Patient To Patient): R^ lip\\n- not better on Lidex; great reaction to Aldara\\n> s/p Aldara QHS x 2 weeks - will do 2 weeks break, and then will repeat Aldara x 2 more weeks\\nFU 1 month after DC Aldara (end of 12/20) Detail Level: Detailed

## 2024-03-07 ENCOUNTER — HOSPITAL ENCOUNTER (OUTPATIENT)
Age: 59
Setting detail: SPECIMEN
Discharge: HOME | End: 2024-03-07
Payer: MEDICARE

## 2024-03-07 ENCOUNTER — TELEPHONE (OUTPATIENT)
Dept: FAMILY MEDICINE CLINIC | Age: 59
End: 2024-03-07

## 2024-03-07 DIAGNOSIS — R05.1 ACUTE COUGH: Primary | ICD-10-CM

## 2024-03-07 LAB
INFLUENZA A BY PCR: NOT DETECTED
INFLUENZA B BY PCR: DETECTED
SARS-COV-2 RNA, RT PCR: NOT DETECTED

## 2024-03-07 PROCEDURE — 87636 SARSCOV2 & INF A&B AMP PRB: CPT

## 2024-03-07 RX ORDER — PREDNISONE 20 MG/1
40 TABLET ORAL DAILY
Qty: 10 TABLET | Refills: 0 | Status: SHIPPED | OUTPATIENT
Start: 2024-03-07 | End: 2024-03-12

## 2024-03-07 RX ORDER — OSELTAMIVIR PHOSPHATE 75 MG/1
75 CAPSULE ORAL 2 TIMES DAILY
Qty: 10 CAPSULE | Refills: 0 | Status: SHIPPED | OUTPATIENT
Start: 2024-03-07 | End: 2024-03-12

## 2024-03-07 NOTE — TELEPHONE ENCOUNTER
Called and informed patient. She stated thanks and understanding. She wants to know if she should still take steroids? The one that you don't have to take a lot at a time is a lot better for her and not as harsh on her. Has radiation tomorrow. Should she be ok to go tomorrow? She stated she started symptoms and not feeling well on Saturday. Didn't have the vomiting until Monday.

## 2024-03-07 NOTE — TELEPHONE ENCOUNTER
If she is feeling ok she can go.  Will be 6 days from symptoms starting so is not likely contagious.  Called in the prednisone that is less tabs

## 2024-03-08 ENCOUNTER — HOSPITAL ENCOUNTER (OUTPATIENT)
Dept: RADIATION ONCOLOGY | Age: 59
Discharge: HOME | End: 2024-03-08
Payer: MEDICARE

## 2024-03-09 ENCOUNTER — HOSPITAL ENCOUNTER (EMERGENCY)
Age: 59
Discharge: HOME | End: 2024-03-10
Attending: EMERGENCY MEDICINE
Payer: MEDICARE

## 2024-03-09 ENCOUNTER — APPOINTMENT (OUTPATIENT)
Dept: GENERAL RADIOLOGY | Age: 59
End: 2024-03-09
Payer: MEDICARE

## 2024-03-09 DIAGNOSIS — J44.1 COPD EXACERBATION (HCC): Primary | ICD-10-CM

## 2024-03-09 DIAGNOSIS — R06.00 DYSPNEA, UNSPECIFIED TYPE: ICD-10-CM

## 2024-03-09 PROCEDURE — 71045 X-RAY EXAM CHEST 1 VIEW: CPT

## 2024-03-09 PROCEDURE — 85025 COMPLETE CBC W/AUTO DIFF WBC: CPT

## 2024-03-09 PROCEDURE — 86140 C-REACTIVE PROTEIN: CPT

## 2024-03-09 PROCEDURE — 84484 ASSAY OF TROPONIN QUANT: CPT

## 2024-03-09 PROCEDURE — 83690 ASSAY OF LIPASE: CPT

## 2024-03-09 PROCEDURE — 84145 PROCALCITONIN (PCT): CPT

## 2024-03-09 PROCEDURE — 99285 EMERGENCY DEPT VISIT HI MDM: CPT

## 2024-03-09 PROCEDURE — 93005 ELECTROCARDIOGRAM TRACING: CPT | Performed by: EMERGENCY MEDICINE

## 2024-03-09 PROCEDURE — 36415 COLL VENOUS BLD VENIPUNCTURE: CPT

## 2024-03-09 PROCEDURE — 80048 BASIC METABOLIC PNL TOTAL CA: CPT

## 2024-03-09 PROCEDURE — 96374 THER/PROPH/DIAG INJ IV PUSH: CPT

## 2024-03-09 ASSESSMENT — PAIN SCALES - GENERAL: PAINLEVEL_OUTOF10: 3

## 2024-03-09 ASSESSMENT — PAIN - FUNCTIONAL ASSESSMENT: PAIN_FUNCTIONAL_ASSESSMENT: 0-10

## 2024-03-10 ENCOUNTER — APPOINTMENT (OUTPATIENT)
Dept: CT IMAGING | Age: 59
End: 2024-03-10
Payer: MEDICARE

## 2024-03-10 VITALS
DIASTOLIC BLOOD PRESSURE: 82 MMHG | SYSTOLIC BLOOD PRESSURE: 132 MMHG | OXYGEN SATURATION: 96 % | TEMPERATURE: 98 F | HEART RATE: 74 BPM | RESPIRATION RATE: 16 BRPM

## 2024-03-10 LAB
ALBUMIN SERPL BCG-MCNC: 3.9 G/DL (ref 3.5–5.1)
ALP SERPL-CCNC: 131 U/L (ref 38–126)
ALT SERPL W/O P-5'-P-CCNC: 30 U/L (ref 11–66)
ANION GAP SERPL CALC-SCNC: 13 MEQ/L (ref 8–16)
AST SERPL-CCNC: 28 U/L (ref 5–40)
BASOPHILS ABSOLUTE: 0 THOU/MM3 (ref 0–0.1)
BASOPHILS NFR BLD AUTO: 0.1 %
BILIRUB CONJ SERPL-MCNC: < 0.2 MG/DL (ref 0–0.3)
BILIRUB SERPL-MCNC: 0.2 MG/DL (ref 0.3–1.2)
BUN SERPL-MCNC: 7 MG/DL (ref 7–22)
CALCIUM SERPL-MCNC: 9 MG/DL (ref 8.5–10.5)
CHLORIDE SERPL-SCNC: 93 MEQ/L (ref 98–111)
CO2 SERPL-SCNC: 28 MEQ/L (ref 23–33)
CREAT SERPL-MCNC: 0.6 MG/DL (ref 0.4–1.2)
CRP SERPL-MCNC: 1.08 MG/DL (ref 0–1)
CRP SERPL-MCNC: 1.11 MG/DL (ref 0–1)
DEPRECATED RDW RBC AUTO: 45.1 FL (ref 35–45)
EKG ATRIAL RATE: 83 BPM
EKG P AXIS: 9 DEGREES
EKG P-R INTERVAL: 136 MS
EKG Q-T INTERVAL: 372 MS
EKG QRS DURATION: 74 MS
EKG QTC CALCULATION (BAZETT): 437 MS
EKG R AXIS: 80 DEGREES
EKG T AXIS: 71 DEGREES
EKG VENTRICULAR RATE: 83 BPM
EOSINOPHIL NFR BLD AUTO: 0.1 %
EOSINOPHILS ABSOLUTE: 0 THOU/MM3 (ref 0–0.4)
ERYTHROCYTE [DISTWIDTH] IN BLOOD BY AUTOMATED COUNT: 13 % (ref 11.5–14.5)
GFR SERPL CREATININE-BSD FRML MDRD: > 60 ML/MIN/1.73M2
GLUCOSE SERPL-MCNC: 128 MG/DL (ref 70–108)
HCT VFR BLD AUTO: 40.3 % (ref 37–47)
HGB BLD-MCNC: 13.5 GM/DL (ref 12–16)
IMM GRANULOCYTES # BLD AUTO: 0.04 THOU/MM3 (ref 0–0.07)
IMM GRANULOCYTES NFR BLD AUTO: 0.5 %
LACTIC ACID, SEPSIS: 1.6 MMOL/L (ref 0.5–1.9)
LIPASE SERPL-CCNC: 38.7 U/L (ref 5.6–51.3)
LIPASE SERPL-CCNC: 40.6 U/L (ref 5.6–51.3)
LYMPHOCYTES ABSOLUTE: 0.9 THOU/MM3 (ref 1–4.8)
LYMPHOCYTES NFR BLD AUTO: 11.5 %
MCH RBC QN AUTO: 31.8 PG (ref 26–33)
MCHC RBC AUTO-ENTMCNC: 33.5 GM/DL (ref 32.2–35.5)
MCV RBC AUTO: 95 FL (ref 81–99)
MONOCYTES ABSOLUTE: 0.5 THOU/MM3 (ref 0.4–1.3)
MONOCYTES NFR BLD AUTO: 6.8 %
NEUTROPHILS NFR BLD AUTO: 81 %
NRBC BLD AUTO-RTO: 0 /100 WBC
OSMOLALITY SERPL CALC.SUM OF ELEC: 267.9 MOSMOL/KG (ref 275–300)
PLATELET # BLD AUTO: 259 THOU/MM3 (ref 130–400)
PMV BLD AUTO: 10.5 FL (ref 9.4–12.4)
POTASSIUM SERPL-SCNC: 3.8 MEQ/L (ref 3.5–5.2)
PROCALCITONIN SERPL IA-MCNC: 0.02 NG/ML (ref 0.01–0.09)
PROCALCITONIN SERPL IA-MCNC: 0.04 NG/ML (ref 0.01–0.09)
PROT SERPL-MCNC: 7.2 G/DL (ref 6.1–8)
RBC # BLD AUTO: 4.24 MILL/MM3 (ref 4.2–5.4)
SEGMENTED NEUTROPHILS ABSOLUTE COUNT: 6.5 THOU/MM3 (ref 1.8–7.7)
SODIUM SERPL-SCNC: 134 MEQ/L (ref 135–145)
TROPONIN, HIGH SENSITIVITY: 7 NG/L (ref 0–12)
TROPONIN, HIGH SENSITIVITY: 7 NG/L (ref 0–12)
WBC # BLD AUTO: 8 THOU/MM3 (ref 4.8–10.8)

## 2024-03-10 PROCEDURE — 71275 CT ANGIOGRAPHY CHEST: CPT

## 2024-03-10 PROCEDURE — 74177 CT ABD & PELVIS W/CONTRAST: CPT

## 2024-03-10 PROCEDURE — 83690 ASSAY OF LIPASE: CPT

## 2024-03-10 PROCEDURE — 94640 AIRWAY INHALATION TREATMENT: CPT

## 2024-03-10 PROCEDURE — 94761 N-INVAS EAR/PLS OXIMETRY MLT: CPT

## 2024-03-10 PROCEDURE — 83605 ASSAY OF LACTIC ACID: CPT

## 2024-03-10 PROCEDURE — 2700000000 HC OXYGEN THERAPY PER DAY

## 2024-03-10 PROCEDURE — 6370000000 HC RX 637 (ALT 250 FOR IP)

## 2024-03-10 PROCEDURE — 84484 ASSAY OF TROPONIN QUANT: CPT

## 2024-03-10 PROCEDURE — 2580000003 HC RX 258

## 2024-03-10 PROCEDURE — 6360000004 HC RX CONTRAST MEDICATION: Performed by: EMERGENCY MEDICINE

## 2024-03-10 PROCEDURE — 6360000002 HC RX W HCPCS

## 2024-03-10 PROCEDURE — 93010 ELECTROCARDIOGRAM REPORT: CPT | Performed by: NUCLEAR MEDICINE

## 2024-03-10 PROCEDURE — 80076 HEPATIC FUNCTION PANEL: CPT

## 2024-03-10 PROCEDURE — 86140 C-REACTIVE PROTEIN: CPT

## 2024-03-10 PROCEDURE — 36415 COLL VENOUS BLD VENIPUNCTURE: CPT

## 2024-03-10 PROCEDURE — 84145 PROCALCITONIN (PCT): CPT

## 2024-03-10 RX ORDER — IPRATROPIUM BROMIDE AND ALBUTEROL SULFATE 2.5; .5 MG/3ML; MG/3ML
1 SOLUTION RESPIRATORY (INHALATION) ONCE
Status: COMPLETED | OUTPATIENT
Start: 2024-03-10 | End: 2024-03-10

## 2024-03-10 RX ORDER — ALBUTEROL SULFATE 2.5 MG/3ML
2.5 SOLUTION RESPIRATORY (INHALATION) EVERY 6 HOURS PRN
Status: DISCONTINUED | OUTPATIENT
Start: 2024-03-10 | End: 2024-03-10 | Stop reason: HOSPADM

## 2024-03-10 RX ADMIN — WATER 125 MG: 1 INJECTION INTRAMUSCULAR; INTRAVENOUS; SUBCUTANEOUS at 00:51

## 2024-03-10 RX ADMIN — IOPAMIDOL 80 ML: 755 INJECTION, SOLUTION INTRAVENOUS at 01:43

## 2024-03-10 RX ADMIN — IPRATROPIUM BROMIDE AND ALBUTEROL SULFATE 1 DOSE: .5; 3 SOLUTION RESPIRATORY (INHALATION) at 00:26

## 2024-03-10 ASSESSMENT — PAIN - FUNCTIONAL ASSESSMENT: PAIN_FUNCTIONAL_ASSESSMENT: NONE - DENIES PAIN

## 2024-03-10 NOTE — DISCHARGE INSTRUCTIONS
You are seen today in the emergency department for shortness of breath.  We believe that you had a COPD exacerbation.  Continue to take your azithromycin at home.  Also continue to take your steroids that were given to you previously.    Follow-up with your cancer doctor.  Let him know about today's visit to see if he wants you to do anything else.    Please return to the emergency department immediately if you have any symptoms of increasing shortness of breath, difficulty breathing or chest pain.

## 2024-03-10 NOTE — ED PROVIDER NOTES
ATTENDING NOTE:    I supervised and discussed the history, physical exam and the management of this patient with the resident. I reviewed the resident's note and agree with the documented findings and plan of care.  Please see my additional note.    I personally saw and examined the patient.  I have reviewed and agree with the resident's findings, including all diagnostic interpretations and treatment plans as written.  I was present for the key portion of any procedures performed and the inclusive time noted in any critical care statement.    Electronically verified by Vianney Dotson MD  03/11/24 0887    
bilaterally with no wheezing.  At this time, believe the patient is stable for discharge.  States that she is currently already on steroid treatment therefore did not prescribe any thing else.  Advised to continue her azithromycin for COPD exacerbation.  Advised to follow-up with her oncologist/pulmonologist regarding today's visit.  Patient is agreeable to plan.    Final diagnoses:   COPD exacerbation (HCC)   Dyspnea, unspecified type     Discharge Medication List as of 3/10/2024  4:30 AM            Condition: condition: stable  Dispo: Discharge to home    This transcription was electronically signed. It was dictated by use of voice recognition software and electronically transcribed. The transcription may contain errors not detected in proofreading.     
breathing treatment with improvement of symptoms.    Patient signed out to Dr. Rocio Shook MD           ED COURSE   ED Medications administered this visit (None if left blank):   Medications   albuterol (PROVENTIL) (2.5 MG/3ML) 0.083% nebulizer solution 2.5 mg (has no administration in time range)   ipratropium 0.5 mg-albuterol 2.5 mg (DUONEB) nebulizer solution 1 Dose (1 Dose Inhalation Given 3/10/24 0026)   methylPREDNISolone sodium succ (SOLU-MEDROL) 125 mg in sterile water 2 mL injection (125 mg IntraVENous Given 3/10/24 0051)   iopamidol (ISOVUE-370) 76 % injection 80 mL (80 mLs IntraVENous Given 3/10/24 0143)         ED Course as of 03/10/24 0316   Sun Mar 10, 2024   0307 58 F pmhx lung CA, here dx flu B, on tamiflu, here today for worsening sob. ?copd exac vs bacterial superinfection. Pending CTA chest, Ct abd d/u also abd pain with diarrhea. Pt currently on azithromycin.  [EL]      ED Course User Index  [EL] Philly Shook MD         PROCEDURES: (None if blank)  Procedures:     CRITICAL CARE:  None    MEDICATION CHANGES     New Prescriptions    No medications on file         FINAL DISPOSITION   Shared Decision-Making was performed, disposition discussed with the patient/family and questions answered.      Outpatient follow up (If applicable):  No follow-up provider specified.  Not applicable              FINAL DIAGNOSES:  Final diagnoses:   COPD exacerbation (HCC)   Dyspnea, unspecified type       Condition: condition: stable  Dispo: Patient signed out to Dr. Sayda Shook  DISPOSITION        This transcription was electronically signed. It was dictated by use of voice recognition software and electronically transcribed. The transcription may contain errors not detected in proofreading.

## 2024-03-10 NOTE — ED NOTES
ED nurse-to-nurse bedside report    Chief Complaint   Patient presents with    Shortness of Breath      LOC: alert and orientated to name, place, date  Vital signs   Vitals:    03/09/24 2324   BP: (!) 149/84   Pulse: 89   Resp: 15   Temp: 98 °F (36.7 °C)   TempSrc: Oral   SpO2: 100%      Pain:    Pain Interventions: mar  Pain Goal: 0  Oxygen: Yes    Current needs required 1-4 LNC   Telemetry: Yes  LDAs:   Peripheral IV 03/10/24 Right Antecubital (Active)   Site Assessment Clean, dry & intact 03/10/24 0016     Continuous Infusions:   Mobility: Independent  Flores Fall Risk Score:       3/27/2023     2:32 PM   Fall Risk   2 or more falls in past year? yes   Fall with injury in past year? no     Fall Interventions: socks call light   Report given to: leodan STANLEY

## 2024-03-10 NOTE — DISCHARGE INSTR - COC
Continuity of Care Form    Patient Name: Mary Lou Flores   :  1965  MRN:  [id number]    Admit date:  3/9/2024  Discharge date:  ***    Code Status Order: Prior   Advance Directives:     Admitting Physician:  No admitting provider for patient encounter.  PCP: Piyush Castellanos APRN - CNP    Discharging Nurse: ***  Discharging Hospital Unit/Room#:   Discharging Unit Phone Number: ***    Emergency Contact:   Extended Emergency Contact Information  Primary Emergency Contact: Dakota Flores  Address: [address] [place] [id number] [facility]  Home Phone: [phone]  Mobile Phone: [phone]  Relation: Spouse  Secondary Emergency Contact: Abdelrahman Flores  Home Phone: [phone]  Mobile Phone: [phone]  Relation: Child    Past Surgical History:  Past Surgical History:   Procedure Laterality Date    CARDIOVASCULAR STRESS TEST      CT NEEDLE BIOPSY LUNG PERCUTANEOUS  2022    CT NEEDLE BIOPSY LUNG PERCUTANEOUS 2022 [facility] CT SCAN    TRANSTHORACIC ECHOCARDIOGRAM      TRANSTHORACIC ECHOCARDIOGRAM      UPPER GASTROINTESTINAL ENDOSCOPY      [facility]    WISDOM TOOTH EXTRACTION         Immunization History:   Immunization History   Administered Date(s) Administered    Influenza, FLUCELVAX, (age 6 mo+), MDCK, PF, 0.5mL 2022, 2022    Pneumococcal, PPSV23, PNEUMOVAX 23, (age 2y+), SC/IM, 0.5mL 2021    TDaP, ADACEL (age 10y-64y), BOOSTRIX (age 10y+), IM, 0.5mL 2021       Active Problems:  Patient Active Problem List   Diagnosis Code    Hypertension, uncontrolled I10    Anxiety F41.9    Gastroesophageal reflux disease without esophagitis K21.9    Seasonal allergies J30.2    Bronchospasm J98.01    History of tobacco abuse Z87.891    Stage 3 severe COPD by GOLD classification ([facility]) J44.9    Allergic rhinitis J30.9    Nodule of upper lobe of left lung R91.1    Pulmonary emphysema ([facility]) J43.9    LYNN (obstructive sleep apnea) G47.33    COPD

## 2024-03-11 ENCOUNTER — APPOINTMENT (OUTPATIENT)
Dept: RADIATION ONCOLOGY | Age: 59
End: 2024-03-11
Payer: MEDICARE

## 2024-03-12 ENCOUNTER — HOSPITAL ENCOUNTER (OUTPATIENT)
Dept: RADIATION ONCOLOGY | Age: 59
Discharge: HOME | End: 2024-03-12
Payer: MEDICARE

## 2024-03-12 ENCOUNTER — TELEPHONE (OUTPATIENT)
Dept: FAMILY MEDICINE CLINIC | Age: 59
End: 2024-03-12

## 2024-03-12 VITALS
DIASTOLIC BLOOD PRESSURE: 68 MMHG | SYSTOLIC BLOOD PRESSURE: 128 MMHG | RESPIRATION RATE: 16 BRPM | BODY MASS INDEX: 34.87 KG/M2 | HEART RATE: 85 BPM | TEMPERATURE: 97.7 F | OXYGEN SATURATION: 96 % | WEIGHT: 190.7 LBS

## 2024-03-12 PROCEDURE — 77373 STRTCTC BDY RAD THER TX DLVR: CPT | Performed by: RADIOLOGY

## 2024-03-12 NOTE — PROGRESS NOTES
EPINEPHrine (EPIPEN 2-JERE) 0.3 MG/0.3ML SOAJ injection Inject 0.3 mLs into the skin once for 1 dose Use as directed for allergic reaction 0.3 mL 0    Incontinence Supply Disposable (BLADDER CONTROL PADS EX ABSORB) MISC Use up to 4 a day 120 each 3    Probiotic Product (PROBIOTIC ADVANCED PO) Take by mouth      Incontinence Supply Disposable (PROCARE ADULT BRIEFS LARGE) MISC Use 2 a day 60 each 5     No current facility-administered medications for this encounter.       PHYSICAL EXAM:       ECO - Symptomatic, <50% in bed during the day (Ambulatory and capable of all self care but unable to carry out any work activities. Up and about more than 50% of waking hours)     General: NAD, AO x 3, Mentation is clear with appropriate affect.  HEENT: Normocephalic, atraumatic  Thorax:  Unlabored on room air   Abdomen:  Non-distended  Neuro:  Cranial nerves grossly intact  Skin - treatment portal: SEE above.       Chemotherapy Update: None    Treatment Imaging: CBCT and All imaging reviewed and approved by Dr. Cavanaugh/    ASSESSMENT: No significant radiation side effects. Responding appropriately to symptomatic management. However, pt reports that she was diagnosed with Flu B last week, since started on Tamiflu, antibiotics, and steroids; she was evaluated in the ED and CTA chest 3/10/24 report called no pneumonia or PE    New medications, diagnostic results: Not applicable    PLAN: Again reviewed potential side effects of radiation for the patient's treatment.    Continue local/topical care. Pt to call PCP today to discuss further steroid and antibiotic management   Patient instructed to let us know should her respiratory symptoms worsen.    Follow up in our office in 1 month or sooner if clinically indicated.

## 2024-03-13 ENCOUNTER — APPOINTMENT (OUTPATIENT)
Dept: RADIATION ONCOLOGY | Age: 59
End: 2024-03-13
Payer: MEDICARE

## 2024-03-13 NOTE — PROGRESS NOTES
Heart Attack Paternal Grandfather      CURRENT MEDICATIONS:  Current Outpatient Medications   Medication Sig Dispense Refill    pantoprazole (PROTONIX) 40 MG tablet Take 1 tablet by mouth daily      guaiFENesin 400 MG tablet Take 3 tablets by mouth daily And can take 1 in the morning as well      calcium carbonate (TUMS) 500 MG chewable tablet Take 1 tablet by mouth as needed for Heartburn      fluticasone (FLONASE) 50 MCG/ACT nasal spray 2 SPRAYS BY NASAL ROUTE DAILY 16 g 1    sodium chloride (BRONCHO SALINE) 0.9 % inhaler solution Use 3 mL by nebulization three times per day as needed for throat/airway secretions. 270 mL 11    lisinopril (PRINIVIL;ZESTRIL) 30 MG tablet TAKE 1 TABLET BY MOUTH DAILY 90 tablet 3    Budeson-Glycopyrrol-Formoterol (BREZTRI AEROSPHERE) 160-9-4.8 MCG/ACT AERO Inhale 2 puffs into the lungs 2 times daily Rinse mouth with water, gargle, and spit after use. 1 each 11    VENTOLIN  (90 Base) MCG/ACT inhaler Inhale 2 puffs into the lungs every 6 hours as needed for Wheezing or Shortness of Breath 18 g 11    albuterol (PROVENTIL) (2.5 MG/3ML) 0.083% nebulizer solution Take 3 mLs by nebulization every 6 hours as needed for Wheezing or Shortness of Breath 120 each 11    bumetanide (BUMEX) 1 MG tablet TAKE 1 TABLET BY MOUTH DAILY 90 tablet 1    ondansetron (ZOFRAN) 8 MG tablet Take 1 tablet by mouth every 8 hours as needed for Nausea or Vomiting 30 tablet 5    carvedilol (COREG) 25 MG tablet TAKE 1 TABLET BY MOUTH 2 TIMES DAILY (WITH MEALS) 180 tablet 3    ACETAMINOPHEN EXTRA STRENGTH 500 MG tablet TAKE 1-2 TABLETS BY MOUTH 4 TIMES DAILY AS NEEDED FOR PAIN 120 tablet 5    cloNIDine (CATAPRES) 0.1 MG tablet TAKE 1 TABLET BY MOUTH DAILY 90 tablet 3    Misc. Devices (MATTRESS PAD) MISC Dry pressure mattress 1 each 0    ALPRAZolam (XANAX) 0.25 MG tablet       Incontinence Supply Disposable (BLADDER CONTROL PADS EX ABSORB) MISC Use up to 4 a day 120 each 3    Incontinence Supply Disposable

## 2024-03-15 ENCOUNTER — APPOINTMENT (OUTPATIENT)
Dept: RADIATION ONCOLOGY | Age: 59
End: 2024-03-15
Payer: MEDICARE

## 2024-03-15 ENCOUNTER — CARE COORDINATION (OUTPATIENT)
Dept: CARE COORDINATION | Age: 59
End: 2024-03-15

## 2024-03-15 NOTE — CARE COORDINATION
Initial attempt to reach patient for Care Coordination enrollment s/p recent list referral for assistance with the management of her CHF, COPD, hypertension, and healthcare needs.  Patient was not available at the time of ACM call and generic voicemail message was left asking patient to please return call to [facility] direct number.  Introduction My Chart message was also sent to patient.  Will continue to work to f/u with patient in the future.

## 2024-03-18 ENCOUNTER — APPOINTMENT (OUTPATIENT)
Dept: RADIATION ONCOLOGY | Age: 59
End: 2024-03-18
Payer: MEDICARE

## 2024-03-19 ENCOUNTER — APPOINTMENT (OUTPATIENT)
Dept: RADIATION ONCOLOGY | Age: 59
End: 2024-03-19
Payer: MEDICARE

## 2024-03-19 ENCOUNTER — CARE COORDINATION (OUTPATIENT)
Dept: CARE COORDINATION | Age: 59
End: 2024-03-19

## 2024-03-19 RX ORDER — GUAIFENESIN 400 MG/1
1200 TABLET ORAL DAILY
COMMUNITY

## 2024-03-19 RX ORDER — CALCIUM CARBONATE 500 MG/1
1 TABLET, CHEWABLE ORAL PRN
COMMUNITY

## 2024-03-19 SDOH — SOCIAL STABILITY: SOCIAL NETWORK: ARE YOU MARRIED, WIDOWED, DIVORCED, SEPARATED, NEVER MARRIED, OR LIVING WITH A PARTNER?: MARRIED

## 2024-03-19 SDOH — HEALTH STABILITY: PHYSICAL HEALTH: ON AVERAGE, HOW MANY MINUTES DO YOU ENGAGE IN EXERCISE AT THIS LEVEL?: 0 MIN

## 2024-03-19 SDOH — ECONOMIC STABILITY: FOOD INSECURITY: WITHIN THE PAST 12 MONTHS, YOU WORRIED THAT YOUR FOOD WOULD RUN OUT BEFORE YOU GOT MONEY TO BUY MORE.: SOMETIMES TRUE

## 2024-03-19 SDOH — ECONOMIC STABILITY: FOOD INSECURITY: WITHIN THE PAST 12 MONTHS, THE FOOD YOU BOUGHT JUST DIDN'T LAST AND YOU DIDN'T HAVE MONEY TO GET MORE.: SOMETIMES TRUE

## 2024-03-19 SDOH — ECONOMIC STABILITY: TRANSPORTATION INSECURITY
IN THE PAST 12 MONTHS, HAS THE LACK OF TRANSPORTATION KEPT YOU FROM MEDICAL APPOINTMENTS OR FROM GETTING MEDICATIONS?: NO

## 2024-03-19 SDOH — ECONOMIC STABILITY: INCOME INSECURITY: HOW HARD IS IT FOR YOU TO PAY FOR THE VERY BASICS LIKE FOOD, HOUSING, MEDICAL CARE, AND HEATING?: SOMEWHAT HARD

## 2024-03-19 SDOH — SOCIAL STABILITY: SOCIAL NETWORK: HOW OFTEN DO YOU ATTENT MEETINGS OF THE CLUB OR ORGANIZATION YOU BELONG TO?: NEVER

## 2024-03-19 SDOH — ECONOMIC STABILITY: INCOME INSECURITY: IN THE LAST 12 MONTHS, WAS THERE A TIME WHEN YOU WERE NOT ABLE TO PAY THE MORTGAGE OR RENT ON TIME?: NO

## 2024-03-19 SDOH — HEALTH STABILITY: MENTAL HEALTH
STRESS IS WHEN SOMEONE FEELS TENSE, NERVOUS, ANXIOUS, OR CAN'T SLEEP AT NIGHT BECAUSE THEIR MIND IS TROUBLED. HOW STRESSED ARE YOU?: TO SOME EXTENT

## 2024-03-19 SDOH — SOCIAL STABILITY: SOCIAL NETWORK
DO YOU BELONG TO ANY CLUBS OR ORGANIZATIONS SUCH AS CHURCH GROUPS UNIONS, FRATERNAL OR ATHLETIC GROUPS, OR SCHOOL GROUPS?: NO

## 2024-03-19 SDOH — ECONOMIC STABILITY: TRANSPORTATION INSECURITY
IN THE PAST 12 MONTHS, HAS LACK OF TRANSPORTATION KEPT YOU FROM MEETINGS, WORK, OR FROM GETTING THINGS NEEDED FOR DAILY LIVING?: NO

## 2024-03-19 SDOH — SOCIAL STABILITY: SOCIAL NETWORK: HOW OFTEN DO YOU ATTEND CHURCH OR RELIGIOUS SERVICES?: PATIENT DECLINED

## 2024-03-19 SDOH — HEALTH STABILITY: PHYSICAL HEALTH: ON AVERAGE, HOW MANY DAYS PER WEEK DO YOU ENGAGE IN MODERATE TO STRENUOUS EXERCISE (LIKE A BRISK WALK)?: 0 DAYS

## 2024-03-19 SDOH — ECONOMIC STABILITY: HOUSING INSECURITY: IN THE LAST 12 MONTHS, HOW MANY PLACES HAVE YOU LIVED?: 1

## 2024-03-19 SDOH — SOCIAL STABILITY: SOCIAL NETWORK
IN A TYPICAL WEEK, HOW MANY TIMES DO YOU TALK ON THE PHONE WITH FAMILY, FRIENDS, OR NEIGHBORS?: MORE THAN THREE TIMES A WEEK

## 2024-03-19 SDOH — SOCIAL STABILITY: SOCIAL NETWORK: HOW OFTEN DO YOU GET TOGETHER WITH FRIENDS OR RELATIVES?: MORE THAN THREE TIMES A WEEK

## 2024-03-19 ASSESSMENT — ENCOUNTER SYMPTOMS: DYSPNEA ASSOCIATED WITH: EXERTION

## 2024-03-19 NOTE — CARE COORDINATION
I agree with the Care Coordinator's Plan of Care    
Second attempt to reach patient for Care Coordination enrollment s/p recent list referral for assistance with the management of her CHF, COPD, hypertension, and healthcare needs. Patient was not available at the time of AC call and generic voicemail message was left asking patient to please return call to [facility] direct number.  Will continue to work to f/u with patient in the future.   
(including domestic violence, insecure housing, neighbor harassment)?: Safety/stability questionable   How do daily activities impact on the patient's well-being? (include current or anticipated unemployment, work, caregiving, access to transportation or other): Contributes to low mood or stress at times   How would you rate their social network (family, work, friends)?: Adequate participation with social networks   How would you rate their financial resources (including ability to afford all required medical care)?: Financially secure, some resource challenges   How wells does the patient now understand their health and well-being (symptoms, signs or risk factors) and what they need to do to manage their health?: Reasonable to good understanding but do not feel able to engage with advice at this time   How well do you think your patient can engage in healthcare discussions? (Barriers include language, deafness, aphasia, alcohol or drug problems, learning difficulties, concentration): Adequate communication, with or without minor barriers   Do other services need to be involved to help this patient?: Other care/services in place and adequate   Are current services involved with this patient well-coordinated? (Include coordination with other services you are now recommendation): Required care/services in place and adequately coordinated   Suggested Interventions and Community Resources  Fall Risk Prevention: In Process (Comment: March 2024) Disease Specific Clinic: Not Started   Home Health Services: Declined (Comment: Does not want to be homebound - March 2024)   Meals on Wheels: Declined (Comment: Recently stopped services & declines - March 2024)   Other Services or Interventions: Follows with [facility], [facility], and [facility]   Medication Assistance Program: Declined (Comment: Denied any need for assistance at this time - March 2024)   Medi Set or Pill Pack: Completed (Comment: Dec. 2024)   Pharmacist:

## 2024-03-21 ENCOUNTER — OFFICE VISIT (OUTPATIENT)
Dept: PULMONOLOGY | Age: 59
End: 2024-03-21
Payer: MEDICARE

## 2024-03-21 VITALS
TEMPERATURE: 98.2 F | HEART RATE: 89 BPM | WEIGHT: 192.2 LBS | OXYGEN SATURATION: 95 % | SYSTOLIC BLOOD PRESSURE: 116 MMHG | HEIGHT: 62 IN | BODY MASS INDEX: 35.37 KG/M2 | DIASTOLIC BLOOD PRESSURE: 64 MMHG

## 2024-03-21 DIAGNOSIS — F17.210 CIGARETTE NICOTINE DEPENDENCE, UNCOMPLICATED: ICD-10-CM

## 2024-03-21 DIAGNOSIS — C34.92 ADENOCARCINOMA OF LEFT LUNG (HCC): ICD-10-CM

## 2024-03-21 DIAGNOSIS — J96.11 CHRONIC RESPIRATORY FAILURE WITH HYPOXIA (HCC): ICD-10-CM

## 2024-03-21 DIAGNOSIS — J44.9 STAGE 3 SEVERE COPD BY GOLD CLASSIFICATION (HCC): Primary | ICD-10-CM

## 2024-03-21 PROBLEM — C80.1 CANCER (HCC): Status: ACTIVE | Noted: 2024-03-21

## 2024-03-21 PROCEDURE — 99214 OFFICE O/P EST MOD 30 MIN: CPT

## 2024-03-21 PROCEDURE — 3074F SYST BP LT 130 MM HG: CPT

## 2024-03-21 PROCEDURE — 3078F DIAST BP <80 MM HG: CPT

## 2024-03-21 RX ORDER — PANTOPRAZOLE SODIUM 40 MG/1
40 TABLET, DELAYED RELEASE ORAL DAILY
COMMUNITY

## 2024-03-21 ASSESSMENT — ENCOUNTER SYMPTOMS
SINUS PRESSURE: 0
SHORTNESS OF BREATH: 1
COUGH: 1
WHEEZING: 0
SINUS PAIN: 0
RHINORRHEA: 0
CHEST TIGHTNESS: 1

## 2024-03-21 NOTE — PATIENT INSTRUCTIONS
Learning About Benefits From Quitting Smoking  How does quitting smoking make you healthier?     If you're thinking about quitting smoking, you may have a few reasons to be smoke-free. Your health may be one of them.  When you quit smoking, you lower your risks for cancer, lung disease, heart attack, stroke, blood vessel disease, and blindness from macular degeneration.  When you're smoke-free, you get sick less often, and you heal faster. You are less likely to get colds, flu, bronchitis, and pneumonia.  As a nonsmoker, you may find that your mood is better and you are less stressed.  When and how will you feel healthier?  Quitting has real health benefits that start from day 1 of being smoke-free. And the longer you stay smoke-free, the healthier you get and the better you feel.  The first hours  After just 20 minutes, your blood pressure and heart rate go down. That means there's less stress on your heart and blood vessels.  Within 12 hours, the level of carbon monoxide in your blood drops back to normal. That makes room for more oxygen. With more oxygen in your body, you may notice that you have more energy than when you smoked.  After 2 weeks  Your lungs start to work better.  Your risk of heart attack starts to drop.  After 1 month  When your lungs are clear, you cough less and breathe deeper, so it's easier to be active.  Your sense of taste and smell return. That means you can enjoy food more than you have since you started smoking.  Over the years  Over the years, your risks of heart disease, heart attack, and stroke are lower.  After 10 years, your risk of dying from lung cancer is cut by about half. And your risk for many other types of cancer is lower too.  How would quitting help others in your life?  When you quit smoking, you improve the health of everyone who now breathes in your smoke.  Their heart, lung, and cancer risks drop, much like yours.  They are sick less. For babies and small children,

## 2024-03-27 ENCOUNTER — OFFICE VISIT (OUTPATIENT)
Dept: FAMILY MEDICINE CLINIC | Age: 59
End: 2024-03-27

## 2024-03-27 VITALS
RESPIRATION RATE: 16 BRPM | WEIGHT: 191.8 LBS | HEART RATE: 99 BPM | TEMPERATURE: 97.3 F | HEIGHT: 62 IN | SYSTOLIC BLOOD PRESSURE: 122 MMHG | BODY MASS INDEX: 35.3 KG/M2 | OXYGEN SATURATION: 97 % | DIASTOLIC BLOOD PRESSURE: 70 MMHG

## 2024-03-27 DIAGNOSIS — C34.90 MALIGNANT NEOPLASM OF LUNG, UNSPECIFIED LATERALITY, UNSPECIFIED PART OF LUNG (HCC): ICD-10-CM

## 2024-03-27 DIAGNOSIS — J44.1 COPD EXACERBATION (HCC): ICD-10-CM

## 2024-03-27 DIAGNOSIS — K21.9 GASTROESOPHAGEAL REFLUX DISEASE WITHOUT ESOPHAGITIS: ICD-10-CM

## 2024-03-27 DIAGNOSIS — I50.9 ACUTE CONGESTIVE HEART FAILURE, UNSPECIFIED HEART FAILURE TYPE (HCC): ICD-10-CM

## 2024-03-27 DIAGNOSIS — J44.9 MODERATE COPD (CHRONIC OBSTRUCTIVE PULMONARY DISEASE) (HCC): Primary | ICD-10-CM

## 2024-03-27 DIAGNOSIS — Z23 NEED FOR PNEUMOCOCCAL 20-VALENT CONJUGATE VACCINATION: ICD-10-CM

## 2024-03-27 DIAGNOSIS — E66.01 SEVERE OBESITY (BMI 35.0-39.9) WITH COMORBIDITY (HCC): ICD-10-CM

## 2024-03-27 RX ORDER — EPINEPHRINE 0.3 MG/.3ML
0.3 INJECTION SUBCUTANEOUS ONCE
Qty: 0.3 ML | Refills: 0 | Status: SHIPPED | OUTPATIENT
Start: 2024-03-27 | End: 2024-03-27

## 2024-03-27 RX ORDER — ESOMEPRAZOLE MAGNESIUM 40 MG/1
40 CAPSULE, DELAYED RELEASE ORAL
Qty: 30 CAPSULE | Refills: 5 | Status: SHIPPED | OUTPATIENT
Start: 2024-03-27

## 2024-03-27 RX ORDER — AZITHROMYCIN 250 MG/1
TABLET, FILM COATED ORAL
Qty: 1 PACKET | Refills: 0 | Status: SHIPPED | OUTPATIENT
Start: 2024-03-27

## 2024-03-27 RX ORDER — PREDNISONE 20 MG/1
40 TABLET ORAL DAILY
Qty: 10 TABLET | Refills: 0 | Status: SHIPPED | OUTPATIENT
Start: 2024-03-27 | End: 2024-04-01

## 2024-03-27 ASSESSMENT — ENCOUNTER SYMPTOMS
EYES NEGATIVE: 1
SHORTNESS OF BREATH: 1
GASTROINTESTINAL NEGATIVE: 1

## 2024-03-27 ASSESSMENT — PATIENT HEALTH QUESTIONNAIRE - PHQ9
SUM OF ALL RESPONSES TO PHQ QUESTIONS 1-9: 2
SUM OF ALL RESPONSES TO PHQ QUESTIONS 1-9: 2
1. LITTLE INTEREST OR PLEASURE IN DOING THINGS: SEVERAL DAYS
2. FEELING DOWN, DEPRESSED OR HOPELESS: SEVERAL DAYS
SUM OF ALL RESPONSES TO PHQ QUESTIONS 1-9: 2
SUM OF ALL RESPONSES TO PHQ9 QUESTIONS 1 & 2: 2
SUM OF ALL RESPONSES TO PHQ QUESTIONS 1-9: 2

## 2024-03-27 NOTE — PROGRESS NOTES
Immunizations Administered       Name Date Dose Route    Pneumococcal, PCV20, PREVNAR 20, (age 6w+), IM, 0.5mL 3/27/2024 0.5 mL Intramuscular    Site: Deltoid- Left    Lot: [id number]    NDC: 9994-7453-79            VIS GIVEN.  CONSENT SIGNED  PATIENT TOLERATED WELL.         1.   Are you sick today?  No  2.   Do you have allergies to medication,food, or any vaccine?  Yes, penicillin           Allergies:  Pcn [penicillins], Seasonal, and Wasp venom    3.   Have you ever had a serious reaction after receiving a vaccination?  No  4.   Do you have a long-term health problem with heart disease, lung disease, asthma, kidney disease,        metabolic disease (e.g., diabetes, anemia, or other blood disorder?  Yes  5.   Do you have cancer, leukemia, AIDS, or any other immune system problem?  Yes  6.   Have you had a seizure, brain, or other nervous system problem?  No          Medical History:    Past Medical History:   Diagnosis Date    Anxiety     Arthritis     Cancer (HCC)     CHF (congestive heart failure) (HCC)     COPD (chronic obstructive pulmonary disease) (HCC)     Depression     Enlargement, spleen     Fatty liver     GERD (gastroesophageal reflux disease)     Headache     Hepatitis A     Hypertension     saw Dr Duff in the past    Ovarian cyst     Oxygen dependent     o2 2liters when walking and at night-sees Yasmani    Pneumonia     Sleep apnea        7.   Do you take cortisone, prednisone, other steroids, or anticancer drugs, or have you had radiation        Treatments?  Yes          Current Medications:    Current Outpatient Medications   Medication Sig Dispense Refill    EPINEPHrine (EPIPEN 2-JERE) 0.3 MG/0.3ML SOAJ injection Inject 0.3 mLs into the skin once for 1 dose Use as directed for allergic reaction 0.3 mL 0    predniSONE (DELTASONE) 20 MG tablet Take 2 tablets by mouth daily for 5 days 10 tablet 0    azithromycin (ZITHROMAX) 250 MG tablet Take 2 tabs (500 mg) on Day 1, and take 1 tab (250 mg) on days 2

## 2024-03-27 NOTE — PROGRESS NOTES
Mary Lou Flores is a 58 y.o. female whopresents today for :  Chief Complaint   Patient presents with    6 Month Follow-Up     Vitals:    03/27/24 1345   BP: 122/70   Pulse: 99   Resp: 16   Temp: 97.3 °F (36.3 °C)   SpO2: 97%       HPI:     HPI patient is here for routine checkup.  Since last being in she was diagnosed with lung cancer.  She was treated with a round of radiation therapy.  This was complicated by influenza and having a COPD exacerbation.  She would like to have another round of Z-Abran and prednisone on hand in case of flare.  We did discuss colon cancer testing.  She would like to have a walker because currently she is not strong enough to carry her oxygen when she is out moving about.  She also asked about changing her Protonix that she does not feel it as effective as it used to be.  Mary Lou Flores was evaluated today and a DME order was entered for a wheeled walker because she requires this to successfully complete daily living tasks of eating, bathing, toileting, personal cares, ambulating, and grooming.  A wheeled walker is necessary due to the patient's unsteady gait, upper body weakness, and inability to  an ambulation device; and she can ambulate only by pushing a walker instead of a lesser assistive device such as a cane, crutch, or standard walker.  The need for this equipment was discussed with the patient and she understands and is in agreement.    Patient Active Problem List   Diagnosis    Hypertension, uncontrolled    Anxiety    Gastroesophageal reflux disease without esophagitis    Seasonal allergies    Bronchospasm    History of tobacco abuse    Stage 3 severe COPD by GOLD classification (HCC)    Allergic rhinitis    Nodule of upper lobe of left lung    Pulmonary emphysema (HCC)    LYNN (obstructive sleep apnea)    COPD exacerbation (HCC)    Current smoker    Abnormal PET scan, lung    Oxygen dependent    Wheelchair dependence    Mucopurulent chronic bronchitis (HCC)    Acute

## 2024-03-28 ENCOUNTER — CARE COORDINATION (OUTPATIENT)
Dept: CARE COORDINATION | Age: 59
End: 2024-03-28

## 2024-03-28 ASSESSMENT — ENCOUNTER SYMPTOMS: DYSPNEA ASSOCIATED WITH: EXERTION

## 2024-03-28 NOTE — CARE COORDINATION
new/change in symptoms.  ACM also educated patient on the use of emergency antibiotic/steroid treatment for COPD exacerbation as ordered by PCP.  Patient was educated to take medications as directed and to f/u with any ongoing symptoms or concerns.  Patient verbalized understanding.  Patient denied any other questions, concerns, or needs and she was encouraged to call with any that may develop.   ACM f/u with Fredi at [facility] re: walker orders.  Fredi requested copy of office note, patient insurance info, and demographic sheet be faxed to their office - [phone] - PCP office updated.          Actions:   - Educated on COPD/CHF zone information   - Educated on signs/symptoms to report and importance of early symptom recognition and follow up  - Educated on the availability of same day appointments and after hours resources   - Educated on RPM program - Pt Declines   - Monitored for questions s/p recent Pulmonary and PCP appointments   - Encouraged patient to f/u re: inogen device as recommended by pulmonary   - Educated on recent medication changes   - Educated on importance of taking medications as directed and calling with any medication questions or concerns   - Reviewed COPD exacerbation medication instructions and when to f/u   - F/u with [facility] re: walker orders  - Monitored for additional needs          Plan of Care:   - Continue with Care Coordination f/u calls for assistance with the management of her CHF, COPD, hypertension, and healthcare needs   - Complete CHF education - Ongoing    - Complete COPD education - Ongoing  - Educate on signs/symptoms to report and importance of early symptom recognition and follow up - Ongoing  - Educate on the need to call for earlier appointment with any new/change/worsening of symptoms - Ongoing  - Educate on the availability of same day appointments and after hours resources - Completed   - Educate on Remote Patient Monitoring program - Pt Declined  -

## 2024-04-04 ENCOUNTER — CARE COORDINATION (OUTPATIENT)
Dept: CARE COORDINATION | Age: 59
End: 2024-04-04

## 2024-04-04 DIAGNOSIS — K21.9 GASTROESOPHAGEAL REFLUX DISEASE WITHOUT ESOPHAGITIS: ICD-10-CM

## 2024-04-04 RX ORDER — ESOMEPRAZOLE MAGNESIUM 40 MG/1
40 CAPSULE, DELAYED RELEASE ORAL 2 TIMES DAILY
Qty: 60 CAPSULE | Refills: 5 | Status: SHIPPED | OUTPATIENT
Start: 2024-04-04

## 2024-04-04 ASSESSMENT — ENCOUNTER SYMPTOMS: DYSPNEA ASSOCIATED WITH: EXERTION

## 2024-04-05 NOTE — CARE COORDINATION
Patient called and updated of PCP recommendations.  Patient is agreeable to try Nexium BID and uses [facility] pharmacy in [place].  Patient denied any other questions, concerns, or needs at at this time.   
IZZY   9/17/2024  2:00 PM Reyna Garrett, APRN - CNP [facility]   9/26/2024  1:15 PM Piyush Castellanos APRN - CNP [place] [facility]   ,   Congestive Heart Failure Assessment       No patient-reported symptoms      Symptoms:  CHF associated dyspnea on exertion: Pos      Symptom course: no change  Weight trend: stable  Salt intake watch compared to last visit: stable     ,   COPD Assessment    Does the patient understand envrionmental exposure?: Yes  Is the patient able to verbalize Rescue vs. Long Acting medications?: Yes  Does the patient have a nebulizer?: Yes  Does the patient use a space with inhaled medications?: Yes     No patient-reported symptoms         Symptoms:     Symptom course: stable  Breathlessness: exertion  Increase use of rapid acting/rescue inhaled medications?: No  Change in chronic cough?: No/At Baseline  Change in sputum?: No/At Baseline     ,   Hypertension - Encounter Level    Symptom course: stable     ,   General Assessment    Do you have any symptoms that are causing concern?: Yes  Progression since Onset: Gradually Worsening  Reported Symptoms: Other (Comment: GERD like symptoms)     , and No linked episodes

## 2024-04-08 ENCOUNTER — CARE COORDINATION (OUTPATIENT)
Dept: CARE COORDINATION | Age: 59
End: 2024-04-08

## 2024-04-08 NOTE — CARE COORDINATION
Attempted to reach patient for continued Care Coordination follow up and education.  Patient was unavailable at the time of my call, and a generic voicemail message was left asking patient to return my call at [phone].

## 2024-04-09 ENCOUNTER — CARE COORDINATION (OUTPATIENT)
Dept: CARE COORDINATION | Age: 59
End: 2024-04-09

## 2024-04-09 NOTE — CARE COORDINATION
JAMILA called pt to follow up with resources mailed to her on 3/25. Pt reported she did receive. Pt in need of help with electric bill, interested in [employer]. Provided pt with the number to [employer] and encouraged her to call and ask for an application. Pt shared about her walker that ins. Will pay for but will not \"pay for the brakes.\" Pt will talk to her Carestar XOCHITL Nichols regarding walker. Inquired if pt is in need of anything else and pt stated, \"no not now.\" Encouraged pt to call with any future needs or concerns. Pt voiced understanding. Active listening provided.

## 2024-04-10 DIAGNOSIS — J30.9 ALLERGIC RHINITIS, UNSPECIFIED SEASONALITY, UNSPECIFIED TRIGGER: ICD-10-CM

## 2024-04-10 RX ORDER — FLUTICASONE PROPIONATE 50 MCG
2 SPRAY, SUSPENSION (ML) NASAL DAILY
Qty: 16 G | Refills: 1 | Status: SHIPPED | OUTPATIENT
Start: 2024-04-10 | End: 2025-04-10

## 2024-04-10 NOTE — TELEPHONE ENCOUNTER
Last visit- 3/27/2024  Next visit- 2024    Requested Prescriptions     Pending Prescriptions Disp Refills    fluticasone (FLONASE) 50 MCG/ACT nasal spray [Pharmacy Med Name: FLUTICASONE PROP 50 MCG SP 50 MCKAYLA] 16 g 1     Si SPRAYS BY NASAL ROUTE DAILY

## 2024-04-17 ENCOUNTER — CARE COORDINATION (OUTPATIENT)
Dept: CARE COORDINATION | Age: 59
End: 2024-04-17

## 2024-04-17 ASSESSMENT — ENCOUNTER SYMPTOMS: DYSPNEA ASSOCIATED WITH: EXERTION

## 2024-04-17 NOTE — CARE COORDINATION
Provider: No  Suggested Interventions and Community Resources  Fall Risk Prevention: Completed (Comment: March 2024)  Disease Specific Clinic: Not Started  Home Health Services: Declined (Comment: Does not want to be homebound - March 2024)  Meals on Wheels: Declined (Comment: Recently stopped services & declines - March 2024)  Medication Assistance Program: Declined (Comment: Denied any need for assistance at this time - March 2024)  Medi Set or Pill Pack: Completed (Comment: Dec. 2024)  Pharmacist: Declined (Comment: March 2024)  Physical Therapy: Declined (Comment: Discussed with pt.  Declines at this time - March 2024)  Pulmonary Rehab: Not Started (Comment: Pt is interested but not at present time - March 2024)  Registered Dietician: Not Started  Smoking Cessation: Declined (Comment: Educated on available resources - March 2024)  Social Work: Completed (Comment: Referral placed for resource needs - March 2024)  Specialty Services Referral: Completed (Comment: current with [facility] program -  [place] [phone] - March 2024)  Other Services: Declined (Comment: Remote Patient Monitoring program - March 2024)  Transportation Support: Declined (Comment: Drives locally, family helps, and is aware of medicaid/COA resources - March 2024)  Zone Management Tools: Completed (Comment: CHF and COPD - March 2024)  Other Services or Interventions: Follows with [facility], [facility], and [facility]          Goals Addressed                   This Visit's Progress       Care Coordination     Conditions and Symptoms   Improving     I will schedule office visits, as directed by my provider.  I will keep my appointment or reschedule if I have to cancel.  I will notify my provider of any barriers to my plan of care.  I will follow my Zone Management tool to seek urgent or emergent care.  I will notify my provider of any symptoms that indicate a worsening of my condition.    Barriers: overwhelmed by complexity of regimen,

## 2024-04-19 ENCOUNTER — HOSPITAL ENCOUNTER (OUTPATIENT)
Dept: RADIATION ONCOLOGY | Age: 59
Discharge: HOME | End: 2024-04-19
Payer: MEDICARE

## 2024-04-19 VITALS
HEART RATE: 88 BPM | DIASTOLIC BLOOD PRESSURE: 65 MMHG | OXYGEN SATURATION: 94 % | TEMPERATURE: 97.6 F | RESPIRATION RATE: 18 BRPM | SYSTOLIC BLOOD PRESSURE: 137 MMHG | WEIGHT: 194 LBS | BODY MASS INDEX: 35.47 KG/M2

## 2024-04-19 DIAGNOSIS — C34.12 PRIMARY MALIGNANT NEOPLASM OF LEFT UPPER LOBE OF LUNG (HCC): ICD-10-CM

## 2024-04-19 DIAGNOSIS — R91.1 NODULE OF UPPER LOBE OF LEFT LUNG: Primary | ICD-10-CM

## 2024-04-19 PROCEDURE — 99212 OFFICE O/P EST SF 10 MIN: CPT

## 2024-04-19 ASSESSMENT — ENCOUNTER SYMPTOMS
BACK PAIN: 1
CHEST TIGHTNESS: 0
COUGH: 1
TROUBLE SWALLOWING: 0
SHORTNESS OF BREATH: 1
BLOOD IN STOOL: 0
NAUSEA: 0
SORE THROAT: 0
ABDOMINAL PAIN: 1
APNEA: 0
VOMITING: 0
WHEEZING: 1

## 2024-04-19 NOTE — PROGRESS NOTES
calcium carbonate (TUMS) 500 MG chewable tablet Take 1 tablet by mouth as needed for Heartburn      sodium chloride (BRONCHO SALINE) 0.9 % inhaler solution Use 3 mL by nebulization three times per day as needed for throat/airway secretions. 270 mL 11    lisinopril (PRINIVIL;ZESTRIL) 30 MG tablet TAKE 1 TABLET BY MOUTH DAILY 90 tablet 3    Budeson-Glycopyrrol-Formoterol (BREZTRI AEROSPHERE) 160-9-4.8 MCG/ACT AERO Inhale 2 puffs into the lungs 2 times daily Rinse mouth with water, gargle, and spit after use. 1 each 11    VENTOLIN  (90 Base) MCG/ACT inhaler Inhale 2 puffs into the lungs every 6 hours as needed for Wheezing or Shortness of Breath 18 g 11    albuterol (PROVENTIL) (2.5 MG/3ML) 0.083% nebulizer solution Take 3 mLs by nebulization every 6 hours as needed for Wheezing or Shortness of Breath 120 each 11    bumetanide (BUMEX) 1 MG tablet TAKE 1 TABLET BY MOUTH DAILY 90 tablet 1    atorvastatin (LIPITOR) 20 MG tablet Take 1 tablet by mouth daily (Patient not taking: Reported on 3/19/2024) 30 tablet 11    ondansetron (ZOFRAN) 8 MG tablet Take 1 tablet by mouth every 8 hours as needed for Nausea or Vomiting 30 tablet 5    carvedilol (COREG) 25 MG tablet TAKE 1 TABLET BY MOUTH 2 TIMES DAILY (WITH MEALS) 180 tablet 3    ACETAMINOPHEN EXTRA STRENGTH 500 MG tablet TAKE 1-2 TABLETS BY MOUTH 4 TIMES DAILY AS NEEDED FOR PAIN 120 tablet 5    cloNIDine (CATAPRES) 0.1 MG tablet TAKE 1 TABLET BY MOUTH DAILY 90 tablet 3    Misc. Devices (MATTRESS PAD) MISC Dry pressure mattress 1 each 0    ALPRAZolam (XANAX) 0.25 MG tablet       Incontinence Supply Disposable (BLADDER CONTROL PADS EX ABSORB) MISC Use up to 4 a day 120 each 3    Incontinence Supply Disposable (PROCARE ADULT BRIEFS LARGE) MISC Use 2 a day 60 each 5     No current facility-administered medications for this encounter.       All portions of this note that were completed during the initial nursing assessment were discussed and reviewed in detail with

## 2024-04-22 RX ORDER — BUMETANIDE 1 MG/1
1 TABLET ORAL DAILY
Qty: 90 TABLET | Refills: 1 | Status: SHIPPED | OUTPATIENT
Start: 2024-04-22 | End: 2024-04-25

## 2024-04-25 DIAGNOSIS — I10 ESSENTIAL HYPERTENSION: ICD-10-CM

## 2024-04-25 RX ORDER — BUMETANIDE 1 MG/1
1 TABLET ORAL DAILY
Qty: 90 TABLET | Refills: 0 | Status: SHIPPED | OUTPATIENT
Start: 2024-04-25

## 2024-04-25 RX ORDER — CARVEDILOL 25 MG/1
25 TABLET ORAL 2 TIMES DAILY WITH MEALS
Qty: 180 TABLET | Refills: 0 | Status: SHIPPED | OUTPATIENT
Start: 2024-04-25

## 2024-04-30 ENCOUNTER — CARE COORDINATION (OUTPATIENT)
Dept: CARE COORDINATION | Age: 59
End: 2024-04-30

## 2024-04-30 ASSESSMENT — ENCOUNTER SYMPTOMS: DYSPNEA ASSOCIATED WITH: EXERTION

## 2024-04-30 NOTE — ACP (ADVANCE CARE PLANNING)
Advance Care Planning   Healthcare Decision Maker:    Today we discussed Healthcare Decision Makers. The patient is considering options.     Healthcare Decision Maker information and resources reviewed with patient and patient verbalized understanding and is considering her options.

## 2024-04-30 NOTE — CARE COORDINATION
Education outreach Date/Time: 2024 12:21 PM    Ambulatory Care Manager (ACM) contacted the patient by telephone to perform Ambulatory Care Coordination. Verified name and  with patient as identifiers. Provided introduction to self, and explanation of the Ambulatory Care Manager's role.     ACM reviewed that a Healthy tips for the   CHF Summer education  packet has been sent to [facility]. ACM reviewed CHF zones, daily weights, the importance of low sodium diet, and healthy tips packet with the patient. Instructed patient to call their PCP if they have a weight gain of 3 lbs in 2 days or 5 lbs in a week.     Patient reminded that there is a physician on call 24 hours a day / 7 days a week should the patient have questions or concerns. The patient verbalized understanding.      Lab Results       None            Care Coordination Interventions    Referral from Primary Care Provider: No  Suggested Interventions and Community Resources  Fall Risk Prevention: Completed (Comment: 2024)  Disease Specific Clinic: Declined (Comment: 2024)  Home Health Services: Declined (Comment: Does not want to be homebound - 2024)  Meals on Wheels: Declined (Comment: Recently stopped services & declines - 2024)  Medication Assistance Program: Declined (Comment: Denied any need for assistance at this time - 2024)  Medi Set or Pill Pack: Completed (Comment: Dec. 2024)  Pharmacist: Declined (Comment: 2024)  Physical Therapy: Declined (Comment: Discussed with pt.  Declines at this time - 2024)  Pulmonary Rehab: Not Started (Comment: Pt is interested but not at present time - 2024)  Registered Dietician: Declined (Comment: 2024)  Smoking Cessation: Declined (Comment: Educated on available resources - 2024)  Social Work: Completed (Comment: Referral placed for resource needs - 2024)  Specialty Services Referral: Completed (Comment: current with [facility] program - [place]

## 2024-05-08 ENCOUNTER — TELEPHONE (OUTPATIENT)
Dept: FAMILY MEDICINE CLINIC | Age: 59
End: 2024-05-08

## 2024-05-08 DIAGNOSIS — J44.1 COPD EXACERBATION (HCC): ICD-10-CM

## 2024-05-08 RX ORDER — PREDNISONE 20 MG/1
40 TABLET ORAL DAILY
Qty: 10 TABLET | Refills: 0 | Status: SHIPPED | OUTPATIENT
Start: 2024-05-08 | End: 2024-05-13

## 2024-05-08 RX ORDER — AZITHROMYCIN 250 MG/1
TABLET, FILM COATED ORAL
Qty: 1 PACKET | Refills: 0 | Status: SHIPPED | OUTPATIENT
Start: 2024-05-08

## 2024-05-08 NOTE — TELEPHONE ENCOUNTER
With her history I recommend to take an antibiotic and a round of prednisone to prevent it from getting worse

## 2024-05-08 NOTE — TELEPHONE ENCOUNTER
Pt called asking for advice. She has had a head cold with a cough that been worsening in the past few days.   Nasal congestion and head pressure.  Pt has not tried any OTC ( she is unsure what to take since she is on many medications)  Please advise

## 2024-05-14 ENCOUNTER — CARE COORDINATION (OUTPATIENT)
Dept: CARE COORDINATION | Age: 59
End: 2024-05-14

## 2024-05-14 RX ORDER — FLUCONAZOLE 100 MG/1
100 TABLET ORAL DAILY
Qty: 14 TABLET | Refills: 0 | Status: SHIPPED | OUTPATIENT
Start: 2024-05-14 | End: 2024-05-28

## 2024-05-14 ASSESSMENT — ENCOUNTER SYMPTOMS: DYSPNEA ASSOCIATED WITH: EXERTION

## 2024-05-14 NOTE — CARE COORDINATION
Diflucan called in.  Chart says she is not taking lipitor. But double check as should not take lipitor with diflucan  
Patient verified she is currently not taking Lipitor.  [facility] informed patient that diflucan has been sent to her pharmacy and she verbalized understanding.  Patient denied any additional questions, concerns, or needs.    
Level    Symptom course: stable     ,   General Assessment    Do you have any symptoms that are causing concern?: Yes  Progression since Onset: Gradually Improving  Reported Symptoms: Congestion, Cough, Other (Comment: thrush like symptoms)     , and No linked episodes

## 2024-05-15 DIAGNOSIS — F41.9 ANXIETY: Primary | ICD-10-CM

## 2024-05-15 DIAGNOSIS — J30.9 ALLERGIC RHINITIS, UNSPECIFIED SEASONALITY, UNSPECIFIED TRIGGER: ICD-10-CM

## 2024-05-15 RX ORDER — FLUTICASONE PROPIONATE 50 MCG
2 SPRAY, SUSPENSION (ML) NASAL DAILY
Qty: 16 G | Refills: 1 | Status: SHIPPED | OUTPATIENT
Start: 2024-05-15 | End: 2025-05-15

## 2024-05-15 RX ORDER — ALPRAZOLAM 0.25 MG/1
TABLET ORAL
Qty: 30 TABLET | Refills: 2 | Status: SHIPPED | OUTPATIENT
Start: 2024-05-15 | End: 2024-06-14

## 2024-05-23 ENCOUNTER — HOSPITAL ENCOUNTER (OUTPATIENT)
Dept: CT IMAGING | Age: 59
Discharge: HOME | End: 2024-05-23
Payer: MEDICARE

## 2024-05-23 DIAGNOSIS — C34.12 PRIMARY MALIGNANT NEOPLASM OF LEFT UPPER LOBE OF LUNG (HCC): ICD-10-CM

## 2024-05-23 DIAGNOSIS — I10 ESSENTIAL HYPERTENSION: ICD-10-CM

## 2024-05-23 PROCEDURE — 71260 CT THORAX DX C+: CPT

## 2024-05-23 PROCEDURE — 6360000004 HC RX CONTRAST MEDICATION

## 2024-05-23 RX ORDER — CLONIDINE HYDROCHLORIDE 0.1 MG/1
0.1 TABLET ORAL DAILY
Qty: 90 TABLET | Refills: 1 | Status: SHIPPED | OUTPATIENT
Start: 2024-05-23

## 2024-05-23 RX ADMIN — IOPAMIDOL 100 ML: 755 INJECTION, SOLUTION INTRAVENOUS at 13:41

## 2024-05-28 ENCOUNTER — HOSPITAL ENCOUNTER (OUTPATIENT)
Dept: RADIATION ONCOLOGY | Age: 59
Discharge: HOME | End: 2024-05-28
Payer: MEDICARE

## 2024-05-28 VITALS
RESPIRATION RATE: 18 BRPM | TEMPERATURE: 97.7 F | DIASTOLIC BLOOD PRESSURE: 61 MMHG | WEIGHT: 192.8 LBS | HEART RATE: 97 BPM | SYSTOLIC BLOOD PRESSURE: 134 MMHG | OXYGEN SATURATION: 97 % | BODY MASS INDEX: 35.25 KG/M2

## 2024-05-28 DIAGNOSIS — C34.12 PRIMARY MALIGNANT NEOPLASM OF LEFT UPPER LOBE OF LUNG (HCC): Primary | ICD-10-CM

## 2024-05-28 PROCEDURE — 99212 OFFICE O/P EST SF 10 MIN: CPT

## 2024-05-28 ASSESSMENT — ENCOUNTER SYMPTOMS
APNEA: 0
SORE THROAT: 0
BACK PAIN: 1
COUGH: 1
ABDOMINAL PAIN: 0
WHEEZING: 0
SHORTNESS OF BREATH: 0
TROUBLE SWALLOWING: 0
BLOOD IN STOOL: 0
VOMITING: 1
CHEST TIGHTNESS: 0
NAUSEA: 1

## 2024-05-28 ASSESSMENT — PAIN DESCRIPTION - LOCATION: LOCATION: BACK

## 2024-05-28 ASSESSMENT — PAIN SCALES - GENERAL: PAINLEVEL_OUTOF10: 4

## 2024-05-28 NOTE — PROGRESS NOTES
release capsule Take 1 capsule by mouth in the morning and at bedtime 60 capsule 5    EPINEPHrine (EPIPEN 2-JERE) 0.3 MG/0.3ML SOAJ injection Inject 0.3 mLs into the skin once for 1 dose Use as directed for allergic reaction 0.3 mL 0    guaiFENesin 400 MG tablet Take 3 tablets by mouth daily And can take 1 in the morning as well (Patient not taking: Reported on 3/27/2024)      calcium carbonate (TUMS) 500 MG chewable tablet Take 1 tablet by mouth as needed for Heartburn      sodium chloride (BRONCHO SALINE) 0.9 % inhaler solution Use 3 mL by nebulization three times per day as needed for throat/airway secretions. 270 mL 11    lisinopril (PRINIVIL;ZESTRIL) 30 MG tablet TAKE 1 TABLET BY MOUTH DAILY 90 tablet 3    Budeson-Glycopyrrol-Formoterol (BREZTRI AEROSPHERE) 160-9-4.8 MCG/ACT AERO Inhale 2 puffs into the lungs 2 times daily Rinse mouth with water, gargle, and spit after use. 1 each 11    VENTOLIN  (90 Base) MCG/ACT inhaler Inhale 2 puffs into the lungs every 6 hours as needed for Wheezing or Shortness of Breath 18 g 11    albuterol (PROVENTIL) (2.5 MG/3ML) 0.083% nebulizer solution Take 3 mLs by nebulization every 6 hours as needed for Wheezing or Shortness of Breath 120 each 11    atorvastatin (LIPITOR) 20 MG tablet Take 1 tablet by mouth daily (Patient not taking: Reported on 3/19/2024) 30 tablet 11    ondansetron (ZOFRAN) 8 MG tablet Take 1 tablet by mouth every 8 hours as needed for Nausea or Vomiting 30 tablet 5    ACETAMINOPHEN EXTRA STRENGTH 500 MG tablet TAKE 1-2 TABLETS BY MOUTH 4 TIMES DAILY AS NEEDED FOR PAIN 120 tablet 5    Misc. Devices (MATTRESS PAD) MISC Dry pressure mattress 1 each 0    Incontinence Supply Disposable (BLADDER CONTROL PADS EX ABSORB) MISC Use up to 4 a day 120 each 3    Incontinence Supply Disposable (PROCARE ADULT BRIEFS LARGE) MISC Use 2 a day 60 each 5     No current facility-administered medications for this encounter.       All portions of this note that were completed

## 2024-05-30 ENCOUNTER — CARE COORDINATION (OUTPATIENT)
Dept: CARE COORDINATION | Age: 59
End: 2024-05-30

## 2024-05-30 NOTE — CARE COORDINATION
Ambulatory Care Coordination Note  2024    Patient Current Location:  Home: [address] [place] [address] [place] [address]     LAUREN PEREZ contacted the patient by telephone. Verified name and  with patient as identifiers. Provided introduction to self, and explanation of the LAUREN PEREZ role.     Challenges to be reviewed by the provider   Additional needs identified to be addressed with provider: No  none               Method of communication with provider: none.    I spoke with the patient for continued Care Coordination follow up and education.  Patient states she is doing well.  Recent CT chest completed on 24 resulted as: decreased size of treated left upper lobe lung nodule.  PET/CT to be completed in 3 months.  Patient denied any recent c/o increased swelling, SOB, increased cough, or weight gain being present.  Patient denied any recent weight gain.  We discussed Zone management tools for chronic disease(s) and patient denies current questions re: s/sx at this time.  Educated on how to identify sx's that are worse than the baseline and the importance of early symptom recognition and reporting to prevent exacerbation which may lead to ED visits and hospital admissions.  I advised patient to contact PCP office if needed.  No further needs at this time.     Lab Results       None            Care Coordination Interventions    Referral from Primary Care Provider: No  Suggested Interventions and Community Resources  Fall Risk Prevention: Completed (Comment: 2024)  Disease Specific Clinic: Declined (Comment: 2024)  Home Care Waiver: In Process (Comment: [employer] - aide 2/wk for ADL & 2/wk for personal care - May 2024)  Home Health Services: Declined (Comment: Does not want to be homebound - 2024)  Meals on Wheels: In Process (Comment: 5/week thru [employer] - May 2024)  Medication Assistance Program: Declined (Comment: Denied any need for assistance at this time - 2024)  Medi Set or Pill

## 2024-06-11 ENCOUNTER — CARE COORDINATION (OUTPATIENT)
Dept: CARE COORDINATION | Age: 59
End: 2024-06-11

## 2024-06-11 ASSESSMENT — ENCOUNTER SYMPTOMS: DYSPNEA ASSOCIATED WITH: EXERTION

## 2024-06-11 NOTE — CARE COORDINATION
Declined (Comment: Drives locally, family helps, and is aware of medicaid/COA resources - March 2024)  Zone Management Tools: Completed (Comment: CHF and COPD - March 2024)  Other Services or Interventions: Follows with [facility], [facility], and [facility]      ,   Congestive Heart Failure Assessment    Are you currently restricting fluids?: No Restriction  Do you understand a low sodium diet?: Yes  Do you understand how to read food labels?: No  How many restaurant meals do you eat per week?: 1-2  Do you salt your food before tasting it?: No     Shortness of breath (worse than baseline)      Symptoms:  CHF associated dyspnea on exertion: Pos, CHF associated fatigue: Pos      Symptom course: no change  Salt intake watch compared to last visit: stable      ,   COPD Assessment    Does the patient understand envrionmental exposure?: Yes  Is the patient able to verbalize Rescue vs. Long Acting medications?: Yes  Does the patient have a nebulizer?: Yes  Does the patient use a space with inhaled medications?: Yes     Other symptoms causing concern, Shortness of breath (worse than baseline), Increase in cough         Symptoms:     Symptom course: no change  Breathlessness: exertion  Increase use of rapid acting/rescue inhaled medications?: Yes  Change in chronic cough?: Increased  Change in sputum?: Increased      ,   Hypertension - Encounter Level    Symptom course: stable      , and   General Assessment    Do you have any symptoms that are causing concern?: Yes  Progression since Onset: Unchanged  Reported Symptoms: Nausea, Vomiting, Congestion, Shortness of Breath          Medications Reviewed:   Patient denies any changes with medications and reports taking all medications as prescribed.    Advance Care Planning:   Not reviewed during this call     Care Planning:    Goals Addressed                   This Visit's Progress       Care Coordination     Conditions and Symptoms   Improving     I will schedule office

## 2024-06-17 ENCOUNTER — CARE COORDINATION (OUTPATIENT)
Dept: CARE COORDINATION | Age: 59
End: 2024-06-17

## 2024-06-17 ASSESSMENT — ENCOUNTER SYMPTOMS: DYSPNEA ASSOCIATED WITH: EXERTION

## 2024-06-17 NOTE — CARE COORDINATION
Ambulatory Care Coordination Note     2024 11:02 AM     Patient Current Location:  [place]     ACM contacted the patient by telephone. Verified name and  with patient as identifiers.         ACM: Vianney Lee RN     Challenges to be reviewed by the provider   Additional needs identified to be addressed with provider No    none         Method of communication with provider: none.    Care Summary Note: Patient was called for continued Care Coordination follow up and education re: the management of her CHF, COPD, hypertension, and healthcare needs.  Patient shared she is feeling better than she did last week, but she continues with c/o increased congestion/SOB/drainage upon awakening in the morning.  Patient denied any c/o increased swelling being present. Patient admits to not weighing herself recently and importance of routine weight monitoring as well as what to report/when to follow up was reviewed.  Patient verbalized understanding.  CHF and COPD zone education and signs/symptoms to report were reviewed with patient along with the importance of early symptom recognition and follow up.  Patient acknowledged understanding. Patient denied any other questions, concerns, or needs and she was encouraged to call with any that may develop.     Offered patient enrollment in the Remote Patient Monitoring (RPM) program for in-home monitoring: Yes, but did not enroll at this time: Patient declined .     Assessments Completed:   Care Coordination Interventions    Referral from Primary Care Provider: No  Suggested Interventions and Community Resources  Fall Risk Prevention: Completed (Comment: 2024)  Disease Specific Clinic: Declined (Comment: 2024)  Home Care Waiver: In Process (Comment: [employer] - aide 2/wk for ADL & 2/wk for personal care - May 2024)  Home Health Services: Declined (Comment: Does not want to be homebound - 2024)  Meals on Wheels: Completed (Comment: 5/week thru [employer] - May

## 2024-06-20 ENCOUNTER — TELEPHONE (OUTPATIENT)
Dept: PULMONOLOGY | Age: 59
End: 2024-06-20

## 2024-06-20 DIAGNOSIS — J43.9 PULMONARY EMPHYSEMA, UNSPECIFIED EMPHYSEMA TYPE (HCC): ICD-10-CM

## 2024-06-20 DIAGNOSIS — J44.9 STAGE 3 SEVERE COPD BY GOLD CLASSIFICATION (HCC): ICD-10-CM

## 2024-06-20 RX ORDER — BUDESONIDE, GLYCOPYRROLATE, AND FORMOTEROL FUMARATE 160; 9; 4.8 UG/1; UG/1; UG/1
2 AEROSOL, METERED RESPIRATORY (INHALATION) 2 TIMES DAILY
Qty: 3 EACH | Refills: 3 | Status: SHIPPED | OUTPATIENT
Start: 2024-06-20 | End: 2025-06-20

## 2024-06-20 NOTE — TELEPHONE ENCOUNTER
Called and spoke to [facility] pharmacy, they stated that for some reason when she gets Breztri from [facility], they are only wanting to pay $14 of the medication, so she would have to get it from a different pharmacy where it will be $0 out of pocket.     I called and informed patient of this and she would like the script to go to [facility] in [place]. 90 day supply faxed.

## 2024-06-20 NOTE — TELEPHONE ENCOUNTER
Na, can we try to sign patient up for assistance? I will print Rx. We can give samples while waiting.Thanks!

## 2024-06-20 NOTE — TELEPHONE ENCOUNTER
Pharmacy called regarding [facility]'s Breztri being to expensive for pt. Is there something else she can take that is less expensive? Thank you

## 2024-07-02 ENCOUNTER — CARE COORDINATION (OUTPATIENT)
Dept: CARE COORDINATION | Age: 59
End: 2024-07-02

## 2024-07-02 ASSESSMENT — ENCOUNTER SYMPTOMS: DYSPNEA ASSOCIATED WITH: EXERTION

## 2024-07-02 NOTE — CARE COORDINATION
Ambulatory Care Coordination Note     2024 1:34 PM     Patient Current Location:  [place]     ACM contacted the patient by telephone. Verified name and  with patient as identifiers.         ACM: Vianney Lee RN     Challenges to be reviewed by the provider   Additional needs identified to be addressed with provider Yes    Pt shared she is having some increased sinus congestion again and has been using her nebulizer treatments more frequently.  Patient does not feel she has an infection present and declines appointment at this time.  Pt instructed to call with any new/change/worsening of symptoms.          Method of communication with provider: chart routing.    Care Summary Note: Patient was called for continued Care Coordination follow up and education re: the management of her CHF, COPD, hypertension, and healthcare needs.  Patient shared she is not feeling very well today as she has had some dizziness and increased sinus congestion present.  Patient shared she is also using her nebulizer treatments more frequently.  Patient denied any c/o increased/worsening SOB or cough being present.  Patient declines appointment for evaluation of recent symptoms.  ACM educated patient on the importance of early symptom recognition and follow up and instructed patient to call for an earlier appointment with any worsening of symptoms.  Patient verbalized understanding.  Patient denied any significant increased swelling being present, but admits weight is typically in the 191 pound range and weight yesterday was 193 pounds.  Patient admits to not yet monitoring weight today.  ACM reviewed importance of daily weight monitoring and what to report and when to follow up.  Patient verbalized understanding.  CHF and COPD zone education was also reviewed with patient.  ACM reviewed signs/symptoms patient should report as well as the importance of early symptom recognition and follow up.  Patient acknowledged understanding.

## 2024-07-11 ENCOUNTER — CARE COORDINATION (OUTPATIENT)
Dept: CARE COORDINATION | Age: 59
End: 2024-07-11

## 2024-07-11 NOTE — CARE COORDINATION
Ambulatory Care Coordination Note     7/11/2024 10:56 AM     Patient outreach attempt by this AC today to perform care management follow up . ACM was unable to reach the patient by telephone today; left voice message requesting a return phone call to this ACM.     ACM: Vianney Lee, RN     Care Summary Note: Attempted to reach patient for continued Care Coordination follow up and education re: the management of her CHF, COPD, hypertension, and healthcare needs.  Will continue to work to f/u with patient in the future.     PCP/Specialist follow up:   Future Appointments         Provider Specialty Dept Phone    8/19/2024 1:00 PM (Arrive by 12:30 PM) [place] [facility] INJE[facility] RO[facility] Radiology [phone]    8/19/2024 2:00 PM (Arrive by 1:30 PM) [place] [facility] RM 2; [facility] PET/CT ROOM Radiology [phone]    8/23/2024 1:30 PM Rena Mathews PA-C; SCHEDULE, JUST[facility]EMPH[facility] NURSE Radiation Oncology [phone]    9/17/2024 2:00 PM Reyna Garrett APRN - [facility] Pulmonology [phone]    9/26/2024 1:15 PM Piyush Castellanos APRN - CNP Family Medicine [phone]            Follow Up:   Plan for next AC outreach in approximately 1 week to complete:  - Disease specific assessments - CHF, COPD, and Hypertension  - Goal progression  - Education   - ACP - Completed during previous call  - RPM - Pt declines (March 2024)  - Communicate with Ciaran Nichols [phone] as needed  - Monitor for transition to Passport services d/t age - Start of Care 7/3/2024  - Patient is due for Medicare AWV - Scheduled for 9/26/2024  - Monitor for readiness to discharge from Care Coordination

## 2024-07-16 ENCOUNTER — CARE COORDINATION (OUTPATIENT)
Dept: CARE COORDINATION | Age: 59
End: 2024-07-16

## 2024-07-16 ASSESSMENT — ENCOUNTER SYMPTOMS: DYSPNEA ASSOCIATED WITH: EXERTION

## 2024-07-16 NOTE — CARE COORDINATION
Ambulatory Care Coordination Note     2024 11:29 AM     Patient Current Location:  Home: [address] [place] [address] [place] [address]     ACM contacted the patient by telephone. Verified name and  with patient as identifiers.         ACM: Vianney Lee RN     Challenges to be reviewed by the provider   Additional needs identified to be addressed with provider Yes    Patient with c/o recent \"break out\" across the middle of her chest that has resolved, but she now has painful/burning redness under her breast.  Pt is asking for recommendations re: treatment.  Please advise.     Pt also with ongoing \"sinus symptoms.\"  Patient declines appointment for evaluation and was instructed to schedule appt if symptoms are not improved/resolved by next week.           Method of communication with provider: chart routing.    Care Summary Note: Patient was called for continued Care Coordination follow up and education re: the management of her CHF, COPD, hypertension, and healthcare needs.  Patient shared she is just waking up for the day as she was up \"a lot last night\" d/t pain/burning under her breast - see above.  Patient was encouraged to keep the area clean and dry and she verbalized understanding.  Patient shared she also continues with ongoing sinus symptoms/congestion that are unchanged from recent calls.  Patient declines appointment for evaluation and ACM reviewed need to call for ongoing/worsening of symptoms and to f/u if symptoms unchanged/worsen.  Patient acknowledged understanding.   Patient denied any c/o increased/worsening SOB, cough, or swelling being present.  Patient denies any c/o increased bloating or change in activity d/t her breathing.  CHF and COPD zone education and signs/symptoms to report was reviewed with patient.  Importance of early symptom recognition and follow up as well as the need to call for an earlier appointment with any new symptoms was also reviewed.  Patient acknowledged understanding.

## 2024-07-16 NOTE — CARE COORDINATION
Patient called and updated of PCP recommendations.  Patient verbalized understanding and denied any additional questions or concerns.

## 2024-07-16 NOTE — TELEPHONE ENCOUNTER
Patient considered this but denies any rash or itching and does not feel it \"feels like when she had shingles in the past\" and break out area has resolved.

## 2024-07-22 ENCOUNTER — TELEPHONE (OUTPATIENT)
Dept: FAMILY MEDICINE CLINIC | Age: 59
End: 2024-07-22

## 2024-07-22 ENCOUNTER — TELEPHONE (OUTPATIENT)
Dept: PULMONOLOGY | Age: 59
End: 2024-07-22

## 2024-07-22 DIAGNOSIS — J96.11 CHRONIC RESPIRATORY FAILURE WITH HYPOXIA (HCC): ICD-10-CM

## 2024-07-22 DIAGNOSIS — J44.9 STAGE 3 SEVERE COPD BY GOLD CLASSIFICATION (HCC): Primary | ICD-10-CM

## 2024-07-22 NOTE — TELEPHONE ENCOUNTER
Pt is requesting an order for a rollator walker with hand brakes. She would like the order sent to [facility]. Fax # [phone]. She stated one was ordered back in April, but it was not covered by her insurance at the time.

## 2024-07-22 NOTE — TELEPHONE ENCOUNTER
Maricruz from [facility] called regarding home oxygen for Mary Lou. She can now get it covered through [employer].  She needs a new order for the home oxygen. Thank you

## 2024-07-25 DIAGNOSIS — I10 ESSENTIAL HYPERTENSION: ICD-10-CM

## 2024-07-25 RX ORDER — CARVEDILOL 25 MG/1
25 TABLET ORAL 2 TIMES DAILY WITH MEALS
Qty: 180 TABLET | Refills: 1 | Status: SHIPPED | OUTPATIENT
Start: 2024-07-25

## 2024-07-30 ENCOUNTER — CARE COORDINATION (OUTPATIENT)
Dept: CARE COORDINATION | Age: 59
End: 2024-07-30

## 2024-07-30 ASSESSMENT — ENCOUNTER SYMPTOMS: DYSPNEA ASSOCIATED WITH: EXERTION

## 2024-07-30 NOTE — CARE COORDINATION
Phone    8/7/2024 2:00 PM Piyush Castellanos APRN - CNP [facility] [phone]    8/19/2024 1:00 PM (Arrive by 12:30 PM) [place] [facility] INJE[facility] RO[facility] Radiology [phone]    8/19/2024 2:00 PM (Arrive by 1:30 PM) [place] CT RM 2; [place] PET/CT ROOM Radiology [phone]    8/23/2024 1:30 PM Rena Mathews PA-C; SCHEDULE, JUSTYN HEMPHILL NURSE Radiation Oncology [phone]    9/17/2024 2:00 PM Reyna Garrett APRN - CNP Pulmonology [phone]    9/26/2024 1:15 PM Piyush Castellanos APRN - CNP [facility] [phone]            Follow Up:   Plan for next ACM outreach in approximately 2 weeks to complete:  - Disease specific assessments - CHF, COPD, and Hypertension  - Goal progression  - Education   - ACP - Completed during previous call  - RPM - Pt declines (March 2024)  - [facility] [phone] as needed  - Monitor for transition to Passport services d/t age - Start of Care 7/3/2024  - Patient is due for Medicare AWV - Scheduled for 9/26/2024  - If patient remains stable will consider graduation from Care Coordination in the near future     Patient  is agreeable to this plan.

## 2024-08-12 ENCOUNTER — CARE COORDINATION (OUTPATIENT)
Dept: CARE COORDINATION | Age: 59
End: 2024-08-12

## 2024-08-12 ASSESSMENT — ENCOUNTER SYMPTOMS: DYSPNEA ASSOCIATED WITH: EXERTION

## 2024-08-12 NOTE — CARE COORDINATION
Ambulatory Care Coordination Note     2024 2:53 PM     Patient Current Location:  Home: [address] [place] [address] [place] [address]     ACM contacted the patient by telephone. Verified name and  with patient as identifiers.         ACM: Vianney Lee RN     Challenges to be reviewed by the provider   Additional needs identified to be addressed with provider No    none         Method of communication with provider: none.    Care Summary Note: Patient was called for continued Care Coordination follow up and education re: the management of her CHF, COPD, Hypertension, and healthcare needs.  Patient shared she is frustrated today as she just received word that her PET scan was denied by insurance.  Patient reported she has been in contact with her providers and they are working on an appeal.  Patient was encouraged to stay in touch and f/u as needed.  Patient verbalized understanding.    Patient shared she did recently take antibiotic and steroid for ongoing cough/congestion/SOB and symptoms are improving.  Patient reported she also had some thrush symptoms also and is in the process of treating those symptoms.  Patient is scheduled to see PCP tomorrow and she was encouraged to f/u on recent symptoms and to write down any questions/concerns and bring those with her to her appointment.  Patient verbalized understanding.  Patient denied any c/o increased SOB, swelling, or cough being present.  ACM reviewed zone education and signs/symptoms to report as well as the importance of early symptom recognition and follow up.  Patient acknowledged understanding.  Patient denied any other questions, concerns, or needs and she was encouraged to call with any that may develop.     Offered patient enrollment in the Remote Patient Monitoring (RPM) program for in-home monitoring: Yes, but did not enroll at this time: Patient declined RPM enrollment .     Assessments Completed:   No changes since last call    Medications Reviewed:

## 2024-08-13 ENCOUNTER — OFFICE VISIT (OUTPATIENT)
Dept: FAMILY MEDICINE CLINIC | Age: 59
End: 2024-08-13
Payer: MEDICARE

## 2024-08-13 VITALS
OXYGEN SATURATION: 96 % | RESPIRATION RATE: 16 BRPM | WEIGHT: 193 LBS | SYSTOLIC BLOOD PRESSURE: 128 MMHG | HEART RATE: 103 BPM | HEIGHT: 62 IN | DIASTOLIC BLOOD PRESSURE: 82 MMHG | BODY MASS INDEX: 35.51 KG/M2 | TEMPERATURE: 98.2 F

## 2024-08-13 DIAGNOSIS — R10.30 LOWER ABDOMINAL PAIN: ICD-10-CM

## 2024-08-13 DIAGNOSIS — Z12.11 SCREEN FOR COLON CANCER: ICD-10-CM

## 2024-08-13 DIAGNOSIS — J44.1 COPD EXACERBATION (HCC): Primary | ICD-10-CM

## 2024-08-13 PROCEDURE — 99214 OFFICE O/P EST MOD 30 MIN: CPT | Performed by: NURSE PRACTITIONER

## 2024-08-13 PROCEDURE — 3079F DIAST BP 80-89 MM HG: CPT | Performed by: NURSE PRACTITIONER

## 2024-08-13 PROCEDURE — 96372 THER/PROPH/DIAG INJ SC/IM: CPT | Performed by: NURSE PRACTITIONER

## 2024-08-13 PROCEDURE — 3074F SYST BP LT 130 MM HG: CPT | Performed by: NURSE PRACTITIONER

## 2024-08-13 RX ORDER — CIPROFLOXACIN 500 MG/1
500 TABLET, FILM COATED ORAL 2 TIMES DAILY
Qty: 20 TABLET | Refills: 0 | Status: SHIPPED | OUTPATIENT
Start: 2024-08-13 | End: 2024-08-20

## 2024-08-13 RX ORDER — ALPRAZOLAM 0.25 MG/1
0.25 TABLET ORAL NIGHTLY PRN
COMMUNITY

## 2024-08-13 RX ORDER — TRIAMCINOLONE ACETONIDE 40 MG/ML
40 INJECTION, SUSPENSION INTRA-ARTICULAR; INTRAMUSCULAR ONCE
Status: COMPLETED | OUTPATIENT
Start: 2024-08-13 | End: 2024-08-13

## 2024-08-13 RX ORDER — FLUCONAZOLE 100 MG/1
100 TABLET ORAL DAILY
Qty: 14 TABLET | Refills: 0 | Status: SHIPPED | OUTPATIENT
Start: 2024-08-13 | End: 2024-08-27

## 2024-08-13 RX ADMIN — TRIAMCINOLONE ACETONIDE 40 MG: 40 INJECTION, SUSPENSION INTRA-ARTICULAR; INTRAMUSCULAR at 14:39

## 2024-08-13 ASSESSMENT — ENCOUNTER SYMPTOMS
GASTROINTESTINAL NEGATIVE: 1
COUGH: 1
EYES NEGATIVE: 1

## 2024-08-13 NOTE — PROGRESS NOTES
Mary Lou Flores is a 59 y.o. female whopresents today for :  Chief Complaint   Patient presents with   • Congestion   • Allergies     Vitals:    08/13/24 1358   BP: 128/82   Pulse: (!) 103   Resp: 16   Temp: 98.2 °F (36.8 °C)   SpO2: 96%       HPI:     HPI  Patient here with cough and congestion she had recent COPD exacerbation she did have emergency prescription for steroid and antibiotics she reports it is improved but not yet resolved as she did have a diarrhea she has been having some lower abdominal pressure at this time she also feels she has developed thrush in her mouth we did review her recent CAT scans oncology is trying to get a PET scan approved to see if her cancer appears to still be active.  Patient does have a history of lung cancer  Patient Active Problem List   Diagnosis   • Hypertension, uncontrolled   • Anxiety   • Gastroesophageal reflux disease without esophagitis   • Seasonal allergies   • Bronchospasm   • History of tobacco abuse   • Stage 3 severe COPD by GOLD classification (HCC)   • Allergic rhinitis   • Nodule of upper lobe of left lung   • Pulmonary emphysema (HCC)   • LYNN (obstructive sleep apnea)   • COPD exacerbation (HCC)   • Current smoker   • Abnormal PET scan, lung   • Oxygen dependent   • Wheelchair dependence   • Mucopurulent chronic bronchitis (HCC)   • Acute congestive heart failure, unspecified heart failure type (HCC)   • Primary malignant neoplasm of left upper lobe of lung (HCC)   • Cancer (HCC)   • Chronic respiratory failure with hypoxia (HCC)     Past Medical History:   Diagnosis Date   • Anxiety    • Arthritis    • Cancer (HCC)    • CHF (congestive heart failure) (HCC)    • COPD (chronic obstructive pulmonary disease) (HCC)    • Depression    • Enlargement, spleen    • Fatty liver    • GERD (gastroesophageal reflux disease)    • Headache    • Hepatitis A    • Hypertension     saw Dr Duff in the past   • Ovarian cyst    • Oxygen dependent     o2 2liters when walking

## 2024-08-13 NOTE — PROGRESS NOTES
Administrations This Visit       triamcinolone acetonide (KENALOG-40) injection 40 mg       Admin Date  08/13/2024  14:39 Action  Given Dose  40 mg Route  IntraMUSCular Site  Vastus Lateralis Left Documented By  Cony Barton MA    NDC: 9341-0770-09    Lot#: [id number]    : [employer] (PRIMARY CARE)    Patient Supplied?: No                Patient tolerated well.    Cony Barton [facility]

## 2024-08-13 NOTE — PROGRESS NOTES
After obtaining consent, and per orders of Piyush Castellanos, injection of Kenolog given in Left gluteal by Cony Barton MA. Patient instructed to remain in clinic for 20 minutes afterwards, and to report any adverse reaction to me immediately.

## 2024-08-21 DIAGNOSIS — C34.12 PRIMARY MALIGNANT NEOPLASM OF LEFT UPPER LOBE OF LUNG (HCC): Primary | ICD-10-CM

## 2024-08-23 ENCOUNTER — APPOINTMENT (OUTPATIENT)
Dept: RADIATION ONCOLOGY | Age: 59
End: 2024-08-23
Payer: MEDICARE

## 2024-08-26 ENCOUNTER — HOSPITAL ENCOUNTER (OUTPATIENT)
Dept: CT IMAGING | Age: 59
Discharge: HOME | End: 2024-08-26
Payer: MEDICARE

## 2024-08-26 DIAGNOSIS — C34.12 PRIMARY MALIGNANT NEOPLASM OF LEFT UPPER LOBE OF LUNG (HCC): ICD-10-CM

## 2024-08-26 PROCEDURE — 71250 CT THORAX DX C-: CPT

## 2024-08-27 ENCOUNTER — OFFICE VISIT (OUTPATIENT)
Dept: FAMILY MEDICINE CLINIC | Age: 59
End: 2024-08-27

## 2024-08-27 ENCOUNTER — LAB (OUTPATIENT)
Dept: LAB | Age: 59
End: 2024-08-27

## 2024-08-27 VITALS
TEMPERATURE: 97 F | SYSTOLIC BLOOD PRESSURE: 122 MMHG | BODY MASS INDEX: 34.56 KG/M2 | WEIGHT: 189 LBS | OXYGEN SATURATION: 96 % | HEART RATE: 101 BPM | DIASTOLIC BLOOD PRESSURE: 62 MMHG | RESPIRATION RATE: 18 BRPM

## 2024-08-27 DIAGNOSIS — H60.502 ACUTE OTITIS EXTERNA OF LEFT EAR, UNSPECIFIED TYPE: Primary | ICD-10-CM

## 2024-08-27 DIAGNOSIS — M79.10 MYALGIA: ICD-10-CM

## 2024-08-27 LAB
ALBUMIN SERPL BCG-MCNC: 4.2 G/DL (ref 3.5–5.1)
ALP SERPL-CCNC: 128 U/L (ref 38–126)
ALT SERPL W/O P-5'-P-CCNC: 17 U/L (ref 11–66)
ANION GAP SERPL CALC-SCNC: 13 MEQ/L (ref 8–16)
AST SERPL-CCNC: 20 U/L (ref 5–40)
BASOPHILS ABSOLUTE: 0.1 THOU/MM3 (ref 0–0.1)
BASOPHILS NFR BLD AUTO: 0.6 %
BILIRUB SERPL-MCNC: 0.5 MG/DL (ref 0.3–1.2)
BUN SERPL-MCNC: 12 MG/DL (ref 7–22)
CALCIUM SERPL-MCNC: 9.5 MG/DL (ref 8.5–10.5)
CHLORIDE SERPL-SCNC: 88 MEQ/L (ref 98–111)
CO2 SERPL-SCNC: 28 MEQ/L (ref 23–33)
CREAT SERPL-MCNC: 0.8 MG/DL (ref 0.4–1.2)
DEPRECATED RDW RBC AUTO: 42.3 FL (ref 35–45)
EOSINOPHIL NFR BLD AUTO: 0.8 %
EOSINOPHILS ABSOLUTE: 0.1 THOU/MM3 (ref 0–0.4)
ERYTHROCYTE [DISTWIDTH] IN BLOOD BY AUTOMATED COUNT: 12.3 % (ref 11.5–14.5)
GFR SERPL CREATININE-BSD FRML MDRD: 84 ML/MIN/1.73M2
GLUCOSE SERPL-MCNC: 149 MG/DL (ref 70–108)
HCT VFR BLD AUTO: 38 % (ref 37–47)
HGB BLD-MCNC: 13 GM/DL (ref 12–16)
IMM GRANULOCYTES # BLD AUTO: 0.06 THOU/MM3 (ref 0–0.07)
IMM GRANULOCYTES NFR BLD AUTO: 0.5 %
LYMPHOCYTES ABSOLUTE: 2.2 THOU/MM3 (ref 1–4.8)
LYMPHOCYTES NFR BLD AUTO: 16.7 %
MCH RBC QN AUTO: 31.9 PG (ref 26–33)
MCHC RBC AUTO-ENTMCNC: 34.2 GM/DL (ref 32.2–35.5)
MCV RBC AUTO: 93.1 FL (ref 81–99)
MONOCYTES ABSOLUTE: 0.9 THOU/MM3 (ref 0.4–1.3)
MONOCYTES NFR BLD AUTO: 6.8 %
NEUTROPHILS ABSOLUTE: 9.7 THOU/MM3 (ref 1.8–7.7)
NEUTROPHILS NFR BLD AUTO: 74.6 %
NRBC BLD AUTO-RTO: 0 /100 WBC
PLATELET # BLD AUTO: 340 THOU/MM3 (ref 130–400)
PMV BLD AUTO: 11.4 FL (ref 9.4–12.4)
POTASSIUM SERPL-SCNC: 4.3 MEQ/L (ref 3.5–5.2)
PROT SERPL-MCNC: 7.8 G/DL (ref 6.1–8)
RBC # BLD AUTO: 4.08 MILL/MM3 (ref 4.2–5.4)
SODIUM SERPL-SCNC: 129 MEQ/L (ref 135–145)
WBC # BLD AUTO: 13 THOU/MM3 (ref 4.8–10.8)

## 2024-08-27 RX ORDER — OFLOXACIN 3 MG/ML
5 SOLUTION AURICULAR (OTIC) 2 TIMES DAILY
Qty: 10 ML | Refills: 0 | Status: SHIPPED | OUTPATIENT
Start: 2024-08-27 | End: 2024-09-06

## 2024-08-27 NOTE — PROGRESS NOTES
Mary Lou Flores is a 59 y.o. female whopresents today for :  Chief Complaint   Patient presents with    Otalgia     bilateral     Vitals:    08/27/24 1318   BP: 122/62   Pulse: (!) 101   Resp: 18   Temp: 97 °F (36.1 °C)   SpO2: 96%       HPI:     HPI patient here for ear pain patient reports her ears have been feeling full particularly her left ear has been painful as of late we also did revisit the pain she gets in her legs we did discuss getting the NIXON done at first she said she was interested in but at the end of the visit she wished to hold off again she also reported today having a lot of muscle spasms prickly in the muscles in her chest wall and her legs    Patient Active Problem List   Diagnosis    Hypertension, uncontrolled    Anxiety    Gastroesophageal reflux disease without esophagitis    Seasonal allergies    Bronchospasm    History of tobacco abuse    Stage 3 severe COPD by GOLD classification (HCC)    Allergic rhinitis    Nodule of upper lobe of left lung    Pulmonary emphysema (HCC)    LYNN (obstructive sleep apnea)    COPD exacerbation (HCC)    Current smoker    Abnormal PET scan, lung    Oxygen dependent    Wheelchair dependence    Mucopurulent chronic bronchitis (HCC)    Acute congestive heart failure, unspecified heart failure type (HCC)    Primary malignant neoplasm of left upper lobe of lung (HCC)    Cancer (HCC)    Chronic respiratory failure with hypoxia (HCC)     Past Medical History:   Diagnosis Date    Anxiety     Arthritis     Cancer (HCC)     CHF (congestive heart failure) (HCC)     COPD (chronic obstructive pulmonary disease) (HCC)     Depression     Enlargement, spleen     Fatty liver     GERD (gastroesophageal reflux disease)     Headache     Hepatitis A     Hypertension     saw Dr Duff in the past    Ovarian cyst     Oxygen dependent     o2 2liters when walking and at night-sees Yasmani    Pneumonia     Sleep apnea       Past Surgical History:   Procedure Laterality Date     Maintenance   Topic Date Due    COVID-19 Vaccine (1) Never done    Shingles vaccine (1 of 2) Never done    Cervical cancer screen  Never done    Colorectal Cancer Screen  Never done    Diabetes screen  09/19/2022    Annual Wellness Visit (Medicare Advantage)  01/01/2024    Respiratory Syncytial Virus (RSV) Pregnant or age 60 yrs+ (1 - 1-dose 60+ series) Never done    Flu vaccine (1) 08/01/2024    Depression Screen  03/27/2025    Breast cancer screen  04/10/2025    Lung Cancer Screening &/or Counseling  08/26/2025    Lipids  06/21/2028    DTaP/Tdap/Td vaccine (2 - Td or Tdap) 06/14/2031    Pneumococcal 0-64 years Vaccine  Completed    Hepatitis C screen  Completed    Hepatitis A vaccine  Aged Out    Hepatitis B vaccine  Aged Out    Hib vaccine  Aged Out    Polio vaccine  Aged Out    Meningococcal (ACWY) vaccine  Aged Out    HIV screen  Discontinued       :     Review of Systems   HENT:  Positive for ear pain.        Objective:     Vitals:    08/27/24 1318   BP: 122/62   Site: Left Upper Arm   Position: Sitting   Cuff Size: Large Adult   Pulse: (!) 101   Resp: 18   Temp: 97 °F (36.1 °C)   TempSrc: Temporal   SpO2: 96%   Weight: 85.7 kg (189 lb)       Physical Exam  Constitutional:       Appearance: She is well-developed.   HENT:      Head: Normocephalic.      Right Ear: Tympanic membrane and external ear normal.      Ears:      Comments: Patient had a significant cerumen impaction in her left ear we used curette and a large amount of water to irrigate it we were able to remove a large portion of it but her ear canal was significantly inflamed.  Faint membrane was intact     Nose: Nose normal.   Cardiovascular:      Rate and Rhythm: Normal rate and regular rhythm.      Heart sounds: Normal heart sounds. No murmur heard.     No friction rub. No gallop.   Pulmonary:      Effort: Pulmonary effort is normal.      Breath sounds: Normal breath sounds. No wheezing or rales.   Abdominal:      General: Bowel sounds are normal.  24-Apr-2023 00:20

## 2024-08-28 ENCOUNTER — HOSPITAL ENCOUNTER (OUTPATIENT)
Dept: RADIATION ONCOLOGY | Age: 59
Discharge: HOME | End: 2024-08-28
Payer: MEDICARE

## 2024-08-28 ENCOUNTER — CARE COORDINATION (OUTPATIENT)
Dept: CARE COORDINATION | Age: 59
End: 2024-08-28

## 2024-08-28 ENCOUNTER — TELEPHONE (OUTPATIENT)
Dept: FAMILY MEDICINE CLINIC | Age: 59
End: 2024-08-28

## 2024-08-28 VITALS
DIASTOLIC BLOOD PRESSURE: 62 MMHG | TEMPERATURE: 97.9 F | HEART RATE: 88 BPM | RESPIRATION RATE: 18 BRPM | WEIGHT: 189 LBS | SYSTOLIC BLOOD PRESSURE: 139 MMHG | BODY MASS INDEX: 34.56 KG/M2 | OXYGEN SATURATION: 94 %

## 2024-08-28 DIAGNOSIS — E87.1 HYPONATREMIA: Primary | ICD-10-CM

## 2024-08-28 DIAGNOSIS — C34.12 PRIMARY MALIGNANT NEOPLASM OF LEFT UPPER LOBE OF LUNG (HCC): Primary | ICD-10-CM

## 2024-08-28 PROCEDURE — 99212 OFFICE O/P EST SF 10 MIN: CPT

## 2024-08-28 RX ORDER — AZITHROMYCIN 250 MG/1
TABLET, FILM COATED ORAL
Qty: 1 PACKET | Refills: 0 | Status: SHIPPED | OUTPATIENT
Start: 2024-08-28

## 2024-08-28 ASSESSMENT — ENCOUNTER SYMPTOMS
COUGH: 1
SHORTNESS OF BREATH: 1
SORE THROAT: 0
CHEST TIGHTNESS: 0
APNEA: 0
DYSPNEA ASSOCIATED WITH: EXERTION
WHEEZING: 0
VOMITING: 0
BACK PAIN: 1
BLOOD IN STOOL: 0
NAUSEA: 1
TROUBLE SWALLOWING: 0
ABDOMINAL PAIN: 1

## 2024-08-28 ASSESSMENT — PAIN DESCRIPTION - LOCATION: LOCATION: BACK

## 2024-08-28 ASSESSMENT — PAIN SCALES - GENERAL: PAINLEVEL_OUTOF10: 5

## 2024-08-28 NOTE — PROGRESS NOTES
[facility]           [address], [address]        [place], [place] [address]        O: [phone]        F: [phone]       [employer]            FOLLOW UP NOTE    Date of Service: 2024  Patient ID: Mary Lou Flores   : 1965  MRN: [id number]   Acct Number: [id number]       DATE OF SERVICE: 2024   LOCATION: [facility]  PROVIDER: Rena Mathews PA-C    FOLLOW UP PHYSICIANS: JASON Castillo (Pulm)     ASSESSMENT AND PLAN:   Cancer Staging   Primary malignant neoplasm of left upper lobe of lung (HCC)  Staging form: Lung, AJCC 8th Edition  - Clinical stage from 2024: Stage IA2 (cT1b, cN0, cM0) - Signed by Rena Mathews PA-C on 2024      - Mary Lou Flores is a 59 y.o. female who presents today with  for regularly-scheduled follow-up for her non-small cell lung cancer. She completed SBRT to the left lung nodule on 3/12/24   - The patient appears to be doing well overall, apart from report of chronic COPD symptoms, GERD symptoms, and recent ear infection, currently on antibiotics. She reports shortness of breath and cough are chronic and stable, and states she is compliant with pulmonology therapies  - Discussed with patient that recent CT chest completed on 24 resulted as: decreased size of treated lung nodule  - Continue to follow with pulmonology and all other medical providers  - We will order CT chest to be completed in 3 months. I offered CT abd/pelvis and MRI brain for other symptoms but patient declined at this juncture  - I advised the patient to call with any new/worsening symptoms that may arise before her return visit; the patient voiced understanding and agreement     The patient will return to clinic in 3 months or sooner if clinically indicated. They have our clinic number to call with any questions or concerns if needed. Thank you for allowing us to be a part of their care.    HPI:  Oncology History

## 2024-08-28 NOTE — TELEPHONE ENCOUNTER
Please call pt.  Her wbc was slightly high.  In case she has a sinus infection brewing as well I called in a zpak.  Also it showed low sodium.  This may be causing her muscle spasm.  Sometimes the cause of low sodium is drinking too much water.  Is patient drinking a lot of water?

## 2024-08-28 NOTE — TELEPHONE ENCOUNTER
With her WBC being up I did call in an oral antibiotic as well today.  Lets see if that makes a difference with her symptoms

## 2024-08-28 NOTE — TELEPHONE ENCOUNTER
Spoke with patient.  Patient voiced understanding     Patient states she does not feel she is drinking more water then she should.  Patient asking what the next step would be for the low sodium?

## 2024-08-28 NOTE — CARE COORDINATION
Patient not available at that time of f/u call and generic voicemail message left asking patient to please return call to [facility] direct number.

## 2024-08-28 NOTE — TELEPHONE ENCOUNTER
Try to increase sodium in diet, perhaps have some gatorade, or crackers during the day,  ok to have a can of soup.  Recheck sodium level in 2 weeks

## 2024-08-28 NOTE — CARE COORDINATION
Ambulatory Care Coordination Note     2024 3:09 PM     Patient Current Location:  Home: [address] [place] [address] [place] [address]     ACM contacted the patient by telephone. Verified name and  with patient as identifiers.         ACM: Vianney Lee RN     Challenges to be reviewed by the provider   Additional needs identified to be addressed with provider Yes    - Pt shared she tried OTC Debrox and feels it made her ear feel \"rodriguez\" but overall her ear is feeling better.    - Pt cont. with \"burning\" in her mouth/stomach & is unsure if thrush has resolved.  Recent sinus symptoms are improving but \"pressure\" continues.  Wheezing has resolved and cough is much improved with clear drainage.   - Pt also with ongoing low back pain that radiates to sides and \"fullness\" around front.  Patient unsure if it is r/t bladder/UTI concerns.   - ACM reviewed recent lab f/u instructions with patient and she verbalized understanding.  Pt shared she does not feel she is drinking excessive amounts of water as she typically drinks 3 bottles of water a day but sometimes it is more.          Method of communication with provider: chart routing.    Care Summary Note: Patient was called for continued Care Coordination follow up and education re: the management of her CHF, COPD, Hypertension, and healthcare needs.  Patient shared she is feeling better, but is tired today as she had a f/u with her RT Oncologist.  Patient reported her appointment was good and she denied any questions or concerns.  Patient reported her breathing remains at her baseline and she denied any recent c/o wheezing being present.  Patient reported cough has also improved and she has minimal clear secretions present.  Patient continues with sinus \"pressure\" and c/o low back pain/bladder/front fullness that is unchanged from recent PCP visit.  ACM also reviewed recent lab f/u directions/instructions and she verbalized understanding.  PCP updated of ongoing  symptoms.  Patient shared recent ear symptoms are improving.  ACM reviewed signs/symptoms to report and zone education with patient.  Importance of early symptom recognition and follow up as well as the need to call for an earlier appointment with any new/change/worsening of symptoms.  Patient verbalized understanding.  Patient denied any other questions, concerns, or needs and she was encouraged to call with any that may develop.      Offered patient enrollment in the Remote Patient Monitoring (RPM) program for in-home monitoring: Yes, but did not enroll at this time: - Patient declines RPM enrollment .     Assessments Completed:   No changes since last call    Medications Reviewed:   Patient aware of recent changes and she denied any questions or concerns.     Advance Care Planning:   Reviewed during previous call      Care Planning:    Goals Addressed                   This Visit's Progress       Care Coordination     Conditions and Symptoms   Improving     I will schedule office visits, as directed by my provider.  I will keep my appointment or reschedule if I have to cancel.  I will notify my provider of any barriers to my plan of care.  I will follow my Zone Management tool to seek urgent or emergent care.  I will notify my provider of any symptoms that indicate a worsening of my condition.    Barriers: overwhelmed by complexity of regimen, stress, and lack of education  Plan for overcoming my barriers: Ongoing education and monitoring of resource needs   Confidence: 7/10  Anticipated Goal Completion Date: 6/15/2024  Update: Cont. Education & monitoring of resource needs.  New Goal Date: 9/20/2024                 PCP/Specialist follow up:   Future Appointments         Provider Specialty Dept Phone    9/17/2024 2:00 PM Reyna Garrett APRN - [facility] [phone]    9/26/2024 1:15 PM Piyush Castellanos APRN - [facility] Family Medicine [phone]    11/21/2024 10:00 AM (Arrive by 9:30 AM) [facility] CT

## 2024-08-29 NOTE — CARE COORDINATION
Patient called and updated of PCP's recommendations and instructions.  Patient verbalized understanding and denied any further questions or concerns.

## 2024-09-08 DIAGNOSIS — H60.502 ACUTE OTITIS EXTERNA OF LEFT EAR, UNSPECIFIED TYPE: ICD-10-CM

## 2024-09-09 ENCOUNTER — CARE COORDINATION (OUTPATIENT)
Dept: CARE COORDINATION | Age: 59
End: 2024-09-09

## 2024-09-09 DIAGNOSIS — J41.1 MUCOPURULENT CHRONIC BRONCHITIS (HCC): Primary | ICD-10-CM

## 2024-09-09 RX ORDER — OFLOXACIN 3 MG/ML
5 SOLUTION AURICULAR (OTIC) 2 TIMES DAILY
Qty: 10 ML | Refills: 0 | Status: SHIPPED | OUTPATIENT
Start: 2024-09-09 | End: 2024-09-19

## 2024-09-09 ASSESSMENT — ENCOUNTER SYMPTOMS: DYSPNEA ASSOCIATED WITH: EXERTION

## 2024-09-11 ENCOUNTER — LAB (OUTPATIENT)
Dept: LAB | Age: 59
End: 2024-09-11

## 2024-09-11 DIAGNOSIS — E87.1 HYPONATREMIA: ICD-10-CM

## 2024-09-11 DIAGNOSIS — J41.1 MUCOPURULENT CHRONIC BRONCHITIS (HCC): ICD-10-CM

## 2024-09-11 LAB
BASOPHILS ABSOLUTE: 0.1 THOU/MM3 (ref 0–0.1)
BASOPHILS NFR BLD AUTO: 0.5 %
DEPRECATED RDW RBC AUTO: 43.6 FL (ref 35–45)
EOSINOPHIL NFR BLD AUTO: 0.6 %
EOSINOPHILS ABSOLUTE: 0.1 THOU/MM3 (ref 0–0.4)
ERYTHROCYTE [DISTWIDTH] IN BLOOD BY AUTOMATED COUNT: 12.4 % (ref 11.5–14.5)
HCT VFR BLD AUTO: 38 % (ref 37–47)
HGB BLD-MCNC: 12.6 GM/DL (ref 12–16)
IMM GRANULOCYTES # BLD AUTO: 0.06 THOU/MM3 (ref 0–0.07)
IMM GRANULOCYTES NFR BLD AUTO: 0.4 %
LYMPHOCYTES ABSOLUTE: 2.4 THOU/MM3 (ref 1–4.8)
LYMPHOCYTES NFR BLD AUTO: 16.8 %
MCH RBC QN AUTO: 31.5 PG (ref 26–33)
MCHC RBC AUTO-ENTMCNC: 33.2 GM/DL (ref 32.2–35.5)
MCV RBC AUTO: 95 FL (ref 81–99)
MONOCYTES ABSOLUTE: 0.7 THOU/MM3 (ref 0.4–1.3)
MONOCYTES NFR BLD AUTO: 4.6 %
NEUTROPHILS ABSOLUTE: 11 THOU/MM3 (ref 1.8–7.7)
NEUTROPHILS NFR BLD AUTO: 77.1 %
NRBC BLD AUTO-RTO: 0 /100 WBC
PLATELET # BLD AUTO: 334 THOU/MM3 (ref 130–400)
PMV BLD AUTO: 11.8 FL (ref 9.4–12.4)
RBC # BLD AUTO: 4 MILL/MM3 (ref 4.2–5.4)
WBC # BLD AUTO: 14.3 THOU/MM3 (ref 4.8–10.8)

## 2024-09-12 ENCOUNTER — TELEPHONE (OUTPATIENT)
Dept: FAMILY MEDICINE CLINIC | Age: 59
End: 2024-09-12

## 2024-09-12 DIAGNOSIS — R73.9 HYPERGLYCEMIA: Primary | ICD-10-CM

## 2024-09-12 DIAGNOSIS — R73.9 HYPERGLYCEMIA: ICD-10-CM

## 2024-09-12 LAB
ALBUMIN SERPL BCG-MCNC: 4.2 G/DL (ref 3.5–5.1)
ANION GAP SERPL CALC-SCNC: 11 MEQ/L (ref 8–16)
BUN SERPL-MCNC: 7 MG/DL (ref 7–22)
CALCIUM SERPL-MCNC: 9.4 MG/DL (ref 8.5–10.5)
CHLORIDE SERPL-SCNC: 88 MEQ/L (ref 98–111)
CO2 SERPL-SCNC: 31 MEQ/L (ref 23–33)
CREAT SERPL-MCNC: 0.6 MG/DL (ref 0.4–1.2)
DEPRECATED MEAN GLUCOSE BLD GHB EST-ACNC: 117 MG/DL (ref 70–126)
GFR SERPL CREATININE-BSD FRML MDRD: > 90 ML/MIN/1.73M2
GLUCOSE SERPL-MCNC: 187 MG/DL (ref 70–108)
HBA1C MFR BLD HPLC: 5.9 % (ref 4.4–6.4)
PHOSPHATE SERPL-MCNC: 2.9 MG/DL (ref 2.4–4.7)
POTASSIUM SERPL-SCNC: 4.5 MEQ/L (ref 3.5–5.2)
SODIUM SERPL-SCNC: 130 MEQ/L (ref 135–145)

## 2024-09-12 RX ORDER — DOXYCYCLINE HYCLATE 100 MG
100 TABLET ORAL 2 TIMES DAILY
Qty: 20 TABLET | Refills: 0 | Status: SHIPPED | OUTPATIENT
Start: 2024-09-12 | End: 2024-09-22

## 2024-09-17 ENCOUNTER — OFFICE VISIT (OUTPATIENT)
Dept: PULMONOLOGY | Age: 59
End: 2024-09-17
Payer: MEDICARE

## 2024-09-17 VITALS
WEIGHT: 191.2 LBS | OXYGEN SATURATION: 95 % | TEMPERATURE: 97.8 F | HEART RATE: 94 BPM | DIASTOLIC BLOOD PRESSURE: 70 MMHG | BODY MASS INDEX: 35.19 KG/M2 | SYSTOLIC BLOOD PRESSURE: 128 MMHG | HEIGHT: 62 IN

## 2024-09-17 DIAGNOSIS — C34.92 ADENOCARCINOMA OF LEFT LUNG (HCC): ICD-10-CM

## 2024-09-17 DIAGNOSIS — J44.9 STAGE 3 SEVERE COPD BY GOLD CLASSIFICATION (HCC): Primary | ICD-10-CM

## 2024-09-17 DIAGNOSIS — F17.210 CIGARETTE NICOTINE DEPENDENCE, UNCOMPLICATED: ICD-10-CM

## 2024-09-17 DIAGNOSIS — J30.9 ALLERGIC RHINITIS, UNSPECIFIED SEASONALITY, UNSPECIFIED TRIGGER: ICD-10-CM

## 2024-09-17 DIAGNOSIS — J96.11 CHRONIC RESPIRATORY FAILURE WITH HYPOXIA (HCC): ICD-10-CM

## 2024-09-17 DIAGNOSIS — J43.9 PULMONARY EMPHYSEMA, UNSPECIFIED EMPHYSEMA TYPE (HCC): ICD-10-CM

## 2024-09-17 PROCEDURE — 99214 OFFICE O/P EST MOD 30 MIN: CPT

## 2024-09-17 PROCEDURE — 3074F SYST BP LT 130 MM HG: CPT

## 2024-09-17 PROCEDURE — 3078F DIAST BP <80 MM HG: CPT

## 2024-09-17 RX ORDER — ALBUTEROL SULFATE 90 UG/1
2 AEROSOL, METERED RESPIRATORY (INHALATION) EVERY 6 HOURS PRN
Qty: 18 G | Refills: 11 | Status: SHIPPED | OUTPATIENT
Start: 2024-09-17

## 2024-09-17 RX ORDER — ENSIFENTRINE 3 MG/2.5ML
1 SUSPENSION RESPIRATORY (INHALATION) 2 TIMES DAILY
Qty: 150 ML | Refills: 11 | Status: SHIPPED | OUTPATIENT
Start: 2024-09-17

## 2024-09-17 RX ORDER — FLUTICASONE PROPIONATE 50 MCG
2 SPRAY, SUSPENSION (ML) NASAL DAILY
Qty: 16 G | Refills: 11 | Status: SHIPPED | OUTPATIENT
Start: 2024-09-17 | End: 2025-09-17

## 2024-09-17 ASSESSMENT — ENCOUNTER SYMPTOMS
COUGH: 1
SINUS PRESSURE: 1
SHORTNESS OF BREATH: 1
WHEEZING: 0
RHINORRHEA: 1
SINUS PAIN: 0
CHEST TIGHTNESS: 0

## 2024-09-20 ENCOUNTER — TELEPHONE (OUTPATIENT)
Dept: PULMONOLOGY | Age: 59
End: 2024-09-20

## 2024-09-26 ENCOUNTER — OFFICE VISIT (OUTPATIENT)
Dept: FAMILY MEDICINE CLINIC | Age: 59
End: 2024-09-26

## 2024-09-26 VITALS
TEMPERATURE: 98.2 F | SYSTOLIC BLOOD PRESSURE: 120 MMHG | DIASTOLIC BLOOD PRESSURE: 64 MMHG | BODY MASS INDEX: 35.3 KG/M2 | HEIGHT: 62 IN | HEART RATE: 92 BPM | WEIGHT: 191.8 LBS | RESPIRATION RATE: 16 BRPM | OXYGEN SATURATION: 97 %

## 2024-09-26 DIAGNOSIS — J44.9 CHRONIC OBSTRUCTIVE PULMONARY DISEASE, UNSPECIFIED COPD TYPE (HCC): ICD-10-CM

## 2024-09-26 DIAGNOSIS — F41.9 ANXIETY: ICD-10-CM

## 2024-09-26 DIAGNOSIS — I10 ESSENTIAL HYPERTENSION: ICD-10-CM

## 2024-09-26 DIAGNOSIS — R73.9 HYPERGLYCEMIA: ICD-10-CM

## 2024-09-26 DIAGNOSIS — C34.90 MALIGNANT NEOPLASM OF LUNG, UNSPECIFIED LATERALITY, UNSPECIFIED PART OF LUNG (HCC): ICD-10-CM

## 2024-09-26 DIAGNOSIS — H60.502 ACUTE OTITIS EXTERNA OF LEFT EAR, UNSPECIFIED TYPE: ICD-10-CM

## 2024-09-26 DIAGNOSIS — J44.1 COPD EXACERBATION (HCC): ICD-10-CM

## 2024-09-26 DIAGNOSIS — Z00.00 INITIAL MEDICARE ANNUAL WELLNESS VISIT: Primary | ICD-10-CM

## 2024-09-26 ASSESSMENT — PATIENT HEALTH QUESTIONNAIRE - PHQ9
SUM OF ALL RESPONSES TO PHQ QUESTIONS 1-9: 0
2. FEELING DOWN, DEPRESSED OR HOPELESS: NOT AT ALL
SUM OF ALL RESPONSES TO PHQ QUESTIONS 1-9: 0
SUM OF ALL RESPONSES TO PHQ QUESTIONS 1-9: 0
SUM OF ALL RESPONSES TO PHQ9 QUESTIONS 1 & 2: 0
SUM OF ALL RESPONSES TO PHQ QUESTIONS 1-9: 0
2. FEELING DOWN, DEPRESSED OR HOPELESS: NOT AT ALL
SUM OF ALL RESPONSES TO PHQ QUESTIONS 1-9: 0
1. LITTLE INTEREST OR PLEASURE IN DOING THINGS: NOT AT ALL
1. LITTLE INTEREST OR PLEASURE IN DOING THINGS: NOT AT ALL
SUM OF ALL RESPONSES TO PHQ9 QUESTIONS 1 & 2: 0

## 2024-09-26 ASSESSMENT — LIFESTYLE VARIABLES
HOW MANY STANDARD DRINKS CONTAINING ALCOHOL DO YOU HAVE ON A TYPICAL DAY: 1 OR 2
HOW OFTEN DO YOU HAVE A DRINK CONTAINING ALCOHOL: 2-3 TIMES A WEEK

## 2024-10-01 ENCOUNTER — TELEPHONE (OUTPATIENT)
Dept: FAMILY MEDICINE CLINIC | Age: 59
End: 2024-10-01

## 2024-10-01 DIAGNOSIS — J44.1 COPD EXACERBATION (HCC): ICD-10-CM

## 2024-10-01 RX ORDER — PREDNISONE 20 MG/1
40 TABLET ORAL DAILY
Qty: 10 TABLET | Refills: 0 | Status: SHIPPED | OUTPATIENT
Start: 2024-10-01 | End: 2024-10-06

## 2024-10-01 RX ORDER — FLUCONAZOLE 100 MG/1
100 TABLET ORAL DAILY
Qty: 14 TABLET | Refills: 0 | Status: SHIPPED | OUTPATIENT
Start: 2024-10-01 | End: 2024-10-15

## 2024-10-01 RX ORDER — AZITHROMYCIN 250 MG/1
TABLET, FILM COATED ORAL
Qty: 1 PACKET | Refills: 0 | Status: SHIPPED | OUTPATIENT
Start: 2024-10-01

## 2024-10-01 NOTE — TELEPHONE ENCOUNTER
Pt called stating that she thinks she has thrush again. She stated that her tongue become white a few days ago and also the side of her mouth. She has been on an ATB and stated that she will usually get thrush. She wanted to know if you would rx her something to relieve her sx's. She also stated that she is out of a steroid and ATB to have on hand. She is wanting a refill on those as well.

## 2024-11-01 DIAGNOSIS — I10 ESSENTIAL HYPERTENSION: ICD-10-CM

## 2024-11-01 RX ORDER — LISINOPRIL 30 MG/1
30 TABLET ORAL DAILY
Qty: 90 TABLET | Refills: 3 | Status: SHIPPED | OUTPATIENT
Start: 2024-11-01

## 2024-11-01 NOTE — TELEPHONE ENCOUNTER
Last visit- 9/26/2024  Next visit- 12/23/2024    Requested Prescriptions     Pending Prescriptions Disp Refills    lisinopril (PRINIVIL;ZESTRIL) 30 MG tablet [Pharmacy Med Name: lisinopril 30 mg tablet] 90 tablet 3     Sig: TAKE 1 TABLET BY MOUTH DAILY

## 2024-11-21 ENCOUNTER — TELEPHONE (OUTPATIENT)
Dept: PULMONOLOGY | Age: 59
End: 2024-11-21

## 2024-11-21 DIAGNOSIS — J44.9 STAGE 3 SEVERE COPD BY GOLD CLASSIFICATION (HCC): Primary | ICD-10-CM

## 2024-11-21 RX ORDER — NEBULIZER ACCESSORIES
1 KIT MISCELLANEOUS EVERY 6 HOURS PRN
Qty: 1 KIT | Refills: 1 | Status: SHIPPED | OUTPATIENT
Start: 2024-11-21 | End: 2025-11-21

## 2024-11-22 NOTE — TELEPHONE ENCOUNTER
Patient is needing refills of her incontinence supplies. Called [facility] in [place] and they are faxing over the form to be completed.

## 2024-11-26 ENCOUNTER — HOSPITAL ENCOUNTER (OUTPATIENT)
Dept: CT IMAGING | Age: 59
Discharge: HOME | End: 2024-11-26
Payer: MEDICARE

## 2024-11-26 DIAGNOSIS — C34.12 PRIMARY MALIGNANT NEOPLASM OF LEFT UPPER LOBE OF LUNG (HCC): ICD-10-CM

## 2024-11-26 LAB
CREAT BLD-MCNC: 0.6 MG/DL (ref 0.5–1.2)
GFR SERPL CREATININE-BSD FRML MDRD: > 90 ML/MIN/1.73M2

## 2024-11-26 PROCEDURE — 71260 CT THORAX DX C+: CPT

## 2024-11-26 PROCEDURE — 6360000004 HC RX CONTRAST MEDICATION

## 2024-11-26 PROCEDURE — 82565 ASSAY OF CREATININE: CPT

## 2024-11-26 RX ORDER — IOPAMIDOL 755 MG/ML
100 INJECTION, SOLUTION INTRAVASCULAR
Status: COMPLETED | OUTPATIENT
Start: 2024-11-26 | End: 2024-11-26

## 2024-11-26 RX ADMIN — IOPAMIDOL 100 ML: 755 INJECTION, SOLUTION INTRAVENOUS at 08:56

## 2024-11-27 DIAGNOSIS — I10 ESSENTIAL HYPERTENSION: ICD-10-CM

## 2024-11-27 RX ORDER — CLONIDINE HYDROCHLORIDE 0.1 MG/1
0.1 TABLET ORAL DAILY
Qty: 90 TABLET | Refills: 1 | Status: SHIPPED | OUTPATIENT
Start: 2024-11-27

## 2024-11-27 NOTE — TELEPHONE ENCOUNTER
Last visit- 9/26/2024  Next visit- 12/23/2024    Requested Prescriptions     Pending Prescriptions Disp Refills    cloNIDine (CATAPRES) 0.1 MG tablet [Pharmacy Med Name: clonidine HCl 0.1 mg tablet] 90 tablet 1     Sig: TAKE 1 TABLET BY MOUTH DAILY

## 2024-12-06 ENCOUNTER — HOSPITAL ENCOUNTER (OUTPATIENT)
Dept: RADIATION ONCOLOGY | Age: 59
Discharge: HOME | End: 2024-12-06
Payer: MEDICARE

## 2024-12-06 VITALS
OXYGEN SATURATION: 97 % | WEIGHT: 194 LBS | DIASTOLIC BLOOD PRESSURE: 61 MMHG | BODY MASS INDEX: 35.47 KG/M2 | SYSTOLIC BLOOD PRESSURE: 130 MMHG | RESPIRATION RATE: 20 BRPM | TEMPERATURE: 98.3 F | HEART RATE: 98 BPM

## 2024-12-06 DIAGNOSIS — C34.12 PRIMARY MALIGNANT NEOPLASM OF LEFT UPPER LOBE OF LUNG (HCC): ICD-10-CM

## 2024-12-06 DIAGNOSIS — R91.1 NODULE OF UPPER LOBE OF LEFT LUNG: Primary | ICD-10-CM

## 2024-12-06 PROCEDURE — 99214 OFFICE O/P EST MOD 30 MIN: CPT

## 2024-12-06 PROCEDURE — 99212 OFFICE O/P EST SF 10 MIN: CPT

## 2024-12-06 ASSESSMENT — ENCOUNTER SYMPTOMS
VOMITING: 1
WHEEZING: 1
APNEA: 0
CONSTIPATION: 1
NAUSEA: 0
TROUBLE SWALLOWING: 0
BACK PAIN: 1
COUGH: 1
CHEST TIGHTNESS: 0
ABDOMINAL PAIN: 1
BLOOD IN STOOL: 0
SORE THROAT: 0
SHORTNESS OF BREATH: 1

## 2024-12-06 ASSESSMENT — PAIN SCALES - GENERAL: PAINLEVEL_OUTOF10: 6

## 2024-12-06 ASSESSMENT — PAIN DESCRIPTION - LOCATION: LOCATION: BACK

## 2024-12-06 ASSESSMENT — PAIN DESCRIPTION - FREQUENCY: FREQUENCY: CONTINUOUS

## 2024-12-06 NOTE — PROGRESS NOTES
1 capsule by mouth in the morning and at bedtime 60 capsule 5    EPINEPHrine (EPIPEN 2-JERE) 0.3 MG/0.3ML SOAJ injection Inject 0.3 mLs into the skin once for 1 dose Use as directed for allergic reaction 0.3 mL 0    guaiFENesin 400 MG tablet Take 3 tablets by mouth daily And can take 1 in the morning as well      calcium carbonate (TUMS) 500 MG chewable tablet Take 1 tablet by mouth as needed for Heartburn      sodium chloride (BRONCHO SALINE) 0.9 % inhaler solution Use 3 mL by nebulization three times per day as needed for throat/airway secretions. 270 mL 11    albuterol (PROVENTIL) (2.5 MG/3ML) 0.083% nebulizer solution Take 3 mLs by nebulization every 6 hours as needed for Wheezing or Shortness of Breath 120 each 11    ondansetron (ZOFRAN) 8 MG tablet Take 1 tablet by mouth every 8 hours as needed for Nausea or Vomiting 30 tablet 5    ACETAMINOPHEN EXTRA STRENGTH 500 MG tablet TAKE 1-2 TABLETS BY MOUTH 4 TIMES DAILY AS NEEDED FOR PAIN 120 tablet 5    Misc. Devices (MATTRESS PAD) MISC Dry pressure mattress 1 each 0    Incontinence Supply Disposable (BLADDER CONTROL PADS EX ABSORB) MISC Use up to 4 a day 120 each 3    Incontinence Supply Disposable (PROCARE ADULT BRIEFS LARGE) MISC Use 2 a day 60 each 5     No current facility-administered medications for this encounter.       All portions of this note that were completed during the initial nursing assessment were discussed and reviewed in detail with the nursing staff member who completed this portion of the note and I agree with the information and assessment as written. A complete review of systems was performed and found to be negative except as presented above.    PHYSICAL EXAMINATION:  VITAL SIGNS: /61   Pulse 98   Temp 98.3 °F (36.8 °C) (Infrared)   Resp 20   Wt 88 kg (194 lb)   SpO2 97%   BMI 35.47 kg/m²     ECOG: 3 - Symptomatic, >50% in bed, but not bedbound (Capable of only limited self-care, confined to bed or chair 50% or more of waking

## 2024-12-17 NOTE — TELEPHONE ENCOUNTER
ICU RN at bedside for transfer of pt to [facility].  All belongings with pt and wife with pt on transfer.     Received BP reading from pt. Dr Marquita Basurto reviewed. Verbal Order, increase Coreg to 25 mg BID. Pt notified. Pended. Please agree with verbal order.

## 2024-12-23 ENCOUNTER — OFFICE VISIT (OUTPATIENT)
Dept: FAMILY MEDICINE CLINIC | Age: 59
End: 2024-12-23

## 2024-12-23 VITALS
SYSTOLIC BLOOD PRESSURE: 120 MMHG | RESPIRATION RATE: 16 BRPM | OXYGEN SATURATION: 98 % | BODY MASS INDEX: 35.33 KG/M2 | HEART RATE: 98 BPM | TEMPERATURE: 97.6 F | WEIGHT: 192 LBS | HEIGHT: 62 IN | DIASTOLIC BLOOD PRESSURE: 60 MMHG

## 2024-12-23 DIAGNOSIS — F41.9 ANXIETY: ICD-10-CM

## 2024-12-23 DIAGNOSIS — T78.40XA ALLERGIC REACTION, INITIAL ENCOUNTER: ICD-10-CM

## 2024-12-23 DIAGNOSIS — J44.9 CHRONIC OBSTRUCTIVE PULMONARY DISEASE, UNSPECIFIED COPD TYPE (HCC): ICD-10-CM

## 2024-12-23 DIAGNOSIS — I10 ESSENTIAL HYPERTENSION: Primary | ICD-10-CM

## 2024-12-23 DIAGNOSIS — J44.1 COPD EXACERBATION (HCC): ICD-10-CM

## 2024-12-23 RX ORDER — PREDNISONE 20 MG/1
40 TABLET ORAL DAILY
Qty: 10 TABLET | Refills: 0 | Status: SHIPPED | OUTPATIENT
Start: 2024-12-23 | End: 2024-12-28

## 2024-12-23 ASSESSMENT — ENCOUNTER SYMPTOMS
RESPIRATORY NEGATIVE: 1
EYES NEGATIVE: 1
GASTROINTESTINAL NEGATIVE: 1

## 2024-12-23 NOTE — PROGRESS NOTES
Mary Lou Flores is a 59 y.o. female who presents today for :  Chief Complaint   Patient presents with    3 Month Follow-Up    Rash     Rash on face and chest and back-finished an ATB on Thursday for a cold    Thrush     Vitals:    12/23/24 1305   BP: 120/60   Pulse: 98   Resp: 16   Temp: 97.6 °F (36.4 °C)   SpO2: 98%       HPI:     History of Present Illness  The patient presents for evaluation of a rash.    She reports the development of a rash on her face, chest, arms, and back, characterized by a burning sensation and itching. She also notes a change in her tongue, describing it as feeling swollen and thicker, along with an increase in lip size. She does not experience any difficulty breathing but reports a sensation of warmth. She has previously taken Zithromax without concurrent steroid use. She has a history of using steroids in conjunction with Zithromax but did not feel the need for steroids during this course of treatment. She has a supply of Medrol Dosepak at home and has previously responded well to prednisone. She has not received her influenza vaccine this season and expresses a desire to do so. She also reports a sensation of something in her ear, accompanied by mild pain. She has a scheduled appointment with her pulmonologist next month. She has not yet started using her new nebulizer as she did not feel it was necessary. . She also reports a sensation of something in her ear, accompanied by mild pain. She recently completed a course of Zithromax, which she believes may have triggered the rash. The rash appeared on Friday, following the completion of her antibiotic course on Thursday. She has been applying lotion to the affected areas and has taken Benadryl. She recalls a similar reaction to penicillin in the past. She has previously taken doxycycline, which she found effective.    ALLERGIES  The patient is allergic to PENICILLIN and ZITHROMAX.    MEDICATIONS  Current: Zithromax,

## 2024-12-26 ENCOUNTER — TELEPHONE (OUTPATIENT)
Dept: FAMILY MEDICINE CLINIC | Age: 59
End: 2024-12-26

## 2024-12-26 NOTE — TELEPHONE ENCOUNTER
Pt had to stop antibiotic due to allergic reaction and has one more dose of steroid left for tomorrow. She is getting her sinus symptoms back, green phlegm and cough. Asking if she is to take a different antibiotic ??

## 2025-01-07 DIAGNOSIS — I10 ESSENTIAL HYPERTENSION: ICD-10-CM

## 2025-01-07 DIAGNOSIS — F41.9 ANXIETY: ICD-10-CM

## 2025-01-07 DIAGNOSIS — K21.9 GASTROESOPHAGEAL REFLUX DISEASE WITHOUT ESOPHAGITIS: ICD-10-CM

## 2025-01-07 RX ORDER — CARVEDILOL 25 MG/1
25 TABLET ORAL 2 TIMES DAILY WITH MEALS
Qty: 180 TABLET | Refills: 1 | Status: SHIPPED | OUTPATIENT
Start: 2025-01-07

## 2025-01-07 RX ORDER — LISINOPRIL 30 MG/1
30 TABLET ORAL DAILY
Qty: 90 TABLET | Refills: 3 | Status: SHIPPED | OUTPATIENT
Start: 2025-01-07

## 2025-01-07 RX ORDER — BUMETANIDE 1 MG/1
1 TABLET ORAL DAILY
Qty: 90 TABLET | Refills: 0 | Status: SHIPPED | OUTPATIENT
Start: 2025-01-07

## 2025-01-07 RX ORDER — CLONIDINE HYDROCHLORIDE 0.1 MG/1
0.1 TABLET ORAL DAILY
Qty: 90 TABLET | Refills: 1 | Status: SHIPPED | OUTPATIENT
Start: 2025-01-07

## 2025-01-07 RX ORDER — ALPRAZOLAM 0.25 MG/1
0.25 TABLET ORAL NIGHTLY PRN
Qty: 30 TABLET | Refills: 2 | Status: SHIPPED | OUTPATIENT
Start: 2025-01-07 | End: 2025-02-06

## 2025-01-07 RX ORDER — ONDANSETRON 8 MG/1
8 TABLET, FILM COATED ORAL EVERY 8 HOURS PRN
Qty: 30 TABLET | Refills: 5 | Status: SHIPPED | OUTPATIENT
Start: 2025-01-07

## 2025-01-09 ENCOUNTER — TELEPHONE (OUTPATIENT)
Dept: FAMILY MEDICINE CLINIC | Age: 60
End: 2025-01-09

## 2025-01-09 RX ORDER — DOXYCYCLINE HYCLATE 100 MG
100 TABLET ORAL 2 TIMES DAILY
Qty: 28 TABLET | Refills: 0 | Status: SHIPPED | OUTPATIENT
Start: 2025-01-09 | End: 2025-01-23

## 2025-01-09 NOTE — TELEPHONE ENCOUNTER
Pt called stating that she had finished her ATB, omnicef, last week. She stated her cold sx's improved, but have returned again a few days ago. She is coughing up yellow phlegm again and is extremely congested. She has been using Mucinex. She wants to know if she needs an ATB again? Please advise.

## 2025-01-15 ENCOUNTER — TELEPHONE (OUTPATIENT)
Dept: FAMILY MEDICINE CLINIC | Age: 60
End: 2025-01-15

## 2025-01-15 RX ORDER — METHYLPREDNISOLONE 4 MG/1
TABLET ORAL
Qty: 1 KIT | Refills: 0 | Status: SHIPPED | OUTPATIENT
Start: 2025-01-15 | End: 2025-01-21

## 2025-01-15 NOTE — TELEPHONE ENCOUNTER
Pt calling stating that she was started on an antibiotic 6 days ago. States the antibiotic is for 14 days. States she feels like her symptoms are not improving. States she thinks she needs a steroid as well. If called in, pt would like it to go to [facility] in [place]. Please adivse.

## 2025-01-22 DIAGNOSIS — J44.9 STAGE 3 SEVERE COPD BY GOLD CLASSIFICATION (HCC): ICD-10-CM

## 2025-01-22 DIAGNOSIS — J43.9 PULMONARY EMPHYSEMA, UNSPECIFIED EMPHYSEMA TYPE (HCC): ICD-10-CM

## 2025-01-22 RX ORDER — ALBUTEROL SULFATE 0.83 MG/ML
2.5 SOLUTION RESPIRATORY (INHALATION) EVERY 6 HOURS PRN
Qty: 120 EACH | Refills: 11 | Status: SHIPPED | OUTPATIENT
Start: 2025-01-22 | End: 2026-01-22

## 2025-01-22 NOTE — TELEPHONE ENCOUNTER
Patient called in and stated that she is in need of her albuterol nebs to be sent to [facility] pharmacy.   Last ordered 8/9/23  Last visit 9/17/24  Next visit 1/28/25

## 2025-01-27 NOTE — PROGRESS NOTES
[facility]    Patient: MARY LOU GALVAN, 59 y.o.   : 1965      Patient of Dr. Garcia and Reyna Garrett CNP     Assessment/Plan   1. Stage 3 severe COPD by GOLD classification (HCC)    2. Pulmonary emphysema, unspecified emphysema type (HCC)    3. Chronic respiratory failure with hypoxia    4. Cigarette nicotine dependence, uncomplicated    5. Adenocarcinoma of left lung (HCC)     -Patient seems to be stable at this time and doing well, recovering from persistent upper respiratory tract infection which had been affecting her breathing.  No treatments provided by us.   -Continue current treatment with Breztri, albuterol  -Patient was encouraged to begin using Ohtuvayre to reduce daytime symptoms as well as avoid additional exacerbations and hospitalizations  -She was educated on mechanism of action as well as indications for use for Ohtuvayre and is agreeable to begin using twice daily through nebulizer  -Continue using 4 L/min with exertion as needed  -Discussed with patient smoking cessation techniques including patches and gum patient reports has used in the past and did help, reinforced to patient the importance of quitting smoking to prevent further damage to lung function, patient verbalized understanding. (Approximately 5mins)   -Not motivated to quit smoking at this time.  Patient instructed to notify office if she would like any help with tobacco cessation  -Continue to follow-up with radiation oncology for management of adenocarcinoma of left lung  -Reviewed preventative vaccinations    Advised patient to call office with any changes, questions, or concerns regarding respiratory status or issues with prescribed medications    Return in about 3 months (around 2025) for COPD in [place], med check.     Subjective     Chief Complaint   Patient presents with    Follow-up     4 mo copd med check f/u        PETR  Mary Lou is here for follow up for

## 2025-01-28 ENCOUNTER — OFFICE VISIT (OUTPATIENT)
Dept: PULMONOLOGY | Age: 60
End: 2025-01-28
Payer: MEDICARE

## 2025-01-28 VITALS
OXYGEN SATURATION: 94 % | TEMPERATURE: 98.2 F | DIASTOLIC BLOOD PRESSURE: 62 MMHG | WEIGHT: 194 LBS | BODY MASS INDEX: 35.7 KG/M2 | HEART RATE: 97 BPM | SYSTOLIC BLOOD PRESSURE: 124 MMHG | HEIGHT: 62 IN

## 2025-01-28 DIAGNOSIS — J43.9 PULMONARY EMPHYSEMA, UNSPECIFIED EMPHYSEMA TYPE (HCC): ICD-10-CM

## 2025-01-28 DIAGNOSIS — C34.92 ADENOCARCINOMA OF LEFT LUNG (HCC): ICD-10-CM

## 2025-01-28 DIAGNOSIS — J44.9 STAGE 3 SEVERE COPD BY GOLD CLASSIFICATION (HCC): Primary | ICD-10-CM

## 2025-01-28 DIAGNOSIS — J96.11 CHRONIC RESPIRATORY FAILURE WITH HYPOXIA: ICD-10-CM

## 2025-01-28 DIAGNOSIS — F17.210 CIGARETTE NICOTINE DEPENDENCE, UNCOMPLICATED: ICD-10-CM

## 2025-01-28 PROCEDURE — 3074F SYST BP LT 130 MM HG: CPT

## 2025-01-28 PROCEDURE — 99214 OFFICE O/P EST MOD 30 MIN: CPT

## 2025-01-28 PROCEDURE — 3078F DIAST BP <80 MM HG: CPT

## 2025-01-28 ASSESSMENT — ENCOUNTER SYMPTOMS
SHORTNESS OF BREATH: 1
SINUS PAIN: 0
SINUS PRESSURE: 0
COUGH: 1
WHEEZING: 1
RHINORRHEA: 0
CHEST TIGHTNESS: 1

## 2025-03-17 ENCOUNTER — HOSPITAL ENCOUNTER (OUTPATIENT)
Dept: CT IMAGING | Age: 60
Discharge: HOME | End: 2025-03-17
Payer: MEDICARE

## 2025-03-17 DIAGNOSIS — C34.12 PRIMARY MALIGNANT NEOPLASM OF LEFT UPPER LOBE OF LUNG (HCC): ICD-10-CM

## 2025-03-17 PROCEDURE — 71250 CT THORAX DX C-: CPT

## 2025-03-23 ENCOUNTER — HOSPITAL ENCOUNTER (EMERGENCY)
Age: 60
Discharge: HOME | End: 2025-03-23
Attending: EMERGENCY MEDICINE
Payer: MEDICARE

## 2025-03-23 ENCOUNTER — APPOINTMENT (OUTPATIENT)
Dept: GENERAL RADIOLOGY | Age: 60
End: 2025-03-23
Payer: MEDICARE

## 2025-03-23 VITALS
HEIGHT: 62 IN | SYSTOLIC BLOOD PRESSURE: 121 MMHG | BODY MASS INDEX: 34.96 KG/M2 | WEIGHT: 190 LBS | RESPIRATION RATE: 16 BRPM | OXYGEN SATURATION: 98 % | HEART RATE: 93 BPM | DIASTOLIC BLOOD PRESSURE: 81 MMHG

## 2025-03-23 DIAGNOSIS — S42.292A CLOSED FRACTURE OF HEAD OF LEFT HUMERUS, INITIAL ENCOUNTER: Primary | ICD-10-CM

## 2025-03-23 PROCEDURE — 96374 THER/PROPH/DIAG INJ IV PUSH: CPT

## 2025-03-23 PROCEDURE — 6360000002 HC RX W HCPCS: Performed by: EMERGENCY MEDICINE

## 2025-03-23 PROCEDURE — 96376 TX/PRO/DX INJ SAME DRUG ADON: CPT

## 2025-03-23 PROCEDURE — 99284 EMERGENCY DEPT VISIT MOD MDM: CPT

## 2025-03-23 PROCEDURE — 6370000000 HC RX 637 (ALT 250 FOR IP): Performed by: EMERGENCY MEDICINE

## 2025-03-23 PROCEDURE — 73030 X-RAY EXAM OF SHOULDER: CPT

## 2025-03-23 RX ORDER — FENTANYL CITRATE 50 UG/ML
50 INJECTION, SOLUTION INTRAMUSCULAR; INTRAVENOUS ONCE
Status: COMPLETED | OUTPATIENT
Start: 2025-03-23 | End: 2025-03-23

## 2025-03-23 RX ORDER — OXYCODONE AND ACETAMINOPHEN 5; 325 MG/1; MG/1
2 TABLET ORAL EVERY 6 HOURS PRN
Qty: 12 TABLET | Refills: 0 | Status: SHIPPED | OUTPATIENT
Start: 2025-03-23 | End: 2025-03-23

## 2025-03-23 RX ORDER — OXYCODONE AND ACETAMINOPHEN 5; 325 MG/1; MG/1
1 TABLET ORAL ONCE
Refills: 0 | Status: COMPLETED | OUTPATIENT
Start: 2025-03-23 | End: 2025-03-23

## 2025-03-23 RX ORDER — OXYCODONE AND ACETAMINOPHEN 5; 325 MG/1; MG/1
2 TABLET ORAL EVERY 6 HOURS PRN
Qty: 12 TABLET | Refills: 0 | Status: SHIPPED | OUTPATIENT
Start: 2025-03-23 | End: 2025-03-26

## 2025-03-23 RX ADMIN — OXYCODONE HYDROCHLORIDE AND ACETAMINOPHEN 1 TABLET: 5; 325 TABLET ORAL at 06:32

## 2025-03-23 RX ADMIN — FENTANYL CITRATE 50 MCG: 50 INJECTION, SOLUTION INTRAMUSCULAR; INTRAVENOUS at 05:07

## 2025-03-23 RX ADMIN — FENTANYL CITRATE 50 MCG: 50 INJECTION, SOLUTION INTRAMUSCULAR; INTRAVENOUS at 05:52

## 2025-03-23 ASSESSMENT — PAIN DESCRIPTION - ORIENTATION
ORIENTATION: LEFT

## 2025-03-23 ASSESSMENT — PAIN SCALES - GENERAL
PAINLEVEL_OUTOF10: 10

## 2025-03-23 ASSESSMENT — PAIN - FUNCTIONAL ASSESSMENT
PAIN_FUNCTIONAL_ASSESSMENT: 0-10

## 2025-03-23 ASSESSMENT — PAIN DESCRIPTION - LOCATION
LOCATION: SHOULDER

## 2025-03-23 NOTE — ED NOTES
Pt medicated per MAR at this time. Pt provided with ice pack for pain management. Call light in reach. Pt resting on cot.

## 2025-03-23 NOTE — DISCHARGE INSTRUCTIONS
Please follow-up with [facility] tomorrow morning at 8 AM.  They have a walk-in clinic for patients coming from our emergency department.      Do not drink alcohol while you are taking pain medication.

## 2025-03-23 NOTE — ED PROVIDER NOTES
Pt Name: Mary Lou Flores  MRN: [id number]  Birthdate 1965  Date of evaluation: 3/23/2025  ED Resident: Carla Aldridge MD  ED Attending: Dr. Oleary     CHIEF COMPLAINT       Chief Complaint   Patient presents with    Shoulder Pain     History obtained from the patient.    HISTORY OF PRESENT ILLNESS    HPI  Mary Lou Flores is a 59 y.o. female who presents to the emergency department for evaluation of left shoulder pain.    Patient states that she was laying in bed when her cell phone rang and it was across the room, she got up to get her cell phone and slipped and fell onto her left shoulder.  No other injuries, was able to get up and walk after, however, she cannot move her left arm.    Patient states that she did have several beers and some aches drinks that evening, she states that she does not drink every day but she does drink regularly because it helps her sleep.      The patient has no other acute complaints at this time.    PAST MEDICAL AND SURGICAL HISTORY     Past Medical History:   Diagnosis Date    Anxiety     Arthritis     Cancer (HCC)     CHF (congestive heart failure) (HCC)     COPD (chronic obstructive pulmonary disease) (HCC)     Depression     Enlargement, spleen     Fatty liver     GERD (gastroesophageal reflux disease)     Headache     Hepatitis A     Hypertension     saw Dr Duff in the past    Ovarian cyst     Oxygen dependent     o2 2liters when walking and at night-sees Yasmani    Pneumonia     Sleep apnea      Past Surgical History:   Procedure Laterality Date    CARDIOVASCULAR STRESS TEST  2020    CT NEEDLE BIOPSY LUNG PERCUTANEOUS W IMAGING GUIDANCE  04/06/2022    CT NEEDLE BIOPSY LUNG PERCUTANEOUS 4/6/2022 [facility] CT SCAN    LUNG BIOPSY  12/2023    [place] d/t lung cancer    TRANSTHORACIC ECHOCARDIOGRAM  2021    TRANSTHORACIC ECHOCARDIOGRAM  2020    [facility]    WISDOM TOOTH EXTRACTION         MEDICATIONS   No current

## 2025-03-23 NOTE — ED TRIAGE NOTES
Pt presents to ED with c/o L shoulder pain. Pt states that she had about 6 beers and \"a few mixed drinks\" this evening. Pt states she was in bed around 0100, she got up to get her phone and fell while walking. Pt denies dizziness. Pt states she got up too fast to answer the phone. Pt denies LOC, denies hitting her head, and denies blood thinners. 4mg Zofran PO given by EMS PTA.

## 2025-03-23 NOTE — DISCHARGE INSTR - COC
Diet List:975940059}    Routes of Feeding: {CHP DME Other Feedings:073182554}  Liquids: {Slp liquid thickness:80220}  Daily Fluid Restriction: {CHP DME Yes amt example:780003077}  Last Modified Barium Swallow with Video (Video Swallowing Test): {Done Not Done Date:}    Treatments at the Time of Hospital Discharge:   Respiratory Treatments: ***  Oxygen Therapy:  {Therapy; copd oxygen:57307}  Ventilator:    {[facility] Vent List:663161972}    Rehab Therapies: {THERAPEUTIC INTERVENTION:7784940046}  Weight Bearing Status/Restrictions: { CC Weight Bearin}  Other Medical Equipment (for information only, NOT a DME order):  {EQUIPMENT:127857679}  Other Treatments: ***    Patient's personal belongings (please select all that are sent with patient):  {[facility] DME Belongings:617208067}    RN SIGNATURE:  {Esignature:120913621}    CASE MANAGEMENT/SOCIAL WORK SECTION    Inpatient Status Date: ***    Readmission Risk Assessment Score:  [facility] RISK OF UNPLANNED READMISSION 2.0             0 Total Score        Discharging to Facility/ Agency   Name:   Address:  Phone:  Fax:    Dialysis Facility (if applicable)   Name:  Address:  Dialysis Schedule:  Phone:  Fax:    / signature: {Esignature:920108894}    PHYSICIAN SECTION    Prognosis: {Prognosis:7998682957}    Condition at Discharge: { Patient Condition:451463705}    Rehab Potential (if transferring to Rehab): {Prognosis:5345530782}    Recommended Labs or Other Treatments After Discharge: ***    Physician Certification: I certify the above information and transfer of Mary Lou Flores  is necessary for the continuing treatment of the diagnosis listed and that she requires {Admit to Appropriate Level of Care:60366} for {GREATER/LESS:738891383} 30 days.     Update Admission H&P: {CHP DME Changes in HandP:224107157}    PHYSICIAN SIGNATURE:  {Esignature:981637147}

## 2025-04-01 ENCOUNTER — TELEPHONE (OUTPATIENT)
Dept: FAMILY MEDICINE CLINIC | Age: 60
End: 2025-04-01

## 2025-04-01 SDOH — ECONOMIC STABILITY: FOOD INSECURITY: WITHIN THE PAST 12 MONTHS, THE FOOD YOU BOUGHT JUST DIDN'T LAST AND YOU DIDN'T HAVE MONEY TO GET MORE.: NEVER TRUE

## 2025-04-01 SDOH — ECONOMIC STABILITY: FOOD INSECURITY: WITHIN THE PAST 12 MONTHS, YOU WORRIED THAT YOUR FOOD WOULD RUN OUT BEFORE YOU GOT MONEY TO BUY MORE.: NEVER TRUE

## 2025-04-01 SDOH — ECONOMIC STABILITY: INCOME INSECURITY: IN THE LAST 12 MONTHS, WAS THERE A TIME WHEN YOU WERE NOT ABLE TO PAY THE MORTGAGE OR RENT ON TIME?: NO

## 2025-04-01 ASSESSMENT — PATIENT HEALTH QUESTIONNAIRE - PHQ9
SUM OF ALL RESPONSES TO PHQ QUESTIONS 1-9: 1
1. LITTLE INTEREST OR PLEASURE IN DOING THINGS: NOT AT ALL
SUM OF ALL RESPONSES TO PHQ9 QUESTIONS 1 & 2: 1
SUM OF ALL RESPONSES TO PHQ QUESTIONS 1-9: 1
2. FEELING DOWN, DEPRESSED OR HOPELESS: SEVERAL DAYS
SUM OF ALL RESPONSES TO PHQ QUESTIONS 1-9: 1
2. FEELING DOWN, DEPRESSED OR HOPELESS: SEVERAL DAYS
1. LITTLE INTEREST OR PLEASURE IN DOING THINGS: NOT AT ALL
SUM OF ALL RESPONSES TO PHQ QUESTIONS 1-9: 1

## 2025-04-01 NOTE — TELEPHONE ENCOUNTER
Pt called stating that her old [facility] bed mattress ( not the air mattress) is sunken to the frame where she gets in and out of bed and asking for a new mattress script to be sent to [facility] [place]

## 2025-04-01 NOTE — TELEPHONE ENCOUNTER
With hospital beds.  We are going to need a office visit for documentation or insurance will not cover the durable medical equipment

## 2025-04-02 ENCOUNTER — OFFICE VISIT (OUTPATIENT)
Dept: FAMILY MEDICINE CLINIC | Age: 60
End: 2025-04-02

## 2025-04-02 VITALS
HEART RATE: 87 BPM | DIASTOLIC BLOOD PRESSURE: 62 MMHG | TEMPERATURE: 97.7 F | OXYGEN SATURATION: 97 % | BODY MASS INDEX: 34.74 KG/M2 | SYSTOLIC BLOOD PRESSURE: 150 MMHG | HEIGHT: 62 IN | RESPIRATION RATE: 16 BRPM

## 2025-04-02 DIAGNOSIS — C34.12 MALIGNANT NEOPLASM OF UPPER LOBE, LEFT BRONCHUS OR LUNG (HCC): ICD-10-CM

## 2025-04-02 DIAGNOSIS — I50.9 ACUTE CONGESTIVE HEART FAILURE, UNSPECIFIED HEART FAILURE TYPE (HCC): ICD-10-CM

## 2025-04-02 DIAGNOSIS — J44.9 CHRONIC OBSTRUCTIVE PULMONARY DISEASE, UNSPECIFIED COPD TYPE (HCC): Primary | ICD-10-CM

## 2025-04-02 DIAGNOSIS — J44.1 COPD EXACERBATION (HCC): ICD-10-CM

## 2025-04-02 DIAGNOSIS — S42.295A OTHER CLOSED NONDISPLACED FRACTURE OF PROXIMAL END OF LEFT HUMERUS, INITIAL ENCOUNTER: ICD-10-CM

## 2025-04-02 DIAGNOSIS — J96.11 CHRONIC RESPIRATORY FAILURE WITH HYPOXIA: ICD-10-CM

## 2025-04-02 RX ORDER — OXYCODONE AND ACETAMINOPHEN 5; 325 MG/1; MG/1
1 TABLET ORAL EVERY 4 HOURS PRN
COMMUNITY

## 2025-04-02 ASSESSMENT — ENCOUNTER SYMPTOMS
EYES NEGATIVE: 1
GASTROINTESTINAL NEGATIVE: 1
SHORTNESS OF BREATH: 1

## 2025-04-02 NOTE — PROGRESS NOTES
Mary Lou Flores is a 59 y.o. female who presents today for :  Chief Complaint   Patient presents with    Discuss getting a hospital bed     Vitals:    04/02/25 1259   BP: (!) 150/62   Pulse: 87   Resp: 16   Temp: 97.7 °F (36.5 °C)   SpO2: 97%       HPI:     History of Present Illness  The patient presents for evaluation of a shoulder fracture, rib fractures, and balance issues.    A shoulder fracture was sustained on 03/23/2025. The fracture was managed non-surgically with a sling, as recommended by the surgeon assistant following an evaluation of her x-rays. Difficulty in getting out of bed is reported due to the pressure exerted on one side of her mattress, causing it to become uneven. This has resulted in sleeping at an angle, with her arm pinned against the rail, further complicating mobility. A replacement mattress is being sought to alleviate these issues.    Persistent pain from previous rib fractures on the right side is reported, which she attributes to a hard fall. Occasional wheezing is noted, and she has been using a nebulizer to manage her symptoms, although it exacerbates her coughing.    She has been wearing a brace for support but has noticed a decline in balance, leading to several instances of tripping and stumbling.    Mary Lou Flores was evaluated today and a DME order was entered for a semi-electric hospital bed mattress because she requires assistance for positioning needs not possible in an ordinary bed, complexity of body positioning needs, requirement of elevation of head of bed more than 30 degrees for the diagnosis of copd and lung cancer .  Patient requires frequent and immediate positioning changes for relief of pain and care needs related to medical diagnoses.  Patient needs variability of bed height requirements to perform patient transfers and for eating, personal cares, ambulating, and taking own medications.  Current body  .  The need for this equipment was discussed with the

## 2025-04-02 NOTE — PROGRESS NOTES
needed for Pain.      Misc. Devices (MATTRESS PAD) MISC Replacement mattress for semi electric hospital bed 1 each 0    albuterol (PROVENTIL) (2.5 MG/3ML) 0.083% nebulizer solution Take 3 mLs by nebulization every 6 hours as needed for Wheezing or Shortness of Breath 120 each 11    lisinopril (PRINIVIL;ZESTRIL) 30 MG tablet Take 1 tablet by mouth daily 90 tablet 3    cloNIDine (CATAPRES) 0.1 MG tablet Take 1 tablet by mouth daily 90 tablet 1    bumetanide (BUMEX) 1 MG tablet Take 1 tablet by mouth daily 90 tablet 0    carvedilol (COREG) 25 MG tablet Take 1 tablet by mouth 2 times daily (with meals) 180 tablet 1    ondansetron (ZOFRAN) 8 MG tablet Take 1 tablet by mouth every 8 hours as needed for Nausea or Vomiting 30 tablet 5    Respiratory Therapy Supplies (NEBULIZER/TUBING/MOUTHPIECE) KIT 1 kit by Does not apply route every 6 hours as needed (shortness of breath) 1 kit 1    fluticasone (FLONASE) 50 MCG/ACT nasal spray 2 sprays by Nasal route daily 16 g 11    VENTOLIN  (90 Base) MCG/ACT inhaler Inhale 2 puffs into the lungs every 6 hours as needed for Wheezing or Shortness of Breath 18 g 11    Ensifentrine (OHTUVAYRE) 3 MG/2.5ML SUSP Inhale 1 ampule into the lungs in the morning and at bedtime 150 mL 11    ALPRAZolam (XANAX) 0.25 MG tablet Take 1 tablet by mouth nightly as needed for Sleep.      esomeprazole (NEXIUM) 40 MG delayed release capsule Take 1 capsule by mouth in the morning and at bedtime 60 capsule 5    guaiFENesin 400 MG tablet Take 3 tablets by mouth daily And can take 1 in the morning as well      calcium carbonate (TUMS) 500 MG chewable tablet Take 1 tablet by mouth as needed for Heartburn      ACETAMINOPHEN EXTRA STRENGTH 500 MG tablet TAKE 1-2 TABLETS BY MOUTH 4 TIMES DAILY AS NEEDED FOR PAIN 120 tablet 5    Misc. Devices (MATTRESS PAD) MISC Dry pressure mattress 1 each 0    Incontinence Supply Disposable (BLADDER CONTROL PADS EX ABSORB) MISC Use up to 4 a day 120 each 3    Incontinence

## 2025-04-03 ENCOUNTER — OFFICE VISIT (OUTPATIENT)
Age: 60
End: 2025-04-03
Payer: MEDICARE

## 2025-04-03 VITALS
WEIGHT: 193.4 LBS | OXYGEN SATURATION: 95 % | DIASTOLIC BLOOD PRESSURE: 78 MMHG | BODY MASS INDEX: 35.59 KG/M2 | HEIGHT: 62 IN | TEMPERATURE: 98.1 F | SYSTOLIC BLOOD PRESSURE: 136 MMHG | HEART RATE: 89 BPM

## 2025-04-03 DIAGNOSIS — C34.92 ADENOCARCINOMA OF LEFT LUNG (HCC): ICD-10-CM

## 2025-04-03 DIAGNOSIS — J43.9 PULMONARY EMPHYSEMA, UNSPECIFIED EMPHYSEMA TYPE (HCC): ICD-10-CM

## 2025-04-03 DIAGNOSIS — J96.11 CHRONIC RESPIRATORY FAILURE WITH HYPOXIA: ICD-10-CM

## 2025-04-03 DIAGNOSIS — J44.9 STAGE 3 SEVERE COPD BY GOLD CLASSIFICATION (HCC): Primary | ICD-10-CM

## 2025-04-03 DIAGNOSIS — J41.1 MUCOPURULENT CHRONIC BRONCHITIS (HCC): ICD-10-CM

## 2025-04-03 DIAGNOSIS — F17.210 CIGARETTE NICOTINE DEPENDENCE, UNCOMPLICATED: ICD-10-CM

## 2025-04-03 PROCEDURE — 3078F DIAST BP <80 MM HG: CPT

## 2025-04-03 PROCEDURE — 3075F SYST BP GE 130 - 139MM HG: CPT

## 2025-04-03 PROCEDURE — 99406 BEHAV CHNG SMOKING 3-10 MIN: CPT

## 2025-04-03 PROCEDURE — 99214 OFFICE O/P EST MOD 30 MIN: CPT

## 2025-04-03 RX ORDER — BUDESONIDE, GLYCOPYRROLATE, AND FORMOTEROL FUMARATE 160; 9; 4.8 UG/1; UG/1; UG/1
2 AEROSOL, METERED RESPIRATORY (INHALATION) 2 TIMES DAILY
Qty: 3 EACH | Refills: 3 | Status: SHIPPED | OUTPATIENT
Start: 2025-04-03 | End: 2026-04-03

## 2025-04-03 ASSESSMENT — ENCOUNTER SYMPTOMS
COUGH: 1
CHEST TIGHTNESS: 0
RHINORRHEA: 0
SHORTNESS OF BREATH: 1
SINUS PRESSURE: 0
SINUS PAIN: 0
WHEEZING: 0

## 2025-04-04 ENCOUNTER — TELEPHONE (OUTPATIENT)
Dept: FAMILY MEDICINE CLINIC | Age: 60
End: 2025-04-04

## 2025-04-04 ENCOUNTER — HOSPITAL ENCOUNTER (OUTPATIENT)
Dept: RADIATION ONCOLOGY | Age: 60
Discharge: HOME | End: 2025-04-04
Payer: MEDICARE

## 2025-04-04 VITALS
DIASTOLIC BLOOD PRESSURE: 60 MMHG | OXYGEN SATURATION: 94 % | HEART RATE: 83 BPM | WEIGHT: 193 LBS | SYSTOLIC BLOOD PRESSURE: 128 MMHG | RESPIRATION RATE: 18 BRPM | TEMPERATURE: 97.4 F | BODY MASS INDEX: 35.3 KG/M2

## 2025-04-04 DIAGNOSIS — C34.12 PRIMARY MALIGNANT NEOPLASM OF LEFT UPPER LOBE OF LUNG (HCC): Primary | ICD-10-CM

## 2025-04-04 PROCEDURE — 99212 OFFICE O/P EST SF 10 MIN: CPT

## 2025-04-04 PROCEDURE — 99213 OFFICE O/P EST LOW 20 MIN: CPT

## 2025-04-04 ASSESSMENT — ENCOUNTER SYMPTOMS
COUGH: 1
APNEA: 0
BACK PAIN: 1
VOMITING: 0
ABDOMINAL PAIN: 0
TROUBLE SWALLOWING: 0
NAUSEA: 1
SHORTNESS OF BREATH: 0
SORE THROAT: 0
CHEST TIGHTNESS: 0
WHEEZING: 0
BLOOD IN STOOL: 0

## 2025-04-04 ASSESSMENT — PAIN SCALES - GENERAL: PAINLEVEL_OUTOF10: 7

## 2025-04-04 ASSESSMENT — PAIN DESCRIPTION - LOCATION: LOCATION: SHOULDER

## 2025-04-04 NOTE — PROGRESS NOTES
[facility]           [address], [address]        [place] [id number]        O: [phone]        F: [phone]       [employer]            FOLLOW UP NOTE    Date of Service: 2025  Patient ID: Mary Lou Flores   : 1965  MRN: [id number]   Acct Number: [id number]       DATE OF SERVICE: 2025   LOCATION: [facility]  PROVIDER: Rena Mathews PA-C    FOLLOW UP PHYSICIANS:  JASON Castillo (Pulm)    ASSESSMENT AND PLAN:   Cancer Staging   Primary malignant neoplasm of left upper lobe of lung (HCC)  Staging form: Lung, AJCC 8th Edition  - Clinical stage from 2024: Stage IA2 (cT1b, cN0, cM0) - Signed by Rena Mathews PA-C on 2024      - Mary Lou Flores is a 59 y.o. female who presents today with  for regularly-scheduled follow-up for her non-small cell lung cancer. She completed SBRT to the left lung nodule on 3/12/24  - The patient appears to be doing fair. She reports a recent fall with a left humerus fracture with pain and swelling, orthopedics evaluated and recommended sling and is ordering a repeat shoulder x-ray soon. She reports chronic, stable cough, nausea, and joint/back aches. She denies shortness of breath, chest pain, unintentional weight loss, fever, early satiety, lumps, changes/blood in urination or bowel movements, headaches, numbness/tingling  - Discussed that recent CT chest completed on 3/17/25 resulted as: stability of DAWIT nodule, as well as stable left adrenal nodule likely adenoma. PET/CT needed to differentiate between residual malignancy, vs post-SBRT treatment changes (ie scarring). Nodule seen on CT chest 3/17/25 could represent residual malignancy. Depending on max SUV, we will continue with CT surveillance vs will consider biopsy for residual malignancy, and if biopsy positive, will recommend surgery vs systemic therapy for further treatment  - Continue to follow with pulmonology and all

## 2025-04-04 NOTE — TELEPHONE ENCOUNTER
Pt called stating that [facility] is out of network and wanting script sent to [facility]   Fax number [phone] she is double checking to see if they will take the script for tempurpedic  mattress and let the office know

## 2025-04-13 ENCOUNTER — HOSPITAL ENCOUNTER (OUTPATIENT)
Age: 60
Discharge: HOME | End: 2025-04-13
Payer: MEDICARE

## 2025-04-13 ENCOUNTER — HOSPITAL ENCOUNTER (OUTPATIENT)
Dept: GENERAL RADIOLOGY | Age: 60
Discharge: HOME | End: 2025-04-13
Payer: MEDICARE

## 2025-04-13 DIAGNOSIS — Z87.81 HISTORY OF HUMERUS FRACTURE: ICD-10-CM

## 2025-04-13 PROCEDURE — 73030 X-RAY EXAM OF SHOULDER: CPT

## 2025-04-20 DIAGNOSIS — K21.9 GASTROESOPHAGEAL REFLUX DISEASE WITHOUT ESOPHAGITIS: ICD-10-CM

## 2025-04-21 RX ORDER — ESOMEPRAZOLE MAGNESIUM 40 MG/1
CAPSULE, DELAYED RELEASE ORAL
Qty: 60 CAPSULE | Refills: 5 | Status: SHIPPED | OUTPATIENT
Start: 2025-04-21

## 2025-04-21 NOTE — TELEPHONE ENCOUNTER
Last visit- 4/2/2025  Next visit- 4/30/2025    Requested Prescriptions     Pending Prescriptions Disp Refills    esomeprazole (NEXIUM) 40 MG delayed release capsule [Pharmacy Med Name: esomeprazole magnesium 40 mg capsule,delayed release] 60 capsule 5     Sig: TAKE ONE CAPSULE BY MOUTH IN THE MORNING and AT BEDTIME

## 2025-04-28 RX ORDER — BUMETANIDE 1 MG/1
1 TABLET ORAL DAILY
Qty: 90 TABLET | Refills: 1 | Status: SHIPPED | OUTPATIENT
Start: 2025-04-28

## 2025-04-30 ENCOUNTER — OFFICE VISIT (OUTPATIENT)
Dept: FAMILY MEDICINE CLINIC | Age: 60
End: 2025-04-30

## 2025-04-30 ENCOUNTER — LAB (OUTPATIENT)
Dept: LAB | Age: 60
End: 2025-04-30

## 2025-04-30 VITALS
HEART RATE: 80 BPM | BODY MASS INDEX: 36.03 KG/M2 | SYSTOLIC BLOOD PRESSURE: 134 MMHG | TEMPERATURE: 96.3 F | WEIGHT: 197 LBS | RESPIRATION RATE: 18 BRPM | OXYGEN SATURATION: 96 % | DIASTOLIC BLOOD PRESSURE: 78 MMHG

## 2025-04-30 DIAGNOSIS — S42.295A OTHER CLOSED NONDISPLACED FRACTURE OF PROXIMAL END OF LEFT HUMERUS, INITIAL ENCOUNTER: ICD-10-CM

## 2025-04-30 DIAGNOSIS — R53.83 OTHER FATIGUE: ICD-10-CM

## 2025-04-30 DIAGNOSIS — C34.12 MALIGNANT NEOPLASM OF UPPER LOBE, LEFT BRONCHUS OR LUNG (HCC): ICD-10-CM

## 2025-04-30 DIAGNOSIS — J44.1 COPD EXACERBATION (HCC): Primary | ICD-10-CM

## 2025-04-30 DIAGNOSIS — H00.015 HORDEOLUM EXTERNUM OF LEFT LOWER EYELID: ICD-10-CM

## 2025-04-30 LAB
ALBUMIN SERPL BCG-MCNC: 3.8 G/DL (ref 3.4–4.9)
ALP SERPL-CCNC: 131 U/L (ref 38–126)
ALT SERPL W/O P-5'-P-CCNC: 11 U/L (ref 10–35)
ANION GAP SERPL CALC-SCNC: 11 MEQ/L (ref 8–16)
AST SERPL-CCNC: 15 U/L (ref 10–35)
BASOPHILS ABSOLUTE: 0.1 THOU/MM3 (ref 0–0.1)
BASOPHILS NFR BLD AUTO: 0.5 %
BILIRUB SERPL-MCNC: 0.5 MG/DL (ref 0.3–1.2)
BUN SERPL-MCNC: 7 MG/DL (ref 8–23)
CALCIUM SERPL-MCNC: 9.1 MG/DL (ref 8.8–10.2)
CHLORIDE SERPL-SCNC: 97 MEQ/L (ref 98–111)
CO2 SERPL-SCNC: 31 MEQ/L (ref 22–29)
CREAT SERPL-MCNC: 0.6 MG/DL (ref 0.5–0.9)
DEPRECATED RDW RBC AUTO: 48.8 FL (ref 35–45)
EOSINOPHIL NFR BLD AUTO: 1.2 %
EOSINOPHILS ABSOLUTE: 0.1 THOU/MM3 (ref 0–0.4)
ERYTHROCYTE [DISTWIDTH] IN BLOOD BY AUTOMATED COUNT: 14.3 % (ref 11.5–14.5)
GFR SERPL CREATININE-BSD FRML MDRD: > 90 ML/MIN/1.73M2
GLUCOSE SERPL-MCNC: 96 MG/DL (ref 74–109)
HCT VFR BLD AUTO: 36.7 % (ref 37–47)
HGB BLD-MCNC: 11.7 GM/DL (ref 12–16)
IMM GRANULOCYTES # BLD AUTO: 0.05 THOU/MM3 (ref 0–0.07)
IMM GRANULOCYTES NFR BLD AUTO: 0.4 %
LYMPHOCYTES ABSOLUTE: 2.3 THOU/MM3 (ref 1–4.8)
LYMPHOCYTES NFR BLD AUTO: 20.3 %
MCH RBC QN AUTO: 29.9 PG (ref 26–33)
MCHC RBC AUTO-ENTMCNC: 31.9 GM/DL (ref 32.2–35.5)
MCV RBC AUTO: 93.9 FL (ref 81–99)
MONOCYTES ABSOLUTE: 0.8 THOU/MM3 (ref 0.4–1.3)
MONOCYTES NFR BLD AUTO: 7.2 %
NEUTROPHILS ABSOLUTE: 8 THOU/MM3 (ref 1.8–7.7)
NEUTROPHILS NFR BLD AUTO: 70.4 %
NRBC BLD AUTO-RTO: 0 /100 WBC
PLATELET # BLD AUTO: 254 THOU/MM3 (ref 130–400)
PMV BLD AUTO: 13.3 FL (ref 9.4–12.4)
POTASSIUM SERPL-SCNC: 3.7 MEQ/L (ref 3.5–5.2)
PROT SERPL-MCNC: 7.1 G/DL (ref 6.4–8.3)
RBC # BLD AUTO: 3.91 MILL/MM3 (ref 4.2–5.4)
SODIUM SERPL-SCNC: 139 MEQ/L (ref 135–145)
TSH SERPL DL<=0.05 MIU/L-ACNC: 1.88 UIU/ML (ref 0.27–4.2)
WBC # BLD AUTO: 11.3 THOU/MM3 (ref 4.8–10.8)

## 2025-04-30 RX ORDER — DOXYCYCLINE HYCLATE 100 MG
100 TABLET ORAL 2 TIMES DAILY
Qty: 20 TABLET | Refills: 0 | Status: SHIPPED | OUTPATIENT
Start: 2025-04-30 | End: 2025-05-10

## 2025-04-30 ASSESSMENT — ENCOUNTER SYMPTOMS
RESPIRATORY NEGATIVE: 1
EYE PAIN: 1
GASTROINTESTINAL NEGATIVE: 1

## 2025-04-30 NOTE — PROGRESS NOTES
as documenting on the day of the visit.    The patient (or guardian, if applicable) and other individuals in attendance with the patient were advised that Artificial Intelligence will be utilized during this visit to record, process the conversation to generate a clinical note and to support improvement of the AI technology. The patient (or guardian, if applicable) and other individuals in attendance at the appointment consented to the use of AI, including the recording.      Patient given educational materials - seepatient instructions.  Discussed use, benefit, and side effects of prescribed medications.All patient questions answered.  Pt voiced understanding.  Patient agreed withtreatment plan. Follow up as directed.     Electronically signed by JASON Huggins CNP on 4/30/2025 at 4:30 PM

## 2025-05-01 ENCOUNTER — RESULTS FOLLOW-UP (OUTPATIENT)
Dept: FAMILY MEDICINE CLINIC | Age: 60
End: 2025-05-01

## 2025-05-05 ENCOUNTER — HOSPITAL ENCOUNTER (OUTPATIENT)
Age: 60
Discharge: HOME | End: 2025-05-05
Payer: MEDICARE

## 2025-05-05 ENCOUNTER — HOSPITAL ENCOUNTER (OUTPATIENT)
Dept: GENERAL RADIOLOGY | Age: 60
Discharge: HOME | End: 2025-05-05
Payer: MEDICARE

## 2025-05-05 DIAGNOSIS — S42.295D OTHER CLOSED NONDISPLACED FRACTURE OF PROXIMAL END OF LEFT HUMERUS WITH ROUTINE HEALING, SUBSEQUENT ENCOUNTER: Primary | ICD-10-CM

## 2025-05-05 DIAGNOSIS — S42.295D OTHER CLOSED NONDISPLACED FRACTURE OF PROXIMAL END OF LEFT HUMERUS WITH ROUTINE HEALING, SUBSEQUENT ENCOUNTER: ICD-10-CM

## 2025-05-05 PROCEDURE — 73030 X-RAY EXAM OF SHOULDER: CPT

## 2025-06-09 ENCOUNTER — HOSPITAL ENCOUNTER (OUTPATIENT)
Dept: GENERAL RADIOLOGY | Age: 60
Discharge: HOME | End: 2025-06-09
Payer: MEDICARE

## 2025-06-09 ENCOUNTER — OFFICE VISIT (OUTPATIENT)
Dept: FAMILY MEDICINE CLINIC | Age: 60
End: 2025-06-09
Payer: MEDICARE

## 2025-06-09 ENCOUNTER — RESULTS FOLLOW-UP (OUTPATIENT)
Dept: FAMILY MEDICINE CLINIC | Age: 60
End: 2025-06-09

## 2025-06-09 ENCOUNTER — HOSPITAL ENCOUNTER (OUTPATIENT)
Age: 60
Discharge: HOME | End: 2025-06-09
Payer: MEDICARE

## 2025-06-09 VITALS
HEART RATE: 74 BPM | DIASTOLIC BLOOD PRESSURE: 90 MMHG | TEMPERATURE: 98 F | OXYGEN SATURATION: 97 % | HEIGHT: 62 IN | BODY MASS INDEX: 36.62 KG/M2 | RESPIRATION RATE: 18 BRPM | WEIGHT: 199 LBS | SYSTOLIC BLOOD PRESSURE: 180 MMHG

## 2025-06-09 DIAGNOSIS — R07.9 CHEST PAIN, UNSPECIFIED TYPE: ICD-10-CM

## 2025-06-09 DIAGNOSIS — M75.02 ADHESIVE CAPSULITIS OF LEFT SHOULDER: ICD-10-CM

## 2025-06-09 DIAGNOSIS — C34.12 MALIGNANT NEOPLASM OF UPPER LOBE, LEFT BRONCHUS OR LUNG (HCC): ICD-10-CM

## 2025-06-09 DIAGNOSIS — S42.295A OTHER CLOSED NONDISPLACED FRACTURE OF PROXIMAL END OF LEFT HUMERUS, INITIAL ENCOUNTER: ICD-10-CM

## 2025-06-09 DIAGNOSIS — S42.295A OTHER CLOSED NONDISPLACED FRACTURE OF PROXIMAL END OF LEFT HUMERUS, INITIAL ENCOUNTER: Primary | ICD-10-CM

## 2025-06-09 DIAGNOSIS — I20.9 ANGINA PECTORIS: ICD-10-CM

## 2025-06-09 DIAGNOSIS — R06.02 SHORTNESS OF BREATH: ICD-10-CM

## 2025-06-09 PROCEDURE — 73030 X-RAY EXAM OF SHOULDER: CPT

## 2025-06-09 PROCEDURE — 99214 OFFICE O/P EST MOD 30 MIN: CPT | Performed by: NURSE PRACTITIONER

## 2025-06-09 PROCEDURE — 3077F SYST BP >= 140 MM HG: CPT | Performed by: NURSE PRACTITIONER

## 2025-06-09 PROCEDURE — 3080F DIAST BP >= 90 MM HG: CPT | Performed by: NURSE PRACTITIONER

## 2025-06-09 RX ORDER — TRAMADOL HYDROCHLORIDE 50 MG/1
50 TABLET ORAL EVERY 6 HOURS PRN
Qty: 40 TABLET | Refills: 1 | Status: SHIPPED | OUTPATIENT
Start: 2025-06-09 | End: 2025-06-23

## 2025-06-09 RX ORDER — BUMETANIDE 1 MG/1
2 TABLET ORAL DAILY
Qty: 180 TABLET | Refills: 1 | Status: SHIPPED | OUTPATIENT
Start: 2025-06-09

## 2025-06-09 ASSESSMENT — ENCOUNTER SYMPTOMS
EYES NEGATIVE: 1
GASTROINTESTINAL NEGATIVE: 1
SHORTNESS OF BREATH: 1

## 2025-06-09 NOTE — PROGRESS NOTES
test with myocardial perfusion      4. Chest pain, unspecified type  Nuclear stress test with myocardial perfusion      5. Shortness of breath  Nuclear stress test with myocardial perfusion      6. Malignant neoplasm of upper lobe, left bronchus or lung (HCC)            Orders Placed This Encounter   Procedures    XR SHOULDER LEFT (MIN 2 VIEWS)     Standing Status:   Future     Number of Occurrences:   1     Expected Date:   6/9/2025     Expiration Date:   6/9/2026    [facility])     Referral Priority:   Routine     Referral Type:   Physical Therapy     Referral Reason:   Specialty Services Required     Requested Specialty:   Physical Therapy     Number of Visits Requested:   1    Nuclear stress test with myocardial perfusion     Standing Status:   Future     Expected Date:   6/9/2025     Expiration Date:   6/9/2026     What stress agent should be used?:   Lexiscan     NM Radi[employer]arm[employer]:   Per Facility Protocol     Orders Placed This Encounter   Medications    bumetanide (BUMEX) 1 MG tablet     Sig: Take 2 tablets by mouth daily     Dispense:  180 tablet     Refill:  1    traMADol (ULTRAM) 50 MG tablet     Sig: Take 1 tablet by mouth every 6 hours as needed for Pain for up to 14 days. Intended supply: 5 days. Take lowest dose possible to manage pain Max Daily Amount: 200 mg     Dispense:  40 tablet     Refill:  1     Reduce doses taken as pain becomes manageable      On this date 06/09/25 I have spent 30 minutes reviewing previous notes, test results and face to face with the patient discussing the diagnosis and importance of compliance with the treatment plan as well as documenting on the day of the visit.    The patient (or guardian, if applicable) and other individuals in attendance with the patient were advised that Artificial Intelligence will be utilized during this visit to record, process the conversation to generate a clinical note and to support improvement of the AI

## 2025-06-10 ENCOUNTER — TELEPHONE (OUTPATIENT)
Dept: ORTHOPEDIC SURGERY | Age: 60
End: 2025-06-10

## 2025-06-10 ENCOUNTER — OFFICE VISIT (OUTPATIENT)
Dept: ORTHOPEDIC SURGERY | Age: 60
End: 2025-06-10
Payer: MEDICARE

## 2025-06-10 VITALS
SYSTOLIC BLOOD PRESSURE: 173 MMHG | HEIGHT: 62 IN | DIASTOLIC BLOOD PRESSURE: 83 MMHG | BODY MASS INDEX: 36.62 KG/M2 | WEIGHT: 199 LBS

## 2025-06-10 DIAGNOSIS — S42.292D OTHER CLOSED DISPLACED FRACTURE OF PROXIMAL END OF LEFT HUMERUS WITH ROUTINE HEALING, SUBSEQUENT ENCOUNTER: Primary | ICD-10-CM

## 2025-06-10 DIAGNOSIS — M25.512 LEFT SHOULDER PAIN, UNSPECIFIED CHRONICITY: ICD-10-CM

## 2025-06-10 PROCEDURE — 20610 DRAIN/INJ JOINT/BURSA W/O US: CPT | Performed by: PHYSICIAN ASSISTANT

## 2025-06-10 RX ORDER — TRIAMCINOLONE ACETONIDE 40 MG/ML
40 INJECTION, SUSPENSION INTRA-ARTICULAR; INTRAMUSCULAR ONCE
Status: COMPLETED | OUTPATIENT
Start: 2025-06-10 | End: 2025-06-10

## 2025-06-10 RX ORDER — BUPIVACAINE HYDROCHLORIDE 5 MG/ML
3 INJECTION, SOLUTION PERINEURAL ONCE
Status: COMPLETED | OUTPATIENT
Start: 2025-06-10 | End: 2025-06-10

## 2025-06-10 RX ADMIN — TRIAMCINOLONE ACETONIDE 40 MG: 200 INJECTION, SUSPENSION INTRA-ARTICULAR; INTRAMUSCULAR at 11:23

## 2025-06-10 RX ADMIN — BUPIVACAINE HYDROCHLORIDE 15 MG: 5 INJECTION, SOLUTION PERINEURAL at 11:22

## 2025-06-10 NOTE — PROGRESS NOTES
Orthopaedic Progress Note      CHIEF COMPLAINT: Left proximal humerus fracture    HISTORY OF PRESENT ILLNESS:       Ms. Flores  is a 60 y.o. female  who presents with a almost 3-month history of a left proximal humerus fracture.  She was initially seen outlying facility.  Because of geographic location comes to our clinic today.  This was treated nonoperatively.  She been in a sling.  She is out of the sling.  She was seen by her primary care provider yesterday her x-ray of the shoulder ordered suggesting a healed left proximal humerus fracture.  She comes us today for evaluation.  She states pain is sharp in her shoulders worse with any attempted activity relieved with rest she would like to get into some home health physical therapy      Past Medical History:    Past Medical History:   Diagnosis Date    Anxiety     Arthritis     Cancer (HCC)     CHF (congestive heart failure) (HCC)     COPD (chronic obstructive pulmonary disease) (HCC)     Depression     Enlargement, spleen     Fatty liver     GERD (gastroesophageal reflux disease)     Headache     Hepatitis A     Hypertension     saw Dr Duff in the past    Ovarian cyst     Oxygen dependent     o2 2liters when walking and at night-sees Yasmani    Pneumonia     Sleep apnea        Past Surgical History:    Past Surgical History:   Procedure Laterality Date    CARDIOVASCULAR STRESS TEST  2020    CT NEEDLE BIOPSY LUNG PERCUTANEOUS W IMAGING GUIDANCE  04/06/2022    CT NEEDLE BIOPSY LUNG PERCUTANEOUS 4/6/2022 [facility] CT SCAN    LUNG BIOPSY  12/2023    [place] d/t lung cancer    TRANSTHORACIC ECHOCARDIOGRAM  2021    TRANSTHORACIC ECHOCARDIOGRAM  2020    [facility]PPER [facility]TESTINAL ENDOSCOPY      [facility]    WISDOM TOOTH EXTRACTION           Current  Medications:  Current Outpatient Medications   Medication Sig Dispense Refill    bumetanide (BUMEX) 1 MG tablet Take 2 tablets by mouth daily 180 tablet 1    traMADol (ULTRAM) 50 MG tablet Take 1 tablet by mouth

## 2025-06-10 NOTE — TELEPHONE ENCOUNTER
Patient stated her insurance plan does not cover home health. And would like order for OT. Informed patient order was placed and she may pick it up from the office. Patient requested order be mailed to her.

## 2025-06-14 ENCOUNTER — APPOINTMENT (OUTPATIENT)
Dept: GENERAL RADIOLOGY | Age: 60
End: 2025-06-14
Payer: MEDICARE

## 2025-06-14 ENCOUNTER — HOSPITAL ENCOUNTER (EMERGENCY)
Age: 60
Discharge: HOME | End: 2025-06-14
Payer: MEDICARE

## 2025-06-14 VITALS
BODY MASS INDEX: 36.62 KG/M2 | DIASTOLIC BLOOD PRESSURE: 69 MMHG | HEART RATE: 85 BPM | RESPIRATION RATE: 15 BRPM | SYSTOLIC BLOOD PRESSURE: 137 MMHG | HEIGHT: 62 IN | WEIGHT: 199 LBS | TEMPERATURE: 98.1 F | OXYGEN SATURATION: 97 %

## 2025-06-14 DIAGNOSIS — I10 UNCONTROLLED HYPERTENSION: Primary | ICD-10-CM

## 2025-06-14 LAB
ALBUMIN SERPL BCG-MCNC: 4.1 G/DL (ref 3.4–4.9)
ALP SERPL-CCNC: 175 U/L (ref 38–126)
ALT SERPL W/O P-5'-P-CCNC: 16 U/L (ref 10–35)
ANION GAP SERPL CALC-SCNC: 16 MEQ/L (ref 8–16)
AST SERPL-CCNC: 24 U/L (ref 10–35)
BASOPHILS ABSOLUTE: 0.1 THOU/MM3 (ref 0–0.1)
BASOPHILS NFR BLD AUTO: 0.5 %
BILIRUB SERPL-MCNC: < 0.2 MG/DL (ref 0.3–1.2)
BILIRUB UR QL STRIP.AUTO: NEGATIVE
BUN SERPL-MCNC: 10 MG/DL (ref 8–23)
CALCIUM SERPL-MCNC: 9.4 MG/DL (ref 8.8–10.2)
CHARACTER UR: CLEAR
CHLORIDE SERPL-SCNC: 97 MEQ/L (ref 98–111)
CO2 SERPL-SCNC: 25 MEQ/L (ref 22–29)
COLOR, UA: YELLOW
CREAT SERPL-MCNC: 0.5 MG/DL (ref 0.5–0.9)
DEPRECATED RDW RBC AUTO: 49.6 FL (ref 35–45)
EKG ATRIAL RATE: 86 BPM
EKG P AXIS: 45 DEGREES
EKG P-R INTERVAL: 142 MS
EKG Q-T INTERVAL: 366 MS
EKG QRS DURATION: 74 MS
EKG QTC CALCULATION (BAZETT): 437 MS
EKG R AXIS: 63 DEGREES
EKG T AXIS: 45 DEGREES
EKG VENTRICULAR RATE: 86 BPM
EOSINOPHIL NFR BLD AUTO: 0.4 %
EOSINOPHILS ABSOLUTE: 0.1 THOU/MM3 (ref 0–0.4)
ERYTHROCYTE [DISTWIDTH] IN BLOOD BY AUTOMATED COUNT: 15.1 % (ref 11.5–14.5)
ETHANOL SERPL-MCNC: 0.13 % (ref 0–0.08)
GFR SERPL CREATININE-BSD FRML MDRD: > 90 ML/MIN/1.73M2
GLUCOSE SERPL-MCNC: 132 MG/DL (ref 74–109)
GLUCOSE UR QL STRIP.AUTO: NEGATIVE MG/DL
HCT VFR BLD AUTO: 42.8 % (ref 37–47)
HGB BLD-MCNC: 14.1 GM/DL (ref 12–16)
HGB UR QL STRIP.AUTO: NEGATIVE
IMM GRANULOCYTES # BLD AUTO: 0.09 THOU/MM3 (ref 0–0.07)
IMM GRANULOCYTES NFR BLD AUTO: 0.5 %
KETONES UR QL STRIP.AUTO: NEGATIVE
LYMPHOCYTES ABSOLUTE: 2.5 THOU/MM3 (ref 1–4.8)
LYMPHOCYTES NFR BLD AUTO: 15.1 %
MCH RBC QN AUTO: 29.7 PG (ref 26–33)
MCHC RBC AUTO-ENTMCNC: 32.9 GM/DL (ref 32.2–35.5)
MCV RBC AUTO: 90.1 FL (ref 81–99)
MONOCYTES ABSOLUTE: 0.7 THOU/MM3 (ref 0.4–1.3)
MONOCYTES NFR BLD AUTO: 4.3 %
NEUTROPHILS ABSOLUTE: 13.2 THOU/MM3 (ref 1.8–7.7)
NEUTROPHILS NFR BLD AUTO: 79.2 %
NITRITE UR QL STRIP: NEGATIVE
NRBC BLD AUTO-RTO: 0 /100 WBC
NT-PROBNP SERPL IA-MCNC: 54 PG/ML (ref 0–124)
OSMOLALITY SERPL CALC.SUM OF ELEC: 276.6 MOSMOL/KG (ref 275–300)
PH UR STRIP.AUTO: 7 [PH] (ref 5–9)
PLATELET # BLD AUTO: 374 THOU/MM3 (ref 130–400)
PMV BLD AUTO: 10.3 FL (ref 9.4–12.4)
POTASSIUM SERPL-SCNC: 3.9 MEQ/L (ref 3.5–5.2)
PROT SERPL-MCNC: 7.8 G/DL (ref 6.4–8.3)
PROT UR STRIP.AUTO-MCNC: NEGATIVE MG/DL
RBC # BLD AUTO: 4.75 MILL/MM3 (ref 4.2–5.4)
SODIUM SERPL-SCNC: 138 MEQ/L (ref 135–145)
SP GR UR REFRACT.AUTO: 1.01 (ref 1–1.03)
TROPONIN, HIGH SENSITIVITY: 7 NG/L (ref 0–12)
UROBILINOGEN, URINE: 0.2 EU/DL (ref 0–1)
WBC # BLD AUTO: 16.7 THOU/MM3 (ref 4.8–10.8)
WBC #/AREA URNS HPF: NEGATIVE /[HPF]

## 2025-06-14 PROCEDURE — 84484 ASSAY OF TROPONIN QUANT: CPT

## 2025-06-14 PROCEDURE — 83880 ASSAY OF NATRIURETIC PEPTIDE: CPT

## 2025-06-14 PROCEDURE — 99285 EMERGENCY DEPT VISIT HI MDM: CPT

## 2025-06-14 PROCEDURE — 36415 COLL VENOUS BLD VENIPUNCTURE: CPT

## 2025-06-14 PROCEDURE — 85025 COMPLETE CBC W/AUTO DIFF WBC: CPT

## 2025-06-14 PROCEDURE — 80053 COMPREHEN METABOLIC PANEL: CPT

## 2025-06-14 PROCEDURE — 71045 X-RAY EXAM CHEST 1 VIEW: CPT

## 2025-06-14 PROCEDURE — 81003 URINALYSIS AUTO W/O SCOPE: CPT

## 2025-06-14 PROCEDURE — 93010 ELECTROCARDIOGRAM REPORT: CPT | Performed by: INTERNAL MEDICINE

## 2025-06-14 PROCEDURE — 93005 ELECTROCARDIOGRAM TRACING: CPT | Performed by: PHYSICIAN ASSISTANT

## 2025-06-14 PROCEDURE — 82077 ASSAY SPEC XCP UR&BREATH IA: CPT

## 2025-06-14 ASSESSMENT — ENCOUNTER SYMPTOMS
SHORTNESS OF BREATH: 0
NAUSEA: 0
COUGH: 0
DIARRHEA: 0
VOMITING: 0
RHINORRHEA: 0
ABDOMINAL PAIN: 0
SORE THROAT: 0

## 2025-06-14 NOTE — ED PROVIDER NOTES
[facility] EMERGENCY DEPT      Pt Name: Mary Lou Flores  MRN: [id number]  Birthdate 1965  Date of evaluation: 6/14/2025  Provider: Dora Simpson PA-C    CHIEF COMPLAINT       Chief Complaint   Patient presents with    Hypertension       Nurses Notes reviewed and I agree except as noted in the HPI.      HISTORY OF PRESENT ILLNESS    Mary Lou Flores is a 60 y.o. female with history of anxiety, panic disorder, lung cancer, CHF, COPD, HTN, oxygen dependency, and tobacco dependency who presents from home via EMS for concern of elevated blood pressure.  Patient has had elevated blood pressures intermittently for the past 2 weeks.  She knows when it is high because she feels a pressure and heartbeat in her ears.  Yesterday her blood pressure was noted 191 systolic and today 158.  Patient was at home and felt her blood pressure elevate causing her anxiety and prompting her to call EMS.  Patient attributes the blood pressure being up due to a fracture of her left shoulder which she has been treating with ibuprofen and Tylenol.  It is improving and she is getting set up for physical therapy.  Patient reports a 3 to 5 pound weight gain recently.  Patient had a headache earlier but currently resolved.  Patient has chronic issues of cough, dizziness, and shortness of breath which are at baseline although the shortness of breath may have been worsening over the past few months.  Patient denies chest pain, vision changes, palpitations, presyncopal feeling, change in urination, fever, recent illness, or other complaints.    Patient smokes 1/2 to 3/4 pack of cigarettes per day.  Patient denies being a daily drinker but drinks 5-6 beers on average and sometimes Bacardi rum.  Patient denies illicit drug use.    REVIEW OF SYSTEMS     Review of Systems   Constitutional:  Negative for activity change, appetite change, chills, diaphoresis and fever.   HENT:  Negative for congestion, rhinorrhea and sore throat.    Eyes:  Negative

## 2025-06-14 NOTE — ED TRIAGE NOTES
Pt to ER via [facility] due to high blood pressure that has been ongoing for the past couple weeks. Pt reports she has had pressure in her head and ears and it made her nervous. Her blood pressure was 199/99 at home. Upon arrival to the ER, blood pressure is 168/72. EKG completed upon arrival.

## 2025-06-20 ENCOUNTER — TELEPHONE (OUTPATIENT)
Dept: PULMONOLOGY | Age: 60
End: 2025-06-20

## 2025-06-20 DIAGNOSIS — J43.9 PULMONARY EMPHYSEMA, UNSPECIFIED EMPHYSEMA TYPE (HCC): ICD-10-CM

## 2025-06-20 DIAGNOSIS — J44.9 STAGE 3 SEVERE COPD BY GOLD CLASSIFICATION (HCC): ICD-10-CM

## 2025-06-20 RX ORDER — BUDESONIDE, GLYCOPYRROLATE, AND FORMOTEROL FUMARATE 160; 9; 4.8 UG/1; UG/1; UG/1
2 AEROSOL, METERED RESPIRATORY (INHALATION) 2 TIMES DAILY
Qty: 3 EACH | Refills: 3 | Status: SHIPPED | OUTPATIENT
Start: 2025-06-20 | End: 2026-06-20

## 2025-06-20 NOTE — TELEPHONE ENCOUNTER
Received refill request for BREZTRI AEROSPHERE . Medication was last ordered by [facility] . Medication was last ordered on 4/3/2025 with 3 refills.   Patient was last seen in the office 4/3/2025. Patient has a scheduled follow up 10/2/2025.   Medication needs to be sent to [facility]  [facility]armacy.

## 2025-06-26 ENCOUNTER — HOSPITAL ENCOUNTER (OUTPATIENT)
Dept: OCCUPATIONAL THERAPY | Age: 60
Setting detail: THERAPIES SERIES
Discharge: HOME | End: 2025-06-26
Payer: MEDICARE

## 2025-06-26 PROCEDURE — 97165 OT EVAL LOW COMPLEX 30 MIN: CPT

## 2025-06-26 PROCEDURE — 97110 THERAPEUTIC EXERCISES: CPT

## 2025-06-26 NOTE — PROGRESS NOTES
[facility]  OCCUPATIONAL THERAPY  [x] EVALUATION  [] DAILY NOTE (LAND) [] DAILY NOTE (AQUATIC ) [] PROGRESS NOTE [] DISCHARGE NOTE    [] OUTPATIENT REHABILITATION CENTER [facility]   [] [place] [facility]    [x] [facility]   [] [facility]    Date: 2025  Patient Name:  Mary Lou Flores  : 1965  MRN: [id number]  CSN: [id number]    Referring Practitioner Teto Hurtado PA-C [id number]      Diagnosis  Diagnoses       S42.292D (ICD-10-CM) - Other closed displaced fracture of proximal end of left humerus with routine healing, subsequent encounter    M25.512 (ICD-10-CM) - Left shoulder pain, unspecified chronicity           Treatment Diagnosis M25.612 Stiffness of Left Shoulder  M25.512  Left Shoulder Pain  M79.622  Pain in Left Upper Arm  Z73.6 Limitation of Activities Due to Disability  Z74.1 Need for Assistance with Personal Care   Date of Evaluation 25   Additional Pertinent History Mary Lou Flores has a past medical history of Anxiety, Arthritis, Cancer (HCC), CHF (congestive heart failure) (HCC), COPD (chronic obstructive pulmonary disease) (HCC), Depression, Enlargement, spleen, Fatty liver, GERD (gastroesophageal reflux disease), Headache, Hepatitis A, Hypertension, Ovarian cyst, Oxygen dependent, Pneumonia, and Sleep apnea.  she has a past surgical history that includes [place] tooth extraction; Upper gastrointestinal endoscopy; transthoracic echocardiogram (); transthoracic echocardiogram (); cardiovascular stress test (); CT NEEDLE BIOPSY LUNG PERCUTANEOUS (2022); and Lung biopsy (2023).     Allergies Allergies   Allergen Reactions    Azithromycin Diarrhea, Hives, Itching, Rash, Shortness Of Breath and Swelling    Environmental/Seasonal Other (See Comments)     Seasonal Allergies     Pcn [Penicillins] Hives    Wasp Venom       Medications   Current Outpatient Medications:     Budeson-Glycopyrrol-Formoterol (BREZTRI

## 2025-07-01 ENCOUNTER — HOSPITAL ENCOUNTER (OUTPATIENT)
Dept: OCCUPATIONAL THERAPY | Age: 60
Setting detail: THERAPIES SERIES
End: 2025-07-01
Payer: MEDICARE

## 2025-07-03 ENCOUNTER — HOSPITAL ENCOUNTER (OUTPATIENT)
Dept: OCCUPATIONAL THERAPY | Age: 60
Setting detail: THERAPIES SERIES
Discharge: HOME | End: 2025-07-03
Payer: MEDICARE

## 2025-07-03 DIAGNOSIS — I10 ESSENTIAL HYPERTENSION: ICD-10-CM

## 2025-07-03 PROCEDURE — 97535 SELF CARE MNGMENT TRAINING: CPT

## 2025-07-03 PROCEDURE — 97110 THERAPEUTIC EXERCISES: CPT

## 2025-07-03 RX ORDER — CLONIDINE HYDROCHLORIDE 0.1 MG/1
0.1 TABLET ORAL DAILY
Qty: 90 TABLET | Refills: 1 | Status: SHIPPED | OUTPATIENT
Start: 2025-07-03

## 2025-07-03 NOTE — PROGRESS NOTES
progressing toward goals. Patient relates that she could tell that her arm was moving more after therapy evaluation.     GOALS:  Patient Goal: Be able to put my hair up by myself.     Short Term Goals:  Time Frame: 4 weeks  Be independent with HEP as instructed to increase her ability to eventually put her hair in ponytail.  Increase active left shoulder flexion to 90, abduction to 100, ER at side to 10, and get left thumb within 3\" of spine at waistband behind back to increase her ability to complete dressing tasks.  Increase passive left shoulder flexion to 135, abduction to 105, and ER at side to 20 to increase her flexibility to allow patient to use left arm for basic daily tasks.  Report pain going no higher than 4/10 at any time in left UE to allow patient to use left arm to assist with hair care.    Long Term Goals:  Time Frame: 12 weeks  Report being able to use left arm on steering wheel with no more than minimal difficulty.  Report being able to put hair in ponytail in adapted fashion if needed but with no assistance.  Report being able to don jacket without difficulty.  Be able to retrieve lightweight item from bottom shelf of upper cupboard using left UE without difficulty.    Patient Education:   [x]  HEP/Education Completed: Plan of Care, Goals, scapular retraction, backward shoulder circles, reclined dowel for shoulder flexion and chest press, standing dowel for shoulder extension and IR up back  7/3/25: education regarding home pulley system; proper breathing technique  []  No new Education completed  []  Reviewed Prior HEP      [x]  Patient verbalized and/or demonstrated understanding of education provided.  []  Patient unable to verbalize and/or demonstrate understanding of education provided.  Will continue education.  [x]  Barriers to learning: none    PLAN:      []  Plan of care initiated.  Plan to see patient 2 times per week for 12 weeks to address the treatment planned outlined above.  [x]

## 2025-07-08 ENCOUNTER — HOSPITAL ENCOUNTER (OUTPATIENT)
Dept: OCCUPATIONAL THERAPY | Age: 60
Setting detail: THERAPIES SERIES
Discharge: HOME | End: 2025-07-08
Payer: MEDICARE

## 2025-07-08 PROCEDURE — 97535 SELF CARE MNGMENT TRAINING: CPT

## 2025-07-08 PROCEDURE — 97110 THERAPEUTIC EXERCISES: CPT

## 2025-07-08 NOTE — PROGRESS NOTES
[facility]  OCCUPATIONAL THERAPY  [] EVALUATION  [x] DAILY NOTE (LAND) [] DAILY NOTE (AQUATIC ) [] PROGRESS NOTE [] DISCHARGE NOTE    [] OUTPATIENT REHABILITATION CENTER [facility]   [] [place] [facility]    [x] [facility]   [] [facility]    Date: 2025  Patient Name:  Mary Lou Flores  : 1965  MRN: [id number]  CSN: [id number]    Referring Practitioner Teto Hurtado PA-C [id number]      Diagnosis  Diagnoses       S42.292D (ICD-10-CM) - Other closed displaced fracture of proximal end of left humerus with routine healing, subsequent encounter    M25.512 (ICD-10-CM) - Left shoulder pain, unspecified chronicity           Treatment Diagnosis M25.612 Stiffness of Left Shoulder  M25.512  Left Shoulder Pain  M79.622  Pain in Left Upper Arm  Z73.6 Limitation of Activities Due to Disability  Z74.1 Need for Assistance with Personal Care   Date of Evaluation 25   Additional Pertinent History Mary Lou Flores has a past medical history of Anxiety, Arthritis, Cancer (HCC), CHF (congestive heart failure) (HCC), COPD (chronic obstructive pulmonary disease) (HCC), Depression, Enlargement, spleen, Fatty liver, GERD (gastroesophageal reflux disease), Headache, Hepatitis A, Hypertension, Ovarian cyst, Oxygen dependent, Pneumonia, and Sleep apnea.  she has a past surgical history that includes [place] tooth extraction; Upper gastrointestinal endoscopy; transthoracic echocardiogram (); transthoracic echocardiogram (); cardiovascular stress test (); CT NEEDLE BIOPSY LUNG PERCUTANEOUS (2022); and Lung biopsy (2023).     Allergies Allergies   Allergen Reactions    Azithromycin Diarrhea, Hives, Itching, Rash, Shortness Of Breath and Swelling    Environmental/Seasonal Other (See Comments)     Seasonal Allergies     Pcn [Penicillins] Hives    Wasp Venom       Medications   Current Outpatient Medications:     cloNIDine (CATAPRES) 0.1 MG tablet, TAKE ONE tablet

## 2025-07-10 ENCOUNTER — HOSPITAL ENCOUNTER (OUTPATIENT)
Dept: OCCUPATIONAL THERAPY | Age: 60
Setting detail: THERAPIES SERIES
Discharge: HOME | End: 2025-07-10
Payer: MEDICARE

## 2025-07-10 PROCEDURE — 97110 THERAPEUTIC EXERCISES: CPT

## 2025-07-10 PROCEDURE — 97535 SELF CARE MNGMENT TRAINING: CPT

## 2025-07-10 NOTE — PROGRESS NOTES
hair up; seated ER at side with dowel  []  No new Education completed  []  Reviewed Prior HEP      [x]  Patient verbalized and/or demonstrated understanding of education provided.  []  Patient unable to verbalize and/or demonstrate understanding of education provided.  Will continue education.  [x]  Barriers to learning: none    PLAN:      []  Plan of care initiated.  Plan to see patient 2 times per week for 12 weeks to address the treatment planned outlined above.  [x]  Continue with current plan of care  []  Modify plan of care as follows:    []  Hold pending physician visit  []  Discharge    Time In 1500   Time Out 1600   Timed Code Minutes: 60 min   Total Treatment Time: 60 min       Kelle Baumann, OTR/L #[id number]

## 2025-07-15 ENCOUNTER — HOSPITAL ENCOUNTER (OUTPATIENT)
Dept: OCCUPATIONAL THERAPY | Age: 60
Setting detail: THERAPIES SERIES
End: 2025-07-15
Payer: MEDICARE

## 2025-07-17 ENCOUNTER — HOSPITAL ENCOUNTER (OUTPATIENT)
Dept: OCCUPATIONAL THERAPY | Age: 60
Setting detail: THERAPIES SERIES
Discharge: HOME | End: 2025-07-17
Payer: MEDICARE

## 2025-07-17 DIAGNOSIS — I10 ESSENTIAL HYPERTENSION: ICD-10-CM

## 2025-07-17 PROCEDURE — 97110 THERAPEUTIC EXERCISES: CPT

## 2025-07-17 RX ORDER — CARVEDILOL 25 MG/1
25 TABLET ORAL 2 TIMES DAILY
Qty: 180 TABLET | Refills: 1 | Status: SHIPPED | OUTPATIENT
Start: 2025-07-17

## 2025-07-17 NOTE — PROGRESS NOTES
[facility]  OCCUPATIONAL THERAPY  [] EVALUATION  [x] DAILY NOTE (LAND) [] DAILY NOTE (AQUATIC ) [] PROGRESS NOTE [] DISCHARGE NOTE    [] OUTPATIENT REHABILITATION CENTER [facility]   [] [place] [facility]    [x] [facility]   [] PEDRO [facility]    Date: 2025  Patient Name:  Mary Lou Flores  : 1965  MRN: [id number]  CSN: [id number]    Referring Practitioner Teto Hurtado PA-C [id number]      Diagnosis  Diagnoses       S42.292D (ICD-10-CM) - Other closed displaced fracture of proximal end of left humerus with routine healing, subsequent encounter    M25.512 (ICD-10-CM) - Left shoulder pain, unspecified chronicity           Treatment Diagnosis M25.612 Stiffness of Left Shoulder  M25.512  Left Shoulder Pain  M79.622  Pain in Left Upper Arm  Z73.6 Limitation of Activities Due to Disability  Z74.1 Need for Assistance with Personal Care   Date of Evaluation 25   Additional Pertinent History Mary Lou Flores has a past medical history of Anxiety, Arthritis, Cancer (HCC), CHF (congestive heart failure) (HCC), COPD (chronic obstructive pulmonary disease) (HCC), Depression, Enlargement, spleen, Fatty liver, GERD (gastroesophageal reflux disease), Headache, Hepatitis A, Hypertension, Ovarian cyst, Oxygen dependent, Pneumonia, and Sleep apnea.  she has a past surgical history that includes [place] tooth extraction; Upper gastrointestinal endoscopy; transthoracic echocardiogram (); transthoracic echocardiogram (); cardiovascular stress test (); CT NEEDLE BIOPSY LUNG PERCUTANEOUS (2022); and Lung biopsy (2023).     Allergies Allergies   Allergen Reactions    Azithromycin Diarrhea, Hives, Itching, Rash, Shortness Of Breath and Swelling    Environmental/Seasonal Other (See Comments)     Seasonal Allergies     Pcn [Penicillins] Hives    Wasp Venom       Medications   Current Outpatient Medications:     carvedilol (COREG) 25 MG tablet, TAKE ONE tablet

## 2025-07-22 ENCOUNTER — HOSPITAL ENCOUNTER (OUTPATIENT)
Dept: OCCUPATIONAL THERAPY | Age: 60
Setting detail: THERAPIES SERIES
Discharge: HOME | End: 2025-07-22
Payer: MEDICARE

## 2025-07-22 ENCOUNTER — TELEPHONE (OUTPATIENT)
Dept: FAMILY MEDICINE CLINIC | Age: 60
End: 2025-07-22

## 2025-07-22 PROCEDURE — 97110 THERAPEUTIC EXERCISES: CPT

## 2025-07-22 PROCEDURE — 97140 MANUAL THERAPY 1/> REGIONS: CPT

## 2025-07-22 NOTE — PROGRESS NOTES
[facility]  OCCUPATIONAL THERAPY  [] EVALUATION  [x] DAILY NOTE (LAND) [] DAILY NOTE (AQUATIC ) [] PROGRESS NOTE [] DISCHARGE NOTE    [] OUTPATIENT REHABILITATION CENTER [facility]   [] [place] [facility]    [x] [facility]   [] [facility]    Date: 2025  Patient Name:  Mary Lou Flores  : 1965  MRN: [id number]  CSN: [id number]    Referring Practitioner Teto Hurtado PA-C [id number]      Diagnosis  Diagnoses       S42.292D (ICD-10-CM) - Other closed displaced fracture of proximal end of left humerus with routine healing, subsequent encounter    M25.512 (ICD-10-CM) - Left shoulder pain, unspecified chronicity           Treatment Diagnosis M25.612 Stiffness of Left Shoulder  M25.512  Left Shoulder Pain  M79.622  Pain in Left Upper Arm  Z73.6 Limitation of Activities Due to Disability  Z74.1 Need for Assistance with Personal Care   Date of Evaluation 25   Additional Pertinent History Mary Lou Flores has a past medical history of Anxiety, Arthritis, Cancer (HCC), CHF (congestive heart failure) (HCC), COPD (chronic obstructive pulmonary disease) (HCC), Depression, Enlargement, spleen, Fatty liver, GERD (gastroesophageal reflux disease), Headache, Hepatitis A, Hypertension, Ovarian cyst, Oxygen dependent, Pneumonia, and Sleep apnea.  she has a past surgical history that includes [place] tooth extraction; Upper gastrointestinal endoscopy; transthoracic echocardiogram (); transthoracic echocardiogram (); cardiovascular stress test (); CT NEEDLE BIOPSY LUNG PERCUTANEOUS (2022); and Lung biopsy (2023).     Allergies Allergies   Allergen Reactions    Azithromycin Diarrhea, Hives, Itching, Rash, Shortness Of Breath and Swelling    Environmental/Seasonal Other (See Comments)     Seasonal Allergies     Pcn [Penicillins] Hives    Wasp Venom       Medications   Current Outpatient Medications:     carvedilol (COREG) 25 MG tablet, TAKE ONE tablet

## 2025-07-22 NOTE — TELEPHONE ENCOUNTER
JASON Huggins - CNP, from below, appointment with you 7/23/25 - identified with a care gap for ASCVD without a statin  - Would patient benefit from rosuvastatin 10mg daily and/or updated lipid panel?  Per patient chart review: 60yof with HTN, current smoker, last  (in 2023) and 10-year ASCVD risk 14%. Appears previous atorvastatin 20mg daily - unclear, possibly self-stopped?    Please let me know if I can further assist, or if you would like me to try to reach patient to discuss any further.  Thank you,  Caitlyn Gardner, PharmD, BCACP  Population Health Pharmacist  [facility] Clinical Pharmacy  Department, toll free: [phone], option 1    ==============================================================  [facility] CLINICAL PHARMACY: STATIN THERAPY REVIEW  Identified statin use in persons with cardiovascular disease care gap per [employer]. Records dated: 7/14/25.     Last Visit: 6/9/25  Next Visit: 7/23/25    Per insurer report, LIS-3 - co-pays are $0.00 through all phases of the benefit, regardless of the days' supply dispensed.    Patient also appears to be prescribed: ACE/ARB (ACEi 96% PDC in 2024 and 91% YTD; 30ds lisinopril last filled 6/24/25)    ASSESSMENT of Statin in Persons with Cardiovascular Disease Care Gap    Mary Lou Flores  has been identified as having a diagnosis for clinical ASCVD or event (e.g., inpatient hospitalization for MI, CABG, PCI or other revascularization procedures) in the measurement year and not currently filling a moderate or high intensity statin.   Patients included in this care gap are males age 21-75 and females age 40-75.     ASCVD: angina (6/9/25 acute PCP visit - referred for stress test, not yet scheduled)    Lab Results   Component Value Date/Time    CHOL 220 06/21/2023 11:38 AM    TRIG 237 06/21/2023 11:38 AM    HDL 42 06/21/2023 11:38 AM     06/21/2023 11:38 AM    VLDL NOT REPORTED 09/19/2019 08:25 AM     ALT   Date Value Ref Range Status

## 2025-07-23 ENCOUNTER — OFFICE VISIT (OUTPATIENT)
Dept: FAMILY MEDICINE CLINIC | Age: 60
End: 2025-07-23

## 2025-07-23 VITALS
TEMPERATURE: 98 F | RESPIRATION RATE: 15 BRPM | BODY MASS INDEX: 35.48 KG/M2 | DIASTOLIC BLOOD PRESSURE: 70 MMHG | HEART RATE: 98 BPM | SYSTOLIC BLOOD PRESSURE: 128 MMHG | OXYGEN SATURATION: 96 % | WEIGHT: 194 LBS

## 2025-07-23 DIAGNOSIS — K21.9 GASTROESOPHAGEAL REFLUX DISEASE WITHOUT ESOPHAGITIS: ICD-10-CM

## 2025-07-23 DIAGNOSIS — F41.9 ANXIETY: ICD-10-CM

## 2025-07-23 DIAGNOSIS — M75.02 ADHESIVE CAPSULITIS OF LEFT SHOULDER: ICD-10-CM

## 2025-07-23 DIAGNOSIS — J44.9 CHRONIC OBSTRUCTIVE PULMONARY DISEASE, UNSPECIFIED COPD TYPE (HCC): ICD-10-CM

## 2025-07-23 DIAGNOSIS — Z12.11 SCREEN FOR COLON CANCER: ICD-10-CM

## 2025-07-23 DIAGNOSIS — M54.50 ACUTE MIDLINE LOW BACK PAIN WITHOUT SCIATICA: ICD-10-CM

## 2025-07-23 DIAGNOSIS — J44.1 COPD EXACERBATION (HCC): ICD-10-CM

## 2025-07-23 DIAGNOSIS — I10 ESSENTIAL HYPERTENSION: Primary | ICD-10-CM

## 2025-07-23 DIAGNOSIS — S42.295A OTHER CLOSED NONDISPLACED FRACTURE OF PROXIMAL END OF LEFT HUMERUS, INITIAL ENCOUNTER: ICD-10-CM

## 2025-07-23 LAB
BILIRUBIN, POC: NEGATIVE
BLOOD URINE, POC: NEGATIVE
CLARITY, POC: CLEAR
COLOR, POC: YELLOW
GLUCOSE URINE, POC: NEGATIVE MG/DL
KETONES, POC: NEGATIVE MG/DL
LEUKOCYTE EST, POC: NEGATIVE
NITRITE, POC: NEGATIVE
PH, POC: 5.5
PROTEIN, POC: NEGATIVE MG/DL
SPECIFIC GRAVITY, POC: 1.01
UROBILINOGEN, POC: 0.2 MG/DL

## 2025-07-23 RX ORDER — ROSUVASTATIN CALCIUM 5 MG/1
5 TABLET, COATED ORAL DAILY
Qty: 90 TABLET | Refills: 1 | Status: SHIPPED | OUTPATIENT
Start: 2025-07-23

## 2025-07-23 ASSESSMENT — ENCOUNTER SYMPTOMS
RESPIRATORY NEGATIVE: 1
EYES NEGATIVE: 1
GASTROINTESTINAL NEGATIVE: 1

## 2025-07-23 NOTE — PROGRESS NOTES
Mary Lou Flores is a 60 y.o. female who presents today for :  Chief Complaint   Patient presents with    3 Month Follow-Up     COPD    Lower Back Pain     Vitals:    07/23/25 1303   BP: 128/70   Pulse: 98   Resp: 15   Temp: 98 °F (36.7 °C)   SpO2: 96%       HPI:     History of Present Illness  The patient presents for evaluation of cholesterol management, urinary issues, arm pain, and mood issues.    She was previously on Lipitor but discontinued it due to leg and foot swelling. Currently, she is not on any cholesterol medication. She is open to trying a different cholesterol medication if deemed necessary.    She reports a decrease in urination despite taking Bumex twice daily. She also experiences back pain, which she attributes to her mattress. Her fluid intake varies, sometimes consuming two large cups of water with ice daily, and other times less. She mentions that alcohol consumption may have increased her urination frequency.    She has been attending physical therapy for her arm, which she can now lift, although with difficulty. She rates her pain as 4 or 5 out of 10. She fractured her shoulder on 03/23/2025. She had to reschedule a heart test because she was unable to raise her arm above her head. She believes her blood pressure was elevated due to the pain. She was taking Advil frequently but has since stopped. She is scheduled for a PET scan next month. She was prescribed tramadol for pain but discontinued it due to dizziness and lightheadedness.    Her mood is generally good, but she expresses anger towards her . She recalls an incident where her blood pressure spiked to 199/100, prompting a hospital visit. She felt unwell and sought medical attention, but her  did not accompany her. She also mentions that he did not visit her when she fractured her shoulder. She feels stressed by her 's behavior and is considering divorce but feels financially dependent on him.    She has not

## 2025-07-23 NOTE — TELEPHONE ENCOUNTER
Noted new rosuvastatin rx with today's visit (note in progress).    Will send patient [employer] message re 90 / 100-ds rxs - appears most rxs written for 90ds, possibly pharmacy fills for 30ds?    =======================================================   For Pharmacy Admin Tracking Only    Program: [employer]  CPA in place:  No  Recommendation Provided To: Provider: 1 via Note to Provider  Intervention Detail: New Rx: 1, reason: Needs Additional Therapy  Intervention Accepted By: Provider: 1  Gap Closed?: No   Time Spent (min): 15

## 2025-07-24 ENCOUNTER — HOSPITAL ENCOUNTER (OUTPATIENT)
Dept: OCCUPATIONAL THERAPY | Age: 60
Setting detail: THERAPIES SERIES
End: 2025-07-24
Payer: MEDICARE

## 2025-07-29 ENCOUNTER — HOSPITAL ENCOUNTER (OUTPATIENT)
Dept: OCCUPATIONAL THERAPY | Age: 60
Setting detail: THERAPIES SERIES
End: 2025-07-29
Payer: MEDICARE

## 2025-07-31 ENCOUNTER — HOSPITAL ENCOUNTER (OUTPATIENT)
Dept: OCCUPATIONAL THERAPY | Age: 60
Setting detail: THERAPIES SERIES
Discharge: HOME | End: 2025-07-31
Payer: MEDICARE

## 2025-07-31 PROCEDURE — 97110 THERAPEUTIC EXERCISES: CPT

## 2025-07-31 NOTE — PROGRESS NOTES
[facility]  OCCUPATIONAL THERAPY  [] EVALUATION  [x] DAILY NOTE (LAND) [] DAILY NOTE (AQUATIC ) [] PROGRESS NOTE [] DISCHARGE NOTE    [] OUTPATIENT REHABILITATION CENTER [facility]   [] [place] [facility]    [x] [facility]   [] P[facility]    Date: 2025  Patient Name:  Mary Lou Flores  : 1965  MRN: [id number]  CSN: [id number]    Referring Practitioner Teto Hurtado PA-C [id number]      Diagnosis  Diagnoses       S42.292D (ICD-10-CM) - Other closed displaced fracture of proximal end of left humerus with routine healing, subsequent encounter    M25.512 (ICD-10-CM) - Left shoulder pain, unspecified chronicity           Treatment Diagnosis M25.612 Stiffness of Left Shoulder  M25.512  Left Shoulder Pain  M79.622  Pain in Left Upper Arm  Z73.6 Limitation of Activities Due to Disability  Z74.1 Need for Assistance with Personal Care   Date of Evaluation 25   Additional Pertinent History Mary Lou Flores has a past medical history of Anxiety, Arthritis, Cancer (HCC), CHF (congestive heart failure) (HCC), COPD (chronic obstructive pulmonary disease) (HCC), Depression, Enlargement, spleen, Fatty liver, GERD (gastroesophageal reflux disease), Headache, Hepatitis A, Hypertension, Ovarian cyst, Oxygen dependent, Pneumonia, and Sleep apnea.  she has a past surgical history that includes [place] tooth extraction; Upper gastrointestinal endoscopy; transthoracic echocardiogram (); transthoracic echocardiogram (); cardiovascular stress test (); CT NEEDLE BIOPSY LUNG PERCUTANEOUS (2022); and Lung biopsy (2023).     Allergies Allergies   Allergen Reactions    Azithromycin Diarrhea, Hives, Itching, Rash, Shortness Of Breath and Swelling    Environmental/Seasonal Other (See Comments)     Seasonal Allergies     Pcn [Penicillins] Hives    Wasp Venom       Medications   Current Outpatient Medications:     rosuvastatin (CRESTOR) 5 MG tablet, Take 1 tablet

## 2025-08-07 ENCOUNTER — HOSPITAL ENCOUNTER (OUTPATIENT)
Dept: OCCUPATIONAL THERAPY | Age: 60
Setting detail: THERAPIES SERIES
Discharge: HOME | End: 2025-08-07
Payer: MEDICARE

## 2025-08-07 PROCEDURE — 97110 THERAPEUTIC EXERCISES: CPT

## 2025-08-12 ENCOUNTER — HOSPITAL ENCOUNTER (OUTPATIENT)
Dept: OCCUPATIONAL THERAPY | Age: 60
Setting detail: THERAPIES SERIES
End: 2025-08-12
Payer: MEDICARE

## 2025-08-18 ENCOUNTER — HOSPITAL ENCOUNTER (OUTPATIENT)
Dept: PET IMAGING | Age: 60
Discharge: HOME | End: 2025-08-18
Payer: MEDICARE

## 2025-08-18 DIAGNOSIS — C34.12 PRIMARY MALIGNANT NEOPLASM OF LEFT UPPER LOBE OF LUNG (HCC): ICD-10-CM

## 2025-08-18 PROCEDURE — 78815 PET IMAGE W/CT SKULL-THIGH: CPT

## 2025-08-18 PROCEDURE — 3430000000 HC RX DIAGNOSTIC RADIOPHARMACEUTICAL

## 2025-08-18 PROCEDURE — A9609 HC RX DIAGNOSTIC RADIOPHARMACEUTICAL: HCPCS

## 2025-08-18 RX ORDER — FLUDEOXYGLUCOSE F 18 200 MCI/ML
10.1 INJECTION, SOLUTION INTRAVENOUS
Status: COMPLETED | OUTPATIENT
Start: 2025-08-18 | End: 2025-08-18

## 2025-08-18 RX ADMIN — FLUDEOXYGLUCOSE F 18 10.1 MILLICURIE: 200 INJECTION, SOLUTION INTRAVENOUS at 09:41

## 2025-08-21 ENCOUNTER — HOSPITAL ENCOUNTER (OUTPATIENT)
Dept: OCCUPATIONAL THERAPY | Age: 60
Setting detail: THERAPIES SERIES
Discharge: HOME | End: 2025-08-21
Payer: MEDICARE

## 2025-08-21 PROCEDURE — 97110 THERAPEUTIC EXERCISES: CPT

## 2025-08-26 ENCOUNTER — APPOINTMENT (OUTPATIENT)
Dept: OCCUPATIONAL THERAPY | Age: 60
End: 2025-08-26
Payer: MEDICARE

## 2025-08-26 ENCOUNTER — HOSPITAL ENCOUNTER (OUTPATIENT)
Dept: RADIATION ONCOLOGY | Age: 60
Discharge: HOME | End: 2025-08-26
Payer: MEDICARE

## 2025-08-26 VITALS
RESPIRATION RATE: 18 BRPM | WEIGHT: 193 LBS | BODY MASS INDEX: 35.3 KG/M2 | DIASTOLIC BLOOD PRESSURE: 59 MMHG | TEMPERATURE: 98.2 F | HEART RATE: 100 BPM | OXYGEN SATURATION: 94 % | SYSTOLIC BLOOD PRESSURE: 128 MMHG

## 2025-08-26 DIAGNOSIS — C34.12 PRIMARY MALIGNANT NEOPLASM OF LEFT UPPER LOBE OF LUNG (HCC): ICD-10-CM

## 2025-08-26 DIAGNOSIS — R19.00 PELVIC MASS: Primary | ICD-10-CM

## 2025-08-26 PROCEDURE — 99214 OFFICE O/P EST MOD 30 MIN: CPT

## 2025-08-26 PROCEDURE — 99212 OFFICE O/P EST SF 10 MIN: CPT

## 2025-08-26 ASSESSMENT — ENCOUNTER SYMPTOMS
ABDOMINAL PAIN: 0
RECTAL PAIN: 0
COUGH: 1
VOMITING: 0
WHEEZING: 0
TROUBLE SWALLOWING: 0
NAUSEA: 1
BACK PAIN: 1
SHORTNESS OF BREATH: 1
BLOOD IN STOOL: 0
SORE THROAT: 0
APNEA: 0
CHEST TIGHTNESS: 0

## 2025-08-26 ASSESSMENT — PAIN SCALES - GENERAL: PAINLEVEL_OUTOF10: 7

## 2025-08-26 ASSESSMENT — PAIN DESCRIPTION - LOCATION: LOCATION: SHOULDER

## 2025-08-26 ASSESSMENT — PAIN DESCRIPTION - ORIENTATION: ORIENTATION: LEFT

## 2025-08-28 ENCOUNTER — HOSPITAL ENCOUNTER (OUTPATIENT)
Dept: OCCUPATIONAL THERAPY | Age: 60
Setting detail: THERAPIES SERIES
Discharge: HOME | End: 2025-08-28
Payer: MEDICARE

## 2025-08-28 PROCEDURE — 97110 THERAPEUTIC EXERCISES: CPT

## 2025-09-04 ENCOUNTER — OFFICE VISIT (OUTPATIENT)
Dept: OBGYN | Age: 60
End: 2025-09-04

## 2025-09-04 VITALS
SYSTOLIC BLOOD PRESSURE: 128 MMHG | WEIGHT: 194.6 LBS | BODY MASS INDEX: 35.81 KG/M2 | RESPIRATION RATE: 18 BRPM | HEART RATE: 106 BPM | DIASTOLIC BLOOD PRESSURE: 64 MMHG | OXYGEN SATURATION: 97 % | HEIGHT: 62 IN

## 2025-09-04 DIAGNOSIS — J41.1 MUCOPURULENT CHRONIC BRONCHITIS (HCC): ICD-10-CM

## 2025-09-04 DIAGNOSIS — C34.12 PRIMARY MALIGNANT NEOPLASM OF LEFT UPPER LOBE OF LUNG (HCC): ICD-10-CM

## 2025-09-04 DIAGNOSIS — C80.1 CANCER (HCC): ICD-10-CM

## 2025-09-04 DIAGNOSIS — J43.9 PULMONARY EMPHYSEMA, UNSPECIFIED EMPHYSEMA TYPE (HCC): ICD-10-CM

## 2025-09-04 DIAGNOSIS — Z12.31 ENCOUNTER FOR SCREENING MAMMOGRAM FOR MALIGNANT NEOPLASM OF BREAST: ICD-10-CM

## 2025-09-04 DIAGNOSIS — Z87.891 HISTORY OF TOBACCO ABUSE: ICD-10-CM

## 2025-09-04 DIAGNOSIS — J44.1 COPD EXACERBATION (HCC): ICD-10-CM

## 2025-09-04 DIAGNOSIS — G47.33 OSA (OBSTRUCTIVE SLEEP APNEA): ICD-10-CM

## 2025-09-04 DIAGNOSIS — N83.201 CYST OF RIGHT OVARY: Primary | ICD-10-CM

## 2025-09-04 DIAGNOSIS — I10 HYPERTENSION, UNCONTROLLED: ICD-10-CM
